# Patient Record
Sex: FEMALE | Race: WHITE
[De-identification: names, ages, dates, MRNs, and addresses within clinical notes are randomized per-mention and may not be internally consistent; named-entity substitution may affect disease eponyms.]

---

## 2017-09-16 NOTE — ERD
ER Documentation


Chief Complaint


Date/Time


DATE: 17 


TIME: 20:56


Chief Complaint


shortness x 1 week, hx asthma





HPI


Patient is a 46-year-old female with a history of asthma, breast cancer with 

mets to lungs and liver (last treatment over 1.5 years old), recurrent pleural 

effusions who presents to the emergency department for concerns of shortness of 

breath 1 week.  Patient states that her shortness of breath occurs 

episodically.  Patient states that she feel SOB when walking long distances. 

Patient denies any SOB at rest.  Patient reports difficulty with taking a deep 

breath.  Patient also reports intermittent episodes of palpitations.  She 

states she states that her episodes last less than 1 minute.  Patient denies 

any chest pain.  Patient denies any fever, chills, left upper extremity pain, 

nausea, vomiting, diaphoresis, loss of consciousness.  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 

has a desk job.  Patient denies any heavy lifting or recent trauma.  Patient 

denies any bilateral leg swelling, calf pain,recent surgeries, prolonged travel

, hormone use, history of DVT or PE.





ROS


All systems reviewed and are negative except as per history of present illness.





Medications


Home Meds


Active Scripts


Ferrous Sulfate* (Ferrous Sulfate*) 325 Mg Tabec, 325 MG PO BID, #60 TAB


   Prov:MAGDALENA PRICE PA-C         17


Diphenhydramine Hcl* (Benadryl*) 25 Mg Cap, 25 MG PO Q6, #30 CAP


   Prov:GILBERTO TIPTON NP         16


Tramadol HCl (Tramadol HCl) 50 Mg Tablet, 50 MG PO Q6 Y for PAIN, #20 TAB


   Prov:GILBERTO TIPTON NP         16


Hydrocodone Bit/Acetaminophen (Anexsia 5-325 Mg Tablet) 1 Tab Tablet, 1 TAB PO 

Q6H Y for MODERATE PAIN LEVEL 4-6, #20 TAB


   Prov:SAMEER ROE NP         16


Venlafaxine Hcl* (Venlafaxine Hcl ER*) 37.5 Mg Capsr, 37.5 MG PO DAILY for 30 

Days, CAP


   Prov:ROESAMEER NP         16


Apixaban* (Eliquis*) 5 Mg Tablet, 5 MG PO BID for 90 Days, TAB


   Prov:SAMEER ROE NP         16


Tamoxifen Citrate* (Nolvadex*) 10 Mg Tab, 20 MG PO DAILY for 30 Days, TAB


   Prov:SAMEER ROE NP         16


Reported Medications


Tamoxifen Citrate* (Tamoxifen Citrate*) 20 Mg Tab, 20 MG PO DAILY, TAB


   16





Allergies


Allergies:  


Coded Allergies:  


     Penicillins (Verified  Allergy, Mild, 16)


     hydrocodone bit (Verified  Adverse Reaction, Mild, HEADACHE, NAUSEA AND 

VOMITING, 16)





PMhx/Soc


History of Surgery:  Yes (RT MASTECTOMY 2013, BTL)


Anesthesia Reaction:  No


Hx Neurological Disorder:  No


Hx Respiratory Disorders:  Yes (PLEURAL EFFUSION REQUIRING CHEST TUBE )


Hx Cardiac Disorders:  No


Hx Psychiatric Problems:  No


Hx Miscellaneous Medical Probl:  Yes (breast ca)


Hx Alcohol Use:  No


Hx Substance Use:  No


Hx Tobacco Use:  No


Smoking Status:  Never smoker





Physical Exam


Vitals





Vital Signs








  Date Time  Temp Pulse Resp B/P Pulse Ox O2 Delivery O2 Flow Rate FiO2


 


17 01:38 98.3 89 20 137/68 97   


 


17 20:05 98.7 89 20 143/73 97   








Physical Exam


GENERAL: Well-developed, well-nourished female. Appears in no acute distress. 

No abdominal retractions, no nasal flaring.


HEAD: Normocephalic, atraumatic. 


EYES: Pupils are equally reactive bilaterally. EOMs grossly intact. No 

conjunctival erythema. 


ENT: Moist mucous membranes. No uvula deviation. No kissing tonsils. 


NECK: Supple. No meningismus. Normal range of motion of the neck.


LUNG: Clear to auscultation bilaterally. No rhonchi, wheezing, rales or coarse 

breath sounds. 


HEART: Regular rate and rhythm. No murmurs, rubs or gallops.


BACK: No midline tenderness. 


EXTREMITIES: Equal pulses bilaterally. No peripheral clubbing, cyanosis or 

edema. No unilateral leg swelling.


NEUROLOGIC: Alert and oriented. Moving all four extremities without any 

difficulty. Normal speech. Steady gait. 


SKIN: Normal color. Warm and dry. No rashes or lesions.


Result Diagram:  


17





Results 24 hrs





 Laboratory Tests








Test


  17


21:30


 


White Blood Count 11.310^3/ul 


 


Red Blood Count 4.4610^6/ul 


 


Hemoglobin 9.6g/dl 


 


Hematocrit 31.3% 


 


Mean Corpuscular Volume 70.2fl 


 


Mean Corpuscular Hemoglobin 21.5pg 


 


Mean Corpuscular Hemoglobin


Concent 30.7g/dl 


 


 


Red Cell Distribution Width 17.9% 


 


Platelet Count 97045^3/UL 


 


Mean Platelet Volume 9.1fl 


 


Neutrophils % 61.1% 


 


Lymphocytes % 28.8% 


 


Monocytes % 7.9% 


 


Eosinophils % 1.3% 


 


Basophils % 0.5% 


 


Nucleated Red Blood Cells % 0.0/100WBC 


 


Neutrophils # 6.910^3/ul 


 


Lymphocytes # 3.310^3/ul 


 


Monocytes # 0.910^3/ul 


 


Eosinophils # 0.210^3/ul 


 


Basophils # 0.110^3/ul 


 


Nucleated Red Blood Cells # 0.010^3/ul 


 


Sodium Level 139mmol/L 


 


Potassium Level 3.8mmol/L 


 


Chloride Level 108mmol/L 


 


Carbon Dioxide Level 21mmol/L 


 


Anion Gap 14 


 


Blood Urea Nitrogen 17mg/dl 


 


Creatinine 0.71mg/dl 


 


Glucose Level 108mg/dl 


 


Calcium Level 9.0mg/dl 


 


Troponin I < 0.012ng/ml 


 


B-Type Natriuretic Peptide 69PG/ML 











Procedures/MDM


ED COURSE:


The patient was stable throughout ED course. I kept the patient and/or family 

informed of laboratory and diagnostic imaging results throughout the ED course.

  





EKG:


Read by Dr. Wilson, attending physician.


EKG shows normal sinus rhythm at a rate of 72 bpm.


No arrhythmias, acute ST elevations or T wave changes were noted.


 


DIAGNOSTIC IMAGING:


Read by radiologist.








Patient: TULIO MCGOWAN   : 1970   Age: 46  Sex: F                   

     


 MR #:    [id number]   [id number]    DOS: 17


 Ordering MD: MAGDALENA PRICE PA-C   Location:  [facility]   Room/Bed:                 

                           


 








PROCEDURE:   XR Chest. 


 


CLINICAL INDICATION:   Shortness of breath.   


 


TECHNIQUE:   Portable AP semi erect view of the chest was obtained. 


 


COMPARISON:   2016 


 


FINDINGS:


 


The cardiomediastinal silhouette is within upper normal limits.  The right 

lower lobe consolidation appears slightly worse likely compressive atelectasis 

from an enlarging right pleural effusion.  The left lung is clear. There is no 

left pleural effusion or pulmonary vascular congestion.  The osseous structures 

are intact with no evidence for acute abnormality.  


 


RPTAT:HJJR


 


IMPRESSION:


 


Slight interval enlargement of chronic right pleural effusion and associated 

compressive atelectasis of the right lower lobe as compared to 2016. 

Chronic empyema is difficult to exclude. Consider follow-up evaluation.


_____________________________________________ 


Physician Blanka           Date    Time 


Electronically viewed and signed by Physician Blanka on 2017 22:36 


 


D:  2017 22:36  T:  2017 22:36


JR/





CC: MAGDALENA PRICE PA-C











 Patient: TULIO MCGOWAN   : 1970   Age: 46  Sex: F                  

      


 MR #:    [id number]   Acct #:   [id number]    DOS: 17 2312


 Ordering MD: MAGDALENA PRICE PA-C   Location:  [facility]   Room/Bed:                 

                           


 








PROCEDURE:   CT chest without contrast 


 


CLINICAL INDICATION:   Shortness of breath


 


TECHNIQUE:   CT scan of the chest with contrast was performed without 

intravenous contrast.  Coronal and sagittal images were reformatted.  One or 

more of the following dose reduction techniques were used: Automated exposure 

control, adjustment of the mA and/or kV according to patient size, use of 

iterative reconstruction technique.


The CTDIvol = 16.20 mGy and DLP = 618.05 mGycm.


 


COMPARISON:   Chest x-ray 2017 and 2016 


 


FINDINGS:


 


Lungs, airway and pleura:  The trachea and bronchi are patent as well as normal 

in caliber.  In addition to the compressive atelectasis of the right lower and 

middle lobe seen on the chest x-ray, there are multiple nodules scattered 

throughout both lungs ranging in size from 4 mm up to 1 cm. These findings may 

reflect atypical infectious etiologies but metastatic disease is difficult to 

exclude.  The loculated right pleural effusion extends into the major fissure 

and has a slightly hyperdense periphery in the inferior right thorax, findings 

unable to exclude a chronic empyema. The right pleural fluid may also reflect 

carcinomatosis. The right pleural fluid occupies an estimated 40% of the right 

thorax. There is no evidence of a left pleural effusion.


 


Mediastinum, berhane and cardiovascular:  The heart is normal in size.  There is 

no evidence for pericardial effusion.  The thoracic aorta is normal in caliber.

  Paratracheal adenopathy is approximately 1.8 cm in short axis. There is 

subcarinal adenopathy of approximately 1.3 cm in short axis.  The esophagus is 

normal in caliber.


 


Osseous structures and musculoskeletal findings:  Sclerosis of the sternum is 

concerning for blastic metastatic disease. There is some spondylosis of the 

thoracic spine without additional evidence of osseous metastasis.  Changes of 

right mastectomy are present.  Right axillary adenopathy has a short axis of 

1.7 cm and cannot exclude metastatic disease.


 


Visualized upper abdomen:  Hypodense masses within the liver are present, a 

total of 5 lesions are visualized ranging in size from 7 mm up to 6.6 cm in the 

right hepatic lobe, findings compatible with hepatic metastases. Adenopathy of 

the pao hepatis region is present.  The adrenal glands are normal bilaterally.


 


RPTAT:HJJR


 


IMPRESSION:


 


1.  Loculated right pleural effusion is more concerning for carcinomatosis 

rather than a chronic empyema


2. Multiple scattered pulmonary nodules bilaterally in addition to compressive 

atelectasis of the right lower lobe are concerning for metastatic disease. 

Mediastinal adenopathy is also present and concerning for neoplastic 

involvement.


3.  Changes of right mastectomy are noted with right axillary lymphadenopathy, 

hepatic metastases and a sclerotic sternum focus, the combination of findings 

believed to reflect stage IV metastatic breast cancer. Consideration might be 

given for follow-up evaluation with PET.


_____________________________________________ 


Physician Blanka           Date    Time 


Electronically viewed and signed by Physician Blanka on 2017 23:54 


 


D:  2017 23:54  T:  2017 23:54


JR/





CC: MAGDALENA PRICE PA-C








MEDICAL DECISION MAKING:


This is a 46-year-old female with PMHx of breast cancer with mets to lung and 

liver who presents emergency department for concerns of shortness of breath and 

palpitations for the last week.  Patient describes her symptoms to be episodic 

in nature.  Patient denied any leg swelling, recent surgeries, travel, 

exogenous estrogen use. Vital signs were reviewed. Patient was afebrile. 

Patient was not hypoxic. 





CBC showed signs of a slight anemia.  Patient does not have any indications for 

an emergent blood transfusion at this time. BMP showed no evidence of 

electrolyte abnormalities, severe acidosis, alkalosis, renal failure. PERC 

score was 0.  Troponin was negative.  BNP was negative. EKG was obtained and 

showed normal sinus rhythm.  Reviewed by ED attending.  CXR showed slight 

interval enlargement of chronic right pleural effusion and associated 

compressive atelectasis of the right lower lobe as compared to 2016. 

Chronic empyema is difficult to exclude. Consider follow-up evaluation. 

Initially, I spoke to Dr. Wilson, supervising physician who advised me to order 

CT chest.





CT chest was obtained given the patient had a history of cancer.   CT chest 

showed  1.  Loculated right pleural effusion is more concerning for 

carcinomatosis rather than a chronic empyema 2. Multiple scattered pulmonary 

nodules bilaterally in addition to compressive atelectasis of the right lower 

lobe are concerning for metastatic disease. Mediastinal adenopathy is also 

present and concerning for neoplastic involvement. 3.  Changes of right 

mastectomy are noted with right axillary lymphadenopathy, hepatic metastases 

and a sclerotic sternum focus, the combination of findings believed to reflect 

stage IV metastatic breast cancer. Consideration might be given for follow-up 

evaluation with PET.





Discussed findings with Dr. Howard, my supervising physician at the time.  Dr. Howard also examined and spoke to the patient.  We had a long discussion with 

patient in regards to her CT findings. Patient was offered admission, however 

she declined.  Patient states she is barely getting her life back on track and 

wished to go home at this time.  Patient was advised that she may return at 

anytime for any new or worsening symptoms.  Patient was encouraged to follow-up 

with her oncologist  for further management of her pleural effusion and 

CT chest findings.  Patient understands and is agreeable with this plan.  

Patient's O2 sat remained above 95% throughout ED course with no signs of 

respiratory distress/failure.





At this time, patient's presentation is most consistent with pleural effusion 

and possible carcinomatosis. She also noted to have a slight anemia.  Patient 

was advised to take iron supplements. Low suspicion for ACS, pericarditis, 

pericardial tamponade, PE, pneumothorax, pneumonia, bronchitis. 





PRESCRIPTION:


Ferrous sulfate





DISCHARGE:


At this time, patient is stable for discharge and outpatient management. 

Patient was given a copy of all imaging studies and blood work obtained today. 

I have instructed the patient to follow-up with his/her primary care physician/ 

oncologist (Dr. Dawn) in 1-2 days. I have discussed with the patient the 

possibility of needing to see a specialist for further workup and imaging 

studies if symptoms persist. I have instructed the patient to promptly return 

to the ER for any new or worsening symptoms including increased pain, fever, 

nausea, vomiting, weakness or LOC. The patient and/or family expressed 

understanding of and agreement with this plan. All questions were answered. 

Home care instructions were provided. 





Disclaimer: Inadvertent spelling and grammatical errors are likely due to EHR/

dictation software use and do not reflect on the overall quality of patient 

care. Also, please note that the electronic time recorded on this note does not 

necessarily reflect the actual time of the patient encounter





Departure


Diagnosis:  


 Primary Impression:  


 Pleural effusion, right


 Additional Impressions:  


 Shortness of breath


 Carcinomatosis


Condition:  Stable


Patient Instructions:  Pleural Effusion, What Is Breast Cancer?


Referrals:  


JAVED GONZALEZ CARLOS M. MD ARORA,ANITRA HONEYCUTT MD, MD,SHIREEN CELESTIN,TORI FERRER,CAITLIN MANDUJANO MD





Additional Instructions:  








Call your primary care doctor TOMORROW for an appointment during the next 1-2 

days.See the doctor sooner or return here if your condition worsens before your 

appointment time.





Call Dr. Dawn, your oncologist was further management of your pleural 

effusion. Unable to rule out metastasis at this time.











MAGDALENA PRICE PA-C Sep 16, 2017 21:02

## 2017-09-16 NOTE — RADRPT
PROCEDURE:   CT chest without contrast 

 

CLINICAL INDICATION:   Shortness of breath

 

TECHNIQUE:   CT scan of the chest with contrast was performed without intravenous contrast.  Coronal
 and sagittal images were reformatted.  One or more of the following dose reduction techniques were 
used: Automated exposure control, adjustment of the mA and/or kV according to patient size, use of i
terative reconstruction technique.

The CTDIvol = 16.20 mGy and DLP = 618.05 mGycm.

 

COMPARISON:   Chest x-ray 09/16/2017 and 05/11/2016 

 

FINDINGS:

 

Lungs, airway and pleura:  The trachea and bronchi are patent as well as normal in caliber.  In wilfred
tion to the compressive atelectasis of the right lower and middle lobe seen on the chest x-ray, ther
e are multiple nodules scattered throughout both lungs ranging in size from 4 mm up to 1 cm. These f
indings may reflect atypical infectious etiologies but metastatic disease is difficult to exclude.  
The loculated right pleural effusion extends into the major fissure and has a slightly hyperdense pe
riphery in the inferior right thorax, findings unable to exclude a chronic empyema. The right pleura
l fluid may also reflect carcinomatosis. The right pleural fluid occupies an estimated 40% of the ri
ght thorax. There is no evidence of a left pleural effusion.

 

Mediastinum, berhane and cardiovascular:  The heart is normal in size.  There is no evidence for perica
rdial effusion.  The thoracic aorta is normal in caliber.  Paratracheal adenopathy is approximately 
1.8 cm in short axis. There is subcarinal adenopathy of approximately 1.3 cm in short axis.  The eso
phagus is normal in caliber.

 

Osseous structures and musculoskeletal findings:  Sclerosis of the sternum is concerning for blastic
 metastatic disease. There is some spondylosis of the thoracic spine without additional evidence of 
osseous metastasis.  Changes of right mastectomy are present.  Right axillary adenopathy has a short
 axis of 1.7 cm and cannot exclude metastatic disease.

 

Visualized upper abdomen:  Hypodense masses within the liver are present, a total of 5 lesions are v
isualized ranging in size from 7 mm up to 6.6 cm in the right hepatic lobe, findings compatible with
 hepatic metastases. Adenopathy of the pao hepatis region is present.  The adrenal glands are norm
al bilaterally.

 

RPTAT:HJJR

 

IMPRESSION:

 

1.  Loculated right pleural effusion is more concerning for carcinomatosis rather than a chronic emp
yema

2. Multiple scattered pulmonary nodules bilaterally in addition to compressive atelectasis of the ri
ght lower lobe are concerning for metastatic disease. Mediastinal adenopathy is also present and con
cerning for neoplastic involvement.

3.  Changes of right mastectomy are noted with right axillary lymphadenopathy, hepatic metastases an
d a sclerotic sternum focus, the combination of findings believed to reflect stage IV metastatic santos
ast cancer. Consideration might be given for follow-up evaluation with PET.

_____________________________________________ 

Physician Blanka           Date    Time 

Electronically viewed and signed by Physician Blanka on 09/16/2017 23:54 

 

D:  09/16/2017 23:54  T:  09/16/2017 23:54

/

## 2017-09-16 NOTE — RADRPT
PROCEDURE:   XR Chest. 

 

CLINICAL INDICATION:   Shortness of breath.   

 

TECHNIQUE:   Portable AP semi erect view of the chest was obtained. 

 

COMPARISON:   05/11/2016 

 

FINDINGS:

 

The cardiomediastinal silhouette is within upper normal limits.  The right lower lobe consolidation 
appears slightly worse likely compressive atelectasis from an enlarging right pleural effusion.  The
 left lung is clear. There is no left pleural effusion or pulmonary vascular congestion.  The osseou
s structures are intact with no evidence for acute abnormality.  

 

RPTAT:HJJR

 

IMPRESSION:

 

Slight interval enlargement of chronic right pleural effusion and associated compressive atelectasis
 of the right lower lobe as compared to 05/11/2016. Chronic empyema is difficult to exclude. Conside
r follow-up evaluation.

_____________________________________________ 

Physician Blanka           Date    Time 

Electronically viewed and signed by Physician Blanka on 09/16/2017 22:36 

 

D:  09/16/2017 22:36  T:  09/16/2017 22:36

/

## 2017-09-26 NOTE — RADRPT
PROCEDURE:   XR Chest. 

 

CLINICAL INDICATION:   Chest pain.

 

TECHNIQUE:   Single frontal view. 

 

COMPARISON:   09/16/2017. 

 

FINDINGS:

There is a large right pleural effusion and only minimal aeration of the right lung apex. The left l
jorge alberto is clear and there is no left pleural effusion. 

The heart size is normal.  There is calcification in the aorta consistent with atherosclerosis. 

There is no pneumothorax.   

 

IMPRESSION:

1.  Large right pleural effusion, larger than seen previously.

2.  Only minimal aeration of the right lung apex.

3.  Otherwise unremarkable study.

 

RPTAT: QQ

_____________________________________________ 

.Charly Espinal MD, MD           Date    Time 

Electronically viewed and signed by .Charly Espinal MD, MD on 09/26/2017 16:25 

 

D:  09/26/2017 16:25  T:  09/26/2017 16:25

.R/

## 2017-09-26 NOTE — ERA
ER Documentation


Chief Complaint


Date/Time


DATE: 9/26/17 


TIME: 16:41


Chief Complaint


sob when lying flat during mri, lung biopsy x 1 week, + cancer





HPI


This is a 46-year-old female with a history of right breast cancer who had her 

right pleural effusion drained 5 days ago.  Patient says she felt good 1-2 days 

after but it started getting short of breath.  She says she cannot walk very 

far and has orthopnea.  Patient was a code green at radiology department in the 

MRI scanner.  The patient apparently was getting an MRI of her brain when she 

was given IV contrast she became very dizzy.  The MRI was completed and when 

she was taken out of the scanner she was complaining of severe dizziness and 

did not feel well.  There is no rash no swelling of the face lips tongues or 

throat.  She had no chest pain.  She states that once she got to the ER here 

she was having profuse vomiting over and over that is nonbloody.  She says she 

felt better after she stopped vomiting.  She denies any recent fever abdominal 

pain diarrhea illness or productive cough





ROS


All systems reviewed and are negative except as per history of present illness.





Medications


Home Meds


Discontinued Reported Medications


Tamoxifen Citrate* (Tamoxifen Citrate*) 20 Mg Tab, 20 MG PO DAILY, TAB


   7/14/16


Discontinued Scripts


Ferrous Sulfate* (Ferrous Sulfate*) 325 Mg Tabec, 325 MG PO BID, #60 TAB


   Prov:MAGDALENA PRICE PA-C         9/17/17


Diphenhydramine Hcl* (Benadryl*) 25 Mg Cap, 25 MG PO Q6, #30 CAP


   Prov:GILBERTO TIPTON NP         11/19/16


Tramadol HCl (Tramadol HCl) 50 Mg Tablet, 50 MG PO Q6 Y for PAIN, #20 TAB


   Prov:GILBERTO TIPTON NP         11/19/16


Hydrocodone Bit/Acetaminophen (Anexsia 5-325 Mg Tablet) 1 Tab Tablet, 1 TAB PO 

Q6H Y for MODERATE PAIN LEVEL 4-6, #20 TAB


   Prov:SAMEER ROE NP         5/12/16


Venlafaxine Hcl* (Venlafaxine Hcl ER*) 37.5 Mg Capsr, 37.5 MG PO DAILY for 30 

Days, CAP


   Prov:SAMEER ROE NP         5/11/16


Apixaban* (Eliquis*) 5 Mg Tablet, 5 MG PO BID for 90 Days, TAB


   Prov:ROE,SAMEER V. NP         5/11/16


Tamoxifen Citrate* (Nolvadex*) 10 Mg Tab, 20 MG PO DAILY for 30 Days, TAB


   Prov:ROE,SAMEER V. NP         5/11/16





Allergies


Allergies:  


Coded Allergies:  


     Penicillins (Verified  Allergy, Mild, 9/26/17)


     hydrocodone bit (Verified  Adverse Reaction, Mild, HEADACHE, NAUSEA AND 

VOMITING, 9/26/17)





PMhx/Soc


History of Surgery:  Yes (RT MASTECTOMY 2013, BTL)


Anesthesia Reaction:  No


Hx Neurological Disorder:  No


Hx Respiratory Disorders:  Yes (PLEURAL EFFUSION REQUIRING CHEST TUBE 2016)


Hx Cardiac Disorders:  No


Hx Psychiatric Problems:  No


Hx Miscellaneous Medical Probl:  Yes (breast ca)


Hx Alcohol Use:  No


Hx Substance Use:  No


Hx Tobacco Use:  No


Smoking Status:  Never smoker





Physical Exam


Vitals





Vital Signs








  Date Time  Temp Pulse Resp B/P Pulse Ox O2 Delivery O2 Flow Rate FiO2


 


9/26/17 18:07  81 16 133/70 97 Room Air  


 


9/26/17 16:00      Nasal Cannula 2.0 


 


9/26/17 16:00      Nasal Cannula 2 


 


9/26/17 16:00 98.8 91 24 160/85 97   








Physical Exam


Const:  []


Head:   Atraumatic 


Eyes:    Normal Conjunctiva


ENT:    Normal External Ears, Nose and Mouth.


Neck:               Full range of motion..~ No meningismus.


Resp:    Clear to auscultation bilaterally


Cardio:    Regular rate and rhythm, no murmurs


Abd:    Soft, non tender, non distended. Normal bowel sounds


Skin:    No petechiae or rashes


Back:    No midline or flank tenderness


Ext:    No cyanosis, or edema


Neur:    Awake and alert


Psych:    Normal Mood and Affect


Result Diagram:  


9/26/17 1600                                                                   

             9/26/17 1600





Results 24 hrs





 Laboratory Tests








Test


  9/26/17


16:00


 


White Blood Count 13.410^3/ul 


 


Red Blood Count 4.8310^6/ul 


 


Hemoglobin 10.3g/dl 


 


Hematocrit 33.5% 


 


Mean Corpuscular Volume 69.4fl 


 


Mean Corpuscular Hemoglobin 21.3pg 


 


Mean Corpuscular Hemoglobin


Concent 30.7g/dl 


 


 


Red Cell Distribution Width 18.0% 


 


Platelet Count 28791^3/UL 


 


Mean Platelet Volume 9.4fl 


 


Neutrophils % 78.6% 


 


Lymphocytes % 14.8% 


 


Monocytes % 5.6% 


 


Eosinophils % 0.1% 


 


Basophils % 0.4% 


 


Nucleated Red Blood Cells % 0.0/100WBC 


 


Neutrophils # 10.510^3/ul 


 


Lymphocytes # 2.010^3/ul 


 


Monocytes # 0.810^3/ul 


 


Eosinophils # 0.010^3/ul 


 


Basophils # 0.110^3/ul 


 


Nucleated Red Blood Cells # 0.010^3/ul 


 


Prothrombin Time 14.1Sec 


 


Prothrombin Time Ratio 1.1 


 


INR International Normalized


Ratio 1.09 


 


 


Activated Partial


Thromboplast Time 32.8Sec 


 


 


Sodium Level 137mmol/L 


 


Potassium Level 4.0mmol/L 


 


Chloride Level 105mmol/L 


 


Carbon Dioxide Level 22mmol/L 


 


Anion Gap 14 


 


Blood Urea Nitrogen 10mg/dl 


 


Creatinine 0.57mg/dl 


 


Glucose Level 123mg/dl 


 


Calcium Level 9.2mg/dl 


 


Total Bilirubin 0.2mg/dl 


 


Direct Bilirubin 0.00mg/dl 


 


Indirect Bilirubin 0.2mg/dl 


 


Aspartate Amino Transf


(AST/SGOT) 59IU/L 


 


 


Alanine Aminotransferase


(ALT/SGPT) 29IU/L 


 


 


Alkaline Phosphatase 107IU/L 


 


Troponin I < 0.012ng/ml 


 


B-Type Natriuretic Peptide 77PG/ML 


 


Total Protein 8.1g/dl 


 


Albumin 4.0g/dl 


 


Globulin 4.10g/dl 


 


Albumin/Globulin Ratio 0.97 








 Current Medications








 Medications


  (Trade)  Dose


 Ordered  Sig/Aiden


 Route


 PRN Reason  Start Time


 Stop Time Status Last Admin


Dose Admin


 


 Ondansetron HCl


  (Zofran Inj)  4 mg  ONCE  STAT


 IV


   9/26/17 16:07


 9/26/17 16:10 DC 9/26/17 16:26


 











Procedures/MDM





PROCEDURE:   XR Chest. 


 


CLINICAL INDICATION:   Chest pain.


 


TECHNIQUE:   Single frontal view. 


 


COMPARISON:   09/16/2017. 


 


FINDINGS:


There is a large right pleural effusion and only minimal aeration of the right 

lung apex. The left lung is clear and there is no left pleural effusion. 


The heart size is normal.  There is calcification in the aorta consistent with 

atherosclerosis. 


There is no pneumothorax.   


 


IMPRESSION:


1.  Large right pleural effusion, larger than seen previously.


2.  Only minimal aeration of the right lung apex.


3.  Otherwise unremarkable study.


 


RPTAT: QQ


_____________________________________________ 


.Charly Espinal MD, MD           Date    Time 


Electronically viewed and signed by .Charly Espinal MD, MD on 09/26/2017 16:25 


 


D:  09/26/2017 16:25  T:  09/26/2017 16:25


.R/





CC: SINA BROWN DO











The patient again has a very large right pleural effusion.  His unusual that 

the fluid has accumulated so fast again just 4-5 days after being drained.





Her dizziness may have been caused from a contrast induced reaction.  Her 

vomiting may have been from that or from the vertigo she was feeling.





We will admit her to the hospital for symptomatic pleural effusion she will 

need to have it drained again.





Departure


Diagnosis:  


 Primary Impression:  


 Pleural effusion


Condition:  Stable











SINA BROWN DO Sep 26, 2017 16:43

## 2017-09-26 NOTE — RADRPT
PROCEDURE:   CT Brain without contrast. 

 

CLINICAL INDICATION: Sudden left-sided weakness

 

TECHNIQUE:   A CT of the brain was performed on a GE LightSpeed 64-slice CT scanner utilizing axial 
imaging from the skull base through the vertex without IV contrast.  Multiplanar reformatted images 
were made.  Images were reviewed on a PACS workstation.  The CTDIvol is 42.81 mGy and the DLP is 20.
23 mGycm.  

 

One of the following 3 does reduction techniques were used during this CT examination:

1)  Automated exposure control

2)  Adjustment of the mA +/- kV according to patient size or

3)  Use of iterative reconstruction technique

 

COMPARISON:   None 

 

FINDINGS:

 

The visualized scalp and again demonstrates a preventive lesion in the right frontal calvarium with 
the trans spatial lesion extending from the right frontal scalp involving the calvarium and the righ
t frontal extra-axial and frontal lobe. This lesion is concerning for metastasis. Perilesional edema
 is noted in the right frontal lobe with effacement of the right lateral ventricle. Again noted is 6
 mm right to left midline herniation. Multiple hyperdense nodules are noted in the left posterior pa
rietal lobe at the gray-white matter junction ranging in size from 4 to 9 mm. Hyperdense metastasis 
are again likely per Brain MRI with contrast is again recommended to further evaluate.

 

No extra-axial fluid collections are present. No evidence for extra-axial hemorrhage is noted. No ev
idence for hydrocephalus is noted.

 

IMPRESSION:

 

1.  No significant interval change in the large right frontal trans patient will mass involving the 
scalp, the calvarium, the right frontal extra-axial space in the right frontal lobe with extensive p
erilesional edema. This is concerning for a metastasis and recommend brain MRI with contrast.

2.  Multiple left parietal convexity hyperdense lesions ranging in size from fourth to 9 mm also com
patible with metastasis.

3.  6 mm right to left midline shift

4.  No evidence for hydrocephalus at this time.

 

 

 

 

RPTAT: HDC

_____________________________________________ 

.Taylor Rice MD, MD           Date    Time 

Electronically viewed and signed by .Taylor Rice MD, MD on 09/26/2017 22:14 

 

D:  09/26/2017 22:14  T:  09/26/2017 22:14

.C/

## 2017-09-27 NOTE — CONS
DATE OF ADMISSION:  09/26/2017

 

DATE OF CONSULTATION:  09/27/2017

 

REASON FOR CONSULTATION:  Right pleural effusion.

 

Thank you, Dr. Quevedo for this consultation.

 

HISTORY OF PRESENT ILLNESS:  This is a pleasant, 46-year-old lady

with a history of metastatic breast cancer now found to have METS

to the brain, in addition to history of METS to the lungs.  She

has had recurrent pleural effusions.  The last thoracentesis was

performed less than 1 week ago.  The patient states in the past

she has had a PleurX catheter approximately a year ago and this

was removed when she had minimal drainage.  Currently has mild

dyspnea but no hemoptysis or hematemesis.  No nausea or vomiting.

 

PAST MEDICAL HISTORY:

1.   Metastatic breast cancer.

2.   Mild obesity.

 

MEDICATION:  Per chart.

 

ALLERGIES:  PENICILLIN AND HYDROCODONE.

 

SOCIAL HISTORY:  She is a nonsmoker.  No alcohol.  No history of

drug use.

 

FAMILY HISTORY:  Noncontributory.

 

REVIEW OF SYSTEMS:  A 12-point review of systems negative other

than that mentioned above.

 

PHYSICAL EXAMINATION:

GENERAL:  Well-nourished, well-developed lady, comfortable at

rest.  No acute distress.

VITAL SIGNS:  Currently afebrile.  Pulse is 68, blood pressure

125/72, O2 sat 96 percent on 4 L nasal cannula.

NECK:  Supple.  No JVD.  No lymphadenopathy.

CARDIAC:  S1, S2.  No added sounds or murmurs.

CHEST:  Diminished air entry bilaterally.

ABDOMEN:  Soft, nontender.  No guarding or rebound.

EXTREMITIES:  No cyanosis, clubbing or edema.

NEUROLOGICALLY:  No focal deficits.

 

LABORATORY:  White count 10.5, hemoglobin 9.4, and platelets of

235.  INR 1.09.

 

Chest x-ray shows a large to moderate right pleural effusion.

 

IMPRESSION:

1.   Recurrent right pleural effusion, likely secondary to

  underlying metastatic breast cancer.

2.   New central nervous system (CNS) lesions with cerebral

edema.

 

PLAN:

1.   The patient to have a PleurX catheter placed for recurrent

  pleural effusions.

2.   Continue steroids for cerebral edema.

3.   Neurosurgical recommendations.

4.   Consider palliative care consultation as overall prognosis

is guarded.

 

 

 

Dictated By:  Davion Bazan MD

 

/rené/carlos

Job#:  [id number]/Document#:  [id number]

D:  09/27/2017 12:09

T:  09/27/2017 12:36

## 2017-09-27 NOTE — CONS
Date/Time of Note


Date/Time of Note


DATE: 9/27/17 


TIME: 13:46





Assessment/Plan


Assessment/Plan


Chief Complaint/Hosp Course


#Her2+/ER+/AZ+ metastatic breast ca


-pt is non compliant and when she receives therapy, she is quite responsive to 

therapy


-therefore at this time, although she has terminal disease, there are still 

many more treatment options which she could benefit from


-given her Brain mets, I would consider a combination of Xeloda and Lapatinib 

which can penetrate the blood brain barrier


-once she stabilizes can restart Tamoxifen as well





#Brain Mets


-these are quite large and bulky


-I have spoken to neurosurgery about potentially resecting these tumors. 


-will refer the patient to rad onc as well as she will need this regardless, 

post operatively


-if surgery is not an option we can also consider radiosurgery





#Pleural Effusion


-pt to have a thoracentesis


-will check cytology and re-evaluate her prognostic markers


Problems:  





Consultation Date/Type/Reason


Admit Date/Time


Sep 26, 2017 at 19:54


Date of Consultation:  Sep 27, 2017


Type of Consultation:  Oncology


Reason for Consultation


metastatic Her2+ Breast ca now with brain mets


Referring Provider:  SHIREEN ASHBY





Hx of Present Illness


46-year-old female first seen our office in Dec 2014. Her history is as follows:


-05/28/2013 had a right mastectomy on  for a 3 cm poorly differentiated 

infiltrating ductal carcinoma with high-grade ductal carcinoma in situ 

measuring 3 cm with clear margins, but 7/8 nodes were positive for cancer.  Her 

ER and AZ were strongly positive.  Ki-67 was high at 82%.  Her HER-2/iman was 

positive at 3+ by IHC,, PT was initially treated by Dr. Claudy Alfonso  who 

presumably gave the patient adjuvant Taxotere/ Cytoxan and Herceptin which was 

completed in 4/2014 with breaks in the therapy due to patient non compliance. 

PT was also given Tamoxifen which she only took for 2 months.





-12/2014 pt presented to our clinic and was restarted on Tamoxifen The patient 

received untilchemotherapy until 04/2014, but I do not have the flow sheet.  

The patient was started on tamoxifen earlier this year, but she stopped after 

two months because of side effects. 


-3/2016 Re-presented to our clinic, off tamoxifen for almost 2 years. PET CT 

done revealed enlarged lymph nodes in R axilla and intrathroacic lymph nodes. 

Also noted were pulmonary nodules, right pleural effusion, liver mets and bone 

mets. She changed insurance.  It should be noted that her BRCA1 and BRCA2 were 

done, which were negative.


5/2016 - PT started TAxotere/ Cytoxan / Herceptin. She received 6 cycles until 

11/15/16. Shew as also placed back on Tamoxifen


8/2016 PET CT showed significant response to treatment with resolved pleural 

effusion, and resolved lymphadenopathy as well as decrease in liver mets and 

resolved bone mets.


1/2017: pt was on single agent Tamoxifen as she wanted to take a break from 

infusional therapy. 


2/2017: PET CT was done which demonstrated continued decrease in 

lymphadenopathy and continued decrease in the hepatic mets. osseous mets were 

stable.


3/2017 was the last time we saw her in clinic.


7/2017 : pt was noted to have recurrent pleural effusions and had a pleurex 

catheter placed and removed. The cytology was malignant. She was to follow up 

with me as an outpatient to re-initiate therapy


9/2017 - CT Chest was done which demonstrated loculated pleural effusion 

concerning for carcinomatosis. Also seen are multiple pulmonary nodules and 

lymph adenopathy


9/22/17 PET CT :  new metabolic uptake in anterior temporal lobe concerning for 

brain mets. extensive new liver mets, progressive osseous mets and progressive 

barbie mets in the bilateral lower neck, right axilla, chest and abdomen. also 

seen is a new right pleural effusion. also seen are progressive pulmonary mets. 


9/26/17 Brain MRI done revealed mass in right frontal calvarium extending to 

overlying scalp soft tissue and underling meninges measuring 5cm. also seen is 

a mass in kia right sphenotemporal buttress extending to dura measuring 1.9 cm. 

also seen in a left parietal lobe lesion measuring 9mm, there is associated 

midline shift and no herniation





Pt was admitted for neurosurgical evaluation.


Constitutional:  other (dizziness)


Eyes:  no complaints


ENT:  no complaints


Respiratory:  no complaints, shortness of breath


Cardiovascular:  no complaints


Gastrointestinal:  constipation, decreased appetite


Musculoskeletal:  bone/joint pain





Past Medical History


metastatic breast cancer





Past Surgical History


R Breast MRM in 2013





Family History


Significant Family History:  no pertinent family hx





Social History


Alcohol Use:  none


Smoking Status:  Never smoker


Drug Use:  none





Exam/Review of Systems


Vital Signs


Vitals





 Vital Signs








  Date Time  Temp Pulse Resp B/P Pulse Ox O2 Delivery O2 Flow Rate FiO2


 


9/27/17 12:00  68      


 


9/27/17 11:00   20 125/72 97 Nasal Cannula 4.0 


 


9/27/17 08:00 98.1       














 Intake and Output   


 


 9/26/17 9/26/17 9/27/17





 15:00 23:00 07:00


 


Intake Total   200 ml


 


Output Total   1050 ml


 


Balance   -850 ml











Exam


Constitutional:  alert, oriented


Psych:  no complaints


Head:  normocephalic


Eyes:  nl conjunctiva


ENMT:  nl external ears & nose


Neck:  non-tender, supple


Respiratory:  crackles/rales, diminished breath sounds, labored breathing


Cardiovascular:  nl pulses, regular rate and rhythm


Gastrointestinal:  soft


Musculoskeletal:  nl extremities to inspection


Skin:  other (s/p R MRM)





Results


Result Diagram:  


9/26/17 2305                                                                   

             9/26/17 2305





Results 24 hrs





Laboratory Tests








Test


  9/26/17


16:00 9/26/17


23:05


 


White Blood Count 13.4  H 10.5  #


 


Red Blood Count 4.83   4.41  


 


Hemoglobin 10.3  L 9.4  L


 


Hematocrit 33.5  L 30.5  L


 


Mean Corpuscular Volume 69.4  L 69.2  L


 


Mean Corpuscular Hemoglobin 21.3  L 21.3  L


 


Mean Corpuscular Hemoglobin


Concent 30.7  L


  30.8  L


 


 


Red Cell Distribution Width 18.0  H 17.7  H


 


Platelet Count 295   235  #


 


Mean Platelet Volume 9.4   9.3  


 


Neutrophils % 78.6  H 75.4  


 


Lymphocytes % 14.8  L 16.3  


 


Monocytes % 5.6   7.5  


 


Eosinophils % 0.1   0.1  


 


Basophils % 0.4   0.3  


 


Nucleated Red Blood Cells % 0.0   0.0  


 


Neutrophils # 10.5  H 7.9  H


 


Lymphocytes # 2.0   1.7  


 


Monocytes # 0.8   0.8  


 


Eosinophils # 0.0   0.0  


 


Basophils # 0.1   0.0  


 


Nucleated Red Blood Cells # 0.0   0.0  


 


Prothrombin Time 14.1   


 


Prothrombin Time Ratio 1.1   


 


INR International Normalized


Ratio 1.09  


  


 


 


Activated Partial


Thromboplast Time 32.8  


  


 


 


Sodium Level 137   135  


 


Potassium Level 4.0   3.9  


 


Chloride Level 105   103  


 


Carbon Dioxide Level 22   26  


 


Anion Gap 14   10  


 


Blood Urea Nitrogen 10   10  


 


Creatinine 0.57   0.62  


 


Glucose Level 123   124  


 


Calcium Level 9.2   9.1  


 


Total Bilirubin 0.2   0.3  


 


Direct Bilirubin 0.00   0.00  


 


Indirect Bilirubin 0.2   0.3  


 


Aspartate Amino Transf


(AST/SGOT) 59  H


  56  H


 


 


Alanine Aminotransferase


(ALT/SGPT) 29  


  29  


 


 


Alkaline Phosphatase 107   96  


 


Troponin I < 0.012   


 


B-Type Natriuretic Peptide 77   


 


Total Protein 8.1   7.3  


 


Albumin 4.0   3.3  


 


Globulin 4.10  H 4.00  H


 


Albumin/Globulin Ratio 0.97   0.82  











Medications


Medications





 Current Medications


Ondansetron HCl (Zofran Inj) 4 mg Q6H  PRN IV NAUSEA AND/OR VOMITING;  Start 9/ 26/17 at 22:30


Acetaminophen (Tylenol Liquid) 650 mg Q6H  PRN PO PAIN LEVEL 1-3 OR FEVER;  

Start 9/26/17 at 22:30


Morphine Sulfate (morphine) 2 mg Q4H  PRN IV PAIN LEVEL 7-10;  Start 9/26/17 at 

22:30


Famotidine (Pepcid) 20 mg Q12 PO  Last administered on 9/27/17at 09:34; Admin 

Dose 20 MG;  Start 9/27/17 at 09:00


Dexamethasone (Decadron) 4 mg Q6 IV  Last administered on 9/27/17at 11:43; 

Admin Dose 4 MG;  Start 9/27/17 at 00:00











KRISTEL ORANTES M.D. Sep 27, 2017 14:09

## 2017-09-27 NOTE — HP
Date/Time of Note


Date/Time of Note


DATE: 9/27/17 


TIME: 07:35





Assessment/Plan


VTE Prophylaxis


VTE Prophylaxis Intervention:  SCD's





Lines/Catheters


IV Catheter Type (from [facility]):  Peripheral IV


Urinary Cath still in place:  No





Assessment/Plan


Assessment/Plan





ASSESSMENT


46-year-old female with history of breast cancer with metastasis to the lung, 

liver and recurrent pleural effusion now with brain metastasis





PLAN


Continue ICU monitoring


Frequent neuro checks


Decadron


Follow-up MRI of the brain results


Thoracentesis


Neurosurgery evaluation


Will notify her oncologist about admission





HPI/ROS


Admit Date/Time


Admit Date/Time


Sep 26, 2017 at 19:54





Hx of Present Illness





This is a 46-year-old female with a history of breast cancer with metastasis to 

lung and liver status post right-sided mastectomy with recurrent rule out 

effusion who initially presented for a MRI of the brain.  During imaging, 

patient became severely dizzy as a result patient was sent to ER.  MRI was 

completed.  She told me that even when she was at home, she was planning on 

going to the ER after MRI is done because she has been having diffuse headache 

for the past day or 2 and because of continued shortness of breath.  She said a 

few days ago she had thoracentesis at the outside hospital but she continued to 

have shortness of breath.  She has had multiple thoracentesis for recurrent 

right-sided pleural effusion.


Patient was initially admitted to telemetry unit but acidosis she got there she 

was noted to have difficulty speaking and a per patient she was also shaking.  

Patient is admitted to ICU for close monitoring given finding of brain imaging.

  See below.





CT of the head showed Large right trans spatial mass involving the right 

frontal scalp, calvarium, extra axial space and right frontal lobe with 

extensive perilesional edema, Multiple left parietal convexity hyperdense 

lesions at the gray-white matter junction compatible with additional metastasis.


and 6 mm right to left midline herniation and effacement of the right lateral 

ventricle.





PMH/Family/Social


Social History


Smoking Status:  Never smoker





Exam/Review of Systems


Vital Signs


Vitals





 Vital Signs








  Date Time  Temp Pulse Resp B/P Pulse Ox O2 Delivery O2 Flow Rate FiO2


 


9/27/17 06:21       2.0 


 


9/27/17 06:00  70 24  93   


 


9/27/17 05:00    130/71  Nasal Cannula  


 


9/27/17 04:00 98.3       














 Intake and Output   


 


 9/26/17 9/26/17 9/27/17





 15:00 23:00 07:00


 


Intake Total   200 ml


 


Output Total   1050 ml


 


Balance   -850 ml











Exam


Constitutional:  alert, oriented, well developed


Head:  atraumatic, normocephalic


Eyes:  EOMI, PERRL


Respiratory:  diminished breath sounds


Cardiovascular:  nl pulses, regular rate and rhythm


Extremities:  normal pulses





Labs


Result Diagram:  


9/26/17 2305 9/26/17 2305








Medications


Medications





 Current Medications


Ondansetron HCl (Zofran Inj) 4 mg Q6H  PRN IV NAUSEA AND/OR VOMITING;  Start 9/ 26/17 at 22:30


Acetaminophen (Tylenol Liquid) 650 mg Q6H  PRN PO PAIN LEVEL 1-3 OR FEVER;  

Start 9/26/17 at 22:30


Morphine Sulfate (morphine) 2 mg Q4H  PRN IV PAIN LEVEL 7-10;  Start 9/26/17 at 

22:30


Famotidine (Pepcid) 20 mg Q12 PO ;  Start 9/27/17 at 09:00


Dexamethasone (Decadron) 4 mg Q6 IV  Last administered on 9/27/17at 06:43; 

Admin Dose 4 MG;  Start 9/27/17 at 00:00











JOSE EDUARDO COLE MD Sep 27, 2017 07:45

## 2017-09-27 NOTE — CONS
Date/Time of Note


Date/Time of Note


DATE: 9/27/17 


TIME: 20:31





Assessment/Plan


Assessment/Plan


Additional Assessment/Plan


Large right hemispheric breast metastasis, with midline shift and edema. This 

is too large to consider treating with primary XRT in my opinion. She will need 

craniectomy of involved calvarium and reconstruction of the skull with a 

titanium mesh cranioplasty at the same time as resection of the tumor itself. 

If there is significant pial invasion she may have left sided weakness. She is 

right handed so I do not expect a significant speech or cognitive adverse 

effect. She will need XRT to the resection cavity. I also spoke with her about 

the possibility that the skin overlying the lesion may be compromised. She 

expressed her understanding of the risks and benefit of surgery and agrees to 

proceed. She is to have her right pleural effusion treated tomorrow; craniotomy 

may be arranged as soon as Friday if cleared by primary service. Thank you.





Consultation Date/Type/Reason


Admit Date/Time


Sep 26, 2017 at 19:54


Date of Consultation:  Sep 27, 2017


Type of Consultation:  NEUROSURGERY


Reason for Consultation


large right calvarial metastasis with intradural extension





Hx of Present Illness


The patient is a 46 year old femalew ith a history of HEr2+ breast CA now 

presenting with large right calvarial metastatic tumor with dural extension, 

possible brain invasion, and right frontal vasogenic edema and midline shift of 

5mm. Patient has had headache and N/V. She has been followed by Dr. Loco. 

There is another area on left stephanie-atrial white matter that has hemosiderin 

staining and a blush of hemorrhage, that may represent either a second met or 

possible a cavernoma. Neurosurgical input regarding management of brain 

metastases has been requested.


Constitutional:  other (dizziness)


Eyes:  no complaints


ENT:  no complaints


Respiratory:  no complaints, shortness of breath


Cardiovascular:  no complaints


Gastrointestinal:  constipation, decreased appetite


Musculoskeletal:  bone/joint pain


Psychological:  no complaints





Social History


Alcohol Use:  none


Smoking Status:  Never smoker


Drug Use:  none





Exam/Review of Systems


Vital Signs


Vitals





 Vital Signs








  Date Time  Temp Pulse Resp B/P Pulse Ox O2 Delivery O2 Flow Rate FiO2


 


9/27/17 20:10     96  2.0 


 


9/27/17 20:00 98.2 59 23 174/104  Nasal Cannula  














 Intake and Output   


 


 9/26/17 9/26/17 9/27/17





 15:00 23:00 07:00


 


Intake Total   200 ml


 


Output Total   1050 ml


 


Balance   -850 ml











Exam


On neurologic examination the patient is doing very well, is awake and alert 

and following commands briskly. Her speech is fluent and appropriate. She seems 

cheerful. Her cranial nerve exam is benign with intact extra-ocular movements, 

pupils equal and reactive, TML, face =. She moves all extremities with full 

power and has no pronator drift.





Results


Result Diagram:  


9/26/17 2305                                                                   

             9/26/17 2305





Results 24 hrs





Laboratory Tests








Test


  9/26/17


23:05


 


White Blood Count 10.5  #


 


Red Blood Count 4.41  


 


Hemoglobin 9.4  L


 


Hematocrit 30.5  L


 


Mean Corpuscular Volume 69.2  L


 


Mean Corpuscular Hemoglobin 21.3  L


 


Mean Corpuscular Hemoglobin


Concent 30.8  L


 


 


Red Cell Distribution Width 17.7  H


 


Platelet Count 235  #


 


Mean Platelet Volume 9.3  


 


Neutrophils % 75.4  


 


Lymphocytes % 16.3  


 


Monocytes % 7.5  


 


Eosinophils % 0.1  


 


Basophils % 0.3  


 


Nucleated Red Blood Cells % 0.0  


 


Neutrophils # 7.9  H


 


Lymphocytes # 1.7  


 


Monocytes # 0.8  


 


Eosinophils # 0.0  


 


Basophils # 0.0  


 


Nucleated Red Blood Cells # 0.0  


 


Sodium Level 135  


 


Potassium Level 3.9  


 


Chloride Level 103  


 


Carbon Dioxide Level 26  


 


Anion Gap 10  


 


Blood Urea Nitrogen 10  


 


Creatinine 0.62  


 


Glucose Level 124  


 


Calcium Level 9.1  


 


Total Bilirubin 0.3  


 


Direct Bilirubin 0.00  


 


Indirect Bilirubin 0.3  


 


Aspartate Amino Transf


(AST/SGOT) 56  H


 


 


Alanine Aminotransferase


(ALT/SGPT) 29  


 


 


Alkaline Phosphatase 96  


 


Total Protein 7.3  


 


Albumin 3.3  


 


Globulin 4.00  H


 


Albumin/Globulin Ratio 0.82  











Medications


Medications





 Current Medications


Ondansetron HCl (Zofran Inj) 4 mg Q6H  PRN IV NAUSEA AND/OR VOMITING;  Start 9/ 26/17 at 22:30


Acetaminophen (Tylenol Liquid) 650 mg Q6H  PRN PO PAIN LEVEL 1-3 OR FEVER;  

Start 9/26/17 at 22:30


Morphine Sulfate (morphine) 2 mg Q4H  PRN IV PAIN LEVEL 7-10;  Start 9/26/17 at 

22:30


Famotidine (Pepcid) 20 mg Q12 PO  Last administered on 9/27/17at 09:34; Admin 

Dose 20 MG;  Start 9/27/17 at 09:00


Dexamethasone (Decadron) 4 mg Q6 IV  Last administered on 9/27/17at 17:53; 

Admin Dose 4 MG;  Start 9/27/17 at 00:00











FARNAZ SOLORIO MD Sep 27, 2017 20:40

## 2017-09-27 NOTE — PN
Date/Time of Note


Date/Time of Note


DATE: 9/27/17 


TIME: 15:32





Assessment/Plan


VTE Prophylaxis


VTE Prophylaxis Intervention:  SCD's





Lines/Catheters


IV Catheter Type (from [facility]):  Saline Lock


Urinary Cath still in place:  No





Assessment/Plan


Chief Complaint/Hosp Course


Patient is a 46-year-old female with a past medical history of HER-2 and 

estrogen receptor positive metastatic breast cancer to the lung, liver and 

brain who presents after feeling dizzy during routine outpatient MRI





Assessment


Dizziness


Metastatic breast cancer, metastases to the lung, liver, brain


6 mm right-to-left midline herniation and effacement of the right lateral 

ventricle


Large right-sided pleural effusion


Status post right-sided mastectomy


Headache


Shortness of breath, stable





Plan


-Attempting to obtain MRI results from [facility] and uploaded into the system, 

neurosurgery has been consulted, recommends steroids for cerebral edema, 

questionable resection, recommendations pending


-Oncology has also seen the patient, multiple options still available for 

patients, physical resection versus radiosurgery.


-Symptomatic care for now


-Decadron for cerebral edema


-Pulmonology has been consulted for large right pleural effusion, thoracentesis 

on hold as patient had recent thoracentesis 5 days ago and quick recurrence, 

recommend a Pleurx catheter instead, pending placement of catheter


-Monitor in ICU


Problems:  





Subjective


24 Hr Interval Summary


Free Text/Dictation


no SOB or headache at this time





Exam/Review of Systems


Vital Signs


Vitals





 Vital Signs








  Date Time  Temp Pulse Resp B/P Pulse Ox O2 Delivery O2 Flow Rate FiO2


 


9/27/17 12:00  68      


 


9/27/17 11:00   20 125/72 97 Nasal Cannula 4.0 


 


9/27/17 08:00 98.1       














 Intake and Output   


 


 9/26/17 9/26/17 9/27/17





 15:00 23:00 07:00


 


Intake Total   200 ml


 


Output Total   1050 ml


 


Balance   -850 ml











Exam


Physical exam





General: Patient is laying in bed and answers questions appropriately


Mentation: Patient is alert and oriented 4,


Head: Normocephalic atraumatic


Eyes: EOMI, pupils reactive to light


Neck: Supple, nontender, midline


Respiratory: dimished breath sounds on the R.


Cardiovascular: regular rate, no obvious murmurs


Gastrointestinal: non-tender to palpation, bowel sounds heard.


Neurological: Moves all extremities spontaneously, very mild strength deficit 

on L UE


Skin: No new skin lesions





Results


Result Diagram:  


9/26/17 2305                                                                   

             9/26/17 2305





Results 24 hrs





Laboratory Tests








Test


  9/26/17


16:00 9/26/17


23:05


 


White Blood Count 13.4  H 10.5  #


 


Red Blood Count 4.83   4.41  


 


Hemoglobin 10.3  L 9.4  L


 


Hematocrit 33.5  L 30.5  L


 


Mean Corpuscular Volume 69.4  L 69.2  L


 


Mean Corpuscular Hemoglobin 21.3  L 21.3  L


 


Mean Corpuscular Hemoglobin


Concent 30.7  L


  30.8  L


 


 


Red Cell Distribution Width 18.0  H 17.7  H


 


Platelet Count 295   235  #


 


Mean Platelet Volume 9.4   9.3  


 


Neutrophils % 78.6  H 75.4  


 


Lymphocytes % 14.8  L 16.3  


 


Monocytes % 5.6   7.5  


 


Eosinophils % 0.1   0.1  


 


Basophils % 0.4   0.3  


 


Nucleated Red Blood Cells % 0.0   0.0  


 


Neutrophils # 10.5  H 7.9  H


 


Lymphocytes # 2.0   1.7  


 


Monocytes # 0.8   0.8  


 


Eosinophils # 0.0   0.0  


 


Basophils # 0.1   0.0  


 


Nucleated Red Blood Cells # 0.0   0.0  


 


Prothrombin Time 14.1   


 


Prothrombin Time Ratio 1.1   


 


INR International Normalized


Ratio 1.09  


  


 


 


Activated Partial


Thromboplast Time 32.8  


  


 


 


Sodium Level 137   135  


 


Potassium Level 4.0   3.9  


 


Chloride Level 105   103  


 


Carbon Dioxide Level 22   26  


 


Anion Gap 14   10  


 


Blood Urea Nitrogen 10   10  


 


Creatinine 0.57   0.62  


 


Glucose Level 123   124  


 


Calcium Level 9.2   9.1  


 


Total Bilirubin 0.2   0.3  


 


Direct Bilirubin 0.00   0.00  


 


Indirect Bilirubin 0.2   0.3  


 


Aspartate Amino Transf


(AST/SGOT) 59  H


  56  H


 


 


Alanine Aminotransferase


(ALT/SGPT) 29  


  29  


 


 


Alkaline Phosphatase 107   96  


 


Troponin I < 0.012   


 


B-Type Natriuretic Peptide 77   


 


Total Protein 8.1   7.3  


 


Albumin 4.0   3.3  


 


Globulin 4.10  H 4.00  H


 


Albumin/Globulin Ratio 0.97   0.82  











Medications


Medications





 Current Medications


Ondansetron HCl (Zofran Inj) 4 mg Q6H  PRN IV NAUSEA AND/OR VOMITING;  Start 9/ 26/17 at 22:30


Acetaminophen (Tylenol Liquid) 650 mg Q6H  PRN PO PAIN LEVEL 1-3 OR FEVER;  

Start 9/26/17 at 22:30


Morphine Sulfate (morphine) 2 mg Q4H  PRN IV PAIN LEVEL 7-10;  Start 9/26/17 at 

22:30


Famotidine (Pepcid) 20 mg Q12 PO  Last administered on 9/27/17at 09:34; Admin 

Dose 20 MG;  Start 9/27/17 at 09:00


Dexamethasone (Decadron) 4 mg Q6 IV  Last administered on 9/27/17at 11:43; 

Admin Dose 4 MG;  Start 9/27/17 at 00:00











SHIREEN ASHBY Sep 27, 2017 15:34

## 2017-09-28 NOTE — RADRPT
PROCEDURE:   XR Chest. 

 

CLINICAL INDICATION:   Shortness of breath.

 

TECHNIQUE:   Single frontal view. 

 

COMPARISON:   09/26/2017. 

 

FINDINGS:

There is complete opacification of the right hemithorax, worse than seen previously. If to the media
stinum to the left. The left lung is clear and there is no left pleural effusion. 

The heart size is normal.

There is a large right pleural effusion. There is no left pleural effusion. 

There is no pneumothorax.   

 

IMPRESSION:

1.  Large right pleural effusion, larger than seen previously with complete opacification of the rig
ht hemithorax. 

2.  Left lung is clear and there is no left pleural effusion.

 

RPTAT: QQ

_____________________________________________ 

.Charly Espinal MD, MD           Date    Time 

Electronically viewed and signed by .Charly Espinal MD, MD on 09/28/2017 08:48 

 

D:  09/28/2017 08:48  T:  09/28/2017 08:48

.R/

## 2017-09-28 NOTE — RADRPT
PROCEDURE: MR Brain with and without intravenous contrast

 

CLINICAL INDICATION:  Preoperative planning. History breast cancer.

 

COMPARISON: CT from 09/26/2017. MRI brain from 9/26/2017 performed under the name "Radha Pacheco"
 with accession [id number] and MRN [id number].

 

TECHNIQUE: Multiplanar multi-sequence images of the brain were obtained before and after the unevent
ful administration of 10 mL Magnevist intravenous contrast.  Images acquired on a 3.0 Karine magnet.

 

FINDINGS:

 

Bones: Multiple masses, consistent with metastases.

 

1. A mass centered within the right frontal calvarium extends to the overlying scalp soft tissues as
 well as the underlying meninges and extra-axial space. It measures approximately 4.7 x 4.5 x 4.0 cm
. The mass has a large dural tail extending to the frontal and temporal lobe.

 

2. The mass centered in the right sphenotemporal buttress extends to the dura overlying the anterior
 right temporal lobe, measuring approximately 1.2 x 1.5 x 1.9 cm.

 

Diffusely low bone marrow signal which may reflect metastases or sequela of chemotherapy.

 

Parenchyma: Multiple parenchymal metastases:

 

1. An enhancing metastasis containing blood products in the left parietal occipital junction and smia
sures about 14 x 11 x 10 mm (AP x TV x SI) , consistent with metastasis.

 

2. Enhancing metastasis with internal blood products within the medial left parietal lobe adjacent t
o measuring about 7 x 5 mm (series 11, image 125).

 

3. Rounded metastasis in the left cerebellar hemisphere measuring 3 mm (series 10, image 123 and ser
ies 14, image 17). This metastasis is better visualized on this examination compared to the MRI from
 09/26/2017, although in retrospect, it was present previously (series 11, image 8).

 

4. 1 mm focal enhancement within the left posterior temporal lobe. This is seen only on a 1 sequence
 and may represent artifact or a tiny metastasis (series 10, image 111). This metastasis is not seen
 on the examination a few days ago. Attention is recommended to this area on a follow-up imaging.

 

There is aliasing from the choroid plexus in the left lateral ventricle which creates the appearance
 of additional metastases (series 9, image 75). However, this is an artifact.

 

The right frontal and opercular sulci are effaced. Moderate amount of vasogenic edema about the extr
a-axial mass.

 

Ventricles: Right lateral ventricle is partially effaced.

 

Extra-axial spaces: 5 mm of midline shift to the left. No uncal herniation or cisternal effacement..

 

Orbits: Normal.

 

Major intracranial flow voids: Preserved.

 

Paranasal sinuses: Clear.

 

Mastoids and middle ears: Clear.

 

Extracranial soft tissues 12 mm fat containing lesion in the midline, suboccipital scalp, consistent
 with a lipoma or pilonidal inclusion cyst.

 

IMPRESSION:

 

Multiple intracranial metastases are unchanged since 09/26/2017. 

 

1. Large metastasis centered in the right frontal calvarium with overlying scalp component, underlyi
ng epidural component, and dural tail. This metastasis contains internal blood products This metasta
sis exerts mass effect resulting in adjacent vasogenic edema, partial effacement of the right latera
l ventricle, and midline shift to the left by 5 mm.

 

2. Two small hemorrhagic metastasis at the junction of the left parietal and occipital lobes.

 

3. Smaller metastasis with dural tail centered in the right sphenotemporal buttress.

 

4. 3 mm rounded metastasis within the left cerebellar hemisphere which is better characterized on th
is examination compared to 09/26/2017.

 

RPTAT: PP

_____________________________________________ 

Physician Jose Luis           Date    Time 

Electronically viewed and signed by Physician Jose Luis on 09/28/2017 08:37 

 

D:  09/28/2017 08:37  T:  09/28/2017 08:37

/

## 2017-09-28 NOTE — RADRPT
PROCEDURE:   ULTRASOUND AND FLUOROSCOPICALLY-GUIDED PERCUTANEOUS PLACEMENT OF TUNNELED RIGHT PLEURAL
 DRAINAGE CATHETER.  

 

CLINICAL INDICATION:   Large right pleural effusion and shortness of breath.

 

TECHNIQUE:   

Prior to the procedure, informed consent was obtained. Risks including bleeding, infection, and pneu
mothorax were explained to the patient. The patient understood and was willing to proceed. A procedu
ral pause was performed. The patient's name, date of birth, and procedure to be performed were verif
ied. The central line was inserted with all elements of maximal sterile barrier technique. All of th
e following were used: head covering, facial mask, sterile gown, sterile gloves, a large sterile she
et, hand hygiene, and  2% chlorhexidine for cutaneous antisepsis.  The right lateral chest wall was 
prepped and draped in the usual sterile fashion.

 

Limited sonography of the right side of chest was then performed. Noted is a large right pleural eff
usion.  Ultrasound images were recorded and stored in the patient's medical record.

 

Following the local injection of Xylocaine, the right pleural space was punctured under sonographic 
guidance posterior laterally at the 8/9 rib interspace with an 18-gauge needle through which a 0.035
 inch floppy tip guidewire was advanced into the right pleural space.  Following this, the catheter 
was tunneled subcutaneously in the right chest wall. The cuff of the catheter was positioned in the 
subcutaneous tissues within the tunnel approximately 2 cm from the exit site of the catheter. Serial
 dilatation was then performed to 16-Burmese and a 16-Burmese peel-away sheath was advanced over the g
uidewire. The 15.5 Burmese Pleurex pleural catheter was advanced through the peel-away sheath into th
e right pleural space. Pleural fluid was aspirated, to verify position. The peel-away sheath was rem
juliette. The subcutaneous tissues were closed with running 3-0 Vicryl. The skin wound was then closed u
sing 4-0 Vicryl and a running subcuticular technique. The catheter was secured to the skin with 2-0 

silk suture. The site was dressed. The patient tolerated the procedure well. 2 liters pleural fluid 
was aspirated out of the catheter without problem.

 

COMPARISON:   Chest x-ray done earlier the same day. 

 

FINDINGS:

The final images demonstrate the pleural catheter within the right pleural space.  Total fluoroscopy
 time is 0.1 minutes.  4 images of the chest were obtained with image intensifier.

 

IMPRESSION:

1. Satisfactory percutaneous insertion of tunneled right pleural drainage catheter with ultrasound a
nd fluoroscopic guidance.

 

RPTAT: QQ

_____________________________________________ 

.Charly Espinal MD, MD           Date    Time 

Electronically viewed and signed by .Charly Espinal MD, MD on 09/28/2017 13:31 

 

D:  09/28/2017 13:31  T:  09/28/2017 13:31

.R/

## 2017-09-28 NOTE — PN
Date/Time of Note


Date/Time of Note


DATE: 9/28/17 


TIME: 10:25





Assessment/Plan


VTE Prophylaxis


VTE Prophylaxis Intervention:  SCD's





Lines/Catheters


IV Catheter Type (from [facility] [place]):  Saline Lock


Urinary Cath still in place:  No





Assessment/Plan


Chief Complaint/Hosp Course


Patient is a 46-year-old female with a past medical history of HER-2 and 

estrogen receptor positive metastatic breast cancer to the lung, liver and 

brain who presents after feeling dizzy during routine outpatient MRI





Assessment


Dizziness


Metastatic breast cancer, metastases to the lung, liver, brain


6 mm right-to-left midline herniation and effacement of the right lateral 

ventricle


Large right-sided pleural effusion


Status post right-sided mastectomy


Headache


Shortness of breath, stable





Plan


-Neurosurgery planning craniotomy tomorrow or monday, pending clearance.


-pleurex catheter today, if vitals and symptoms WNL after catheter place and 

respiratory status improved, patient is moderate risk. 


-Oncology has also seen the patient, multiple options still available for 

patients, physical resection versus radiosurgery.


-Symptomatic care for now


-Decadron for cerebral edema


-Monitor in ICU


Problems:  





Subjective


24 Hr Interval Summary


Free Text/Dictation


no headache, feels well





Exam/Review of Systems


Vital Signs


Vitals





 Vital Signs








  Date Time  Temp Pulse Resp B/P Pulse Ox O2 Delivery O2 Flow Rate FiO2


 


9/28/17 09:00  64 22 137/89 96 Nasal Cannula 4.0 


 


9/28/17 08:00 97.6       














 Intake and Output   


 


 9/27/17 9/27/17 9/28/17





 15:00 23:00 07:00


 


Intake Total 560 ml 120 ml 


 


Output Total 350 ml 400 ml 


 


Balance 210 ml -280 ml 











Exam


Physical exam





General: Patient is laying in bed and answers questions appropriately


Mentation: Patient is alert and oriented 4,


Head: Normocephalic atraumatic


Eyes: EOMI, pupils reactive to light


Neck: Supple, nontender, midline


Respiratory: dimished breath sounds on the R.


Cardiovascular: regular rate, no obvious murmurs


Gastrointestinal: non-tender to palpation, bowel sounds heard.


Neurological: Moves all extremities spontaneously, very mild strength deficit 

on L UE


Skin: No new skin lesions





Results


Result Diagram:  


9/28/17 0442                                                                   

             9/28/17 0442





Results 24 hrs





Laboratory Tests








Test


  9/28/17


04:42


 


White Blood Count 14.3  #H


 


Red Blood Count 4.46  


 


Hemoglobin 9.6  L


 


Hematocrit 31.0  L


 


Mean Corpuscular Volume 69.5  L


 


Mean Corpuscular Hemoglobin 21.5  L


 


Mean Corpuscular Hemoglobin


Concent 31.0  L


 


 


Red Cell Distribution Width 18.1  H


 


Platelet Count 283  #


 


Mean Platelet Volume 9.9  


 


Neutrophils % 83.8  H


 


Lymphocytes % 11.4  L


 


Monocytes % 3.9  


 


Eosinophils % 0.0  


 


Basophils % 0.1  


 


Nucleated Red Blood Cells % 0.0  


 


Neutrophils # 12.0  H


 


Lymphocytes # 1.6  


 


Monocytes # 0.6  


 


Eosinophils # 0.0  


 


Basophils # 0.0  


 


Nucleated Red Blood Cells # 0.0  


 


Sodium Level 136  


 


Potassium Level 4.4  


 


Chloride Level 105  


 


Carbon Dioxide Level 25  


 


Anion Gap 10  


 


Blood Urea Nitrogen 22  #H


 


Creatinine 0.69  


 


Glucose Level 152  


 


Calcium Level 9.3  


 


Phosphorus Level 4.0  


 


Magnesium Level 1.9  











Medications


Medications





 Current Medications


Ondansetron HCl (Zofran Inj) 4 mg Q6H  PRN IV NAUSEA AND/OR VOMITING;  Start 9/ 26/17 at 22:30


Acetaminophen (Tylenol Liquid) 650 mg Q6H  PRN PO PAIN LEVEL 1-3 OR FEVER;  

Start 9/26/17 at 22:30


Morphine Sulfate (morphine) 2 mg Q4H  PRN IV PAIN LEVEL 7-10;  Start 9/26/17 at 

22:30


Famotidine (Pepcid) 20 mg Q12 PO  Last administered on 9/28/17at 08:13; Admin 

Dose 20 MG;  Start 9/27/17 at 09:00


Dexamethasone (Decadron) 4 mg Q6 IV  Last administered on 9/28/17at 06:00; 

Admin Dose 4 MG;  Start 9/27/17 at 00:00











SHIREEN ASHBY Sep 28, 2017 10:29

## 2017-09-28 NOTE — CONS
Date/Time of Note


Date/Time of Note


DATE: 9/28/17 


TIME: 10:08





Consult Date/Type/Reason


Admit Date/Time


Sep 26, 2017 at 19:54


Initial Consult Date


9/27/17


Type of Consultation:  Pulm


Ordering Provider:  SHIREEN ASHBY





Subjective


Comfortable this morning, no overnight events.





Objective





 Vital Signs








  Date Time  Temp Pulse Resp B/P Pulse Ox O2 Delivery O2 Flow Rate FiO2


 


9/28/17 09:00  64 22 137/89 96 Nasal Cannula 4.0 


 


9/28/17 08:00 97.6       














 Intake and Output   


 


 9/27/17 9/27/17 9/28/17





 15:00 23:00 07:00


 


Intake Total 560 ml 120 ml 


 


Output Total 350 ml 400 ml 


 


Balance 210 ml -280 ml 








Exam


PHYSICAL EXAMINATION:


GENERAL:  Well-nourished, well-developed lady, comfortable at rest.  No acute 

distress.


VITAL SIGNS: 


NECK:  Supple.  No JVD.  No lymphadenopathy.


CARDIAC:  S1, S2.  No added sounds or murmurs.


CHEST:  Diminished air entry bilaterally.


ABDOMEN:  Soft, nontender.  No guarding or rebound.


EXTREMITIES:  No cyanosis, clubbing or edema.


NEUROLOGICALLY:  No focal deficits.





Results/Medications


Result Diagram:  


9/28/17 0442                                                                   

             9/28/17 0442





Results 24 hrs





Laboratory Tests








Test


  9/28/17


04:42


 


White Blood Count 14.3  #H


 


Red Blood Count 4.46  


 


Hemoglobin 9.6  L


 


Hematocrit 31.0  L


 


Mean Corpuscular Volume 69.5  L


 


Mean Corpuscular Hemoglobin 21.5  L


 


Mean Corpuscular Hemoglobin


Concent 31.0  L


 


 


Red Cell Distribution Width 18.1  H


 


Platelet Count 283  #


 


Mean Platelet Volume 9.9  


 


Neutrophils % 83.8  H


 


Lymphocytes % 11.4  L


 


Monocytes % 3.9  


 


Eosinophils % 0.0  


 


Basophils % 0.1  


 


Nucleated Red Blood Cells % 0.0  


 


Neutrophils # 12.0  H


 


Lymphocytes # 1.6  


 


Monocytes # 0.6  


 


Eosinophils # 0.0  


 


Basophils # 0.0  


 


Nucleated Red Blood Cells # 0.0  


 


Sodium Level 136  


 


Potassium Level 4.4  


 


Chloride Level 105  


 


Carbon Dioxide Level 25  


 


Anion Gap 10  


 


Blood Urea Nitrogen 22  #H


 


Creatinine 0.69  


 


Glucose Level 152  


 


Calcium Level 9.3  


 


Phosphorus Level 4.0  


 


Magnesium Level 1.9  








Medications





 Current Medications


Ondansetron HCl (Zofran Inj) 4 mg Q6H  PRN IV NAUSEA AND/OR VOMITING;  Start 9/ 26/17 at 22:30


Acetaminophen (Tylenol Liquid) 650 mg Q6H  PRN PO PAIN LEVEL 1-3 OR FEVER;  

Start 9/26/17 at 22:30


Morphine Sulfate (morphine) 2 mg Q4H  PRN IV PAIN LEVEL 7-10;  Start 9/26/17 at 

22:30


Famotidine (Pepcid) 20 mg Q12 PO  Last administered on 9/28/17at 08:13; Admin 

Dose 20 MG;  Start 9/27/17 at 09:00


Dexamethasone (Decadron) 4 mg Q6 IV  Last administered on 9/28/17at 06:00; 

Admin Dose 4 MG;  Start 9/27/17 at 00:00





Assessment/Plan


Chief Complaint/Hosp Course





IMPRESSION:


1.   Recurrent right pleural effusion, likely secondary to underlying 

metastatic breast cancer. Right lung opacification secondary to pleural effusion

, pending pleurex catheter. 


2.   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 Pending 

resection. 


 


PLAN:


1.   The patient to have a PleurX catheter placed for recurrent pleural 

effusions.


2.   Continue steroids for cerebral edema.


3.   Neurosurgical recommendations. Possible surgery tomorrow. 


4.   Consider palliative care consultation as overall prognosis is guarded.


Problems:  











DELFIN LAKHANI MD, [facility] Sep 28, 2017 10:11

## 2017-09-28 NOTE — CONS
Date/Time of Note


Date/Time of Note


DATE: 9/28/17 


TIME: 22:28





Assessment/Plan


Assessment/Plan


Chief Complaint/Hosp Course


#Her2+/ER+/MA+ metastatic breast ca


-pt is non compliant and when she receives therapy, she is quite responsive to 

therapy


-therefore at this time, although she has terminal disease, there are still 

many more treatment options which she could benefit from


-given her Brain mets, I would consider a combination of Xeloda and Lapatinib 

which can penetrate the blood brain barrier


-once she stabilizes can restart Tamoxifen as well





#Brain Mets


-appreciate neurosurgery recs


-pt scheduled for surgical resection of these tumors


-will refer the patient to rad onc as well as she will need this regardless, 

post operatively


-continue current dose of Decadron at 6mv IV q 6





#Pleural Effusion


-s/p pleurex catheter placement


-will check cytology and re-evaluate her prognostic markers


Problems:  





Consultation Date/Type/Reason


Admit Date/Time


Sep 26, 2017 at 19:54


Initial Consult Date


9/27/17


Type of Consultation:  Oncology


Reason for Consultation


metastatic her 2 positive breast cancer to brain


Referring Provider:  SHIREEN ASHBY





24 HR Interval Summary


Free Text/Dictation


pleurex catheter placed yesterday





Exam/Review of Systems


Vital Signs


Vitals





 Vital Signs








  Date Time  Temp Pulse Resp B/P Pulse Ox O2 Delivery O2 Flow Rate FiO2


 


9/28/17 21:00  62 18 125/92 97 Room Air  


 


9/28/17 20:54        21


 


9/28/17 19:00 98.5       


 


9/28/17 18:00       4.0 














 Intake and Output   


 


 9/27/17 9/27/17 9/28/17





 15:00 23:00 07:00


 


Intake Total 560 ml 120 ml 


 


Output Total 350 ml 400 ml 


 


Balance 210 ml -280 ml 











Exam


Constitutional:  alert, oriented


Psych:  no complaints


Head:  normocephalic


Eyes:  nl conjunctiva


ENMT:  nl external ears & nose


Neck:  supple


Respiratory:  diminished breath sounds


Cardiovascular:  regular rate and rhythm


Gastrointestinal:  soft





Results


Result Diagram:  


9/28/17 0442                                                                   

             9/28/17 0442





Results 24 hrs





Laboratory Tests








Test


  9/28/17


04:42 9/28/17


12:50


 


White Blood Count 14.3  #H 


 


Red Blood Count 4.46   


 


Hemoglobin 9.6  L 


 


Hematocrit 31.0  L 


 


Mean Corpuscular Volume 69.5  L 


 


Mean Corpuscular Hemoglobin 21.5  L 


 


Mean Corpuscular Hemoglobin


Concent 31.0  L


  


 


 


Red Cell Distribution Width 18.1  H 


 


Platelet Count 283  # 


 


Mean Platelet Volume 9.9   


 


Neutrophils % 83.8  H 


 


Lymphocytes % 11.4  L 


 


Monocytes % 3.9   


 


Eosinophils % 0.0   


 


Basophils % 0.1   


 


Nucleated Red Blood Cells % 0.0   


 


Neutrophils # 12.0  H 


 


Lymphocytes # 1.6   


 


Monocytes # 0.6   


 


Eosinophils # 0.0   


 


Basophils # 0.0   


 


Nucleated Red Blood Cells # 0.0   


 


Sodium Level 136   


 


Potassium Level 4.4   


 


Chloride Level 105   


 


Carbon Dioxide Level 25   


 


Anion Gap 10   


 


Blood Urea Nitrogen 22  #H 


 


Creatinine 0.69   


 


Glucose Level 152   


 


Calcium Level 9.3   


 


Phosphorus Level 4.0   


 


Magnesium Level 1.9   


 


Body Fluid Type  PLEURAL FLUID  


 


Body Fluid Volume  20.0  


 


Body Fluid Color  ORANGE  


 


Body Fluid Appearance  CLOUDY  


 


Body Fluid WBC  1032  


 


Body Fluid RBC (Auto)  12  


 


Body Fluid Polynuclear WBCs


(%) 


  3.3  


 


 


Body Fluid Mononuclear Cells


% Auto 


  96.7  


 


 


Body Fluid Glucose  136  


 


Body Fluid Total Protein  5.3  


 


Body Fluid Lactate


Dehydrogenase 


    


 











Medications


Medications





 Current Medications


Ondansetron HCl (Zofran Inj) 4 mg Q6H  PRN IV NAUSEA AND/OR VOMITING;  Start 9/ 26/17 at 22:30


Acetaminophen (Tylenol Liquid) 650 mg Q6H  PRN PO PAIN LEVEL 1-3 OR FEVER Last 

administered on 9/28/17at 17:24; Admin Dose 650 MG;  Start 9/26/17 at 22:30


Morphine Sulfate (morphine) 2 mg Q4H  PRN IV PAIN LEVEL 7-10;  Start 9/26/17 at 

22:30


Famotidine (Pepcid) 20 mg Q12 PO  Last administered on 9/28/17at 21:08; Admin 

Dose 20 MG;  Start 9/27/17 at 09:00


Dexamethasone (Decadron) 4 mg Q6 IV  Last administered on 9/28/17at 17:55; 

Admin Dose 4 MG;  Start 9/27/17 at 00:00











KRISTEL ORANTES M.D. Sep 28, 2017 22:31

## 2017-09-28 NOTE — RADRPT
PROCEDURE:   Ultrasound guidance for placement of needle in right pleural space.  

 

CLINICAL INDICATION:   Large malignant right pleural effusion.

 

TECHNIQUE:   

Prior to the procedure, informed consent was obtained. Risks including bleeding, infection, and pneu
mothorax were explained to the patient. The patient understood and was willing to proceed. A procedu
ral pause was performed. The patient's name, date of birth, and procedure to be performed were verif
ied. The central line was inserted with all elements of maximal sterile barrier technique. All of th
e following were used: head covering, facial mask, sterile gown, sterile gloves, a large sterile she
et, hand hygiene, and  2% chlorhexidine for cutaneous antisepsis.  The right neck and anterior/super
ior chest wall was prepped and draped in usual sterile fashion.

 

Limited sonography of the right pleural space was then performed. Noted is a large right pleural eff
usion. Ultrasound images were recorded and stored in the patient's medical record.

 

Following the local injection of Xylocaine, the right the pleural space vein was punctured under son
ographic guidance with 18-gauge needle through which a 0.035 inch floppy tip guidewire was advanced 
into the right pleural space.  The patient tolerated the procedure well.  The remainder of the proce
dure was performed and dictated under separate cover.

 

COMPARISON:   None. 

 

FINDINGS:

The ultrasound images demonstrate a large right pleural effusion.  The subsequent images demonstrate
 the needle entering the right pleural space.

 

IMPRESSION:

1. Ultrasound guidance for a needle placement in right the pleural space.

 

RPTAT: QQ

_____________________________________________ 

.Charly Espinal MD, MD           Date    Time 

Electronically viewed and signed by .Charly Espinal MD, MD on 09/28/2017 14:12 

 

D:  09/28/2017 14:12  T:  09/28/2017 14:12

.R/

## 2017-09-29 NOTE — RADRPT
PROCEDURE:   XR Chest. 

 

CLINICAL INDICATION:   Shortness of breath.

 

TECHNIQUE:   Single frontal view. 

 

COMPARISON:   09/28/2017. 

 

FINDINGS:

There is a new right chest tube in satisfactory position. Previously noted large right pleural effus
ion is no longer present. There is only minimal right pleural fluid inferiorly and laterally. There 
is patchy consolidation in the right mid and lower lung zones. The left lung is clear and there is n
o left pleural effusion. 

The heart is enlarged.  

There is no pneumothorax.   

 

IMPRESSION:

1.  Marked improvement in the appearance of the right lung and right pleural fluid. 

2.  Cardiomegaly.

3.  No left pleural effusion.

 

RPTAT: QQ

_____________________________________________ 

.Charly Espinal MD, MD           Date    Time 

Electronically viewed and signed by .Charly Espinal MD, MD on 09/29/2017 08:38 

 

D:  09/29/2017 08:38  T:  09/29/2017 08:38

.R/

## 2017-09-29 NOTE — SIPON
Date/Time of Note


Date/Time of Note


DATE: 9/29/17 


TIME: 20:25





Operative Report


Preoperative Diagnosis


BREAST METASTASIS


Postoperative Diagnosis


RUMA


Operation/Procedure Performed


right frontal craniectomy, resection of tumor, microscope, image guidance, 

titanium mesh cranioplasty


Surgeon


see signature line


First assist


none


Anesthesia:  general


Estimated blood loss:  50 - 100 ml's


Transfusion Required


   none


Specimen


bone and brain tumor


Grafts/Implants


none


Complications


none











FARNAZ SOLORIO MD Sep 29, 2017 20:27

## 2017-09-29 NOTE — HPN
Date/Time of Note


Date/Time of Note


DATE: 9/29/17 


TIME: 17:14





Interval H&P Admission Note


Pt. seen H&P reviewed:  No system changes











FARNAZ SOLORIO MD Sep 29, 2017 17:14

## 2017-09-29 NOTE — PN
Date/Time of Note


Date/Time of Note


DATE: 9/29/17 


TIME: 09:54





Assessment/Plan


VTE Prophylaxis


VTE Prophylaxis Intervention:  SCD's





Lines/Catheters


IV Catheter Type (from [facility]):  Saline Lock


Urinary Cath still in place:  No





Assessment/Plan


Chief Complaint/Hosp Course


Patient is a 46-year-old female with a past medical history of HER-2 and 

estrogen receptor positive metastatic breast cancer to the lung, liver and 

brain who presents after feeling dizzy during routine outpatient MRI





Assessment


Dizziness


Metastatic breast cancer, metastases to the lung, liver, brain


6 mm right-to-left midline herniation and effacement of the right lateral 

ventricle


Large right-sided pleural effusion


Status post right-sided mastectomy


Headache


Shortness of breath, stable





Plan


-Neurosurgery to perform craniotomy today.


-pleurex catheter inserted, draining well.


-Oncology has also seen the patient, multiple options still available for 

patients, physical resection versus radiosurgery.


-Symptomatic care for now


-Decadron for cerebral edema


-Monitor in ICU


-will dispo accordingly depending on how surgery goes.


Problems:  





Subjective


24 Hr Interval Summary


Free Text/Dictation


patient is breathing much easier s/p pleurex cath. pending surgery today.





Exam/Review of Systems


Vital Signs


Vitals





 Vital Signs








  Date Time  Temp Pulse Resp B/P Pulse Ox O2 Delivery O2 Flow Rate FiO2


 


9/29/17 09:00  53 17 112/66 100 Room Air  


 


9/29/17 08:00 98.3       


 


9/28/17 20:54        21


 


9/28/17 18:00       4.0 














 Intake and Output   


 


 9/28/17 9/28/17 9/29/17





 15:00 23:00 07:00


 


Intake Total 200 ml 100 ml 50 ml


 


Output Total 4000 ml  


 


Balance -3800 ml 100 ml 50 ml











Exam


Physical exam





General: Patient is laying in bed and answers questions appropriately


Mentation: Patient is alert and oriented 4,


Head: Normocephalic atraumatic


Eyes: EOMI, pupils reactive to light


Neck: Supple, nontender, midline


Respiratory: dimished breath sounds on the R.


Cardiovascular: regular rate, no obvious murmurs


Gastrointestinal: non-tender to palpation, bowel sounds heard.


Neurological: Moves all extremities spontaneously, very mild strength deficit 

on L UE


Skin: No new skin lesions, R chest pleurex catheter





Results


Result Diagram:  


9/29/17 0428 9/29/17 0428





Results 24 hrs





Laboratory Tests








Test


  9/28/17


12:50 9/29/17


04:28


 


Body Fluid Type PLEURAL FLUID   


 


Body Fluid Volume 20.0   


 


Body Fluid Color ORANGE   


 


Body Fluid Appearance CLOUDY   


 


Body Fluid WBC 1032   


 


Body Fluid RBC (Auto) 12   


 


Body Fluid Polynuclear WBCs


(%) 3.3  


  


 


 


Body Fluid Mononuclear Cells


% Auto 96.7  


  


 


 


Body Fluid Glucose 136   


 


Body Fluid Total Protein 5.3   


 


Body Fluid Lactate


Dehydrogenase   


  


 


 


White Blood Count  19.5  #H


 


Red Blood Count  4.87  


 


Hemoglobin  10.2  L


 


Hematocrit  33.8  L


 


Mean Corpuscular Volume  69.4  L


 


Mean Corpuscular Hemoglobin  20.9  L


 


Mean Corpuscular Hemoglobin


Concent 


  30.2  L


 


 


Red Cell Distribution Width  18.6  H


 


Platelet Count  334  


 


Mean Platelet Volume  9.5  


 


Neutrophils %  84.7  H


 


Lymphocytes %  10.7  L


 


Monocytes %  3.5  


 


Eosinophils %  0.0  


 


Basophils %  0.1  


 


Nucleated Red Blood Cells %  0.0  


 


Neutrophils #  16.5  H


 


Lymphocytes #  2.1  


 


Monocytes #  0.7  


 


Eosinophils #  0.0  


 


Basophils #  0.0  


 


Nucleated Red Blood Cells #  0.0  


 


Sodium Level  136  


 


Potassium Level  4.6  


 


Chloride Level  104  


 


Carbon Dioxide Level  26  


 


Anion Gap  11  


 


Blood Urea Nitrogen  27  H


 


Creatinine  0.66  


 


Glucose Level  142  


 


Calcium Level  9.3  


 


Phosphorus Level  3.8  


 


Magnesium Level  2.1  











Medications


Medications





 Current Medications


Ondansetron HCl (Zofran Inj) 4 mg Q6H  PRN IV NAUSEA AND/OR VOMITING;  Start 9/ 26/17 at 22:30


Acetaminophen (Tylenol Liquid) 650 mg Q6H  PRN PO PAIN LEVEL 1-3 OR FEVER Last 

administered on 9/29/17at 00:32; Admin Dose 650 MG;  Start 9/26/17 at 22:30


Morphine Sulfate (morphine) 2 mg Q4H  PRN IV PAIN LEVEL 7-10;  Start 9/26/17 at 

22:30


Famotidine (Pepcid) 20 mg Q12 PO  Last administered on 9/28/17at 21:08; Admin 

Dose 20 MG;  Start 9/27/17 at 09:00


Dexamethasone (Decadron) 4 mg Q6 IV  Last administered on 9/29/17at 05:40; 

Admin Dose 4 MG;  Start 9/27/17 at 00:00











SHIREEN ASHBY Sep 29, 2017 09:56

## 2017-09-29 NOTE — CONS
Date/Time of Note


Date/Time of Note


DATE: 9/29/17 


TIME: 10:21





Consult Date/Type/Reason


Admit Date/Time


Sep 26, 2017 at 19:54


Initial Consult Date


9/27/17


Type of Consultation:  Pulmonary


Ordering Provider:  SHIREEN ASHBY





Subjective


Stable this morning after placement of Pleurx catheter.  Significant 

improvement in right lung pleural effusion.  Denies shortness of breath chest 

pain or palpitations.  Currently n.p.o. pending neurosurgery.





Objective





 Vital Signs








  Date Time  Temp Pulse Resp B/P Pulse Ox O2 Delivery O2 Flow Rate FiO2


 


9/29/17 09:00  53 17 112/66 100 Room Air  


 


9/29/17 08:00 98.3       


 


9/28/17 20:54        21


 


9/28/17 18:00       4.0 














 Intake and Output   


 


 9/28/17 9/28/17 9/29/17





 15:00 23:00 07:00


 


Intake Total 200 ml 100 ml 50 ml


 


Output Total 4000 ml  


 


Balance -3800 ml 100 ml 50 ml








Exam


PHYSICAL EXAMINATION:


GENERAL:  Well-nourished, well-developed lady, comfortable at rest.  No acute 

distress.


VITAL SIGNS: 


NECK:  Supple.  No JVD.  No lymphadenopathy.


CARDIAC:  S1, S2.  No added sounds or murmurs.


CHEST:  Diminished air entry bilaterally.


ABDOMEN:  Soft, nontender.  No guarding or rebound.


EXTREMITIES:  No cyanosis, clubbing or edema.


NEUROLOGICALLY:  No focal deficits.





Results/Medications


Result Diagram:  


9/29/17 0428                                                                   

             9/29/17 0428





Results 24 hrs





Laboratory Tests








Test


  9/28/17


12:50 9/29/17


04:28


 


Body Fluid Type PLEURAL FLUID   


 


Body Fluid Volume 20.0   


 


Body Fluid Color ORANGE   


 


Body Fluid Appearance CLOUDY   


 


Body Fluid WBC 1032   


 


Body Fluid RBC (Auto) 12   


 


Body Fluid Polynuclear WBCs


(%) 3.3  


  


 


 


Body Fluid Mononuclear Cells


% Auto 96.7  


  


 


 


Body Fluid Glucose 136   


 


Body Fluid Total Protein 5.3   


 


Body Fluid Lactate


Dehydrogenase   


  


 


 


White Blood Count  19.5  #H


 


Red Blood Count  4.87  


 


Hemoglobin  10.2  L


 


Hematocrit  33.8  L


 


Mean Corpuscular Volume  69.4  L


 


Mean Corpuscular Hemoglobin  20.9  L


 


Mean Corpuscular Hemoglobin


Concent 


  30.2  L


 


 


Red Cell Distribution Width  18.6  H


 


Platelet Count  334  


 


Mean Platelet Volume  9.5  


 


Neutrophils %  84.7  H


 


Lymphocytes %  10.7  L


 


Monocytes %  3.5  


 


Eosinophils %  0.0  


 


Basophils %  0.1  


 


Nucleated Red Blood Cells %  0.0  


 


Neutrophils #  16.5  H


 


Lymphocytes #  2.1  


 


Monocytes #  0.7  


 


Eosinophils #  0.0  


 


Basophils #  0.0  


 


Nucleated Red Blood Cells #  0.0  


 


Sodium Level  136  


 


Potassium Level  4.6  


 


Chloride Level  104  


 


Carbon Dioxide Level  26  


 


Anion Gap  11  


 


Blood Urea Nitrogen  27  H


 


Creatinine  0.66  


 


Glucose Level  142  


 


Calcium Level  9.3  


 


Phosphorus Level  3.8  


 


Magnesium Level  2.1  








Medications





 Current Medications


Ondansetron HCl (Zofran Inj) 4 mg Q6H  PRN IV NAUSEA AND/OR VOMITING;  Start 9/ 26/17 at 22:30


Acetaminophen (Tylenol Liquid) 650 mg Q6H  PRN PO PAIN LEVEL 1-3 OR FEVER Last 

administered on 9/29/17at 00:32; Admin Dose 650 MG;  Start 9/26/17 at 22:30


Morphine Sulfate (morphine) 2 mg Q4H  PRN IV PAIN LEVEL 7-10;  Start 9/26/17 at 

22:30


Famotidine (Pepcid) 20 mg Q12 PO  Last administered on 9/28/17at 21:08; Admin 

Dose 20 MG;  Start 9/27/17 at 09:00


Dexamethasone (Decadron) 4 mg Q6 IV  Last administered on 9/29/17at 05:40; 

Admin Dose 4 MG;  Start 9/27/17 at 00:00





Assessment/Plan


Chief Complaint/Hosp Course





IMPRESSION:


1.   Recurrent right pleural effusion, likely secondary to underlying 

metastatic breast cancer. Right lung opacification secondary to pleural effusion

, status post Pleurx catheter placement.  Continue drainage daily.


2.   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 Pending 

resection scheduled for today.


 


PLAN:


1.   Continue Pleurx catheter drainage daily.


2.   Continue steroids for cerebral edema.


3.   Neurosurgical recommendations.  Neurosurgery today.





Critical care time 40 minutes.


Problems:  











DELFIN LAKHANI MD, [facility] Sep 29, 2017 10:23

## 2017-09-29 NOTE — CONS
Date/Time of Note


Date/Time of Note


DATE: 9/29/17 


TIME: 21:38





Assessment/Plan


Assessment/Plan


Chief Complaint/Hosp Course


45 yo with metastatic breast cancer, who was off therapy for at least 5 month, 

who presents with recurrent pleural effusion and large brain metastasis. 





#Her2+/ER+/CA+ metastatic breast ca


-pt is non compliant but  when she receives therapy, she is quite responsive to 

therapy


-therefore at this time, although she has terminal disease, there are still 

many more treatment options which she could benefit from


-given her Brain mets, I would consider a combination of Xeloda and Lapatinib 

which can penetrate the blood brain barrier


-once she stabilizes can restart Tamoxifen as well





#Brain Mets


-appreciate neurosurgery recs


- now s/p  right frontal craniectomy, resection of tumor, microscope, image 

guidance, titanium mesh cranioplast


-will refer the patient to rad onc once she stabilizes for post op radiation 


-decrease to Decadron at 6mv IV q 8





#Pleural Effusion


-s/p pleurex catheter placement


-will check cytology and re-evaluate her prognostic markers





A total 40 min was spent face to face in speaking with the patient of which > 50

% was spent in counseling and coordination fo care and details question and 

answer session


Problems:  





Consultation Date/Type/Reason


Admit Date/Time


Sep 26, 2017 at 19:54


Initial Consult Date


9/27/17


Type of Consultation:  Oncology


Reason for Consultation


metastatic breast cancer


Referring Provider:  SHIREEN ASHBY





24 HR Interval Summary


Free Text/Dictation


pt seen in AM prior to surgery


pt's shortness of breath was improved. awaiting surgery


Constitutional:  improved





Exam/Review of Systems


Vital Signs


Vitals





 Vital Signs








  Date Time  Temp Pulse Resp B/P Pulse Ox O2 Delivery O2 Flow Rate FiO2


 


9/29/17 21:21 98.4       


 


9/29/17 21:18  104 14  99   50


 


9/29/17 16:00    139/93  Room Air  


 


9/28/17 18:00       4.0 














 Intake and Output   


 


 9/28/17 9/28/17 9/29/17





 15:00 23:00 07:00


 


Intake Total 200 ml 100 ml 50 ml


 


Output Total 4000 ml  


 


Balance -3800 ml 100 ml 50 ml











Exam


Constitutional:  alert, oriented


Head:  normocephalic


Eyes:  nl conjunctiva


ENMT:  nl external ears & nose


Neck:  non-tender, supple


Respiratory:  diminished breath sounds, other (pleurex drain in place)


Cardiovascular:  regular rate and rhythm


Gastrointestinal:  soft


Musculoskeletal:  nl extremities to inspection





Results


Result Diagram:  


9/29/17 0428 9/29/17 0428





Results 24 hrs





Laboratory Tests








Test


  9/29/17


04:28


 


White Blood Count 19.5  #H


 


Red Blood Count 4.87  


 


Hemoglobin 10.2  L


 


Hematocrit 33.8  L


 


Mean Corpuscular Volume 69.4  L


 


Mean Corpuscular Hemoglobin 20.9  L


 


Mean Corpuscular Hemoglobin


Concent 30.2  L


 


 


Red Cell Distribution Width 18.6  H


 


Platelet Count 334  


 


Mean Platelet Volume 9.5  


 


Neutrophils % 84.7  H


 


Lymphocytes % 10.7  L


 


Monocytes % 3.5  


 


Eosinophils % 0.0  


 


Basophils % 0.1  


 


Nucleated Red Blood Cells % 0.0  


 


Neutrophils # 16.5  H


 


Lymphocytes # 2.1  


 


Monocytes # 0.7  


 


Eosinophils # 0.0  


 


Basophils # 0.0  


 


Nucleated Red Blood Cells # 0.0  


 


Sodium Level 136  


 


Potassium Level 4.6  


 


Chloride Level 104  


 


Carbon Dioxide Level 26  


 


Anion Gap 11  


 


Blood Urea Nitrogen 27  H


 


Creatinine 0.66  


 


Glucose Level 142  


 


Calcium Level 9.3  


 


Phosphorus Level 3.8  


 


Magnesium Level 2.1  











Medications


Medications





 Current Medications


Ondansetron HCl (Zofran Inj) 4 mg Q6H  PRN IV NAUSEA AND/OR VOMITING;  Start 9/ 26/17 at 22:30


Acetaminophen (Tylenol Liquid) 650 mg Q6H  PRN PO PAIN LEVEL 1-3 OR FEVER Last 

administered on 9/29/17at 00:32; Admin Dose 650 MG;  Start 9/26/17 at 22:30


Morphine Sulfate (morphine) 2 mg Q4H  PRN IV PAIN LEVEL 7-10;  Start 9/26/17 at 

22:30


Famotidine (Pepcid) 20 mg Q12 PO  Last administered on 9/28/17at 21:08; Admin 

Dose 20 MG;  Start 9/27/17 at 09:00


Dexamethasone 4 mg 4 mg Q6 IV  Last administered on 9/29/17at 05:40; Admin Dose 

4 MG;  Start 9/27/17 at 00:00


Levetiracetam (Keppra 500 Mg/ 100ml (Pmx)) 100 ml @  400 mls/hr ONCE IVPB ;  

Start 9/29/17 at 19:30;  Stop 9/29/17 at 23:00


Mannitol 12.5 gm 12.5 gm ONCE IV ;  Start 9/29/17 at 19:30;  Stop 9/29/17 at 23:

33


Fentanyl 100 ml @  2.5 mls/hr TITRATE  PRN IV sedation;  Start 9/29/17 at 21:30


Propofol (Diprivan) 100 ml @  3.6 mls/hr Q12H IV ;  Start 9/29/17 at 21:30











KRISTEL ORANTES M.D. Sep 29, 2017 21:48

## 2017-09-30 NOTE — RADRPT
PROCEDURE:   XR Chest. 

 

CLINICAL INDICATION:   Shortness of breath.

 

TECHNIQUE:   Single frontal view. 

 

COMPARISON:   09/29/2017. 

 

FINDINGS:

The endotracheal tube and right internal jugular vein catheter has been inserted in satisfactory pos
ition. The right chest tube remains in satisfactory position. There is a small right pleural effusio
n, slightly larger than seen previously.

The heart is enlarged. 

There is no left pleural effusion. 

There is no pneumothorax.   

 

IMPRESSION:

1.  Satisfactory placement of endotracheal tube and right internal jugular vein catheter.

2.  Slightly larger right pleural effusion.

3.  No other change from 09/29/2017.

 

RPTAT: QQ

_____________________________________________ 

.Charly Espinal MD, MD           Date    Time 

Electronically viewed and signed by .Charly Espinal MD, MD on 09/30/2017 08:48 

 

D:  09/30/2017 08:48  T:  09/30/2017 08:48

.R/

## 2017-09-30 NOTE — RADRPT
PROCEDURE:   MRI Brain with and without contrast. 

 

CLINICAL INDICATION:   46-year-old female with history of metastatic disease, postop evaluation. 

 

TECHNIQUE:   An MRI of the brain was performed with and without contrast utilizing the following seq
uences:  Sagittal T1 weighted, sagittal FLAIR, axial T1, axial FLAIR, axial T2 weighted, axial diffu
hari weighted (EPI technique [id number]), axial ADC mapping, and post contrast axial and coronal T1 weig
hted, and axial FLAIR. 10 cc of Magnevist was given intravenously without complication.  The images 
were reviewed on a high-resolution PACS workstation. 

 

COMPARISON:   No prior studies are available for comparison. 

 

FINDINGS:

Diffusion weighted sequences demonstrate no evidence of acute lacunar or lobar infarction.  There ha
s been interval right frontal - parietal - temporal craniectomy, with cranioplasty. There is a mild 
amount of residual vasogenic edema in the underlying right frontal and parietal lobes. Of. The there
 is redemonstration of small enhancing hemorrhagic metastases involving the paramedian left occipita
l lobe, measuring 11 x 13 mm and 7 x 5 mm respectively. There is stable mild amount of vasogenic ronald
ma in the adjacent white matter. There is stable appearance of 2.0 x 1.1 cm enhancing extra-axial ma
ss lesion involving the right sphenoid temporal buttress. There is a subtle area of enhancement invo
lving the left superior cerebellar hemisphere measuring 4 mm (axial series image 68). The known left
 temporal lobe enhancing mass lesion is not as well seen on the examination. There is no intracrania
l hemorrhage, extra-axial fluid collection, mass lesion, midline shift or hydrocephalous.  There is 

mild prominence of the cerebral sulci, lateral and third ventricles. There are mild to moderate stephanie
ventricular and subcortical white matter lesions, likely related to chronic microangiopathic changes
.  Normal flow voids are visible the proximal intracranial arteries and dural sinuses, indicating pa
tency.  The midline structures are intact. 

 

The paranasal sinuses, mastoid air cells and middle ear cavities are normally aerated.  The orbits, 
calvarium and extracranial soft tissues are normal in appearance.

 

IMPRESSION:

 

1.  Interval right frontal - parietal - temporal craniectomy with removal of the large extra-axial m
etastatic lesion. 

2.  There is stable appearance of small hemorrhagic metastases involving the paramedian left occipit
al lobe measuring 13 x 11 mm and 7 x 5 mm respectively. 

3.  Stable 4 mm enhancement involving the left superior cerebellar hemisphere, suggestive of small m
etastasis.

 

RPTAT: HGAS

_____________________________________________ 

.Chuy Hallman MD, MD           Date    Time 

Electronically viewed and signed by .Chuy Hallman MD, MD on 09/30/2017 16:14 

 

D:  09/30/2017 16:14  T:  09/30/2017 16:14

.S/

## 2017-09-30 NOTE — CONS
Date/Time of Note


Date/Time of Note


DATE: 9/30/17 


TIME: 13:10





Consult Date/Type/Reason


Admit Date/Time


Sep 26, 2017 at 19:54


Initial Consult Date


9/27/17


Type of Consultation:  Pulm/CCM


Ordering Provider:  SHIREEN ASHBY


s/p craniectomy. On vent. Sedated.





Objective





 Vital Signs








  Date Time  Temp Pulse Resp B/P Pulse Ox O2 Delivery O2 Flow Rate FiO2


 


9/30/17 11:30  75 13 132/63 98   


 


9/30/17 11:00      Mechanical Ventilator  


 


9/30/17 08:00 97.9       


 


9/30/17 08:00        30


 


9/28/17 18:00       4.0 














 Intake and Output   


 


 9/29/17 9/29/17 9/30/17





 15:00 23:00 07:00


 


Intake Total  2941 ml 1093 ml


 


Output Total  1155 ml 1435 ml


 


Balance  1786 ml -342 ml








Exam


NECK:  Supple.  No JVD.  No lymphadenopathy; + ET tube 


CARDIAC:  S1, S2.  No added sounds or murmurs.


CHEST:  Diminished air entry bilaterally.


ABDOMEN:  Soft, nontender.  No guarding or rebound.


EXTREMITIES:  No cyanosis, clubbing or edema.





Results/Medications


Result Diagram:  


9/30/17 0400                                                                   

             9/30/17 0555





Results 24 hrs





Laboratory Tests








Test


  9/30/17


00:16 9/30/17


04:00 9/30/17


05:55


 


Blood Gas Specimen Source


  Blood arterial


  


  


 


 


Arterial Blood Date Drawn


  9/30/2017


12:33:53 AM 


  


 


 


Arterial Blood pH (Temp


corrected) 7.316  L


  


  


 


 


Arterial Blood pCO2 (Temp


correct) 44.0  


  


  


 


 


Arterial Blood pO2 (Temp


corrected) 170.1  H


  


  


 


 


Arterial Blood HCO3 22.0    


 


Arterial Blood Base Excess -4.0  L  


 


Arterial Blood Oxygen


Saturation 98.6  H


  


  


 


 


Gerald Test N/A    


 


Arterial Blood Gas Puncture


Site A-Line  


  


  


 


 


Arterial Blood


Carboxyhemoglobin 0.2  


  


  


 


 


Arterial Blood Methemoglobin 0.3    


 


Blood Gas A-a O2 Differential 136.9  H  


 


Oxyhemoglobin Percent 98.1    


 


Total Hemoglobin 9.8  L  


 


Blood Gas Temperature 37.0    


 


Blood Gas Respiration Rate 14.0    


 


Blood Gas Actual Respiration


Rate 14  


  


  


 


 


Blood Gas Modality VENT - AC    


 


FiO2 50.0    


 


Blood Gas Tidal Volume 500.0    


 


Blood Gas Low PEEP Setting 5.0    


 


Blood Gas Notified Whom CRISTY HAYDEN RCP    


 


Blood Gas Notified Time


  9/30/2017


12:46:02 AM 


  


 


 


White Blood Count  18.1  H 


 


Red Blood Count  3.99  L 


 


Hemoglobin  8.7  L 


 


Hematocrit  28.6  L 


 


Mean Corpuscular Volume  71.7  L 


 


Mean Corpuscular Hemoglobin  21.8  L 


 


Mean Corpuscular Hemoglobin


Concent 


  30.4  L


  


 


 


Red Cell Distribution Width  18.4  H 


 


Platelet Count  335   


 


Mean Platelet Volume  10.3   


 


Neutrophils %  80.8  H 


 


Lymphocytes %  11.5  L 


 


Monocytes %  6.6   


 


Eosinophils %  0.0   


 


Basophils %  0.1   


 


Nucleated Red Blood Cells %  0.0   


 


Neutrophils #  14.6  H 


 


Lymphocytes #  2.1   


 


Monocytes #  1.2  H 


 


Eosinophils #  0.0   


 


Basophils #  0.0   


 


Nucleated Red Blood Cells #  0.0   


 


Sodium Level   139  


 


Potassium Level   5.0  


 


Chloride Level   110  


 


Carbon Dioxide Level   22  


 


Anion Gap   12  


 


Blood Urea Nitrogen   28  H


 


Creatinine   0.85  


 


Glucose Level   173  


 


Calcium Level   7.6  L


 


Phosphorus Level   5.2  H


 


Magnesium Level   2.1  








Medications





 Current Medications


Ondansetron HCl (Zofran Inj) 4 mg Q6H  PRN IV NAUSEA AND/OR VOMITING;  Start 9/ 26/17 at 22:30


Acetaminophen (Tylenol Liquid) 650 mg Q6H  PRN PO PAIN LEVEL 1-3 OR FEVER Last 

administered on 9/29/17at 00:32; Admin Dose 650 MG;  Start 9/26/17 at 22:30


Morphine Sulfate (morphine) 2 mg Q4H  PRN IV PAIN LEVEL 7-10;  Start 9/26/17 at 

22:30


Dexamethasone 4 mg 4 mg Q6 IV  Last administered on 9/30/17at 12:41; Admin Dose 

4 MG;  Start 9/27/17 at 00:00


Fentanyl 100 ml @  2.5 mls/hr TITRATE  PRN IV sedation Last administered on 9/30 /17at 04:36; Admin Dose 10 MLS/HR;  Start 9/29/17 at 21:30


Propofol 100 ml @  3.6 mls/hr Q12H IV  Last administered on 9/30/17at 10:21; 

Admin Dose 25.2 MLS/HR;  Start 9/29/17 at 21:30


Sodium Chloride 1,000 ml @  125 mls/hr Q8H IV  Last administered on 9/30/17at 05

:18; Admin Dose 125 MLS/HR;  Start 9/29/17 at 22:00


Midazolam HCl (Versed) 50 ml @ 1 mls/hr TITRATE IV ;  Start 9/29/17 at 22:00


Famotidine (Pepcid Iv) 20 mg DAILY IV  Last administered on 9/30/17at 10:25; 

Admin Dose 20 MG;  Start 9/30/17 at 10:00





Assessment/Plan


Additional Assessment/Plan


IMP:


1.   Vent Dependence s/p craniectomy 


2.   Recurrent right pleural effusion, likely secondary to underlying 

metastatic breast cancer. Right lung opacification secondary to pleural effusion

, status post Pleurx catheter placement.  Continue drainage daily.


3.   New central nervous system (CNS) lesions with cerebral edema--s/p 

craniectomy 


 


RECS:


1.   Continue Pleurx catheter drainage daily.


2.   Continue steroids for cerebral edema.


3.   Wean to CPAP/PS--> wean as tolerated. 





Critical care time 40 minutes











JULY COPELAND MD Sep 30, 2017 13:17

## 2017-09-30 NOTE — PN
Date/Time of Note


Date/Time of Note


DATE: 9/30/17 


TIME: 10:26





Assessment/Plan


VTE Prophylaxis


VTE Prophylaxis Intervention:  SCD's





Lines/Catheters


IV Catheter Type (from Nrs):  Central Line


Central line still needed:  Yes


Urinary Cath still in place:  No





Assessment/Plan


Chief Complaint/Hosp Course


Patient is a 46-year-old female with a past medical history of HER-2 and 

estrogen receptor positive metastatic breast cancer to the lung, liver and 

brain who presents after feeling dizzy during routine outpatient MRI





Assessment


Dizziness


Metastatic breast cancer, metastases to the lung, liver, brain


6 mm right-to-left midline herniation and effacement of the right lateral 

ventricle


Large right-sided pleural effusion


Status post right-sided mastectomy


Headache


Shortness of breath, stable





Plan


-Neurosurgery performed craniectomy with mesh placement on 9/29/17. patient 

currently still intubated.


-pleurex catheter inserted, draining well. drain as needed.


-Oncology has also seen the patient, multiple options still available for 

patient.


-Symptomatic care for now


-Decadron for cerebral edema


-Monitor in ICU


-will dispo accordingly depending on how patient is after extubation





More than 40 minutes was spent on this encounter


Problems:  





Exam/Review of Systems


Vital Signs


Vitals





 Vital Signs








  Date Time  Temp Pulse Resp B/P Pulse Ox O2 Delivery O2 Flow Rate FiO2


 


9/30/17 08:45  76 14 110/52 97   


 


9/30/17 08:00 97.9     Mechanical Ventilator  


 


9/30/17 08:00        30


 


9/28/17 18:00       4.0 














 Intake and Output   


 


 9/29/17 9/29/17 9/30/17





 15:00 23:00 07:00


 


Intake Total  2941 ml 1093 ml


 


Output Total  1155 ml 1435 ml


 


Balance  1786 ml -342 ml











Exam


Physical exam





General: Patient is laying in bed intubated, sedated


Mentation: Patient intubated, sedated,


Head: R side craniectomy with mesh.


Neck: Supple, nontender, midline


Respiratory: slightly diminished breath sounds on the R.


Cardiovascular: regular rate, no obvious murmurs


Gastrointestinal: non-tender to palpation, bowel sounds heard.


Neurological: unable to assess 2/2 sedation


Skin: No new skin lesions, R chest pleurex catheter





Results


Result Diagram:  


9/30/17 0400                                                                   

             9/30/17 0555





Results 24 hrs





Laboratory Tests








Test


  9/30/17


00:16 9/30/17


04:00 9/30/17


05:55


 


Blood Gas Specimen Source


  Blood arterial


  


  


 


 


Arterial Blood Date Drawn


  9/30/2017


12:33:53 AM 


  


 


 


Arterial Blood pH (Temp


corrected) 7.316  L


  


  


 


 


Arterial Blood pCO2 (Temp


correct) 44.0  


  


  


 


 


Arterial Blood pO2 (Temp


corrected) 170.1  H


  


  


 


 


Arterial Blood HCO3 22.0    


 


Arterial Blood Base Excess -4.0  L  


 


Arterial Blood Oxygen


Saturation 98.6  H


  


  


 


 


Gerald Test N/A    


 


Arterial Blood Gas Puncture


Site A-Line  


  


  


 


 


Arterial Blood


Carboxyhemoglobin 0.2  


  


  


 


 


Arterial Blood Methemoglobin 0.3    


 


Blood Gas A-a O2 Differential 136.9  H  


 


Oxyhemoglobin Percent 98.1    


 


Total Hemoglobin 9.8  L  


 


Blood Gas Temperature 37.0    


 


Blood Gas Respiration Rate 14.0    


 


Blood Gas Actual Respiration


Rate 14  


  


  


 


 


Blood Gas Modality VENT - AC    


 


FiO2 50.0    


 


Blood Gas Tidal Volume 500.0    


 


Blood Gas Low PEEP Setting 5.0    


 


Blood Gas Notified Whom CRISTY HAYDEN RCP    


 


Blood Gas Notified Time


  9/30/2017


12:46:02 AM 


  


 


 


White Blood Count  18.1  H 


 


Red Blood Count  3.99  L 


 


Hemoglobin  8.7  L 


 


Hematocrit  28.6  L 


 


Mean Corpuscular Volume  71.7  L 


 


Mean Corpuscular Hemoglobin  21.8  L 


 


Mean Corpuscular Hemoglobin


Concent 


  30.4  L


  


 


 


Red Cell Distribution Width  18.4  H 


 


Platelet Count  335   


 


Mean Platelet Volume  10.3   


 


Neutrophils %  80.8  H 


 


Lymphocytes %  11.5  L 


 


Monocytes %  6.6   


 


Eosinophils %  0.0   


 


Basophils %  0.1   


 


Nucleated Red Blood Cells %  0.0   


 


Neutrophils #  14.6  H 


 


Lymphocytes #  2.1   


 


Monocytes #  1.2  H 


 


Eosinophils #  0.0   


 


Basophils #  0.0   


 


Nucleated Red Blood Cells #  0.0   


 


Sodium Level   139  


 


Potassium Level   5.0  


 


Chloride Level   110  


 


Carbon Dioxide Level   22  


 


Anion Gap   12  


 


Blood Urea Nitrogen   28  H


 


Creatinine   0.85  


 


Glucose Level   173  


 


Calcium Level   7.6  L


 


Phosphorus Level   5.2  H


 


Magnesium Level   2.1  











Medications


Medications





 Current Medications


Ondansetron HCl (Zofran Inj) 4 mg Q6H  PRN IV NAUSEA AND/OR VOMITING;  Start 9/ 26/17 at 22:30


Acetaminophen (Tylenol Liquid) 650 mg Q6H  PRN PO PAIN LEVEL 1-3 OR FEVER Last 

administered on 9/29/17at 00:32; Admin Dose 650 MG;  Start 9/26/17 at 22:30


Morphine Sulfate (morphine) 2 mg Q4H  PRN IV PAIN LEVEL 7-10;  Start 9/26/17 at 

22:30


Famotidine (Pepcid) 20 mg Q12 PO  Last administered on 9/28/17at 21:08; Admin 

Dose 20 MG;  Start 9/27/17 at 09:00


Dexamethasone 4 mg 4 mg Q6 IV  Last administered on 9/30/17at 05:18; Admin Dose 

4 MG;  Start 9/27/17 at 00:00


Fentanyl 100 ml @  2.5 mls/hr TITRATE  PRN IV sedation Last administered on 9/30 /17at 04:36; Admin Dose 10 MLS/HR;  Start 9/29/17 at 21:30


Propofol 100 ml @  3.6 mls/hr Q12H IV  Last administered on 9/30/17at 10:21; 

Admin Dose 25.2 MLS/HR;  Start 9/29/17 at 21:30


Sodium Chloride 1,000 ml @  125 mls/hr Q8H IV  Last administered on 9/30/17at 05

:18; Admin Dose 125 MLS/HR;  Start 9/29/17 at 22:00


Midazolam HCl (Versed) 50 ml @ 1 mls/hr TITRATE IV ;  Start 9/29/17 at 22:00


Famotidine (Pepcid Iv) 20 mg DAILY IV ;  Start 9/30/17 at 10:00











SHIREEN ASHBY Sep 30, 2017 10:30

## 2017-10-01 NOTE — QN
Documentation


Comment


The patient is doing well s/p extubation. She follows commands x4 briskly. 

Speech is fluent and appropriate. MRI looks good with no evidence of residual 

tumor. 





A/P: dc drain. dc Guerrero. OK from NS pov to transfer to floor.











FARNAZ SOLORIO MD Oct 1, 2017 14:39

## 2017-10-01 NOTE — PN
Date/Time of Note


Date/Time of Note


DATE: 10/1/17 


TIME: 12:14





Assessment/Plan


VTE Prophylaxis


VTE Prophylaxis Intervention:  SCD's





Lines/Catheters


IV Catheter Type (from Nrs):  A Line


Urinary Cath still in place:  Yes


Reason Cath still needed:  terminal illness/intractable pain





Assessment/Plan


Chief Complaint/Hosp Course


Patient is a 46-year-old female with a past medical history of HER-2 and 

estrogen receptor positive metastatic breast cancer to the lung, liver and 

brain who presents after feeling dizzy during routine outpatient MRI





Assessment


Dizziness


Metastatic breast cancer, metastases to the lung, liver, brain


6 mm right-to-left midline herniation and effacement of the right lateral 

ventricle


Large right-sided pleural effusion


Status post right-sided mastectomy


Headache


Shortness of breath, stable





Plan


-Neurosurgery performed craniectomy with mesh placement on 9/29/17. patient 

extubated today. Doing well. However MRI states multiple other areas of mets. 

prognosis is poor.


-hgb low, neurosurgery defers transfusion to primary, will transfuse as needed. 


-pleurex catheter inserted, draining well. drain as needed.


-Oncology has also seen the patient, multiple options still available for 

patient.


-Symptomatic care for now


-Decadron for cerebral edema


-Monitor in ICU


-will dispo accordingly depending on how patient does with PT/OT


-case management consult for palliative care.





More than 40 minutes was spent on this encounter


Problems:  





Subjective


24 Hr Interval Summary


Free Text/Dictation


Patient is doing well, recently extubated





Exam/Review of Systems


Vital Signs


Vitals





 Vital Signs








  Date Time  Temp Pulse Resp B/P Pulse Ox O2 Delivery O2 Flow Rate FiO2


 


10/1/17 10:05     100  4.0 


 


10/1/17 09:25  65 15     30


 


10/1/17 09:00    141/70  CPAP  





      Mechanical Ventilator  


 


10/1/17 08:00 98.1       














 Intake and Output   


 


 9/30/17 9/30/17 10/1/17





 15:00 23:00 07:00


 


Intake Total 1242.2 ml 1253.02 ml 1077.98 ml


 


Output Total 1815 ml 600 ml 585 ml


 


Balance -572.8 ml 653.02 ml 492.98 ml











Exam


Physical exam





General: Patient is laying in bed recently extubted


Mentation: sleepy, but is alert and follows command.


Head: R side craniectomy with mesh. drain.


Neck: Supple, nontender, midline


Respiratory: slightly diminished breath sounds on the R.


Cardiovascular: regular rate, no obvious murmurs


Gastrointestinal: non-tender to palpation, bowel sounds heard.


Neurological: unable to assess 2/2 recent extubation and sedation


Skin: No new skin lesions, R chest pleurex catheter





Results


Result Diagram:  


10/1/17 0810                                                                   

             10/1/17 0500





Results 24 hrs





Laboratory Tests








Test


  10/1/17


05:00 10/1/17


08:10 10/1/17


09:00


 


White Blood Count 11.2  #H 12.3  H 


 


Red Blood Count 3.29  L 3.30  L 


 


Hemoglobin 6.9  #*L 7.3  L 


 


Hematocrit 23.7  L 23.4  L 


 


Mean Corpuscular Volume 72.0  L 70.9  L 


 


Mean Corpuscular Hemoglobin 21.0  L 22.1  L 


 


Mean Corpuscular Hemoglobin


Concent 29.1  L


  31.2  L


  


 


 


Red Cell Distribution Width 18.3  H 18.0  H 


 


Platelet Count 198  # 196   


 


Mean Platelet Volume 9.5   8.8   


 


Neutrophils % 75.3   75.0   


 


Lymphocytes % 14.5  L 14.8  L 


 


Monocytes % 8.5   8.1   


 


Eosinophils % 0.0   0.0   


 


Basophils % 0.1   0.0   


 


Nucleated Red Blood Cells % 0.0   0.0   


 


Neutrophils # 8.5  H 9.2  H 


 


Lymphocytes # 1.6   1.8   


 


Monocytes # 1.0  H 1.0  H 


 


Eosinophils # 0.0   0.0   


 


Basophils # 0.0   0.0   


 


Nucleated Red Blood Cells # 0.0   0.0   


 


Sodium Level 140    


 


Potassium Level 4.2    


 


Chloride Level 112  H  


 


Carbon Dioxide Level 24    


 


Anion Gap 8    


 


Blood Urea Nitrogen 28  H  


 


Creatinine 0.64    


 


Glucose Level 138    


 


Calcium Level 7.6  L  


 


Phosphorus Level 3.5    


 


Magnesium Level 2.3    


 


Blood Gas Specimen Source


  


  


  Blood arterial


 


 


Arterial Blood Date Drawn


  


  


  10/1/2017


9:25:02 AM


 


Arterial Blood pH (Temp


corrected) 


  


  7.418  


 


 


Arterial Blood pCO2 (Temp


correct) 


  


  33.4  L


 


 


Arterial Blood pO2 (Temp


corrected) 


  


  104.2  H


 


 


Arterial Blood HCO3   21.1  L


 


Arterial Blood Base Excess   -3.0  


 


Arterial Blood Oxygen


Saturation 


  


  97.3  


 


 


Gerald Test   N/A  


 


Arterial Blood Gas Puncture


Site 


  


  A-Line  


 


 


Arterial Blood


Carboxyhemoglobin 


  


  0.3  


 


 


Arterial Blood Methemoglobin   0.5  


 


Blood Gas A-a O2 Differential   70.5  H


 


Oxyhemoglobin Percent   96.5  


 


Total Hemoglobin   8.0  L


 


Blood Gas Temperature   37.0  


 


Blood Gas Actual Respiration


Rate 


  


  15  


 


 


Blood Gas Modality   VENT - CPAP  


 


FiO2   30.0  


 


Blood Gas Tidal Volume   626.0  


 


Blood Gas Low PEEP Setting   5.0  


 


Blood Gas Pressure Support   8  


 


Blood Gas Notified Whom   CW  


 


Blood Gas Notified Time


  


  


  10/1/2017


9:39:51 AM











Medications


Medications





 Current Medications


Ondansetron HCl (Zofran Inj) 4 mg Q6H  PRN IV NAUSEA AND/OR VOMITING;  Start 9/ 26/17 at 22:30


Acetaminophen (Tylenol Liquid) 650 mg Q6H  PRN PO PAIN LEVEL 1-3 OR FEVER Last 

administered on 9/29/17at 00:32; Admin Dose 650 MG;  Start 9/26/17 at 22:30


Morphine Sulfate (morphine) 2 mg Q4H  PRN IV PAIN LEVEL 7-10;  Start 9/26/17 at 

22:30


Dexamethasone 4 mg 4 mg Q6 IV  Last administered on 10/1/17at 05:53; Admin Dose 

4 MG;  Start 9/27/17 at 00:00


Fentanyl 100 ml @  2.5 mls/hr TITRATE  PRN IV sedation Last administered on 10/1

/17at 07:03; Admin Dose 5 MLS/HR;  Start 9/29/17 at 21:30


Propofol 100 ml @  3.6 mls/hr Q12H IV  Last administered on 10/1/17at 05:35; 

Admin Dose 18 MLS/HR;  Start 9/29/17 at 21:30


Sodium Chloride 1,000 ml @  125 mls/hr Q8H IV  Last administered on 10/1/17at 09

:01; Admin Dose 125 MLS/HR;  Start 9/29/17 at 22:00


Midazolam HCl (Versed) 50 ml @ 1 mls/hr TITRATE IV ;  Start 9/29/17 at 22:00


Famotidine (Pepcid Iv) 20 mg DAILY IV  Last administered on 10/1/17at 09:54; 

Admin Dose 20 MG;  Start 9/30/17 at 10:00











SHIREEN ASHBY Oct 1, 2017 12:17

## 2017-10-01 NOTE — CONS
Date/Time of Note


Date/Time of Note


DATE: 10/1/17 


TIME: 13:30





Consult Date/Type/Reason


Admit Date/Time


Sep 26, 2017 at 19:54


Initial Consult Date


9/27/17


Type of Consultation:  Pulm/CCM


Ordering Provider:  SHIREEN ASHBY





Subjective


No events. On CPAP/PS





Objective





 Vital Signs








  Date Time  Temp Pulse Resp B/P Pulse Ox O2 Delivery O2 Flow Rate FiO2


 


10/1/17 12:00 98.3 59 14 121/73 100 Nasal Cannula 4.0 


 


10/1/17 09:25        30














 Intake and Output   


 


 9/30/17 9/30/17 10/1/17





 14:59 22:59 06:59


 


Intake Total 1136.6 ml 1251.96 ml 1230.64 ml


 


Output Total 1765 ml 665 ml 570 ml


 


Balance -628.4 ml 586.96 ml 660.64 ml








Exam


HEENT: Neck supple; no JVD; no LAD, + ET tube 


CVS: RRR, S1 and S2


CHEST: Decreased BS right base


ABD: Soft, NT, + BS


EXT: No c/c/e





Results/Medications


Result Diagram:  


10/1/17 0810                                                                   

             10/1/17 0500





Results 24 hrs





Laboratory Tests








Test


  10/1/17


05:00 10/1/17


08:10 10/1/17


09:00


 


White Blood Count 11.2  #H 12.3  H 


 


Red Blood Count 3.29  L 3.30  L 


 


Hemoglobin 6.9  #*L 7.3  L 


 


Hematocrit 23.7  L 23.4  L 


 


Mean Corpuscular Volume 72.0  L 70.9  L 


 


Mean Corpuscular Hemoglobin 21.0  L 22.1  L 


 


Mean Corpuscular Hemoglobin


Concent 29.1  L


  31.2  L


  


 


 


Red Cell Distribution Width 18.3  H 18.0  H 


 


Platelet Count 198  # 196   


 


Mean Platelet Volume 9.5   8.8   


 


Neutrophils % 75.3   75.0   


 


Lymphocytes % 14.5  L 14.8  L 


 


Monocytes % 8.5   8.1   


 


Eosinophils % 0.0   0.0   


 


Basophils % 0.1   0.0   


 


Nucleated Red Blood Cells % 0.0   0.0   


 


Neutrophils # 8.5  H 9.2  H 


 


Lymphocytes # 1.6   1.8   


 


Monocytes # 1.0  H 1.0  H 


 


Eosinophils # 0.0   0.0   


 


Basophils # 0.0   0.0   


 


Nucleated Red Blood Cells # 0.0   0.0   


 


Sodium Level 140    


 


Potassium Level 4.2    


 


Chloride Level 112  H  


 


Carbon Dioxide Level 24    


 


Anion Gap 8    


 


Blood Urea Nitrogen 28  H  


 


Creatinine 0.64    


 


Glucose Level 138    


 


Calcium Level 7.6  L  


 


Phosphorus Level 3.5    


 


Magnesium Level 2.3    


 


Blood Gas Specimen Source


  


  


  Blood arterial


 


 


Arterial Blood Date Drawn


  


  


  10/1/2017


9:25:02 AM


 


Arterial Blood pH (Temp


corrected) 


  


  7.418  


 


 


Arterial Blood pCO2 (Temp


correct) 


  


  33.4  L


 


 


Arterial Blood pO2 (Temp


corrected) 


  


  104.2  H


 


 


Arterial Blood HCO3   21.1  L


 


Arterial Blood Base Excess   -3.0  


 


Arterial Blood Oxygen


Saturation 


  


  97.3  


 


 


Gerald Test   N/A  


 


Arterial Blood Gas Puncture


Site 


  


  A-Line  


 


 


Arterial Blood


Carboxyhemoglobin 


  


  0.3  


 


 


Arterial Blood Methemoglobin   0.5  


 


Blood Gas A-a O2 Differential   70.5  H


 


Oxyhemoglobin Percent   96.5  


 


Total Hemoglobin   8.0  L


 


Blood Gas Temperature   37.0  


 


Blood Gas Actual Respiration


Rate 


  


  15  


 


 


Blood Gas Modality   VENT - CPAP  


 


FiO2   30.0  


 


Blood Gas Tidal Volume   626.0  


 


Blood Gas Low PEEP Setting   5.0  


 


Blood Gas Pressure Support   8  


 


Blood Gas Notified Whom   CW  


 


Blood Gas Notified Time


  


  


  10/1/2017


9:39:51 AM








Medications





 Current Medications


Ondansetron HCl (Zofran Inj) 4 mg Q6H  PRN IV NAUSEA AND/OR VOMITING;  Start 9/ 26/17 at 22:30


Acetaminophen (Tylenol Liquid) 650 mg Q6H  PRN PO PAIN LEVEL 1-3 OR FEVER Last 

administered on 9/29/17at 00:32; Admin Dose 650 MG;  Start 9/26/17 at 22:30


Morphine Sulfate (morphine) 2 mg Q4H  PRN IV PAIN LEVEL 7-10;  Start 9/26/17 at 

22:30


Dexamethasone 4 mg 4 mg Q6 IV  Last administered on 10/1/17at 12:42; Admin Dose 

4 MG;  Start 9/27/17 at 00:00


Fentanyl 100 ml @  2.5 mls/hr TITRATE  PRN IV sedation Last administered on 10/1

/17at 07:03; Admin Dose 5 MLS/HR;  Start 9/29/17 at 21:30


Propofol 100 ml @  3.6 mls/hr Q12H IV  Last administered on 10/1/17at 05:35; 

Admin Dose 18 MLS/HR;  Start 9/29/17 at 21:30


Sodium Chloride 1,000 ml @  125 mls/hr Q8H IV  Last administered on 10/1/17at 09

:01; Admin Dose 125 MLS/HR;  Start 9/29/17 at 22:00


Midazolam HCl (Versed) 50 ml @ 1 mls/hr TITRATE IV ;  Start 9/29/17 at 22:00


Famotidine (Pepcid Iv) 20 mg DAILY IV  Last administered on 10/1/17at 09:54; 

Admin Dose 20 MG;  Start 9/30/17 at 10:00





Assessment/Plan


Additional Assessment/Plan


IMP:


1.   Vent Dependence s/p craniectomy 


2.   Recurrent right pleural effusion, likely secondary to underlying 

metastatic breast cancer. Right lung opacification secondary to pleural effusion

, status post Pleurx catheter placement.  Continue drainage daily.


3.   New central nervous system (CNS) lesions with cerebral edema/hemorrhage--s/

p craniectomy 


 


RECS:


1.   Continue Pleurx catheter drainage daily


2.   Continue steroids for cerebral edema


3.   Extubate


4.   Advance diet 





Critical care time 40 minutes











JULY COPELAND MD Oct 1, 2017 13:32

## 2017-10-02 NOTE — PN
Date/Time of Note


Date/Time of Note


DATE: 10/2/17 


TIME: 10:39





Assessment/Plan


VTE Prophylaxis


VTE Prophylaxis Intervention:  SCD's





Lines/Catheters


IV Catheter Type (from [facility]):  Central Line


Central line still needed:  Yes


Urinary Cath still in place:  No





Assessment/Plan


Assessment/Plan


1.  Breast cancer with metastases to the lung, liver, brain


- s/p craniectomy with mesh placement on 9/29/17


- Neurosurgery on board and recommendations appreciated


- Oncology on board and consultation appreciated. She has been responsive to 

therapy in the past and still has many more treatment options which she could 

benefit from.  Given her Brain mets, Dr. Dee would consider a combination of 

Xeloda and Lapatinib which can penetrate the blood brain barrier.  Once she 

stabilizes can restart Tamoxifen as well


-once cleared from a neurosurgical standpoint will start chemotherapy





2.  6 mm right-to-left midline herniation and effacement of the right lateral 

ventricle


- mental status remains stable


- s/p craniectomy


- Decadron for cerebral edema





3. Large right-sided pleural effusion


- Pleurex cath in place, draining well





4.  Anemia of chronic disease


- s/p 1 unit PRBC today. will continue monitoring and transfuse as needed





5. Shortness of breath


- remains stable s/p extubation





Subjective


24 Hr Interval Summary


Free Text/Dictation


Patient resting comfortably with no new complaints.  c/o slight headache but 

denies any nausea, vomiting, chest pain, SOB, or abdominal issues.  She 

tolerated diet this am.





Exam/Review of Systems


Vital Signs


Vitals





 Vital Signs








  Date Time  Temp Pulse Resp B/P Pulse Ox O2 Delivery O2 Flow Rate FiO2


 


10/2/17 08:30 99.4 55 16 136/74 97   


 


10/2/17 06:00      Room Air  


 


10/1/17 15:00       2.0 


 


10/1/17 09:25        30














 Intake and Output   


 


 10/1/17 10/1/17 10/2/17





 15:00 23:00 07:00


 


Intake Total 1506.6 ml 1365 ml 


 


Output Total 1325 ml 760 ml 550 ml


 


Balance 181.6 ml 605 ml -550 ml











Exam


General: NAD, responding appropriately. 


Mentation: sleepy, but is alert and follows command.


Head: R side craniectomy with mesh. staples in place- no discharge or drainage


Neck: Supple, nontender, midline


Respiratory: diminished diffusely. no wheezes or rhonchi appreciated


Cardiovascular: regular rate, no obvious murmurs


Gastrointestinal: non-tender to palpation, bowel sounds heard.


Skin: No new skin lesions, R chest pleurex catheter





Results


Result Diagram:  


10/2/17 0430                                                                   

             10/2/17 0430





Results 24 hrs





Laboratory Tests








Test


  10/2/17


04:30


 


White Blood Count 13.5  H


 


Red Blood Count 3.56  L


 


Hemoglobin 7.5  L


 


Hematocrit 25.2  L


 


Mean Corpuscular Volume 70.8  L


 


Mean Corpuscular Hemoglobin 21.1  L


 


Mean Corpuscular Hemoglobin


Concent 29.8  L


 


 


Red Cell Distribution Width 17.7  H


 


Platelet Count 219  


 


Mean Platelet Volume 9.9  


 


Neutrophils % 74.5  


 


Lymphocytes % 16.4  


 


Monocytes % 6.5  


 


Eosinophils % 0.0  


 


Basophils % 0.1  


 


Nucleated Red Blood Cells % 0.0  


 


Neutrophils # 10.1  H


 


Lymphocytes # 2.2  


 


Monocytes # 0.9  


 


Eosinophils # 0.0  


 


Basophils # 0.0  


 


Nucleated Red Blood Cells # 0.0  


 


Sodium Level 133  L


 


Potassium Level 4.1  


 


Chloride Level 104  


 


Carbon Dioxide Level 28  


 


Anion Gap 5  L


 


Blood Urea Nitrogen 25  H


 


Creatinine 0.55  


 


Glucose Level 127  


 


Calcium Level 8.1  L


 


Phosphorus Level 3.4  


 


Magnesium Level 2.0  











Medications


Medications





 Current Medications


Ondansetron HCl (Zofran Inj) 4 mg Q6H  PRN IV NAUSEA AND/OR VOMITING Last 

administered on 10/2/17at 01:27; Admin Dose 4 MG;  Start 9/26/17 at 22:30


Acetaminophen (Tylenol Liquid) 650 mg Q6H  PRN PO PAIN LEVEL 1-3 OR FEVER Last 

administered on 9/29/17at 00:32; Admin Dose 650 MG;  Start 9/26/17 at 22:30


Morphine Sulfate (morphine) 2 mg Q4H  PRN IV PAIN LEVEL 7-10;  Start 9/26/17 at 

22:30


Dexamethasone (Decadron) 4 mg Q6 IV  Last administered on 10/2/17at 05:56; 

Admin Dose 4 MG;  Start 9/27/17 at 00:00


Famotidine (Pepcid Iv) 20 mg DAILY IV  Last administered on 10/2/17at 09:05; 

Admin Dose 20 MG;  Start 9/30/17 at 10:00











RIVERA RAWLS MD Oct 2, 2017 10:40

## 2017-10-02 NOTE — CONS
Date/Time of Note


Date/Time of Note


DATE: 10/2/17 


TIME: 11:29





Assessment/Plan


Assessment/Plan


Chief Complaint/Hosp Course


47 yo with metastatic breast cancer, who was off therapy for at least 5 month, 

who presents with recurrent pleural effusion and large brain metastasis. 





#Her2+/ER+/NV+ metastatic breast ca


-pt is non compliant but  when she receives therapy, she is quite responsive to 

therapy


-therefore at this time, although she has terminal disease, there are still 

many more treatment options which she could benefit from


-given her Brain mets, I would consider a combination of Xeloda and Lapatinib 

which can penetrate the blood brain barrier


-once she stabilizes can restart Tamoxifen as well


-once cleared from a neurosurgical standpoint will start chemotherapy





#Brain Mets


-appreciate neurosurgery recs


- now s/p  right frontal craniectomy, resection of tumor, microscope, image 

guidance, titanium mesh cranioplast


-will refer the patient to rad onc once she stabilizes for post op radiation 


-continue Decadron at4mg IV q 6 





#Pleural Effusion


-s/p pleurex catheter placement


-will check cytology and re-evaluate her prognostic markers





A total 40 min was spent face to face in speaking with the patient of which > 50

% was spent in counseling and coordination fo care and details question and 

answer session


Problems:  





Consultation Date/Type/Reason


Admit Date/Time


Sep 26, 2017 at 19:54


Initial Consult Date


9/27/17


Type of Consultation:  oncology


Reason for Consultation


metastatic breast cancer to brain


Referring Provider:  SHIREEN ASHBY





24 HR Interval Summary


Free Text/Dictation


pt is s/p extubation. transferred to floor. on Decadron 4 mg IV q 6





Exam/Review of Systems


Vital Signs


Vitals





 Vital Signs








  Date Time  Temp Pulse Resp B/P Pulse Ox O2 Delivery O2 Flow Rate FiO2


 


10/2/17 08:30 99.4 55 16 136/74 97   


 


10/2/17 06:00      Room Air  


 


10/1/17 15:00       2.0 


 


10/1/17 09:25        30














 Intake and Output   


 


 10/1/17 10/1/17 10/2/17





 15:00 23:00 07:00


 


Intake Total 1506.6 ml 1365 ml 


 


Output Total 1325 ml 760 ml 550 ml


 


Balance 181.6 ml 605 ml -550 ml











Exam


Constitutional:  alert, oriented


Psych:  no complaints


Head:  normocephalic


Eyes:  nl conjunctiva


ENMT:  nl external ears & nose


Neck:  non-tender, supple


Respiratory:  clear to auscultation, normal air movement


Cardiovascular:  regular rate and rhythm


Gastrointestinal:  soft


Musculoskeletal:  nl extremities to inspection





Results


Result Diagram:  


10/2/17 0430                                                                   

             10/2/17 0430





Results 24 hrs





Laboratory Tests








Test


  10/2/17


04:30


 


White Blood Count 13.5  H


 


Red Blood Count 3.56  L


 


Hemoglobin 7.5  L


 


Hematocrit 25.2  L


 


Mean Corpuscular Volume 70.8  L


 


Mean Corpuscular Hemoglobin 21.1  L


 


Mean Corpuscular Hemoglobin


Concent 29.8  L


 


 


Red Cell Distribution Width 17.7  H


 


Platelet Count 219  


 


Mean Platelet Volume 9.9  


 


Neutrophils % 74.5  


 


Lymphocytes % 16.4  


 


Monocytes % 6.5  


 


Eosinophils % 0.0  


 


Basophils % 0.1  


 


Nucleated Red Blood Cells % 0.0  


 


Neutrophils # 10.1  H


 


Lymphocytes # 2.2  


 


Monocytes # 0.9  


 


Eosinophils # 0.0  


 


Basophils # 0.0  


 


Nucleated Red Blood Cells # 0.0  


 


Sodium Level 133  L


 


Potassium Level 4.1  


 


Chloride Level 104  


 


Carbon Dioxide Level 28  


 


Anion Gap 5  L


 


Blood Urea Nitrogen 25  H


 


Creatinine 0.55  


 


Glucose Level 127  


 


Calcium Level 8.1  L


 


Phosphorus Level 3.4  


 


Magnesium Level 2.0  











Medications


Medications





 Current Medications


Ondansetron HCl (Zofran Inj) 4 mg Q6H  PRN IV NAUSEA AND/OR VOMITING Last 

administered on 10/2/17at 01:27; Admin Dose 4 MG;  Start 9/26/17 at 22:30


Acetaminophen (Tylenol Liquid) 650 mg Q6H  PRN PO PAIN LEVEL 1-3 OR FEVER Last 

administered on 9/29/17at 00:32; Admin Dose 650 MG;  Start 9/26/17 at 22:30


Morphine Sulfate (morphine) 2 mg Q4H  PRN IV PAIN LEVEL 7-10;  Start 9/26/17 at 

22:30


Dexamethasone (Decadron) 4 mg Q6 IV  Last administered on 10/2/17at 05:56; 

Admin Dose 4 MG;  Start 9/27/17 at 00:00


Famotidine (Pepcid Iv) 20 mg DAILY IV  Last administered on 10/2/17at 09:05; 

Admin Dose 20 MG;  Start 9/30/17 at 10:00











KRISTEL ORANTES M.D. Oct 2, 2017 11:32

## 2017-10-03 NOTE — CONS
Date/Time of Note


Date/Time of Note


DATE: 10/3/17 


TIME: 11:37





Consult Date/Type/Reason


Admit Date/Time


Sep 26, 2017 at 19:54


Initial Consult Date


9/27/17


Type of Consultation:  Pulmonary


Ordering Provider:  SHIREEN ASHBY





Subjective


Patient comfortable this morning.  No new events





Objective





 Vital Signs








  Date Time  Temp Pulse Resp B/P Pulse Ox O2 Delivery O2 Flow Rate FiO2


 


10/3/17 07:00 98.1 56 18 127/68 97   


 


10/3/17 06:00      Room Air  


 


10/3/17 00:08       2.0 


 


10/3/17 00:03        21














 Intake and Output   


 


 10/2/17 10/2/17 10/3/17





 15:00 23:00 07:00


 


Intake Total  720 ml 420 ml


 


Output Total   900 ml


 


Balance  720 ml -480 ml








Exam


PHYSICAL EXAMINATION:


GENERAL:  Well-nourished, well-developed lady, comfortable at rest.  No acute 

distress.


VITAL SIGNS: 


NECK:  Supple.  No JVD.  No lymphadenopathy.


CARDIAC:  S1, S2.  No added sounds or murmurs.


CHEST:  Diminished air entry bilaterally.


ABDOMEN:  Soft, nontender.  No guarding or rebound.


EXTREMITIES:  No cyanosis, clubbing or edema.


NEUROLOGICALLY:  No focal deficits.





Results/Medications


Result Diagram:  


10/3/17 0437                                                                   

             10/3/17 0438





Results 24 hrs





Laboratory Tests








Test


  10/3/17


04:37 10/3/17


04:38 10/3/17


06:47 10/3/17


08:13


 


White Blood Count 21.5  #H   


 


Red Blood Count 4.47  #   


 


Hemoglobin 9.9  #L   


 


Hematocrit 31.8  #L   


 


Mean Corpuscular Volume 71.1  L   


 


Mean Corpuscular Hemoglobin 22.1  L   


 


Mean Corpuscular Hemoglobin


Concent 31.1  L


  


  


  


 


 


Red Cell Distribution Width 18.6  H   


 


Platelet Count 292  #   


 


Mean Platelet Volume 9.0     


 


Neutrophils % 72.4     


 


Lymphocytes % 17.1     


 


Monocytes % 6.3     


 


Eosinophils % 0.0     


 


Basophils % 0.2     


 


Nucleated Red Blood Cells % 0.1  H   


 


Neutrophils # 15.6  H   


 


Lymphocytes # 3.7  H   


 


Monocytes # 1.4  H   


 


Eosinophils # 0.0     


 


Basophils # 0.0     


 


Nucleated Red Blood Cells # 0.0     


 


Sodium Level  132  L  


 


Potassium Level  4.2    


 


Chloride Level  100    


 


Carbon Dioxide Level  27    


 


Anion Gap  9    


 


Blood Urea Nitrogen  24  H  


 


Creatinine  0.62    


 


Glucose Level  135    


 


Calcium Level  8.4    


 


Phosphorus Level  3.6    


 


Magnesium Level  1.9    


 


Albumin  3.0  L  


 


C-Reactive Protein   < 0.5   


 


Lab Scanned Report


  


  


  


  BLOOD


TRANSFUSION








Medications





 Current Medications


Ondansetron HCl (Zofran Inj) 4 mg Q6H  PRN IV NAUSEA AND/OR VOMITING Last 

administered on 10/2/17at 01:27; Admin Dose 4 MG;  Start 9/26/17 at 22:30


Acetaminophen (Tylenol Liquid) 650 mg Q6H  PRN PO PAIN LEVEL 1-3 OR FEVER Last 

administered on 9/29/17at 00:32; Admin Dose 650 MG;  Start 9/26/17 at 22:30


Morphine Sulfate (morphine) 2 mg Q4H  PRN IV PAIN LEVEL 7-10;  Start 9/26/17 at 

22:30


Dexamethasone (Decadron) 4 mg Q6 IV  Last administered on 10/3/17at 05:54; 

Admin Dose 4 MG;  Start 9/27/17 at 00:00


Famotidine (Pepcid Iv) 20 mg DAILY IV  Last administered on 10/3/17at 08:25; 

Admin Dose 20 MG;  Start 9/30/17 at 10:00





Assessment/Plan


Chief Complaint/Hosp Course





IMPRESSION:


1.   Recurrent right pleural effusion, likely secondary to underlying 

metastatic breast cancer. Right lung opacification secondary to pleural effusion

, status post Pleurx catheter placement.  Continue drainage daily.


2.   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  Status post 

resection of tumor with craniotomy.


 


PLAN:


1.   Continue Pleurx catheter drainage daily.


2.   Continue steroids for cerebral edema.


3.   Hematology oncology recommendations.


Problems:  











DELFIN LAKHANI MD, [facility] Oct 3, 2017 11:38

## 2017-10-03 NOTE — PN
Date/Time of Note


Date/Time of Note


DATE: 10/3/17 


TIME: 15:15





Assessment/Plan


VTE Prophylaxis


VTE Prophylaxis Intervention:  SCD's





Lines/Catheters


IV Catheter Type (from [facility]):  Central Line


Central line still needed:  Yes


Urinary Cath still in place:  No





Assessment/Plan


Assessment/Plan


1.  Breast cancer with metastases to the lung, liver, brain


- s/p craniectomy with mesh placement on 9/29/17


- Neurosurgery on board and recommendations appreciated.  Awaiting clearance 

prior to initiating chemotx


- Oncology on board and consultation appreciated. She has been responsive to 

therapy in the past and still has many more treatment options which she could 

benefit from.  Given her Brain mets, Dr. Dee would consider a combination of 

Xeloda and Lapatinib which can penetrate the blood brain barrier.  Once she 

stabilizes can restart Tamoxifen as well





2.  6 mm right-to-left midline herniation and effacement of the right lateral 

ventricle


- mental status remains stable


- s/p craniectomy


- Decadron for cerebral edema





3. Large right-sided pleural effusion


- Pleurex cath in place, draining well





4.  Anemia of chronic disease


- stable





5. Shortness of breath


- remains stable





Subjective


24 Hr Interval Summary


Free Text/Dictation


Patient lethargic this am but responding appropriately to questions and denies 

any new complaints.  No acute overnight events.





Exam/Review of Systems


Vital Signs


Vitals





 Vital Signs








  Date Time  Temp Pulse Resp B/P Pulse Ox O2 Delivery O2 Flow Rate FiO2


 


10/3/17 14:00 97.7 74 18 118/69 97   


 


10/3/17 06:00      Room Air  


 


10/3/17 00:08       2.0 


 


10/3/17 00:03        21














 Intake and Output   


 


 10/2/17 10/2/17 10/3/17





 15:00 23:00 07:00


 


Intake Total  720 ml 420 ml


 


Output Total   900 ml


 


Balance  720 ml -480 ml











Exam


General: NAD, lethargic but responding appropriately. 


Head: R side staples in place- no discharge or drainage


Neck: Supple, nontender, midline


Respiratory: diminished diffusely. no wheezes or rhonchi appreciated


Cardiovascular: regular rate, no obvious murmurs


Gastrointestinal: non-tender to palpation, bowel sounds heard.


Skin: No new skin lesions, R chest pleurex catheter





Results


Result Diagram:  


10/3/17 0437                                                                   

             10/3/17 0438





Results 24 hrs





Laboratory Tests








Test


  10/3/17


04:37 10/3/17


04:38 10/3/17


06:47 10/3/17


08:13


 


White Blood Count 21.5  #H   


 


Red Blood Count 4.47  #   


 


Hemoglobin 9.9  #L   


 


Hematocrit 31.8  #L   


 


Mean Corpuscular Volume 71.1  L   


 


Mean Corpuscular Hemoglobin 22.1  L   


 


Mean Corpuscular Hemoglobin


Concent 31.1  L


  


  


  


 


 


Red Cell Distribution Width 18.6  H   


 


Platelet Count 292  #   


 


Mean Platelet Volume 9.0     


 


Neutrophils % 72.4     


 


Lymphocytes % 17.1     


 


Monocytes % 6.3     


 


Eosinophils % 0.0     


 


Basophils % 0.2     


 


Nucleated Red Blood Cells % 0.1  H   


 


Neutrophils # 15.6  H   


 


Lymphocytes # 3.7  H   


 


Monocytes # 1.4  H   


 


Eosinophils # 0.0     


 


Basophils # 0.0     


 


Nucleated Red Blood Cells # 0.0     


 


Sodium Level  132  L  


 


Potassium Level  4.2    


 


Chloride Level  100    


 


Carbon Dioxide Level  27    


 


Anion Gap  9    


 


Blood Urea Nitrogen  24  H  


 


Creatinine  0.62    


 


Glucose Level  135    


 


Calcium Level  8.4    


 


Phosphorus Level  3.6    


 


Magnesium Level  1.9    


 


Albumin  3.0  L  


 


C-Reactive Protein   < 0.5   


 


Lab Scanned Report


  


  


  


  BLOOD


TRANSFUSION














Test


  10/3/17


11:45 


  


  


 


 


Urine Color YELLOW     


 


Urine Clarity


  SLIGHTLY


CLOUDY  A 


  


  


 


 


Urine pH 5.0     


 


Urine Specific Gravity 1.016     


 


Urine Ketones NEGATIVE     


 


Urine Nitrite POSITIVE  A   


 


Urine Bilirubin NEGATIVE     


 


Urine Urobilinogen NEGATIVE     


 


Urine Leukocyte Esterase NEGATIVE     


 


Urine Microscopic RBC 1     


 


Urine Microscopic WBC 3     


 


Urine Bacteria MODERATE     


 


Urine Mucus FEW  A   


 


Urine Hemoglobin NEGATIVE     


 


Urine Glucose NEGATIVE     


 


Urine Total Protein NEGATIVE     











Medications


Medications





 Current Medications


Ondansetron HCl (Zofran Inj) 4 mg Q6H  PRN IV NAUSEA AND/OR VOMITING Last 

administered on 10/2/17at 01:27; Admin Dose 4 MG;  Start 9/26/17 at 22:30


Acetaminophen (Tylenol Liquid) 650 mg Q6H  PRN PO PAIN LEVEL 1-3 OR FEVER Last 

administered on 9/29/17at 00:32; Admin Dose 650 MG;  Start 9/26/17 at 22:30


Morphine Sulfate (morphine) 2 mg Q4H  PRN IV PAIN LEVEL 7-10;  Start 9/26/17 at 

22:30


Dexamethasone (Decadron) 4 mg Q6 IV  Last administered on 10/3/17at 13:30; 

Admin Dose 4 MG;  Start 9/27/17 at 00:00


Famotidine (Pepcid Iv) 20 mg DAILY IV  Last administered on 10/3/17at 08:25; 

Admin Dose 20 MG;  Start 9/30/17 at 10:00











RIVERA RAWLS MD Oct 3, 2017 15:17

## 2017-10-03 NOTE — RADRPT
PROCEDURE:   XR Chest. 

 

CLINICAL INDICATION:     Elevated white blood cell count

 

TECHNIQUE:   A single AP view of the chest was obtained.

 

COMPARISON:   Chest x-ray dated 09/30/2017 

 

FINDINGS:

 

The endotracheal tube has been removed. There is a right internal jugular central venous catheter wi
th tip near the cavoatrial junction.

 

There is a small right pleural effusion with right basilar consolidation. The cardiomediastinal silh
ouette is within normal limits for size.  The osseous structures are unremarkable.   

 

IMPRESSION:

 

 

1.  Small right pleural effusion with right basilar atelectasis versus pneumonia, improved when comp
ared to the prior examination.

2. Tubes and lines, as described above.   

 

 

RPTAT: HH

_____________________________________________ 

.Linda Alberts MD, MD           Date    Time 

Electronically viewed and signed by .Linda Alberts MD, MD on 10/03/2017 07:58 

 

D:  10/03/2017 07:58  T:  10/03/2017 07:58

.G/

## 2017-10-03 NOTE — CONS
Date/Time of Note


Date/Time of Note


DATE: 10/3/17 


TIME: 22:13





Assessment/Plan


Assessment/Plan


Chief Complaint/Hosp Course


45 yo with metastatic breast cancer, who was off therapy for at least 5 month, 

who presents with recurrent pleural effusion and large brain metastasis. 





#Her2+/ER+/DC+ metastatic breast ca


-pt is non compliant but  when she receives therapy, she is quite responsive to 

therapy


-therefore at this time, although she has terminal disease, there are still 

many more treatment options which she could benefit from


-given her Brain mets, I would consider a combination of Xeloda and Lapatinib 

which can penetrate the blood brain barrier


-once she stabilizes can restart Tamoxifen as well


-once patient has completed her post op radiation will initiate chemotherapy





#Brain Mets


-appreciate neurosurgery recs


- now s/p  right frontal craniectomy, resection of tumor, microscope, image 

guidance, titanium mesh cranioplast


-will refer the patient to rad onc once she stabilizes for post op radiation . 

rad onc to see patient in house


-continue Decadron at4mg IV q 6 





#Pleural Effusion


-s/p pleurex catheter placement


-will check cytology and re-evaluate her prognostic markers





A total 40 min was spent face to face in speaking with the patient of which > 50

% was spent in counseling and coordination fo care and details question and 

answer session


Problems:  





Consultation Date/Type/Reason


Admit Date/Time


Sep 26, 2017 at 19:54


Initial Consult Date


9/27/17


Type of Consultation:  Oncology


Reason for Consultation


metastatic breast cancer


Referring Provider:  SHIREEN ASHBY





24 HR Interval Summary


Free Text/Dictation


pt continues to recover from surgery





Exam/Review of Systems


Vital Signs


Vitals





 Vital Signs








  Date Time  Temp Pulse Resp B/P Pulse Ox O2 Delivery O2 Flow Rate FiO2


 


10/3/17 19:56 98.0 61 16 120/68 98   


 


10/3/17 06:00      Room Air  


 


10/3/17 00:08       2.0 


 


10/3/17 00:03        21














 Intake and Output   


 


 10/2/17 10/2/17 10/3/17





 15:00 23:00 07:00


 


Intake Total  720 ml 420 ml


 


Output Total   900 ml


 


Balance  720 ml -480 ml











Exam


Constitutional:  alert, oriented


Head:  other (s/p craniotomy . surgical scar healing well)


ENMT:  nl external ears & nose


Neck:  supple


Respiratory:  clear to auscultation, normal air movement


Cardiovascular:  regular rate and rhythm


Gastrointestinal:  soft


Musculoskeletal:  nl extremities to inspection





Results


Result Diagram:  


10/3/17 0437                                                                   

             10/3/17 0438





Results 24 hrs





Laboratory Tests








Test


  10/3/17


04:37 10/3/17


04:38 10/3/17


06:47 10/3/17


08:13


 


White Blood Count 21.5  #H   


 


Red Blood Count 4.47  #   


 


Hemoglobin 9.9  #L   


 


Hematocrit 31.8  #L   


 


Mean Corpuscular Volume 71.1  L   


 


Mean Corpuscular Hemoglobin 22.1  L   


 


Mean Corpuscular Hemoglobin


Concent 31.1  L


  


  


  


 


 


Red Cell Distribution Width 18.6  H   


 


Platelet Count 292  #   


 


Mean Platelet Volume 9.0     


 


Neutrophils % 72.4     


 


Lymphocytes % 17.1     


 


Monocytes % 6.3     


 


Eosinophils % 0.0     


 


Basophils % 0.2     


 


Nucleated Red Blood Cells % 0.1  H   


 


Neutrophils # 15.6  H   


 


Lymphocytes # 3.7  H   


 


Monocytes # 1.4  H   


 


Eosinophils # 0.0     


 


Basophils # 0.0     


 


Nucleated Red Blood Cells # 0.0     


 


Sodium Level  132  L  


 


Potassium Level  4.2    


 


Chloride Level  100    


 


Carbon Dioxide Level  27    


 


Anion Gap  9    


 


Blood Urea Nitrogen  24  H  


 


Creatinine  0.62    


 


Glucose Level  135    


 


Calcium Level  8.4    


 


Phosphorus Level  3.6    


 


Magnesium Level  1.9    


 


Albumin  3.0  L  


 


C-Reactive Protein   < 0.5   


 


Lab Scanned Report


  


  


  


  BLOOD


TRANSFUSION














Test


  10/3/17


11:45 


  


  


 


 


Urine Color YELLOW     


 


Urine Clarity


  SLIGHTLY


CLOUDY  A 


  


  


 


 


Urine pH 5.0     


 


Urine Specific Gravity 1.016     


 


Urine Ketones NEGATIVE     


 


Urine Nitrite POSITIVE  A   


 


Urine Bilirubin NEGATIVE     


 


Urine Urobilinogen NEGATIVE     


 


Urine Leukocyte Esterase NEGATIVE     


 


Urine Microscopic RBC 1     


 


Urine Microscopic WBC 3     


 


Urine Bacteria MODERATE     


 


Urine Mucus FEW  A   


 


Urine Hemoglobin NEGATIVE     


 


Urine Glucose NEGATIVE     


 


Urine Total Protein NEGATIVE     











Medications


Medications





 Current Medications


Ondansetron HCl (Zofran Inj) 4 mg Q6H  PRN IV NAUSEA AND/OR VOMITING Last 

administered on 10/2/17at 01:27; Admin Dose 4 MG;  Start 9/26/17 at 22:30


Acetaminophen (Tylenol Liquid) 650 mg Q6H  PRN PO PAIN LEVEL 1-3 OR FEVER Last 

administered on 9/29/17at 00:32; Admin Dose 650 MG;  Start 9/26/17 at 22:30


Morphine Sulfate (morphine) 2 mg Q4H  PRN IV PAIN LEVEL 7-10;  Start 9/26/17 at 

22:30


Dexamethasone (Decadron) 4 mg Q6 IV  Last administered on 10/3/17at 17:39; 

Admin Dose 4 MG;  Start 9/27/17 at 00:00


Famotidine (Pepcid Iv) 20 mg DAILY IV  Last administered on 10/3/17at 08:25; 

Admin Dose 20 MG;  Start 9/30/17 at 10:00











KRISTEL ORANTES M.D. Oct 3, 2017 22:15

## 2017-10-04 NOTE — PN
Date/Time of Note


Date/Time of Note


DATE: 10/4/17 


TIME: 17:32





Assessment/Plan


VTE Prophylaxis


VTE Prophylaxis Intervention:  SCD's





Lines/Catheters


IV Catheter Type (from [facility]):  [place]


Urinary Cath still in place:  No





Assessment/Plan


Assessment/Plan


1.  Breast cancer with metastases to the lung, liver, brain


- s/p craniectomy with mesh placement on 9/29/17


- Neurosurgery on board and recommendations appreciated.  


- Oncology on board and consultation appreciated. She has been responsive to 

therapy in the past and still has many more treatment options which she could 

benefit from.  Given her Brain mets, Dr. Dee would consider a combination of 

Xeloda and Lapatinib which can penetrate the blood brain barrier.  Once she 

stabilizes can restart Tamoxifen as well





2.  6 mm right-to-left midline herniation and effacement of the right lateral 

ventricle


- mental status remains stable


- s/p craniectomy


- Decadron for cerebral edema





3. Large right-sided pleural effusion


- Pleurex cath in place, draining well





4.  Anemia of chronic disease


- stable





5. Shortness of breath


- remains stable





6. Disposition


- Patient eventually would like to return home. Awaiting clearance from 

specialists prior to d/c





Subjective


24 Hr Interval Summary


Free Text/Dictation


Patient doing well and feeling more like herself this am.  Tolerating diet and 

denies any headaches, dizziness, blurred vision, nausea, vomiting, confusion, 

or abdominal issues.  No acute overnight events.





Exam/Review of Systems


Vital Signs


Vitals





 Vital Signs








  Date Time  Temp Pulse Resp B/P Pulse Ox O2 Delivery O2 Flow Rate FiO2


 


10/4/17 12:48 98.5 66 18 111/67 97   


 


10/3/17 06:00      Room Air  


 


10/3/17 00:08       2.0 


 


10/3/17 00:03        21














 Intake and Output   


 


 10/3/17 10/3/17 10/4/17





 15:00 23:00 07:00


 


Intake Total  1060 ml 600 ml


 


Output Total  1150 ml 


 


Balance  -90 ml 600 ml











Exam


General: NAD, awake and alert. In good spirits


Head: R side staples in place- no discharge or drainage


Neck: Supple, mild tenderness where central line removed. No erythema, drainage

, or discharge appreciated


Respiratory: diminished diffusely. no wheezes or rhonchi appreciated


Cardiovascular: regular rate, no obvious murmurs


Gastrointestinal: non-tender to palpation, bowel sounds heard.


Skin: No new skin lesions, R chest pleurex catheter





Results


Result Diagram:  


10/4/17 0437                                                                   

             10/4/17 0437





Results 24 hrs





Laboratory Tests








Test


  10/4/17


04:37 10/4/17


07:29 10/4/17


07:51 10/4/17


10:54


 


White Blood Count 22.6  H   


 


Red Blood Count 4.19  L   


 


Hemoglobin 9.5  L   


 


Hematocrit 30.1  L   


 


Mean Corpuscular Volume 71.8  L   


 


Mean Corpuscular Hemoglobin 22.7  L   


 


Mean Corpuscular Hemoglobin


Concent 31.6  L


  


  


  


 


 


Red Cell Distribution Width 19.0  H   


 


Platelet Count 265     


 


Mean Platelet Volume 9.2     


 


Neutrophils % 75.1     


 


Lymphocytes % 13.3  L   


 


Monocytes % 7.1     


 


Eosinophils % 0.0     


 


Basophils % 0.2     


 


Nucleated Red Blood Cells % 0.0     


 


Neutrophils # 17.0  H   


 


Lymphocytes # 3.0  H   


 


Monocytes # 1.6  H   


 


Eosinophils # 0.0     


 


Basophils # 0.0     


 


Nucleated Red Blood Cells # 0.0     


 


Sodium Level 130  L   


 


Potassium Level 4.2     


 


Chloride Level 100     


 


Carbon Dioxide Level 26     


 


Anion Gap 8     


 


Blood Urea Nitrogen 26  H   


 


Creatinine 0.56     


 


Glucose Level 135     


 


Calcium Level 7.9  L   


 


Phosphorus Level 3.7     


 


Magnesium Level 1.9     


 


Albumin 2.7  L   


 


Lab Scanned Report  REFERENCE LAB   REFERENCE LAB   


 


Bedside Glucose    190  











Medications


Medications





 Current Medications


Ondansetron HCl (Zofran Inj) 4 mg Q6H  PRN IV NAUSEA AND/OR VOMITING Last 

administered on 10/2/17at 01:27; Admin Dose 4 MG;  Start 9/26/17 at 22:30


Acetaminophen (Tylenol Liquid) 650 mg Q6H  PRN PO PAIN LEVEL 1-3 OR FEVER Last 

administered on 9/29/17at 00:32; Admin Dose 650 MG;  Start 9/26/17 at 22:30


Morphine Sulfate (morphine) 2 mg Q4H  PRN IV PAIN LEVEL 7-10;  Start 9/26/17 at 

22:30


Dexamethasone (Decadron) 4 mg Q6 IV  Last administered on 10/4/17at 17:24; 

Admin Dose 4 MG;  Start 9/27/17 at 00:00


Famotidine (Pepcid) 20 mg DAILY PO ;  Start 10/5/17 at 09:00











RIVERA RAWLS MD Oct 4, 2017 17:37

## 2017-10-05 NOTE — CONS
Date/Time of Note


Date/Time of Note


DATE: 10/5/17 


TIME: 16:21





Assessment/Plan


Assessment/Plan


Chief Complaint/Hosp Course


47 yo with metastatic breast cancer, who was off therapy for at least 5 month, 

who presents with recurrent pleural effusion and large brain metastasis. 





#Her2+/ER+/PA+ metastatic breast ca


-pt is non compliant but  when she receives therapy, she is quite responsive to 

therapy


-therefore at this time, although she has terminal disease, there are still 

many more treatment options which she could benefit from


-given her Brain mets, I would consider a combination of Xeloda and Lapatinib 

which can penetrate the blood brain barrier


-at this time will restart Tamoxifen


-once patient has completed her post op radiation will initiate chemotherapy





#Brain Mets


-appreciate neurosurgery recs


- now s/p  right frontal craniectomy, resection of tumor, microscope, image 

guidance, titanium mesh cranioplast


-appreciate rad onc recs: they have recommended whole brain irradiation over 3 

weeks, 2 doses of 3750 cGy. will consider Namenda with radiation.  will try to 

get Lapatinib in house to start asap. will plan to start Xeloda after 

completion of radiation


-continue Decadron at4mg IV q 6 





#Pleural Effusion


-s/p pleurex catheter placement


-will check cytology and re-evaluate her prognostic markers





A total 40 min was spent face to face in speaking with the patient of which > 50

% was spent in counseling and coordination fo care and details question and 

answer session


Problems:  





Consultation Date/Type/Reason


Admit Date/Time


Sep 26, 2017 at 19:54


Initial Consult Date


9/27/17


Type of Consultation:  Oncology


Reason for Consultation


metastatic Her 2 positive breast cancer


Referring Provider:  SHIREEN ASHBY





24 HR Interval Summary


Free Text/Dictation


pt is recovering well from her craniotomy. seen by radiation yesterday





Exam/Review of Systems


Vital Signs


Vitals





 Vital Signs








  Date Time  Temp Pulse Resp B/P Pulse Ox O2 Delivery O2 Flow Rate FiO2


 


10/5/17 14:00 98.7  18 110/64 98   


 


10/5/17 07:00  79      


 


10/3/17 06:00      Room Air  


 


10/3/17 00:08       2.0 


 


10/3/17 00:03        21














 Intake and Output   


 


 10/4/17 10/4/17 10/5/17





 15:00 23:00 07:00


 


Intake Total  840 ml 1200 ml


 


Output Total 300 ml  1100 ml


 


Balance -300 ml 840 ml 100 ml











Exam


Constitutional:  alert


Psych:  no complaints


Head:  normocephalic


Eyes:  nl conjunctiva


ENMT:  nl external ears & nose


Neck:  non-tender, supple


Respiratory:  clear to auscultation, normal air movement


Cardiovascular:  regular rate and rhythm


Gastrointestinal:  soft


Musculoskeletal:  nl extremities to inspection, nl gait and stance





Results


Result Diagram:  


10/5/17 0503                                                                   

             10/5/17 0503





Results 24 hrs





Laboratory Tests








Test


  10/5/17


05:03


 


White Blood Count 26.2  H


 


Red Blood Count 4.31  


 


Hemoglobin 9.8  L


 


Hematocrit 31.4  L


 


Mean Corpuscular Volume 72.9  L


 


Mean Corpuscular Hemoglobin 22.7  L


 


Mean Corpuscular Hemoglobin


Concent 31.2  L


 


 


Red Cell Distribution Width 19.7  H


 


Platelet Count 269  


 


Mean Platelet Volume 9.0  


 


Neutrophils % 72.1  


 


Lymphocytes % 13.2  L


 


Monocytes % 8.1  


 


Eosinophils % 0.0  


 


Basophils % 0.2  


 


Nucleated Red Blood Cells % 0.1  H


 


Neutrophils # 18.9  H


 


Lymphocytes # 3.5  H


 


Monocytes # 2.1  H


 


Eosinophils # 0.0  


 


Basophils # 0.1  


 


Nucleated Red Blood Cells # 0.0  


 


Sodium Level 130  L


 


Potassium Level 4.0  


 


Chloride Level 100  


 


Carbon Dioxide Level 26  


 


Anion Gap 8  


 


Blood Urea Nitrogen 23  H


 


Creatinine 0.62  


 


Glucose Level 126  


 


Calcium Level 8.4  


 


Phosphorus Level 3.7  


 


Magnesium Level 1.9  


 


Albumin 2.7  L











Medications


Medications





 Current Medications


Ondansetron HCl (Zofran Inj) 4 mg Q6H  PRN IV NAUSEA AND/OR VOMITING Last 

administered on 10/2/17at 01:27; Admin Dose 4 MG;  Start 9/26/17 at 22:30


Acetaminophen (Tylenol Liquid) 650 mg Q6H  PRN PO PAIN LEVEL 1-3 OR FEVER Last 

administered on 9/29/17at 00:32; Admin Dose 650 MG;  Start 9/26/17 at 22:30


Morphine Sulfate (morphine) 2 mg Q4H  PRN IV PAIN LEVEL 7-10;  Start 9/26/17 at 

22:30


Dexamethasone (Decadron) 4 mg Q6 IV  Last administered on 10/5/17at 13:10; 

Admin Dose 4 MG;  Start 9/27/17 at 00:00


Famotidine (Pepcid) 20 mg DAILY PO  Last administered on 10/5/17at 08:38; Admin 

Dose 20 MG;  Start 10/5/17 at 09:00


Tramadol HCl (Ultram) 50 mg Q6H  PRN PO PAIN;  Start 10/4/17 at 23:00


Docusate Sodium (Colace) 100 mg BID  PRN PO CONSTIPATION;  Start 10/4/17 at 23:

00


Bisacodyl (Dulcolax) 5 mg DAILY  PRN PO CONSTIPATION;  Start 10/4/17 at 23:00











KRISTEL ORANTES M.D. Oct 5, 2017 16:27

## 2017-10-05 NOTE — PN
Date/Time of Note


Date/Time of Note


DATE: 10/5/17 


TIME: 14:59





Assessment/Plan


VTE Prophylaxis


VTE Prophylaxis Intervention:  ambulation





Lines/Catheters


IV Catheter Type (from [facility]):  Saline Lock


Urinary Cath still in place:  No





Assessment/Plan


Assessment/Plan


1.  Breast cancer with metastases to the lung, liver, brain


- s/p craniectomy with mesh placement on 9/29/17


- Neurosurgery on board and recommendations appreciated.


- Oncology on board and consultation appreciated. She has been responsive to 

therapy in the past and still has many more treatment options which she could 

benefit from.  Given her Brain mets, Dr. Dee would consider a combination of 

Xeloda and Lapatinib which can penetrate the blood brain barrier.  Once she 

stabilizes can restart Tamoxifen as well





2.  6 mm right-to-left midline herniation and effacement of the right lateral 

ventricle


- mental status remains stable


- s/p craniectomy


- Decadron for cerebral edema





3. Large right-sided pleural effusion


- Pleurex cath in place, draining well





4.  Anemia of chronic disease


- stable and continues to trend up





5. Shortness of breath


- resolved





6. Disposition


- Continue monitoring and awaiting clearance from specialists.  Patient was 

told will here until Monday at least





Subjective


24 Hr Interval Summary


Free Text/Dictation


Patient doing well and ambulating with little assistance.  Denies any headaches

, fevers, chills, dizziness, vision changes, chest pain, or shortness of 

breath.  No acute overnight events and no new complaints.





Exam/Review of Systems


Vital Signs


Vitals





 Vital Signs








  Date Time  Temp Pulse Resp B/P Pulse Ox O2 Delivery O2 Flow Rate FiO2


 


10/5/17 14:00 98.7  18 110/64 98   


 


10/5/17 07:00  79      


 


10/3/17 06:00      Room Air  


 


10/3/17 00:08       2.0 


 


10/3/17 00:03        21














 Intake and Output   


 


 10/4/17 10/4/17 10/5/17





 15:00 23:00 07:00


 


Intake Total  840 ml 1200 ml


 


Output Total 300 ml  1100 ml


 


Balance -300 ml 840 ml 100 ml











Exam


General: NAD, awake and alert. pleasant 


Head: R side staples in place- no discharge or drainage


Neck: Supple


Respiratory: diminished diffusely. no wheezes or rhonchi appreciated


Cardiovascular: regular rate, no obvious murmurs


Gastrointestinal: non-tender to palpation, bowel sounds heard.


Skin: No new skin lesions, R chest pleurex catheter





Results


Result Diagram:  


10/5/17 0503                                                                   

             10/5/17 0503





Results 24 hrs





Laboratory Tests








Test


  10/5/17


05:03


 


White Blood Count 26.2  H


 


Red Blood Count 4.31  


 


Hemoglobin 9.8  L


 


Hematocrit 31.4  L


 


Mean Corpuscular Volume 72.9  L


 


Mean Corpuscular Hemoglobin 22.7  L


 


Mean Corpuscular Hemoglobin


Concent 31.2  L


 


 


Red Cell Distribution Width 19.7  H


 


Platelet Count 269  


 


Mean Platelet Volume 9.0  


 


Neutrophils % 72.1  


 


Lymphocytes % 13.2  L


 


Monocytes % 8.1  


 


Eosinophils % 0.0  


 


Basophils % 0.2  


 


Nucleated Red Blood Cells % 0.1  H


 


Neutrophils # 18.9  H


 


Lymphocytes # 3.5  H


 


Monocytes # 2.1  H


 


Eosinophils # 0.0  


 


Basophils # 0.1  


 


Nucleated Red Blood Cells # 0.0  


 


Sodium Level 130  L


 


Potassium Level 4.0  


 


Chloride Level 100  


 


Carbon Dioxide Level 26  


 


Anion Gap 8  


 


Blood Urea Nitrogen 23  H


 


Creatinine 0.62  


 


Glucose Level 126  


 


Calcium Level 8.4  


 


Phosphorus Level 3.7  


 


Magnesium Level 1.9  


 


Albumin 2.7  L











Medications


Medications





 Current Medications


Ondansetron HCl (Zofran Inj) 4 mg Q6H  PRN IV NAUSEA AND/OR VOMITING Last 

administered on 10/2/17at 01:27; Admin Dose 4 MG;  Start 9/26/17 at 22:30


Acetaminophen (Tylenol Liquid) 650 mg Q6H  PRN PO PAIN LEVEL 1-3 OR FEVER Last 

administered on 9/29/17at 00:32; Admin Dose 650 MG;  Start 9/26/17 at 22:30


Morphine Sulfate (morphine) 2 mg Q4H  PRN IV PAIN LEVEL 7-10;  Start 9/26/17 at 

22:30


Dexamethasone (Decadron) 4 mg Q6 IV  Last administered on 10/5/17at 13:10; 

Admin Dose 4 MG;  Start 9/27/17 at 00:00


Famotidine (Pepcid) 20 mg DAILY PO  Last administered on 10/5/17at 08:38; Admin 

Dose 20 MG;  Start 10/5/17 at 09:00


Tramadol HCl (Ultram) 50 mg Q6H  PRN PO PAIN;  Start 10/4/17 at 23:00


Docusate Sodium (Colace) 100 mg BID  PRN PO CONSTIPATION;  Start 10/4/17 at 23:

00


Bisacodyl (Dulcolax) 5 mg DAILY  PRN PO CONSTIPATION;  Start 10/4/17 at 23:00











RIVERA RAWLS MD Oct 5, 2017 15:02

## 2017-10-05 NOTE — CONS
DATE OF ADMISSION: 09/26/2017

DATE OF CONSULTATION:  10/05/2017

 

 

 

DIAGNOSIS:  Metastatic breast cancer with calvarial and brain metastases.  

 

REFERRING PHYSICIAN:  Ani Loco MD.

 

CONSULTING PHYSICIAN:  FARNAZ BARTHOLOMEW MD.

 

HISTORY OF PRESENT ILLNESS:  The patient is a 46-year-old female whose oncologic history dates back 
to 2013 when on 05/28/2017 she underwent a right modified radical mastectomy for reported 3 cm poorl
y differentiated invasive ductal carcinoma with 7 of 8 positive lymph nodes.  The tumor was triple p
ositive with a high Ki-67.  She was managed by Dr. Alfonso with adjuvant Taxotere and Cytoxan edy
g with Herceptin completed in April 2014.  Her treatment was interrupted reportedly due to patient n
oncompliance.  She was thereafter initiated on tamoxifen for which she only took 2 months.  It does 
not appear that she received adjuvant radiation treatment.  In 12/2014, the patient presented for re
assessment with Dr. Loco and resumed tamoxifen but stopped the medication again due to apparent tox
icity.  The patient was lost to followup until 03/2016 when she represented and was subsequently fou
nd to have evidence of recurrent/metastatic disease.  A CT PET scan apparently revealed right axilla
ry lymphadenopathy, intrathoracic lymphadenopathy as well as metastatic disease to the lung, liver a
nd bone.  There was a right pleural effusion as well.  Genetic testing was performed and the patient
 was found to be BRCA negative.  In May 2016, she was reinitiated on systemic therapy with Taxotere,
 Cytoxan and Herceptin and received 6 cycles through 11/2016.  Tamoxifen was then reinitiated.  By 0
6/2016, CT PET scan demonstrated significant response with resolution of the effusion, lymphadenopat
hy and bony metastases and a decrease in the  size of liver metastases.  By 02/2017, a CT PET scan s
howed continued response and disease stability.  She did not return for followup after 03/2017 but w
as subsequently reassessed in July upon developing shortness of breath and being diagnosed with recu
rrent pleural effusions.  A PleurX catheter was placed and subsequently removed.  Cytology was posit
jeff for malignant cells.  PET imaging on 09/22/2017 described new metabolic uptake in the anterior t
emporal lobe, concerning for brain metastases, extensive new liver disease, progressive osseous dise
ase and lymphadenopathy in the low neck, right axilla, chest and abdomen.  On 09/26/2017 a brain MRI
 revealed a right frontal calvarial mass extending to the overlying scalp soft tissue into the under
lying meninges measuring up to 5 cm.  There was a second subcentimeter mass in the right sphenoid te
mporal buttress extending to the dura measuring 2 cm.  Also noted was a left parotid lobe lesion sima
suring 9 mm.  There was midline shift and no herniation.  At the time, the patient denied any headac
hes, nausea or vomiting or seizure activity or other neurologic issues.  A CT scan also performed on
 09/26/2017 confirmed the presence of this large right trans-spatial mass involving the right fronta
l scalp and extending through the calvarium into the extraaxial and intraaxial spaces with extensive
 perilesional edema.  Other masses which were hyperdense were noted in the left parietal convexity m
easuring 4 and 9 mm each.  There was effacement of the right lateral ventricle and 6 mm of right to 
left midline shift and subfalcine herniation but no hydrocephalus or acute hemorrhage.  Upon hospita
lization at [facility], decision was to replace a PleurX catheter and initiate carlene
roids for cerebral edema.  She was assessed by Dr. Bartholomew who recommended craniectomy and reconstru
ction of the skull with mesh cranioplasty with concurrent resection of the mass.  Such surgery was p
erformed on 09/29/2017 and the patient is recovering.  Post-surgical MRI on 09/30/2017 described int
erval right frontoparietal temporal craniectomy with removal of large extraaxial metastatic lesion. 
 There was only a mild residual amount of vasogenic edema.  There was redemonstration of small enhan
cing hemorrhagic metastases involving the paramedian left occipital lobe measuring 11 x 13 mm and 7 
x 5 mm respectively.  There was a stable 20 x 11 mm extraaxial mass involving the right sphenoid tem
poral buttress and a subtle area of enhancement involving the left superior cerebellar hemisphere me
asuring 4 mm.  The known left temporal lobe enhancing mass was not well visualized on this examinati
on.  

 

PAST MEDICAL HISTORY:  As above.

 

PAST SURGICAL HISTORY:  Right, mastectomy.  Right craniectomy with mesh placement.

 

CURRENT MEDICATIONS:  Zofran, morphine p.r.n., Pepcid, Decadron 4 mg q.6h.  

 

ALLERGIES TO MEDICATIONS:  None.

 

SOCIAL HISTORY:  Denies, tobacco use.  No significant alcohol use.

 

FAMILY HISTORY:  No family history of breast cancer.

 

REVIEW OF SYSTEMS:  

GENERAL:   Denies fevers chills or sweats.

ENT:  Denies otalgia, dysphagia, hoarseness,.

NEUROLOGIC:  As above.

ENDOCRINE:  No history of diabetes or thyroid disease.

SKIN:  No history of lupus, scleroderma or shingles,.

MUSCULOSKELETAL:  She has a generalized mild joint discomfort.  

RESPIRATORY:  She has been having shortness of breath with exertion but no cough or hemoptysis.

CARDIOVASCULAR:  No chest, pain, palpitations, heart attacks or strokes.

SKIN:  No history of lupus scleroderma or shingles.

 

PHYSICAL EXAMINATION,:

GENERAL:  Well-developed female in no distress.

HEENT:  Normal.  The craniectomy scar is healing well without discharge or wound separation or eryth
ema.  Sclerae are anicteric.  Extraocular motions are intact.  No facial asymmetry or droop.  Tongue
 moves well without deviation.  No palpable cervical or supraclavicular adenopathy.  Palpable right 
axillary adenopathy.

LUNGS:  Decreased breath sound at the bases without wheezes or rhonchi.

ABDOMEN:  Soft, nontender, without, rebound or guarding.

EXTREMITIES:  No cyanosis or edema.

NEUROLOGIC:  Grossly nonfocal.  No cranial nerve abnormalities.  The patient can move all extremitie
s, distally and proximally against gravity.  No focal motor or sensory deficits,.

 

ASSESSMENT AND PLAN:  The patient is a 46-year-old female with known metastatic carcinoma of the santos
ast with involvement of brain lung, pleural fluid, liver and, bone.  She has been noncompliant with 
care on numerous occasions.  She has just undergone resection of a large calvarial met with soft tis
marianela and intraaxial and extraaxial extension.  Plan:  Dr. Loco is considering initiating systemic th
erapy with lapatinib and Xeloda.  We reviewed the case and given the potential interaction with radi
otherapy, I would recommend that the Xeloda be initiated post-completion of treatment.  I am comfort
able with concurrent lapatinib and radiation treatment.

 

Given the extent of her disease as well as her noncompliance, I would recommend whole brain irradiat
ion over 3 weeks, 2 doses of 3750 cGy.  Perhaps consideration could be given to initiating Namenda t
o minimize neurocognitive change.  We did review the nature of the risks and benefits of whole brain
 radiation.  Side effects were discussed including but not limited to fatigue, skin reaction, hair l
oss, neurocognitive change.  Questions were answered.  She is interested in proceeding with pedro tidwell.  I will review with Dr. Bartholomew as when he is comfortable with me starting this process.

Thank you for allowing me to participate with this patient's assessment and care.

 

 

Dictated By: MATTHEW GUTIERREZ/LAKISHA

DD:    10/05/2017 11:17:19

DT:    10/05/2017 15:54:51

Conf#: [id number]

DID#:  [id number]

## 2017-10-06 NOTE — CONS
Date/Time of Note


Date/Time of Note


DATE: 10/6/17 


TIME: 21:14





Assessment/Plan


Assessment/Plan


Chief Complaint/Hosp Course


47 yo with metastatic breast cancer, who was off therapy for at least 5 month, 

who presents with recurrent pleural effusion and large brain metastasis. 





#Her2+/ER+/[place]+ metastatic breast ca


-pt is non compliant but  when she receives therapy, she is quite responsive to 

therapy


-therefore at this time, although she has terminal disease, there are still 

many more treatment options which she could benefit from


-given her Brain mets, I would consider a combination of Xeloda and Lapatinib 

which can penetrate the blood brain barrier


-at this time will restart Tamoxifen


-once patient has completed her post op radiation will initiate chemotherapy





#Brain Mets


-appreciate neurosurgery recs


- now s/p  right frontal craniectomy, resection of tumor, microscope, image 

guidance, titanium mesh cranioplast


-appreciate rad onc recs: they have recommended whole brain irradiation over 3 

weeks, 2 doses of 3750 cGy. will consider Namenda with radiation.  will try to 

get Lapatinib in house to start asap. will plan to start Xeloda after 

completion of radiation


-continue Decadron at4mg IV q8





#Pleural Effusion


-s/p pleurex catheter placement


-will check cytology and re-evaluate her prognostic markers





A total 40 min was spent face to face in speaking with the patient of which > 50

% was spent in counseling and coordination fo care and details question and 

answer session


Problems:  





Consultation Date/Type/Reason


Admit Date/Time


Sep 26, 2017 at 19:54


Initial Consult Date


9/27/17


Type of Consultation:  Oncology


Reason for Consultation


metastatic breast cancer


Referring Provider:  SHIREEN ASHBY





24 HR Interval Summary


Free Text/Dictation


recovering well from surgery





Exam/Review of Systems


Vital Signs


Vitals





 Vital Signs








  Date Time  Temp Pulse Resp B/P Pulse Ox O2 Delivery O2 Flow Rate FiO2


 


10/6/17 16:00  56 20 113/65 95 Room Air  


 


10/6/17 08:00 97.6       


 


10/3/17 00:08       2.0 


 


10/3/17 00:03        21














 Intake and Output   


 


 10/5/17 10/5/17 10/6/17





 15:00 23:00 07:00


 


Intake Total  1500 ml 480 ml


 


Output Total  900 ml 


 


Balance  600 ml 480 ml











Exam


Constitutional:  alert, oriented


Psych:  no complaints


Head:  normocephalic, other (s/p craniotomy)


Eyes:  nl conjunctiva


ENMT:  nl external ears & nose


Neck:  non-tender, supple


Respiratory:  clear to auscultation


Cardiovascular:  regular rate and rhythm


Gastrointestinal:  soft


Musculoskeletal:  nl extremities to inspection





Results


Result Diagram:  


10/6/17 0442                                                                   

             10/6/17 0445





Results 24 hrs





Laboratory Tests








Test


  10/6/17


04:42 10/6/17


04:45


 


White Blood Count 23.7  H 


 


Red Blood Count 4.07  L 


 


Hemoglobin 9.2  L 


 


Hematocrit 29.3  L 


 


Mean Corpuscular Volume 72.0  L 


 


Mean Corpuscular Hemoglobin 22.6  L 


 


Mean Corpuscular Hemoglobin


Concent 31.4  L


  


 


 


Red Cell Distribution Width 21.0  H 


 


Platelet Count 261   


 


Mean Platelet Volume 9.6   


 


Neutrophils % 73.0   


 


Lymphocytes % 13.4  L 


 


Monocytes % 7.2   


 


Eosinophils % 0.0   


 


Basophils % 0.1   


 


Nucleated Red Blood Cells % 0.0   


 


Neutrophils # 17.3  H 


 


Lymphocytes # 3.2  H 


 


Monocytes # 1.7  H 


 


Eosinophils # 0.0   


 


Basophils # 0.0   


 


Nucleated Red Blood Cells # 0.0   


 


Sodium Level  132  L


 


Potassium Level  4.5  


 


Chloride Level  102  


 


Carbon Dioxide Level  26  


 


Anion Gap  9  


 


Blood Urea Nitrogen  24  H


 


Creatinine  0.59  


 


Glucose Level  125  


 


Calcium Level  8.2  L


 


Phosphorus Level  4.0  


 


Magnesium Level  2.0  


 


Albumin  2.8  L











Medications


Medications





 Current Medications


Ondansetron HCl (Zofran Inj) 4 mg Q6H  PRN IV NAUSEA AND/OR VOMITING Last 

administered on 10/2/17at 01:27; Admin Dose 4 MG;  Start 9/26/17 at 22:30


Acetaminophen (Tylenol Liquid) 650 mg Q6H  PRN PO PAIN LEVEL 1-3 OR FEVER Last 

administered on 9/29/17at 00:32; Admin Dose 650 MG;  Start 9/26/17 at 22:30


Morphine Sulfate (morphine) 2 mg Q4H  PRN IV PAIN LEVEL 7-10;  Start 9/26/17 at 

22:30


Dexamethasone (Decadron) 4 mg Q6 IV  Last administered on 10/6/17at 17:43; 

Admin Dose 4 MG;  Start 9/27/17 at 00:00


Famotidine (Pepcid) 20 mg DAILY PO  Last administered on 10/6/17at 08:50; Admin 

Dose 20 MG;  Start 10/5/17 at 09:00


Tramadol HCl (Ultram) 50 mg Q6H  PRN PO PAIN;  Start 10/4/17 at 23:00


Docusate Sodium (Colace) 100 mg BID  PRN PO CONSTIPATION;  Start 10/4/17 at 23:

00


Bisacodyl (Dulcolax) 5 mg DAILY  PRN PO CONSTIPATION;  Start 10/4/17 at 23:00


Tamoxifen Citrate (Nolvadex) 20 mg DAILY PO  Last administered on 10/6/17at 08:

53; Admin Dose 20 MG;  Start 10/6/17 at 09:00











KRISTEL ORANTES M.D. Oct 6, 2017 21:20

## 2017-10-06 NOTE — PN
Date/Time of Note


Date/Time of Note


DATE: 10/6/17 


TIME: 17:57





Assessment/Plan


VTE Prophylaxis


VTE Prophylaxis Intervention:  SCD's





Lines/Catheters


IV Catheter Type (from [facility]):  Saline Lock


Urinary Cath still in place:  No





Assessment/Plan


Assessment/Plan


1.  Breast cancer with metastases to the lung, liver, brain


- s/p craniectomy with mesh placement on 9/29/17. Doing well and continues to 

mentate well


- Neurosurgery on board and recommendations appreciated.


- Oncology on board and consultation appreciated.  Dr. Loco is considering 

initiating systemic therapy with lapatinib with Xeloda post-completion of 

treatment. 


- Rad Oncology on board as well and consultation appreciated.  Plans for whole 

brain irradiation over 3 weeks, 2 doses of 3750 cGy.  





2.  6 mm right-to-left midline herniation and effacement of the right lateral 

ventricle


- mental status remains stable


- s/p craniectomy


- Decadron for cerebral edema





3. Large right-sided pleural effusion


- Pleurex cath in place, draining well





4.  Anemia of chronic disease


- stable and continues to trend up





5. Shortness of breath


- resolved





6. Disposition


- Continue monitoring and awaiting clearance from specialists.  Will arrange 

for HH and PT upon discharge





Subjective


24 Hr Interval Summary


Free Text/Dictation


Patient doing well and no new complaints.  Spoke with Rad Onc and Dr. Dee 

and agreeable to plans.  Requesting HH and PT upon discharge.  No acute 

overnight events.





Exam/Review of Systems


Vital Signs


Vitals





 Vital Signs








  Date Time  Temp Pulse Resp B/P Pulse Ox O2 Delivery O2 Flow Rate FiO2


 


10/6/17 16:00  56 20 113/65 95 Room Air  


 


10/6/17 08:00 97.6       


 


10/3/17 00:08       2.0 


 


10/3/17 00:03        21














 Intake and Output   


 


 10/5/17 10/5/17 10/6/17





 15:00 23:00 07:00


 


Intake Total  1500 ml 480 ml


 


Output Total  900 ml 


 


Balance  600 ml 480 ml











Exam


General: NAD, awake and alert. pleasant 


Head: R side staples in place- no discharge or drainage


Neck: Supple


Respiratory: diminished diffusely. no wheezes or rhonchi appreciated


Cardiovascular: regular rate, no obvious murmurs


Gastrointestinal: non-tender to palpation, bowel sounds heard.


Skin: No new skin lesions, R chest pleurex catheter





Results


Result Diagram:  


10/6/17 0442                                                                   

             10/6/17 0445





Results 24 hrs





Laboratory Tests








Test


  10/6/17


04:42 10/6/17


04:45


 


White Blood Count 23.7  H 


 


Red Blood Count 4.07  L 


 


Hemoglobin 9.2  L 


 


Hematocrit 29.3  L 


 


Mean Corpuscular Volume 72.0  L 


 


Mean Corpuscular Hemoglobin 22.6  L 


 


Mean Corpuscular Hemoglobin


Concent 31.4  L


  


 


 


Red Cell Distribution Width 21.0  H 


 


Platelet Count 261   


 


Mean Platelet Volume 9.6   


 


Neutrophils % 73.0   


 


Lymphocytes % 13.4  L 


 


Monocytes % 7.2   


 


Eosinophils % 0.0   


 


Basophils % 0.1   


 


Nucleated Red Blood Cells % 0.0   


 


Neutrophils # 17.3  H 


 


Lymphocytes # 3.2  H 


 


Monocytes # 1.7  H 


 


Eosinophils # 0.0   


 


Basophils # 0.0   


 


Nucleated Red Blood Cells # 0.0   


 


Sodium Level  132  L


 


Potassium Level  4.5  


 


Chloride Level  102  


 


Carbon Dioxide Level  26  


 


Anion Gap  9  


 


Blood Urea Nitrogen  24  H


 


Creatinine  0.59  


 


Glucose Level  125  


 


Calcium Level  8.2  L


 


Phosphorus Level  4.0  


 


Magnesium Level  2.0  


 


Albumin  2.8  L











Medications


Medications





 Current Medications


Ondansetron HCl (Zofran Inj) 4 mg Q6H  PRN IV NAUSEA AND/OR VOMITING Last 

administered on 10/2/17at 01:27; Admin Dose 4 MG;  Start 9/26/17 at 22:30


Acetaminophen (Tylenol Liquid) 650 mg Q6H  PRN PO PAIN LEVEL 1-3 OR FEVER Last 

administered on 9/29/17at 00:32; Admin Dose 650 MG;  Start 9/26/17 at 22:30


Morphine Sulfate (morphine) 2 mg Q4H  PRN IV PAIN LEVEL 7-10;  Start 9/26/17 at 

22:30


Dexamethasone (Decadron) 4 mg Q6 IV  Last administered on 10/6/17at 17:43; 

Admin Dose 4 MG;  Start 9/27/17 at 00:00


Famotidine (Pepcid) 20 mg DAILY PO  Last administered on 10/6/17at 08:50; Admin 

Dose 20 MG;  Start 10/5/17 at 09:00


Tramadol HCl (Ultram) 50 mg Q6H  PRN PO PAIN;  Start 10/4/17 at 23:00


Docusate Sodium (Colace) 100 mg BID  PRN PO CONSTIPATION;  Start 10/4/17 at 23:

00


Bisacodyl (Dulcolax) 5 mg DAILY  PRN PO CONSTIPATION;  Start 10/4/17 at 23:00


Tamoxifen Citrate (Nolvadex) 20 mg DAILY PO  Last administered on 10/6/17at 08:

53; Admin Dose 20 MG;  Start 10/6/17 at 09:00











RIVERA RAWLS MD Oct 6, 2017 18:02

## 2017-10-07 NOTE — PN
Date/Time of Note


Date/Time of Note


DATE: 10/7/17 


TIME: 10:26





Assessment/Plan


VTE Prophylaxis


VTE Prophylaxis Intervention:  contraindicated, SCD's





Lines/Catheters


IV Catheter Type (from [facility]):  Saline Lock


Urinary Cath still in place:  No





Assessment/Plan


Assessment/Plan


1.  Breast cancer with metastases to the lung, liver, brain


- s/p craniectomy with mesh placement on 9/29/17. Doing well and continues to 

mentate well


- Neurosurgery on board and recommendations appreciated.


- Oncology on board and consultation appreciated.  Dr. Loco is considering 

initiating systemic therapy with lapatinib with Xeloda post-completion of 

treatment. Started on Tamoxifen yesterday and feeling sleepy but no other side 

effects noted


- Rad Oncology on board as well and consultation appreciated.  Plans for whole 

brain irradiation over 3 weeks, 2 doses of 3750 cGy.  





2.  Hyperglycemia


- Due to decadron


- Will change to carb consistent diet


- ISS and accuchecks





3.  6 mm right-to-left midline herniation and effacement of the right lateral 

ventricle


- mental status remains stable


- s/p craniectomy


- Decadron for cerebral edema





4. Large right-sided pleural effusion


- Pleurex cath in place, draining well





5.  Anemia of chronic disease


- stable and continues to trend up





6. Shortness of breath


- resolved





7. Disposition


- Continue monitoring and awaiting clearance from specialists.  Will arrange 

for HH and PT upon discharge





Subjective


24 Hr Interval Summary


Free Text/Dictation


Patient doing well and states feeling sleepy after starting on Tamoxifen but 

denies any other complaints.  No acute overnight events.





Exam/Review of Systems


Vital Signs


Vitals





 Vital Signs








  Date Time  Temp Pulse Resp B/P Pulse Ox O2 Delivery O2 Flow Rate FiO2


 


10/7/17 08:17 98.8 54 20 123/72 99   


 


10/6/17 16:00      Room Air  














 Intake and Output   


 


 10/6/17 10/6/17 10/7/17





 15:00 23:00 07:00


 


Intake Total  1300 ml 950 ml


 


Output Total  450 ml 850 ml


 


Balance  850 ml 100 ml











Exam


General: NAD, awake and alert. pleasant 


Head: R side staples in place- no discharge or drainage


Neck: Supple


Respiratory: diminished diffusely. no wheezes or rhonchi appreciated


Cardiovascular: regular rate, no obvious murmurs


Gastrointestinal: non-tender to palpation, bowel sounds heard.


Skin: No new skin lesions, R chest pleurex catheter





Results


Result Diagram:  


10/7/17 0440                                                                   

             10/7/17 0439





Results 24 hrs





Laboratory Tests








Test


  10/7/17


04:39 10/7/17


04:40


 


Sodium Level 132  L 


 


Potassium Level 4.3   


 


Chloride Level 101   


 


Carbon Dioxide Level 24   


 


Anion Gap 11   


 


Blood Urea Nitrogen 23  H 


 


Creatinine 0.57   


 


Glucose Level 130   


 


Calcium Level 8.2  L 


 


Phosphorus Level 3.4   


 


Magnesium Level 2.0   


 


Albumin 2.8  L 


 


White Blood Count  26.0  H


 


Red Blood Count  4.12  L


 


Hemoglobin  9.4  L


 


Hematocrit  29.9  L


 


Mean Corpuscular Volume  72.6  L


 


Mean Corpuscular Hemoglobin  22.8  L


 


Mean Corpuscular Hemoglobin


Concent 


  31.4  L


 


 


Red Cell Distribution Width  21.0  H


 


Platelet Count  267  


 


Mean Platelet Volume  9.5  


 


Neutrophils %  73.3  


 


Lymphocytes %  13.4  L


 


Monocytes %  7.2  


 


Eosinophils %  0.0  


 


Basophils %  0.2  


 


Nucleated Red Blood Cells %  0.0  


 


Neutrophils #  19.0  H


 


Lymphocytes #  3.5  H


 


Monocytes #  1.9  H


 


Eosinophils #  0.0  


 


Basophils #  0.1  


 


Nucleated Red Blood Cells #  0.0  











Medications


Medications





 Current Medications


Ondansetron HCl (Zofran Inj) 4 mg Q6H  PRN IV NAUSEA AND/OR VOMITING Last 

administered on 10/2/17at 01:27; Admin Dose 4 MG;  Start 9/26/17 at 22:30


Acetaminophen (Tylenol Liquid) 650 mg Q6H  PRN PO PAIN LEVEL 1-3 OR FEVER Last 

administered on 9/29/17at 00:32; Admin Dose 650 MG;  Start 9/26/17 at 22:30


Morphine Sulfate (morphine) 2 mg Q4H  PRN IV PAIN LEVEL 7-10;  Start 9/26/17 at 

22:30


Famotidine (Pepcid) 20 mg DAILY PO  Last administered on 10/7/17at 09:00; Admin 

Dose 20 MG;  Start 10/5/17 at 09:00


Tramadol HCl (Ultram) 50 mg Q6H  PRN PO PAIN;  Start 10/4/17 at 23:00


Docusate Sodium (Colace) 100 mg BID  PRN PO CONSTIPATION;  Start 10/4/17 at 23:

00


Bisacodyl (Dulcolax) 5 mg DAILY  PRN PO CONSTIPATION;  Start 10/4/17 at 23:00


Tamoxifen Citrate (Nolvadex) 20 mg DAILY PO  Last administered on 10/7/17at 09:

06; Admin Dose 20 MG;  Start 10/6/17 at 09:00


Dexamethasone (Decadron) 4 mg Q8 IV  Last administered on 10/7/17at 05:42; 

Admin Dose 4 MG;  Start 10/6/17 at 22:00











RIVERA RAWLS MD Oct 7, 2017 10:26

## 2017-10-08 NOTE — PN
Date/Time of Note


Date/Time of Note


DATE: 10/8/17 


TIME: 08:07





Assessment/Plan


VTE Prophylaxis


VTE Prophylaxis Intervention:  contraindicated, SCD's





Lines/Catheters


IV Catheter Type (from [facility]):  Saline Lock


Urinary Cath still in place:  No





Assessment/Plan


Assessment/Plan


1.  Breast cancer with metastases to the lung, liver, brain


- s/p craniectomy with mesh placement on 9/29/17. Doing well and no issues with 

confusion


- Neurosurgery on board and recommendations appreciated.


- Oncology on board and consultation appreciated.  Dr. Dee is considering 

initiating systemic therapy with lapatinib with Xeloda post-completion of 

treatment. Started on Tamoxifen yesterday and feeling sleepy but no other side 

effects noted


- Rad Oncology on board as well and consultation appreciated.  Plans for whole 

brain irradiation over 3 weeks, 2 doses of 3750 cGy.  





2.  Hyperglycemia


- Due to decadron


- Will change to carb consistent diet


- ISS and accuchecks





3.  6 mm right-to-left midline herniation and effacement of the right lateral 

ventricle


- mental status remains stable


- s/p craniectomy


- Decadron for cerebral edema





4. Large right-sided pleural effusion


- Pleurex cath in place, draining well





5.  Anemia of chronic disease


- stable and continues to trend up





6. Shortness of breath


- resolved





7. Disposition


- Continue monitoring and awaiting clearance from specialists.  Will arrange 

for HH and PT upon discharge





Subjective


24 Hr Interval Summary


Free Text/Dictation


Patient doing well and states still feeling fatigued but no new complaints and 

no acute overnight events.





Exam/Review of Systems


Vital Signs


Vitals





 Vital Signs








  Date Time  Temp Pulse Resp B/P Pulse Ox O2 Delivery O2 Flow Rate FiO2


 


10/8/17 07:41 98.0 67 20 117/73 99   


 


10/7/17 15:19      Room Air  














 Intake and Output   


 


 10/7/17 10/7/17 10/8/17





 15:00 23:00 07:00


 


Intake Total  1300 ml 950 ml


 


Output Total  500 ml 900 ml


 


Balance  800 ml 50 ml











Exam


General: NAD, awake and alert. pleasant 


Head: R side staples in place- no discharge or drainage


Neck: Supple


Respiratory: diminished diffusely. no wheezes or rhonchi appreciated


Cardiovascular: regular rate, no obvious murmurs


Gastrointestinal: non-tender to palpation, bowel sounds heard.


Skin: No new skin lesions, R chest pleurex catheter





Results


Result Diagram:  


10/8/17 0423                                                                   

             10/8/17 0423





Results 24 hrs





Laboratory Tests








Test


  10/7/17


12:32 10/7/17


17:40 10/7/17


21:08 10/8/17


01:42


 


Bedside Glucose 119   139   213   150  














Test


  10/8/17


04:23 


  


  


 


 


White Blood Count 27.0  H   


 


Red Blood Count 4.15  L   


 


Hemoglobin 9.3  L   


 


Hematocrit 30.0  L   


 


Mean Corpuscular Volume 72.3  L   


 


Mean Corpuscular Hemoglobin 22.4  L   


 


Mean Corpuscular Hemoglobin


Concent 31.0  L


  


  


  


 


 


Red Cell Distribution Width 21.9  H   


 


Platelet Count 229     


 


Mean Platelet Volume 9.3     


 


Neutrophils % 74.7     


 


Lymphocytes % 12.7  L   


 


Monocytes % 7.9     


 


Eosinophils % 0.0     


 


Basophils % 0.1     


 


Nucleated Red Blood Cells % 0.0     


 


Neutrophils # 20.2  H   


 


Lymphocytes # 3.4  H   


 


Monocytes # 2.1  H   


 


Eosinophils # 0.0     


 


Basophils # 0.0     


 


Nucleated Red Blood Cells # 0.0     


 


Sodium Level 131  L   


 


Potassium Level 4.5     


 


Chloride Level 102     


 


Carbon Dioxide Level 25     


 


Anion Gap 9     


 


Blood Urea Nitrogen 26  H   


 


Creatinine 0.58     


 


Glucose Level 128     


 


Calcium Level 8.0  L   


 


Phosphorus Level 3.7     


 


Magnesium Level 1.9     


 


Albumin 2.8  L   











Medications


Medications





 Current Medications


Ondansetron HCl (Zofran Inj) 4 mg Q6H  PRN IV NAUSEA AND/OR VOMITING Last 

administered on 10/2/17at 01:27; Admin Dose 4 MG;  Start 9/26/17 at 22:30


Acetaminophen (Tylenol Liquid) 650 mg Q6H  PRN PO PAIN LEVEL 1-3 OR FEVER Last 

administered on 9/29/17at 00:32; Admin Dose 650 MG;  Start 9/26/17 at 22:30


Morphine Sulfate (morphine) 2 mg Q4H  PRN IV PAIN LEVEL 7-10;  Start 9/26/17 at 

22:30


Famotidine (Pepcid) 20 mg DAILY PO  Last administered on 10/7/17at 09:00; Admin 

Dose 20 MG;  Start 10/5/17 at 09:00


Tramadol HCl (Ultram) 50 mg Q6H  PRN PO PAIN;  Start 10/4/17 at 23:00


Docusate Sodium (Colace) 100 mg BID  PRN PO CONSTIPATION;  Start 10/4/17 at 23:

00


Bisacodyl (Dulcolax) 5 mg DAILY  PRN PO CONSTIPATION;  Start 10/4/17 at 23:00


Tamoxifen Citrate (Nolvadex) 20 mg DAILY PO  Last administered on 10/7/17at 09:

06; Admin Dose 20 MG;  Start 10/6/17 at 09:00


Dexamethasone (Decadron) 4 mg Q8 IV  Last administered on 10/8/17at 05:31; 

Admin Dose 4 MG;  Start 10/6/17 at 22:00


Miscellaneous Information 1 ea NOTE XX ;  Start 10/7/17 at 10:30


Glucose (Glutose) 15 gm Q15M  PRN PO DECREASED GLUCOSE;  Start 10/7/17 at 10:30


Glucose (Glutose) 22.5 gm Q15M  PRN PO DECREASED GLUCOSE;  Start 10/7/17 at 10:

30


Dextrose (D50w Syringe) 25 ml Q15M  PRN IV DECREASED GLUCOSE;  Start 10/7/17 at 

10:30


Dextrose (D50w Syringe) 50 ml Q15M  PRN IV DECREASED GLUCOSE;  Start 10/7/17 at 

10:30


Glucagon (Glucagen) 1 mg Q15M  PRN IM DECREASED GLUCOSE;  Start 10/7/17 at 10:30


Glucose (Glutose) 15 gm Q15M  PRN BUCCAL DECREASED GLUCOSE;  Start 10/7/17 at 10

:30











RIVERA RAWLS MD Oct 8, 2017 08:07

## 2017-10-09 NOTE — PN
Date/Time of Note


Date/Time of Note


DATE: 10/9/17 


TIME: 11:47





Assessment/Plan


VTE Prophylaxis


VTE Prophylaxis Intervention:  ambulation





Lines/Catheters


IV Catheter Type (from [facility]):  Saline Lock


Urinary Cath still in place:  No





Assessment/Plan


Chief Complaint/Hosp Course


1.  Breast cancer with metastases to the lung, liver, brain


- s/p craniectomy with mesh placement on 9/29/17. Doing well and no issues with 

confusion


- Neurosurgery on board and recommendations appreciated.


- Oncology on board and consultation appreciated.  Dr. Dee is considering 

initiating systemic therapy with lapatinib with Xeloda post-completion of 

treatment. Started on Tamoxifen


- Rad Oncology on board as well and consultation appreciated.  Plans for whole 

brain irradiation over 3 weeks, 2 doses of 3750 cGy.  





2.  Hyperglycemia


- Due to decadron


- Will change to carb consistent diet


- ISS and accuchecks





3.  6 mm right-to-left midline herniation and effacement of the right lateral 

ventricle


- mental status remains stable


- s/p craniectomy


- Decadron for cerebral edema





4. Large right-sided pleural effusion


- Pleurex cath in place, draining well





5.  Anemia of chronic disease


- stable and continues to trend up





6. Shortness of breath


- resolved





7. Disposition


- planning for in-house radiation per neurosurgery, pending timing.


Problems:  





Subjective


24 Hr Interval Summary


Free Text/Dictation


no acute complaints





Exam/Review of Systems


Vital Signs


Vitals





 Vital Signs








  Date Time  Temp Pulse Resp B/P Pulse Ox O2 Delivery O2 Flow Rate FiO2


 


10/9/17 08:17 98.2 67 18 106/62 99   


 


10/7/17 15:19      Room Air  














 Intake and Output   


 


 10/8/17 10/8/17 10/9/17





 15:00 23:00 07:00


 


Intake Total  900 ml 950 ml


 


Output Total  1700 ml 900 ml


 


Balance  -800 ml 50 ml











Exam


General: NAD, awake and alert. pleasant 


Head: R side staples in place- no discharge or drainage


Neck: Supple


Respiratory: diminished diffusely. no wheezes or rhonchi appreciated


Cardiovascular: regular rate, no obvious murmurs


Gastrointestinal: non-tender to palpation, bowel sounds heard.


Skin: No new skin lesions, R chest pleurex catheter





Results


Result Diagram:  


10/9/17 0418                                                                   

             10/9/17 0418





Results 24 hrs





Laboratory Tests








Test


  10/8/17


12:23 10/8/17


17:58 10/8/17


21:25 10/9/17


04:18


 


Bedside Glucose 341  H 124   123   


 


White Blood Count    26.1  H


 


Red Blood Count    4.07  L


 


Hemoglobin    9.3  L


 


Hematocrit    29.4  L


 


Mean Corpuscular Volume    72.2  L


 


Mean Corpuscular Hemoglobin    22.9  L


 


Mean Corpuscular Hemoglobin


Concent 


  


  


  31.6  L


 


 


Red Cell Distribution Width    21.4  H


 


Platelet Count    229  


 


Mean Platelet Volume    9.3  


 


Neutrophils %    77.2  H


 


Lymphocytes %    11.5  L


 


Monocytes %    6.9  


 


Eosinophils %    0.0  


 


Basophils %    0.2  


 


Nucleated Red Blood Cells %    0.0  


 


Neutrophils #    20.1  H


 


Lymphocytes #    3.0  H


 


Monocytes #    1.8  H


 


Eosinophils #    0.0  


 


Basophils #    0.1  


 


Nucleated Red Blood Cells #    0.0  


 


Sodium Level    130  L


 


Potassium Level    4.4  


 


Chloride Level    101  


 


Carbon Dioxide Level    24  


 


Anion Gap    9  


 


Blood Urea Nitrogen    26  H


 


Creatinine    0.57  


 


Glucose Level    113  


 


Calcium Level    8.3  L


 


Phosphorus Level    4.1  


 


Magnesium Level    1.8  


 


Albumin    2.6  L














Test


  10/9/17


08:39 


  


  


 


 


Bedside Glucose 116     











Medications


Medications





 Current Medications


Ondansetron HCl (Zofran Inj) 4 mg Q6H  PRN IV NAUSEA AND/OR VOMITING Last 

administered on 10/2/17at 01:27; Admin Dose 4 MG;  Start 9/26/17 at 22:30


Acetaminophen (Tylenol Liquid) 650 mg Q6H  PRN PO PAIN LEVEL 1-3 OR FEVER Last 

administered on 9/29/17at 00:32; Admin Dose 650 MG;  Start 9/26/17 at 22:30


Morphine Sulfate (morphine) 2 mg Q4H  PRN IV PAIN LEVEL 7-10;  Start 9/26/17 at 

22:30


Famotidine (Pepcid) 20 mg DAILY PO  Last administered on 10/9/17at 10:23; Admin 

Dose 20 MG;  Start 10/5/17 at 09:00


Tramadol HCl (Ultram) 50 mg Q6H  PRN PO PAIN;  Start 10/4/17 at 23:00


Docusate Sodium (Colace) 100 mg BID  PRN PO CONSTIPATION;  Start 10/4/17 at 23:

00


Bisacodyl (Dulcolax) 5 mg DAILY  PRN PO CONSTIPATION;  Start 10/4/17 at 23:00


Tamoxifen Citrate (Nolvadex) 20 mg DAILY PO  Last administered on 10/9/17at 10:

27; Admin Dose 20 MG;  Start 10/6/17 at 09:00


Dexamethasone (Decadron) 4 mg Q8 IV  Last administered on 10/9/17at 05:42; 

Admin Dose 4 MG;  Start 10/6/17 at 22:00


Miscellaneous Information 1 ea NOTE XX ;  Start 10/7/17 at 10:30


Glucose (Glutose) 15 gm Q15M  PRN PO DECREASED GLUCOSE;  Start 10/7/17 at 10:30


Glucose (Glutose) 22.5 gm Q15M  PRN PO DECREASED GLUCOSE;  Start 10/7/17 at 10:

30


Dextrose (D50w Syringe) 25 ml Q15M  PRN IV DECREASED GLUCOSE;  Start 10/7/17 at 

10:30


Dextrose (D50w Syringe) 50 ml Q15M  PRN IV DECREASED GLUCOSE;  Start 10/7/17 at 

10:30


Glucagon (Glucagen) 1 mg Q15M  PRN IM DECREASED GLUCOSE;  Start 10/7/17 at 10:30


Glucose (Glutose) 15 gm Q15M  PRN BUCCAL DECREASED GLUCOSE;  Start 10/7/17 at 10

:30











SHIREEN ASHBY Oct 9, 2017 11:49

## 2017-10-09 NOTE — OPR
DATE OF OPERATION:  09/29/2017

 

 

PREOPERATIVE DIAGNOSIS:  Breast metastasis to right calvarium.

 

POSTOPERATIVE DIAGNOSIS: Breast metastasis to right calvarium.

 

OPERATION PERFORMED:

1.  Right frontal craniectomy greater than 5 cm2.

2.  Resection of brain metastasis.

3.  Operative microscope.

4.  Image guidance/neural navigation.

5.  Titanium mesh cranioplasty approximately 10 x 8 cm.

 

SURGEON:  Jerel Bartholomew.

 

ASSISTANT: None.

 

ANESTHESIA:  General endotracheal anesthesia.

 

ESTIMATED BLOOD LOSS:  100 mL.

 

INDICATIONS:  The patient is a 46-year-old female with a known history of breast cancer who presents
 with a large mass protruding underneath the scalp.  CT and MRI demonstrate intracranial extension w
ith significant cerebral edema and midline shift.  Risks, benefits, and alternatives to craniectomy 
for resection of the involved bone and extracranial and intracranial mass were explained to the denice
ent in detail. Informed consent was obtained, the patient was brought to the operating room.

 

PROCEDURE IN DETAIL:  The patient was positioned supine on the operating table after induction of an
esthesia.  Head was then placed in 3-pin fixation. Her topographic anatomy was then registered to [employer] image guidance system using a tracer algorithm. Once this was completed, it was confirmed
 for accuracy using anatomical landmarks.  The location of the tumor including both the extra clinic
al portion which could be directly palpated but also the intracranial portion was marked on the surf
ace of the scalp with a felt tip marker. An appropriate curvilinear incision that was a question mar
k style flap was marked on the surface of the scalp.  The hair was clipped and the patient was prepp
ed and draped in usual sterile fashion.  The scalp was infiltrated with anesthetic and a 10 blade wa
s used to incise the skin and reflect the scalp flap forward.  The galea was secured to be intact ov
er the extracranial portion of the tumor.  Bur holes were placed along the inferior aspect of the ex
posure at the squamosal portion of the temporal bone and a craniotome was used to circumscribe the t
umor. Because the tumor extended both intracranially, extradurally and extracranially through the chica
ne, the bone flap was not elevated at this time, but rather a craniotome was used to bisect the bone
 flap and in situ and this allowed for a plan to be developed between the tumor and dura and remove 
the bone flap in several pieces.  The operative microscope was then brought in.  The dura was opened
 again, the dura was circumferentially opened, that is to circumscribe the tumor which extended from
 the epidural space to the intradural space intracranially. The complete tumor was isolated, surroun
ded and exposed with the margin of dura, about 1 cm around. ____ were then placed on the surface of 
the brain and the operative microscope was brought in.  The tumor was removed and the operative micr
oscope with ____ carefully being advanced to protect the brain from inadvertent injury.  As the tumo
r was elevated using standard microsurgical techniques. En bloc removal of the involved tumor was pe
rformed.  The resulting craniectomy defect was covered with Duragen and wound was copiously irrigate
d wit antibiotic irrigation, meticulous hemostasis having been obtained.  Next, the cranioplasty was
 performed with a PercSys pre-formatted, gently curved occipital craniectomy Titanium mesh craniopla
sty, which was gently trimmed and bent to fit the resulting defect from the frontal craniectomy.  Th
is was secured in place with several mini screws and a NAHUM drain was left in the subgaleal space. The
 wound was irrigated again with copious amounts of antibiotic irrigation, and the wound was closed i
n layers with 2-0 interrupted Vicryl being used to close the galea and staples on the skin.  The pat
ient tolerated the procedure well.  There were no complications.  All needle and sponge counts were 
reported as correct x2.

 

 

Dictated By: JEREL HOUSTON/LAKISHA

DD:    10/09/2017 19:51:44

DT:    10/09/2017 22:35:08

Conf#: [id number]

DID#:  [id number]

## 2017-10-09 NOTE — CONS
Date/Time of Note


Date/Time of Note


DATE: 10/9/17 


TIME: 22:16





Assessment/Plan


Assessment/Plan


Chief Complaint/Hosp Course


45 yo with metastatic breast cancer, who was off therapy for at least 5 month, 

who presents with recurrent pleural effusion and large brain metastasis. 





#Her2+/ER+/WI+ metastatic breast ca


-pt is non compliant but  when she receives therapy, she is quite responsive to 

therapy


-therefore at this time, although she has terminal disease, there are still 

many more treatment options which she could benefit from


-given her Brain mets, I would consider a combination of Xeloda and Lapatinib 

which can penetrate the blood brain barrier


-at this time will restart Tamoxifen


-once patient has completed her post op radiation will initiate chemotherapy





#Brain Mets


-appreciate neurosurgery recs


- now s/p  right frontal craniectomy, resection of tumor, microscope, image 

guidance, titanium mesh cranioplast. will follow up pathology


-appreciate rad onc recs: they have recommended whole brain irradiation over 3 

weeks, 2 doses of 3750 cGy. will consider Namenda with radiation. will plan to 

start Xeloda after completion of radiation


-patient to get simulated prior to discharge


-continue Decadron at4mg IV q8





#Pleural Effusion


-s/p pleurex catheter placement


-will check cytology and re-evaluate her prognostic markers





A total 40 min was spent face to face in speaking with the patient of which > 50

% was spent in counseling and coordination fo care and details question and 

answer session


Problems:  





Consultation Date/Type/Reason


Admit Date/Time


Sep 26, 2017 at 19:54


Initial Consult Date


9/27/17


Type of Consultation:  Oncology


Reason for Consultation


metastatic breast cancer to brain


Referring Provider:  SHIREEN ASHBY





24 HR Interval Summary


Free Text/Dictation


no acute overnight events. patient continues to improve.





Exam/Review of Systems


Vital Signs


Vitals





 Vital Signs








  Date Time  Temp Pulse Resp B/P Pulse Ox O2 Delivery O2 Flow Rate FiO2


 


10/9/17 19:21 97.9 71 18 117/65 98   


 


10/9/17 14:03      Room Air  














 Intake and Output   


 


 10/8/17 10/8/17 10/9/17





 15:00 23:00 07:00


 


Intake Total  900 ml 950 ml


 


Output Total  1700 ml 900 ml


 


Balance  -800 ml 50 ml











Exam


Constitutional:  alert, oriented


Psych:  no complaints


Head:  normocephalic


Eyes:  nl conjunctiva


ENMT:  nl external ears & nose


Neck:  non-tender, supple


Respiratory:  clear to auscultation, normal air movement


Cardiovascular:  regular rate and rhythm


Gastrointestinal:  soft


Musculoskeletal:  nl extremities to inspection





Results


Result Diagram:  


10/9/17 0418                                                                   

             10/9/17 0418





Results 24 hrs





Laboratory Tests








Test


  10/9/17


04:18 10/9/17


08:39 10/9/17


12:28 10/9/17


17:57


 


White Blood Count 26.1  H   


 


Red Blood Count 4.07  L   


 


Hemoglobin 9.3  L   


 


Hematocrit 29.4  L   


 


Mean Corpuscular Volume 72.2  L   


 


Mean Corpuscular Hemoglobin 22.9  L   


 


Mean Corpuscular Hemoglobin


Concent 31.6  L


  


  


  


 


 


Red Cell Distribution Width 21.4  H   


 


Platelet Count 229     


 


Mean Platelet Volume 9.3     


 


Neutrophils % 77.2  H   


 


Lymphocytes % 11.5  L   


 


Monocytes % 6.9     


 


Eosinophils % 0.0     


 


Basophils % 0.2     


 


Nucleated Red Blood Cells % 0.0     


 


Neutrophils # 20.1  H   


 


Lymphocytes # 3.0  H   


 


Monocytes # 1.8  H   


 


Eosinophils # 0.0     


 


Basophils # 0.1     


 


Nucleated Red Blood Cells # 0.0     


 


Sodium Level 130  L   


 


Potassium Level 4.4     


 


Chloride Level 101     


 


Carbon Dioxide Level 24     


 


Anion Gap 9     


 


Blood Urea Nitrogen 26  H   


 


Creatinine 0.57     


 


Glucose Level 113     


 


Calcium Level 8.3  L   


 


Phosphorus Level 4.1     


 


Magnesium Level 1.8     


 


Albumin 2.6  L   


 


Bedside Glucose  116   134   126  














Test


  10/9/17


21:30 


  


  


 


 


Bedside Glucose 213     











Medications


Medications





 Current Medications


Ondansetron HCl (Zofran Inj) 4 mg Q6H  PRN IV NAUSEA AND/OR VOMITING Last 

administered on 10/2/17at 01:27; Admin Dose 4 MG;  Start 9/26/17 at 22:30


Acetaminophen (Tylenol Liquid) 650 mg Q6H  PRN PO PAIN LEVEL 1-3 OR FEVER Last 

administered on 9/29/17at 00:32; Admin Dose 650 MG;  Start 9/26/17 at 22:30


Morphine Sulfate (morphine) 2 mg Q4H  PRN IV PAIN LEVEL 7-10;  Start 9/26/17 at 

22:30


Famotidine (Pepcid) 20 mg DAILY PO  Last administered on 10/9/17at 10:23; Admin 

Dose 20 MG;  Start 10/5/17 at 09:00


Tramadol HCl (Ultram) 50 mg Q6H  PRN PO PAIN;  Start 10/4/17 at 23:00


Docusate Sodium (Colace) 100 mg BID  PRN PO CONSTIPATION;  Start 10/4/17 at 23:

00


Bisacodyl (Dulcolax) 5 mg DAILY  PRN PO CONSTIPATION;  Start 10/4/17 at 23:00


Tamoxifen Citrate (Nolvadex) 20 mg DAILY PO  Last administered on 10/9/17at 10:

27; Admin Dose 20 MG;  Start 10/6/17 at 09:00


Dexamethasone (Decadron) 4 mg Q8 IV  Last administered on 10/9/17at 21:32; 

Admin Dose 4 MG;  Start 10/6/17 at 22:00


Miscellaneous Information 1 ea NOTE XX ;  Start 10/7/17 at 10:30


Glucose (Glutose) 15 gm Q15M  PRN PO DECREASED GLUCOSE;  Start 10/7/17 at 10:30


Glucose (Glutose) 22.5 gm Q15M  PRN PO DECREASED GLUCOSE;  Start 10/7/17 at 10:

30


Dextrose (D50w Syringe) 25 ml Q15M  PRN IV DECREASED GLUCOSE;  Start 10/7/17 at 

10:30


Dextrose (D50w Syringe) 50 ml Q15M  PRN IV DECREASED GLUCOSE;  Start 10/7/17 at 

10:30


Glucagon (Glucagen) 1 mg Q15M  PRN IM DECREASED GLUCOSE;  Start 10/7/17 at 10:30


Glucose (Glutose) 15 gm Q15M  PRN BUCCAL DECREASED GLUCOSE;  Start 10/7/17 at 10

:30











KRISTEL ORANTES M.D. Oct 9, 2017 22:19

## 2017-10-09 NOTE — CONS
Date/Time of Note


Date/Time of Note


DATE: 10/9/17 


TIME: 14:57





Assessment/Plan


Assessment/Plan


Additional Assessment/Plan


Assessment and recommendations;





1.  Patient admitted with recurrent right pleural effusion due to metastatic 

breast cancer.  Status post Pleurx catheter placement. 





2.  Brain tumor status post resection.





Continue current supportive care.  Further recommendations per oncology.





Consultation Date/Type/Reason


Admit Date/Time


Sep 26, 2017 at 19:54


Initial Consult Date


9/27/17


Type of Consultation:  Pulmonary


Referring Provider:  SHIREEN ASHBY





24 HR Interval Summary


Free Text/Dictation


Patient's condition is stable.  Remains completely awake and alert.





General exam; middle-aged woman, currently in no distress.





Exam/Review of Systems


Vital Signs


Vitals





 Vital Signs








  Date Time  Temp Pulse Resp B/P Pulse Ox O2 Delivery O2 Flow Rate FiO2


 


10/9/17 14:03  73 18 105/51 96 Room Air  


 


10/9/17 08:17 98.2       














 Intake and Output   


 


 10/8/17 10/8/17 10/9/17





 15:00 23:00 07:00


 


Intake Total  900 ml 950 ml


 


Output Total  1700 ml 900 ml


 


Balance  -800 ml 50 ml











Exam


HEENT exam; supple neck, no JVD.  No lymphadenopathy.  Midline trachea.  No 

thyromegaly.  Patient has fair dentition.  Pupils are midsize and reactive to 

light.  There is a well-healed parietal craniotomy scar present with staples 

applied.





Chest exam; diminished breath on lung bases bilaterally.  Right Pleurx catheter 

in place.  S1-S2 audible, no murmurs.  Regular rhythm.  





Abdomen exam; soft, nontender.  No organomegaly.  Bowel sounds audible.





Extremity exam; no peripheral edema.





CNS exam; no focal deficit.





Results


Result Diagram:  


10/9/17 0418                                                                   

             10/9/17 0418





Results 24 hrs





Laboratory Tests








Test


  10/8/17


17:58 10/8/17


21:25 10/9/17


04:18 10/9/17


08:39


 


Bedside Glucose 124   123    116  


 


White Blood Count   26.1  H 


 


Red Blood Count   4.07  L 


 


Hemoglobin   9.3  L 


 


Hematocrit   29.4  L 


 


Mean Corpuscular Volume   72.2  L 


 


Mean Corpuscular Hemoglobin   22.9  L 


 


Mean Corpuscular Hemoglobin


Concent 


  


  31.6  L


  


 


 


Red Cell Distribution Width   21.4  H 


 


Platelet Count   229   


 


Mean Platelet Volume   9.3   


 


Neutrophils %   77.2  H 


 


Lymphocytes %   11.5  L 


 


Monocytes %   6.9   


 


Eosinophils %   0.0   


 


Basophils %   0.2   


 


Nucleated Red Blood Cells %   0.0   


 


Neutrophils #   20.1  H 


 


Lymphocytes #   3.0  H 


 


Monocytes #   1.8  H 


 


Eosinophils #   0.0   


 


Basophils #   0.1   


 


Nucleated Red Blood Cells #   0.0   


 


Sodium Level   130  L 


 


Potassium Level   4.4   


 


Chloride Level   101   


 


Carbon Dioxide Level   24   


 


Anion Gap   9   


 


Blood Urea Nitrogen   26  H 


 


Creatinine   0.57   


 


Glucose Level   113   


 


Calcium Level   8.3  L 


 


Phosphorus Level   4.1   


 


Magnesium Level   1.8   


 


Albumin   2.6  L 














Test


  10/9/17


12:28 


  


  


 


 


Bedside Glucose 134     











Medications


Medications





 Current Medications


Ondansetron HCl (Zofran Inj) 4 mg Q6H  PRN IV NAUSEA AND/OR VOMITING Last 

administered on 10/2/17at 01:27; Admin Dose 4 MG;  Start 9/26/17 at 22:30


Acetaminophen (Tylenol Liquid) 650 mg Q6H  PRN PO PAIN LEVEL 1-3 OR FEVER Last 

administered on 9/29/17at 00:32; Admin Dose 650 MG;  Start 9/26/17 at 22:30


Morphine Sulfate (morphine) 2 mg Q4H  PRN IV PAIN LEVEL 7-10;  Start 9/26/17 at 

22:30


Famotidine (Pepcid) 20 mg DAILY PO  Last administered on 10/9/17at 10:23; Admin 

Dose 20 MG;  Start 10/5/17 at 09:00


Tramadol HCl (Ultram) 50 mg Q6H  PRN PO PAIN;  Start 10/4/17 at 23:00


Docusate Sodium (Colace) 100 mg BID  PRN PO CONSTIPATION;  Start 10/4/17 at 23:

00


Bisacodyl (Dulcolax) 5 mg DAILY  PRN PO CONSTIPATION;  Start 10/4/17 at 23:00


Tamoxifen Citrate (Nolvadex) 20 mg DAILY PO  Last administered on 10/9/17at 10:

27; Admin Dose 20 MG;  Start 10/6/17 at 09:00


Dexamethasone (Decadron) 4 mg Q8 IV  Last administered on 10/9/17at 13:14; 

Admin Dose 4 MG;  Start 10/6/17 at 22:00


Miscellaneous Information 1 ea NOTE XX ;  Start 10/7/17 at 10:30


Glucose (Glutose) 15 gm Q15M  PRN PO DECREASED GLUCOSE;  Start 10/7/17 at 10:30


Glucose (Glutose) 22.5 gm Q15M  PRN PO DECREASED GLUCOSE;  Start 10/7/17 at 10:

30


Dextrose (D50w Syringe) 25 ml Q15M  PRN IV DECREASED GLUCOSE;  Start 10/7/17 at 

10:30


Dextrose (D50w Syringe) 50 ml Q15M  PRN IV DECREASED GLUCOSE;  Start 10/7/17 at 

10:30


Glucagon (Glucagen) 1 mg Q15M  PRN IM DECREASED GLUCOSE;  Start 10/7/17 at 10:30


Glucose (Glutose) 15 gm Q15M  PRN BUCCAL DECREASED GLUCOSE;  Start 10/7/17 at 10

:30











GOKUL BRIGGS Oct 9, 2017 14:59

## 2017-10-10 NOTE — PN
Date/Time of Note


Date/Time of Note


DATE: 10/10/17 


TIME: 15:01





Assessment/Plan


VTE Prophylaxis


VTE Prophylaxis Intervention:  SCD's





Lines/Catheters


IV Catheter Type (from [facility] [[email or web address]  [place]


Urinary Cath still in place:  No





Assessment/Plan


Chief Complaint/Hosp Course


1.  Breast cancer with metastases to the lung, liver, brain


- s/p craniectomy with mesh placement on 9/29/17. Doing well and no issues with 

confusion


- Neurosurgery on board and recommendations appreciated.


- Oncology on board and consultation appreciated.  Dr. Dee is considering 

initiating systemic therapy with lapatinib with Xeloda post-completion of 

treatment. Started on Tamoxifen


- Rad Oncology on board as well and consultation appreciated.  Plans for whole 

brain irradiation over 3 weeks, 2 doses of 3750 cGy.  





2.  Hyperglycemia


- Due to decadron


- Will change to carb consistent diet


- ISS and accuchecks





3.  6 mm right-to-left midline herniation and effacement of the right lateral 

ventricle


- mental status remains stable


- s/p craniectomy


- Decadron for cerebral edema





4. Large right-sided pleural effusion


- Pleurex cath in place, draining well





5.  Anemia of chronic disease


- stable and continues to trend up





6. Shortness of breath


- resolved





7. Disposition


- Will plan to DC for outpatient follow up after confirming appts with heme/onc

, rad onc, neurosurgery


Problems:  





Subjective


24 Hr Interval Summary


Free Text/Dictation


feels well, no nausea/vomiting





Exam/Review of Systems


Vital Signs


Vitals





 Vital Signs








  Date Time  Temp Pulse Resp B/P Pulse Ox O2 Delivery O2 Flow Rate FiO2


 


10/10/17 14:10 98.0 60 17 106/61 99 Room Air  














 Intake and Output   


 


 10/9/17 10/9/17 10/10/17





 15:00 23:00 07:00


 


Intake Total  700 ml 500 ml


 


Output Total  700 ml 


 


Balance  0 ml 500 ml











Exam


General: NAD, awake and alert. pleasant 


Head: R side staples in place- no discharge or drainage


Neck: Supple, midline


Respiratory: diminished diffusely. no wheezes or rhonchi appreciated


Cardiovascular: regular rate, no obvious murmurs


Gastrointestinal: non-tender to palpation, bowel sounds heard.


Skin: No new skin lesions, R chest pleurex catheter





Results


Result Diagram:  


10/10/17 0436                                                                  

              10/10/17 0436





Results 24 hrs





Laboratory Tests








Test


  10/9/17


17:57 10/9/17


21:30 10/10/17


04:36 10/10/17


08:37


 


Bedside Glucose 126   213    133  


 


White Blood Count   24.1  H 


 


Red Blood Count   4.16  L 


 


Hemoglobin   9.3  L 


 


Hematocrit   30.1  L 


 


Mean Corpuscular Volume   72.4  L 


 


Mean Corpuscular Hemoglobin   22.4  L 


 


Mean Corpuscular Hemoglobin


Concent 


  


  30.9  L


  


 


 


Red Cell Distribution Width   21.8  H 


 


Platelet Count   232   


 


Mean Platelet Volume   9.5   


 


Neutrophils %   78.3  H 


 


Lymphocytes %   11.1  L 


 


Monocytes %   6.5   


 


Eosinophils %   0.0   


 


Basophils %   0.2   


 


Nucleated Red Blood Cells %   0.0   


 


Neutrophils #   18.9  H 


 


Lymphocytes #   2.7   


 


Monocytes #   1.6  H 


 


Eosinophils #   0.0   


 


Basophils #   0.1   


 


Nucleated Red Blood Cells #   0.0   


 


Sodium Level   131  L 


 


Potassium Level   4.3   


 


Chloride Level   102   


 


Carbon Dioxide Level   23   


 


Anion Gap   10   


 


Blood Urea Nitrogen   31  H 


 


Creatinine   0.67   


 


Glucose Level   127   


 


Calcium Level   8.0  L 


 


Phosphorus Level   4.1   


 


Magnesium Level   1.9   


 


Albumin   2.7  L 














Test


  10/10/17


12:46 


  


  


 


 


Bedside Glucose 122     











Medications


Medications





 Current Medications


Ondansetron HCl (Zofran Inj) 4 mg Q6H  PRN IV NAUSEA AND/OR VOMITING Last 

administered on 10/2/17at 01:27; Admin Dose 4 MG;  Start 9/26/17 at 22:30


Acetaminophen (Tylenol Liquid) 650 mg Q6H  PRN PO PAIN LEVEL 1-3 OR FEVER Last 

administered on 9/29/17at 00:32; Admin Dose 650 MG;  Start 9/26/17 at 22:30


Morphine Sulfate (morphine) 2 mg Q4H  PRN IV PAIN LEVEL 7-10;  Start 9/26/17 at 

22:30


Famotidine (Pepcid) 20 mg DAILY PO  Last administered on 10/10/17at 08:37; 

Admin Dose 20 MG;  Start 10/5/17 at 09:00


Tramadol HCl (Ultram) 50 mg Q6H  PRN PO PAIN;  Start 10/4/17 at 23:00


Docusate Sodium (Colace) 100 mg BID  PRN PO CONSTIPATION;  Start 10/4/17 at 23:

00


Bisacodyl (Dulcolax) 5 mg DAILY  PRN PO CONSTIPATION;  Start 10/4/17 at 23:00


Tamoxifen Citrate (Nolvadex) 20 mg DAILY PO  Last administered on 10/10/17at 08:

40; Admin Dose 20 MG;  Start 10/6/17 at 09:00


Dexamethasone (Decadron) 4 mg Q8 IV  Last administered on 10/10/17at 14:18; 

Admin Dose 4 MG;  Start 10/6/17 at 22:00


Miscellaneous Information 1 ea NOTE XX ;  Start 10/7/17 at 10:30


Glucose (Glutose) 15 gm Q15M  PRN PO DECREASED GLUCOSE;  Start 10/7/17 at 10:30


Glucose (Glutose) 22.5 gm Q15M  PRN PO DECREASED GLUCOSE;  Start 10/7/17 at 10:

30


Dextrose (D50w Syringe) 25 ml Q15M  PRN IV DECREASED GLUCOSE;  Start 10/7/17 at 

10:30


Dextrose (D50w Syringe) 50 ml Q15M  PRN IV DECREASED GLUCOSE;  Start 10/7/17 at 

10:30


Glucagon (Glucagen) 1 mg Q15M  PRN IM DECREASED GLUCOSE;  Start 10/7/17 at 10:30


Glucose (Glutose) 15 gm Q15M  PRN BUCCAL DECREASED GLUCOSE;  Start 10/7/17 at 10

:30











SHIREEN ASHBY Oct 10, 2017 15:05

## 2017-10-10 NOTE — CONS
Date/Time of Note


Date/Time of Note


DATE: 10/10/17 


TIME: 14:02





Assessment/Plan


Assessment/Plan


Additional Assessment/Plan


Assessment and recommendations;





1.  Patient admitted with recurrent pleural effusion status post right Pleurx 

catheter placement.


2.  History of metastatic breast cancer.


3.  Right parietal craniotomy for brain tumor resection.





Continue current supportive care.  Consider discharge.





Consultation Date/Type/Reason


Admit Date/Time


Sep 26, 2017 at 19:54


Initial Consult Date


9/27/17


Type of Consultation:  Pulmonary


Referring Provider:  SHIREEN ASHBY





24 HR Interval Summary


Free Text/Dictation


Patient's  condition is stable.  Remains completely awake alert.  Denies any 

shortness of breath, chest pain, coughing or sputum production.  Denies any 

headache.





General exam; middle-aged woman, currently no distress.  Awake and alert.





Exam/Review of Systems


Vital Signs


Vitals





 Vital Signs








  Date Time  Temp Pulse Resp B/P Pulse Ox O2 Delivery O2 Flow Rate FiO2


 


10/10/17 08:34 97.9 56 18 116/67 96   


 


10/9/17 14:03      Room Air  














 Intake and Output   


 


 10/9/17 10/9/17 10/10/17





 15:00 23:00 07:00


 


Intake Total  700 ml 500 ml


 


Output Total  700 ml 


 


Balance  0 ml 500 ml











Exam


HEENT exam; supple neck, no JVD.  No lymphadenopathy.  Midline trachea.  No 

thyromegaly.  Pharynx is clear.  There is a healing right parietal scar.  

Pupils are equal and reactive to light.





Chest exam; diminished but clear breath sound.  Pleurx catheter in right 

lateral chest wall.  S1-S2 audible, no murmurs.  Regular rhythm.  





Abdomen exam; soft, nontender.  No organomegaly.  Bowel sounds audible.





Extremity exam; no peripheral edema.  No clubbing.





CNS exam; no focal deficit.





Results


Result Diagram:  


10/10/17 0436                                                                  

              10/10/17 0436





Results 24 hrs





Laboratory Tests








Test


  10/9/17


17:57 10/9/17


21:30 10/10/17


04:36 10/10/17


08:37


 


Bedside Glucose 126   213    133  


 


White Blood Count   24.1  H 


 


Red Blood Count   4.16  L 


 


Hemoglobin   9.3  L 


 


Hematocrit   30.1  L 


 


Mean Corpuscular Volume   72.4  L 


 


Mean Corpuscular Hemoglobin   22.4  L 


 


Mean Corpuscular Hemoglobin


Concent 


  


  30.9  L


  


 


 


Red Cell Distribution Width   21.8  H 


 


Platelet Count   232   


 


Mean Platelet Volume   9.5   


 


Neutrophils %   78.3  H 


 


Lymphocytes %   11.1  L 


 


Monocytes %   6.5   


 


Eosinophils %   0.0   


 


Basophils %   0.2   


 


Nucleated Red Blood Cells %   0.0   


 


Neutrophils #   18.9  H 


 


Lymphocytes #   2.7   


 


Monocytes #   1.6  H 


 


Eosinophils #   0.0   


 


Basophils #   0.1   


 


Nucleated Red Blood Cells #   0.0   


 


Sodium Level   131  L 


 


Potassium Level   4.3   


 


Chloride Level   102   


 


Carbon Dioxide Level   23   


 


Anion Gap   10   


 


Blood Urea Nitrogen   31  H 


 


Creatinine   0.67   


 


Glucose Level   127   


 


Calcium Level   8.0  L 


 


Phosphorus Level   4.1   


 


Magnesium Level   1.9   


 


Albumin   2.7  L 














Test


  10/10/17


12:46 


  


  


 


 


Bedside Glucose 122     











Medications


Medications





 Current Medications


Ondansetron HCl (Zofran Inj) 4 mg Q6H  PRN IV NAUSEA AND/OR VOMITING Last 

administered on 10/2/17at 01:27; Admin Dose 4 MG;  Start 9/26/17 at 22:30


Acetaminophen (Tylenol Liquid) 650 mg Q6H  PRN PO PAIN LEVEL 1-3 OR FEVER Last 

administered on 9/29/17at 00:32; Admin Dose 650 MG;  Start 9/26/17 at 22:30


Morphine Sulfate (morphine) 2 mg Q4H  PRN IV PAIN LEVEL 7-10;  Start 9/26/17 at 

22:30


Famotidine (Pepcid) 20 mg DAILY PO  Last administered on 10/10/17at 08:37; 

Admin Dose 20 MG;  Start 10/5/17 at 09:00


Tramadol HCl (Ultram) 50 mg Q6H  PRN PO PAIN;  Start 10/4/17 at 23:00


Docusate Sodium (Colace) 100 mg BID  PRN PO CONSTIPATION;  Start 10/4/17 at 23:

00


Bisacodyl (Dulcolax) 5 mg DAILY  PRN PO CONSTIPATION;  Start 10/4/17 at 23:00


Tamoxifen Citrate (Nolvadex) 20 mg DAILY PO  Last administered on 10/10/17at 08:

40; Admin Dose 20 MG;  Start 10/6/17 at 09:00


Dexamethasone (Decadron) 4 mg Q8 IV  Last administered on 10/10/17at 06:18; 

Admin Dose 4 MG;  Start 10/6/17 at 22:00


Miscellaneous Information 1 ea NOTE XX ;  Start 10/7/17 at 10:30


Glucose (Glutose) 15 gm Q15M  PRN PO DECREASED GLUCOSE;  Start 10/7/17 at 10:30


Glucose (Glutose) 22.5 gm Q15M  PRN PO DECREASED GLUCOSE;  Start 10/7/17 at 10:

30


Dextrose (D50w Syringe) 25 ml Q15M  PRN IV DECREASED GLUCOSE;  Start 10/7/17 at 

10:30


Dextrose (D50w Syringe) 50 ml Q15M  PRN IV DECREASED GLUCOSE;  Start 10/7/17 at 

10:30


Glucagon (Glucagen) 1 mg Q15M  PRN IM DECREASED GLUCOSE;  Start 10/7/17 at 10:30


Glucose (Glutose) 15 gm Q15M  PRN BUCCAL DECREASED GLUCOSE;  Start 10/7/17 at 10

:30











GOKUL BRIGGS Oct 10, 2017 14:04

## 2017-10-11 NOTE — CONS
Date/Time of Note


Date/Time of Note


DATE: 10/11/17 


TIME: 12:02





Assessment/Plan


Assessment/Plan


Additional Assessment/Plan


Assessment and recommendations;





1.  Patient admitted with shortness of breath due to pleural effusions status 

post Pleurx catheter placement on the right side.  Patient requiring daily 

drainage.


2.  Metastatic breast cancer.  


3.  History of parietal brain tumor resection.  





Continue current treatment.  Consider discharge.  Prognosis is poor.





Consultation Date/Type/Reason


Admit Date/Time


Sep 26, 2017 at 19:54


Initial Consult Date


9/27/17


Type of Consultation:  Pulmonary


Referring Provider:  SHIREEN ASHBY





24 HR Interval Summary


Free Text/Dictation


Patient's condition is stable.  Denies any shortness of breath, headache, 

seizures.





General exam; middle-aged woman, awake and alert.  Currently in no distress.





Exam/Review of Systems


Vital Signs


Vitals





 Vital Signs








  Date Time  Temp Pulse Resp B/P Pulse Ox O2 Delivery O2 Flow Rate FiO2


 


10/11/17 08:31 98.5 53 18 114/69 97   


 


10/10/17 14:10      Room Air  














 Intake and Output   


 


 10/10/17 10/10/17 10/11/17





 15:00 23:00 07:00


 


Intake Total  780 ml 750 ml


 


Balance  780 ml 750 ml











Exam


HEENT exam; supple neck, no JVD.  No lymphadenopathy.  Midline trachea.  No 

thyromegaly.  There is a well-healing right parietal scar.  He was are midsize 

and reactive to light.  Patient has fair dentition.





Chest exam; minimally decreased breath sounds right lower lobe.  Pleurx 

catheter in right lateral chest wall.  Left lung is clear to auscultation.  S1-

S2 audible, no murmurs.  Regular rhythm.





Abdomen exam; soft, protuberant.  Nontender.  Bowel sounds audible.





Extremity exam; no edema.  No clubbing.





CNS exam; no focal deficit.





Results


Result Diagram:  


10/11/17 0432                                                                  

              10/11/17 0432





Results 24 hrs





Laboratory Tests








Test


  10/10/17


12:46 10/10/17


17:40 10/10/17


20:59 10/11/17


02:55


 


Bedside Glucose 122   128   192   157  














Test


  10/11/17


04:32 10/11/17


09:01 


  


 


 


White Blood Count 22.7  H   


 


Red Blood Count 4.37     


 


Hemoglobin 9.9  L   


 


Hematocrit 31.9  L   


 


Mean Corpuscular Volume 73.0  L   


 


Mean Corpuscular Hemoglobin 22.7  L   


 


Mean Corpuscular Hemoglobin


Concent 31.0  L


  


  


  


 


 


Red Cell Distribution Width 21.6  H   


 


Platelet Count 239     


 


Mean Platelet Volume 9.1     


 


Neutrophils % 78.7  H   


 


Lymphocytes % 11.6  L   


 


Monocytes % 6.2     


 


Eosinophils % 0.0     


 


Basophils % 0.2     


 


Nucleated Red Blood Cells % 0.0     


 


Neutrophils # 17.9  H   


 


Lymphocytes # 2.6     


 


Monocytes # 1.4  H   


 


Eosinophils # 0.0     


 


Basophils # 0.0     


 


Nucleated Red Blood Cells # 0.0     


 


Sodium Level 133  L   


 


Potassium Level 4.5     


 


Chloride Level 102     


 


Carbon Dioxide Level 23     


 


Anion Gap 13     


 


Blood Urea Nitrogen 33  H   


 


Creatinine 0.68     


 


Glucose Level 119     


 


Calcium Level 8.3  L   


 


Phosphorus Level 4.2     


 


Magnesium Level 2.0     


 


Albumin 2.8  L   


 


Bedside Glucose  163    











Medications


Medications





 Current Medications


Ondansetron HCl (Zofran Inj) 4 mg Q6H  PRN IV NAUSEA AND/OR VOMITING Last 

administered on 10/2/17at 01:27; Admin Dose 4 MG;  Start 9/26/17 at 22:30


Acetaminophen (Tylenol Liquid) 650 mg Q6H  PRN PO PAIN LEVEL 1-3 OR FEVER Last 

administered on 9/29/17at 00:32; Admin Dose 650 MG;  Start 9/26/17 at 22:30


Morphine Sulfate (morphine) 2 mg Q4H  PRN IV PAIN LEVEL 7-10;  Start 9/26/17 at 

22:30


Famotidine (Pepcid) 20 mg DAILY PO  Last administered on 10/11/17at 09:02; 

Admin Dose 20 MG;  Start 10/5/17 at 09:00


Tramadol HCl (Ultram) 50 mg Q6H  PRN PO PAIN;  Start 10/4/17 at 23:00


Docusate Sodium (Colace) 100 mg BID  PRN PO CONSTIPATION;  Start 10/4/17 at 23:

00


Bisacodyl (Dulcolax) 5 mg DAILY  PRN PO CONSTIPATION;  Start 10/4/17 at 23:00


Tamoxifen Citrate (Nolvadex) 20 mg DAILY PO  Last administered on 10/11/17at 09:

03; Admin Dose 20 MG;  Start 10/6/17 at 09:00


Miscellaneous Information 1 ea NOTE XX ;  Start 10/7/17 at 10:30


Glucose (Glutose) 15 gm Q15M  PRN PO DECREASED GLUCOSE;  Start 10/7/17 at 10:30


Glucose (Glutose) 22.5 gm Q15M  PRN PO DECREASED GLUCOSE;  Start 10/7/17 at 10:

30


Dextrose (D50w Syringe) 25 ml Q15M  PRN IV DECREASED GLUCOSE;  Start 10/7/17 at 

10:30


Dextrose (D50w Syringe) 50 ml Q15M  PRN IV DECREASED GLUCOSE;  Start 10/7/17 at 

10:30


Glucagon (Glucagen) 1 mg Q15M  PRN IM DECREASED GLUCOSE;  Start 10/7/17 at 10:30


Glucose (Glutose) 15 gm Q15M  PRN BUCCAL DECREASED GLUCOSE;  Start 10/7/17 at 10

:30


Dexamethasone (Decadron) 4 mg BID PO ;  Start 10/11/17 at 21:00











GOKUL BRIGGS Oct 11, 2017 12:04

## 2017-10-11 NOTE — PDOCDIS
Discharge Instructions


CONDITION


Patient Condition:  Stable





HOME CARE INSTRUCTIONS:


Diet Instructions:  RegularSpecial Diet:  CARB CONTROLLED





ACTIVITY:








Activity Restrictions:   Slowly Increase Activity





 Rest between Activity





 Avoid heavy lifting





 Do not Drive





 Do not operate Machinery





 Do not operate Power Tool





 Avoid Heavy Housework





Bathing Restrictions:  Shower





FOLLOW UP/APPOINTMENTS


Follow-up Plan


1. Follow up with Dr. Haroon Garcia (rad-oncology) asap, Dr. Bartholomew within 2 

weeks, and Dr. Celeste Loco within 3 weeks. 2. DO NOT MISS APPOINTMENTS 3. Take 

decadron until radiation therapy is over.











SHIREEN ASHBY Oct 11, 2017 13:18

## 2017-10-11 NOTE — DS
Date/Time of Note


Date/Time of Note


DATE: 10/11/17 


TIME: 13:14





Discharge Summary


Admission/Discharge Info


Admit Date/Time


Sep 26, 2017 at 19:54


Discharge Date/Time





Patient Condition:  Stable


Hx of Present Illness





This is a 46-year-old female with a history of breast cancer with metastasis to 

lung and liver status post right-sided mastectomy with recurrent rule out 

effusion who initially presented for a MRI of the brain.  During imaging, 

patient became severely dizzy as a result patient was sent to ER.  MRI was 

completed.  She told me that even when she was at home, she was planning on 

going to the ER after MRI is done because she has been having diffuse headache 

for the past day or 2 and because of continued shortness of breath.  She said a 

few days ago she had thoracentesis at the outside hospital but she continued to 

have shortness of breath.  She has had multiple thoracentesis for recurrent 

right-sided pleural effusion.


Patient was initially admitted to telemetry unit but acidosis she got there she 

was noted to have difficulty speaking and a per patient she was also shaking.  

Patient is admitted to ICU for close monitoring given finding of brain imaging.

  See below.





CT of the head showed Large right trans spatial mass involving the right 

frontal scalp, calvarium, extra axial space and right frontal lobe with 

extensive perilesional edema, Multiple left parietal convexity hyperdense 

lesions at the gray-white matter junction compatible with additional metastasis.


and 6 mm right to left midline herniation and effacement of the right lateral 

ventricle.


Hospital Course


Patient is a 47-year-old  female with a past medical history of 

medically noncompliant breast cancer with metastases to the lung liver and 

brain who presented to [facility] for dizziness during 

routine MRI ordered by oncologist, found to have worsening brain metastases.  

Patient was seen by neurosurgery who performed a right-sided craniectomy with 

mesh placement on September 29, 2017.  During this time patient was also seen 

by her oncologist as well as a radiation oncologist and it was determined that 

patient should began receiving immediate radiation oncology as well as 

tamoxifen and additional chemotherapies per oncology.  Patient had an 

uneventful post surgical.  And was seen by physical therapy who recommended 

acute rehab.  During this time patient also suffered from a large right-sided 

pleural effusion and a Pleurx catheter was placed, very likely secondary to a 

malignant effusion.  Patient has been noncompliant in the past, which may be 

the reason why her metastases have occurred, which is the reason why 

neurosurgery and hematology oncology will like this patient in some sort of 

inpatient service for as long as possible.  Case management will check with the 

rehab unit to ensure follow-up with neurosurgeon Dr. Bartholomew for staple removal 

and craniectomy follow up within 2 weeks, follow up with Dr. Haroon Garcia for 

radiation therapy immediately, and Dr. Ani Loco, heme/onc for follow up for 

starting additional medical chemotherapy within 3 weeks.  Patient will need 

additional follow-up in the outpatient setting and has been thoroughly 

explained to the patient the importance of follow-up.  Patient will need to 

continue her home medications and tamoxifen and including Decadron twice a day 

until radiation therapy is over.  Patient also understands these instructions 

and is happy to be leaving the hospital.





Discharge diagnoses


Metastatic breast cancer, lungs, liver, brain


Brain mass, status post resection, craniectomy on September 29, 2017, residual 

brain masses do exist


Hyperglycemia, likely secondary to Decadron, insulin sliding scale


Right pleural effusion, likely malignant, with Pleurx catheter


Anemia of chronic disease


Home Meds


No Active Prescriptions or Reported Meds


Primary Care Provider


Not On Staff Doctor


Time spent on discharge:   > 30 minutes


Pending Labs





Laboratory Tests








Test


  10/10/17


17:40 10/10/17


20:59 10/11/17


02:55 10/11/17


04:32


 


Bedside Glucose


  128mg/dL


() 192mg/dL


() 157mg/dL


() 


 


 


White Blood Count


  


  


  


  22.710^3/ul


(4.8-10.8)


 


Red Blood Count


  


  


  


  4.3710^6/ul


(4.20-5.40)


 


Hemoglobin


  


  


  


  9.9g/dl


(12.0-16.0)


 


Hematocrit


  


  


  


  31.9%


(37.0-47.0)


 


Mean Corpuscular Volume


  


  


  


  73.0fl


(82.0-101.0)


 


Mean Corpuscular Hemoglobin


  


  


  


  22.7pg


(29.0-33.0)


 


Mean Corpuscular Hemoglobin


Concent 


  


  


  31.0g/dl


(32.0-37.0)


 


Red Cell Distribution Width


  


  


  


  21.6%


(11.5-14.5)


 


Platelet Count


  


  


  


  19168^3/UL


(140-415)


 


Mean Platelet Volume


  


  


  


  9.1fl


(7.4-10.4)


 


Neutrophils %


  


  


  


  78.7%


(39.0-77.0)


 


Lymphocytes %


  


  


  


  11.6%


(15.0-51.0)


 


Monocytes %


  


  


  


  6.2%


(0.0-11.0)


 


Eosinophils %    0.0% (0.0-7.0) 


 


Basophils %    0.2% (0.0-2.0) 


 


Nucleated Red Blood Cells %


  


  


  


  0.0/100WBC


(0.0-0.0)


 


Neutrophils #


  


  


  


  17.910^3/ul


(1.6-7.5)


 


Lymphocytes #


  


  


  


  2.610^3/ul


(0.8-2.9)


 


Monocytes #


  


  


  


  1.410^3/ul


(0.3-0.9)


 


Eosinophils #


  


  


  


  0.010^3/ul


(0.0-0.5)


 


Basophils #


  


  


  


  0.010^3/ul


(0.0-0.1)


 


Nucleated Red Blood Cells #


  


  


  


  0.010^3/ul


(0.0-0.0)


 


Sodium Level


  


  


  


  133mmol/L


(135-144)


 


Potassium Level


  


  


  


  4.5mmol/L


(3.5-5.1)


 


Chloride Level


  


  


  


  102mmol/L


()


 


Carbon Dioxide Level


  


  


  


  23mmol/L


(21-31)


 


Anion Gap    13 (8-16) 


 


Blood Urea Nitrogen    33mg/dl (7-20) 


 


Creatinine


  


  


  


  0.68mg/dl


(0.44-1.00)


 


Glucose Level


  


  


  


  119mg/dl


()


 


Calcium Level


  


  


  


  8.3mg/dl


(8.4-10.2)


 


Phosphorus Level


  


  


  


  4.2mg/dl


(2.5-4.9)


 


Magnesium Level


  


  


  


  2.0mg/dl


(1.7-2.5)


 


Albumin


  


  


  


  2.8g/dl


(3.3-4.9)














Test


  10/11/17


09:01 10/11/17


12:37 


  


 


 


Bedside Glucose


  163mg/dL


() 121mg/dL


() 


  


 

















SHIREEN ASHBY Oct 11, 2017 13:14

## 2017-10-24 ENCOUNTER — HOSPITAL ENCOUNTER (INPATIENT)
Dept: HOSPITAL 10 - E/R | Age: 47
LOS: 22 days | Discharge: HOME HEALTH | DRG: 907 | End: 2017-11-15
Attending: HOSPITALIST | Admitting: HOSPITALIST
Payer: MEDICARE

## 2017-10-24 VITALS
HEIGHT: 66 IN | WEIGHT: 246.04 LBS | BODY MASS INDEX: 39.54 KG/M2 | HEIGHT: 66 IN | WEIGHT: 246.04 LBS | BODY MASS INDEX: 39.54 KG/M2

## 2017-10-24 VITALS — SYSTOLIC BLOOD PRESSURE: 117 MMHG | DIASTOLIC BLOOD PRESSURE: 61 MMHG | RESPIRATION RATE: 18 BRPM

## 2017-10-24 VITALS — TEMPERATURE: 97.5 F

## 2017-10-24 DIAGNOSIS — C79.31: ICD-10-CM

## 2017-10-24 DIAGNOSIS — E66.9: ICD-10-CM

## 2017-10-24 DIAGNOSIS — Z92.3: ICD-10-CM

## 2017-10-24 DIAGNOSIS — J90: ICD-10-CM
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
## 2017-10-24 DIAGNOSIS — C79.89: ICD-10-CM

## 2017-10-24 DIAGNOSIS — R65.10: ICD-10-CM

## 2017-10-24 DIAGNOSIS — T85.79XA: ICD-10-CM

## 2017-10-24 DIAGNOSIS — C78.7: ICD-10-CM

## 2017-10-24 DIAGNOSIS — J86.9: ICD-10-CM

## 2017-10-24 DIAGNOSIS — T85.618A: Primary | ICD-10-CM

## 2017-10-24 DIAGNOSIS — C50.911: ICD-10-CM

## 2017-10-24 DIAGNOSIS — D64.9: ICD-10-CM

## 2017-10-24 DIAGNOSIS — Z98.890: ICD-10-CM

## 2017-10-24 DIAGNOSIS — B95.62: ICD-10-CM

## 2017-10-24 DIAGNOSIS — T85.628A: ICD-10-CM

## 2017-10-24 DIAGNOSIS — L03.313: ICD-10-CM

## 2017-10-24 DIAGNOSIS — C78.01: ICD-10-CM

## 2017-10-24 LAB
ABNORMAL IP MESSAGE: 1
ANION GAP SERPL CALC-SCNC: 9 MMOL/L (ref 8–16)
BASOPHILS # BLD AUTO: 0 10^3/UL (ref 0–0.1)
BASOPHILS NFR BLD: 0.1 % (ref 0–2)
BUN SERPL-MCNC: 28 MG/DL (ref 7–20)
CALCIUM SERPL-MCNC: 8.6 MG/DL (ref 8.4–10.2)
CHLORIDE SERPL-SCNC: 105 MMOL/L (ref 97–110)
CO2 SERPL-SCNC: 26 MMOL/L (ref 21–31)
CREAT SERPL-MCNC: 0.46 MG/DL (ref 0.44–1)
EOSINOPHIL # BLD: 0 10^3/UL (ref 0–0.5)
EOSINOPHIL NFR BLD: 0 % (ref 0–7)
ERYTHROCYTE [DISTWIDTH] IN BLOOD BY AUTOMATED COUNT: 22.5 % (ref 11.5–14.5)
GLUCOSE SERPL-MCNC: 209 MG/DL (ref 70–220)
HCT VFR BLD CALC: 27.2 % (ref 37–47)
HGB BLD-MCNC: 8.2 G/DL (ref 12–16)
LYMPHOCYTES # BLD AUTO: 1.5 10^3/UL (ref 0.8–2.9)
LYMPHOCYTES NFR BLD AUTO: 9.4 % (ref 15–51)
MCH RBC QN AUTO: 22.3 PG (ref 29–33)
MCHC RBC AUTO-ENTMCNC: 30.1 G/DL (ref 32–37)
MCV RBC AUTO: 73.9 FL (ref 82–101)
MONOCYTES # BLD: 1.2 10^3/UL (ref 0.3–0.9)
MONOCYTES NFR BLD: 7.6 % (ref 0–11)
NEUTROPHILS # BLD: 13 10^3/UL (ref 1.6–7.5)
NEUTROPHILS NFR BLD AUTO: 79.9 % (ref 39–77)
NRBC # BLD MANUAL: 0 10^3/UL (ref 0–0)
NRBC BLD AUTO-RTO: 0 /100WBC (ref 0–0)
PLATELET # BLD: 190 10^3/UL (ref 140–415)
PMV BLD AUTO: 9 FL (ref 7.4–10.4)
POSITIVE DIFF: (no result)
POTASSIUM SERPL-SCNC: 4.2 MMOL/L (ref 3.5–5.1)
RBC # BLD AUTO: 3.68 10^6/UL (ref 4.2–5.4)
SODIUM SERPL-SCNC: 136 MMOL/L (ref 135–144)
WBC # BLD AUTO: 16.3 10^3/UL (ref 4.8–10.8)

## 2017-10-24 PROCEDURE — 84484 ASSAY OF TROPONIN QUANT: CPT

## 2017-10-24 PROCEDURE — 89051 BODY FLUID CELL COUNT: CPT

## 2017-10-24 PROCEDURE — 83036 HEMOGLOBIN GLYCOSYLATED A1C: CPT

## 2017-10-24 PROCEDURE — 85049 AUTOMATED PLATELET COUNT: CPT

## 2017-10-24 PROCEDURE — 84443 ASSAY THYROID STIM HORMONE: CPT

## 2017-10-24 PROCEDURE — 76942 ECHO GUIDE FOR BIOPSY: CPT

## 2017-10-24 PROCEDURE — 71010: CPT

## 2017-10-24 PROCEDURE — 77014: CPT

## 2017-10-24 PROCEDURE — 86850 RBC ANTIBODY SCREEN: CPT

## 2017-10-24 PROCEDURE — 87040 BLOOD CULTURE FOR BACTERIA: CPT

## 2017-10-24 PROCEDURE — 83735 ASSAY OF MAGNESIUM: CPT

## 2017-10-24 PROCEDURE — 82962 GLUCOSE BLOOD TEST: CPT

## 2017-10-24 PROCEDURE — 87102 FUNGUS ISOLATION CULTURE: CPT

## 2017-10-24 PROCEDURE — 96366 THER/PROPH/DIAG IV INF ADDON: CPT

## 2017-10-24 PROCEDURE — 96365 THER/PROPH/DIAG IV INF INIT: CPT

## 2017-10-24 PROCEDURE — 76937 US GUIDE VASCULAR ACCESS: CPT

## 2017-10-24 PROCEDURE — 86920 COMPATIBILITY TEST SPIN: CPT

## 2017-10-24 PROCEDURE — 80069 RENAL FUNCTION PANEL: CPT

## 2017-10-24 PROCEDURE — 36430 TRANSFUSION BLD/BLD COMPNT: CPT

## 2017-10-24 PROCEDURE — 87081 CULTURE SCREEN ONLY: CPT

## 2017-10-24 PROCEDURE — 84157 ASSAY OF PROTEIN OTHER: CPT

## 2017-10-24 PROCEDURE — 86901 BLOOD TYPING SEROLOGIC RH(D): CPT

## 2017-10-24 PROCEDURE — 36569 INSJ PICC 5 YR+ W/O IMAGING: CPT

## 2017-10-24 PROCEDURE — 85730 THROMBOPLASTIN TIME PARTIAL: CPT

## 2017-10-24 PROCEDURE — 77412 RADIATION TX DELIVERY LVL 3: CPT

## 2017-10-24 PROCEDURE — 85670 THROMBIN TIME PLASMA: CPT

## 2017-10-24 PROCEDURE — 81001 URINALYSIS AUTO W/SCOPE: CPT

## 2017-10-24 PROCEDURE — 96368 THER/DIAG CONCURRENT INF: CPT

## 2017-10-24 PROCEDURE — 88307 TISSUE EXAM BY PATHOLOGIST: CPT

## 2017-10-24 PROCEDURE — 87070 CULTURE OTHR SPECIMN AEROBIC: CPT

## 2017-10-24 PROCEDURE — C1769 GUIDE WIRE: HCPCS

## 2017-10-24 PROCEDURE — 86900 BLOOD TYPING SEROLOGIC ABO: CPT

## 2017-10-24 PROCEDURE — 85025 COMPLETE CBC W/AUTO DIFF WBC: CPT

## 2017-10-24 PROCEDURE — 85610 PROTHROMBIN TIME: CPT

## 2017-10-24 PROCEDURE — 87075 CULTR BACTERIA EXCEPT BLOOD: CPT

## 2017-10-24 PROCEDURE — 80053 COMPREHEN METABOLIC PANEL: CPT

## 2017-10-24 PROCEDURE — 83615 LACTATE (LD) (LDH) ENZYME: CPT

## 2017-10-24 PROCEDURE — 87116 MYCOBACTERIA CULTURE: CPT

## 2017-10-24 PROCEDURE — 83605 ASSAY OF LACTIC ACID: CPT

## 2017-10-24 PROCEDURE — 80202 ASSAY OF VANCOMYCIN: CPT

## 2017-10-24 PROCEDURE — 71250 CT THORAX DX C-: CPT

## 2017-10-24 PROCEDURE — 82945 GLUCOSE OTHER FLUID: CPT

## 2017-10-24 PROCEDURE — 82042 OTHER SOURCE ALBUMIN QUAN EA: CPT

## 2017-10-24 PROCEDURE — 80048 BASIC METABOLIC PNL TOTAL CA: CPT

## 2017-10-24 PROCEDURE — 86078 PHYS BLOOD BANK SERV REACTJ: CPT

## 2017-10-24 PROCEDURE — 97162 PT EVAL MOD COMPLEX 30 MIN: CPT

## 2017-10-24 PROCEDURE — 84100 ASSAY OF PHOSPHORUS: CPT

## 2017-10-24 PROCEDURE — P9016 RBC LEUKOCYTES REDUCED: HCPCS

## 2017-10-24 RX ADMIN — DOCUSATE SODIUM SCH MG: 100 CAPSULE, LIQUID FILLED ORAL at 22:37

## 2017-10-24 RX ADMIN — FAMOTIDINE SCH MG: 20 TABLET ORAL at 22:37

## 2017-10-24 RX ADMIN — FOLIC ACID SCH MLS/HR: 5 INJECTION, SOLUTION INTRAMUSCULAR; INTRAVENOUS; SUBCUTANEOUS at 21:00

## 2017-10-24 NOTE — ERD
ER Documentation


Chief Complaint


Chief Complaint


RIGHT SIDE TUBE SITE HAS YELLOWISH DRAINAGE SINCE TODAY.





HPI


Patient is a 47-year-old female with diabetes who presents with draining from a 

pleural catheter.  The patient says that she had drainage from the right sided 

pleural catheter with "wet" thick yellow fluid.  The patient has no blood or 

pain.  She denies chest pain.  She was brought in by ambulance from a nursing 

facility.  She is currently receiving chemotherapy and there was plans to start 

radiation tomorrow.  She has had no treatment as of yet.





ROS


All systems reviewed and are negative except as per history of present illness.





Medications


Home Meds


Active Scripts


Insulin Aspart* (Novolog Insulin Pen*) 100 Unit/Ml Soln, 0 UNIT SC WITH MEALS 

BEDTIME for 30 Days, #30 BOX


   Prov:SHIREEN ASHBY         10/11/17


Dexamethasone* (Decadron*) 4 Mg Tab, 4 MG PO BID for 60 Days, #60 TAB


   Prov:SHIREEN ASHBY         10/11/17


Famotidine* (Famotidine*) 20 Mg Tablet, 20 MG PO DAILY for 30 Days, #30 TAB


   Prov:SHIREEN ASHBY         10/11/17


Tamoxifen Citrate* (Nolvadex*) 10 Mg Tab, 20 MG PO DAILY for 30 Days, #30 TAB


   Prov:SHIREEN ASHBY         10/11/17





Allergies


Allergies:  


Coded Allergies:  


     Penicillins (Verified  Allergy, Mild, 9/26/17)


     hydrocodone bit (Verified  Adverse Reaction, Mild, HEADACHE, NAUSEA AND 

VOMITING, ANAPHYLACTIC SYMPTOMS, 9/29/17)





PMhx/Soc


History of Surgery:  Yes (CHEST TUBE PLACEMENT,  RIGHT BREST MASTECTROMY)


Anesthesia Reaction:  No


Hx Neurological Disorder:  Yes (dx bRAIN MASS WITH HERNIATION)


Hx Respiratory Disorders:  Yes (LUNG CA WITH METS, RIGHT PLERUAL EFFUSION)


Hx Cardiac Disorders:  No


Hx Psychiatric Problems:  No


Hx Miscellaneous Medical Probl:  Yes (see H&P)


Hx Alcohol Use:  No


Hx Substance Use:  No


Hx Tobacco Use:  No


Smoking Status:  Never smoker





FmHx


Family History:  No diabetes





Physical Exam


Vitals





Vital Signs








  Date Time  Temp Pulse Resp B/P Pulse Ox O2 Delivery O2 Flow Rate FiO2


 


10/24/17 17:25 98.8 64 20 118/76 98   








Physical Exam


Const: Moderate distress


Head:   Atraumatic 


Eyes:    Normal Conjunctiva


ENT:    Normal External Ears, Nose and Mouth.


Neck:               Full range of motion..~ No meningismus.


Resp:   Decreased breath sounds on the right side


Cardio:    Regular rate and rhythm, no murmurs


Abd:    Soft, non tender, non distended. Normal bowel sounds


Skin:   Pus from a right-sided pleural catheter


Back:    No midline or flank tenderness


Ext:    No cyanosis, or edema


Neur:    Awake and alert


Psych:    Normal Mood and Affect


Result Diagram:  


10/24/17 1810                                                                  

              10/24/17 1810





Results 24 hrs





 Laboratory Tests








Test


  10/24/17


18:10


 


White Blood Count 16.310^3/ul 


 


Red Blood Count 3.6810^6/ul 


 


Hemoglobin 8.2g/dl 


 


Hematocrit 27.2% 


 


Mean Corpuscular Volume 73.9fl 


 


Mean Corpuscular Hemoglobin 22.3pg 


 


Mean Corpuscular Hemoglobin


Concent 30.1g/dl 


 


 


Red Cell Distribution Width 22.5% 


 


Platelet Count 10147^3/UL 


 


Mean Platelet Volume 9.0fl 


 


Neutrophils % 79.9% 


 


Lymphocytes % 9.4% 


 


Monocytes % 7.6% 


 


Eosinophils % 0.0% 


 


Basophils % 0.1% 


 


Nucleated Red Blood Cells % 0.0/100WBC 


 


Neutrophils # 13.010^3/ul 


 


Lymphocytes # 1.510^3/ul 


 


Monocytes # 1.210^3/ul 


 


Eosinophils # 0.010^3/ul 


 


Basophils # 0.010^3/ul 


 


Nucleated Red Blood Cells # 0.010^3/ul 


 


Sodium Level 136mmol/L 


 


Potassium Level 4.2mmol/L 


 


Chloride Level 105mmol/L 


 


Carbon Dioxide Level 26mmol/L 


 


Anion Gap 9 


 


Blood Urea Nitrogen 28mg/dl 


 


Creatinine 0.46mg/dl 


 


Glucose Level 209mg/dl 


 


Lactic Acid Level 2.7mmol/L 


 


Calcium Level 8.6mg/dl 








 Current Medications








 Medications


  (Trade)  Dose


 Ordered  Sig/Aiden


 Route


 PRN Reason  Start Time


 Stop Time Status Last Admin


Dose Admin


 


 Vancomycin HCl  250 ml @ 


 125 mls/hr  ONCE  STAT


 IVPB


   10/24/17 18:03


 10/24/17 20:02   


 


 


 Aztreonam


  (Azactam 1gm/NS


  (Pmx))  50 ml @ 


 100 mls/hr  ONCE  ONCE


 IVPB


   10/24/17 18:30


 10/24/17 18:59 DC 10/24/17 18:51


 


 


 Ondansetron HCl


  (Zofran Inj)  4 mg  BRIDGE ORDER PRN


 IV


 NAUSEA AND/OR VOMITING  10/24/17 18:30


 10/25/17 18:29   


 


 


 Acetaminophen


  (Tylenol Tab)  650 mg  ER BRIDGE PRN


 PO


 MILD PAIN/FEVER  10/24/17 18:30


 10/25/17 18:29   


 


 


 Sodium Chloride


  (NS)  3,460 ml  BOLUS OVER 2 HOURS STAT


 IV*


   10/24/17 19:24


 10/24/17 19:25   


 











Procedures/MDM


Chest X-ray 1V Interpreted by me:


Soft Tissue:                                               No acute 

abnormalities


Bones:                                                    No acute abnormalities


Mediastinum/Cardiac Silhouette/Lungs:     Right-sided pleural effusion with 

catheter in place





Admit MDM:


Patient's infectious symptoms have not stabilized and the patient is at risk of 

rapid decompensation.  The patient will be admitted for careful hydration, 

antibiotic therapy, and infectious source control.





Severe Sepsis criteria: 


Infectious source:   Infected pleural effusion


End organ damage indicated by: 


Lactate greater than 2





Sepsis Management:


Time of recognition of sepsis: 1925





Within 3 hours of recognition:


Blood cultures x 2 before broad-spectrum antibiotics:   Yes


30 ml/kg NS bolus    Completed


Initial lactate      2.7


Repeat lactate     pending





Time of recognition of septic shock: No septic shock





Septic Shock Assessment:


Any lactic acid > 4.0   No


Persistent hypotension (SBP < 90 or 40 mmHg drop, MAP < 65) despite 30 mL/kg IV 

fluid bolus No





Volume Re-assessment for Septic Shock (post 30 ml/kg bolus):


No septic shock at this time





Persistent Hypotension Treatment:


Comfort care   No


Central line   Not Required


Vasopressor started   Not required


I considered further perfusion assessment with CVP measurement, SCVO2, bedside 

ultrasound volume assessment, passive leg raise, trial of further fluid bolus.


And proceeded with 30 ml/kg fluid bolus of NSS, broad spectrum antibiotics, and 

admission.





Accepting Care Team


Current data and ongoing care discussed.


Admitting Physician:   Dr. Truong


Consultant(s):   None


Outstanding Data:   Culture results





Critical Care:


Critical care time   35 minutes excluding all billable procedures


Emergent fluid management while maintaining close respiratory support.  

Provision of immediate and broad-spectrum antibiotic therapy.  Simultaneous 

assessment for possible sources in order to direct targeted therapy.  

Consideration for invasive and chemical support to prevent cardiopulmonary 

collapse.





Departure


Diagnosis:  


 Primary Impression:  


 Severe sepsis


 Additional Impressions:  


 Pleural effusion associated with pulmonary infection


 Anemia


 Anemia type:  unspecified type  Qualified Code:  D64.9 - Anemia, unspecified 

type


 Leukocytosis


 Leukocytosis type:  unspecified  Qualified Code:  D72.829 - Leukocytosis, 

unspecified type


Condition:  Fair











OSTICK,DELFINO MD Oct 24, 2017 19:29

## 2017-10-24 NOTE — HP
Date/Time of Note


Date/Time of Note


DATE: 10/24/17 


TIME: 22:13





Assessment/Plan


VTE Prophylaxis


VTE Prophylaxis Intervention:  SCD's





Assessment/Plan


Chief Complaint/Hosp Course





A/P


1.  Pleural effusion; stable, consult Pul. pleurex catheter therapy


2.  Chest Wall Cellulitis? ID Consult.


3.  Metastatic/ Recurrent Breast Ca- lung/liver/brain met's


4.  POD ~30 Crani


5.  FTT; snf


Problems:  





HPI/ROS


Admit Date/Time


Admit Date/Time


Oct 24, 2017 at 18:29





Hx of Present Illness


39-year-old female with metastatic breast cancer who was residing at 

[facility] rehab for pleural effusion Pleurx catheter therapy.  

There may have been some issues lately and were unable to drain her fluid.  She 

is noticed that the fluid has been leaking and was sent to the ER for eval.  

Denies any fever chills loss of appetite.  She does notice some drainage around 

the chest tube site.





ER: Stable vital signs





ROS


Neurological system: No loss of speech or vision.  Not much headache.


Cardiovascular: No chest pain positive dyspnea no edema


Lungs: Positive dyspnea no edema but no fever


Abdomen: No pain nausea vomiting.  Feels some sort of fullness


Genitourinary: No abdominal pain dysuria fever


Musculoskeletal: Mild gait dysfunction needs assistance with ambulating.  No 

rash no itching


Psychiatry: No anxiety agitation depression


Endocrine: No previous diabetes dyslipidemia or thyroid dysfunction


Hematologic; no hemoptysis hematuria hematochezia


Constitutional: No fevers no chills.





PMH/Family/Social


Social History


Smoking Status:  Never smoker





Exam/Review of Systems


Vital Signs


Vitals





 Vital Signs








  Date Time  Temp Pulse Resp B/P Pulse Ox O2 Delivery O2 Flow Rate FiO2


 


10/24/17 21:46 98.3 56 18 117/61 97   


 


10/24/17 19:40      Room Air  











Exam


Exam


No pallor adenopathy icterus.  Postop craniectomy site C/D/I


Regular no murmur rub gallop


Dimin bs; on right no tachypnea. Rt chest tube in place probably; dressing 

soaked


Bs + nt nd; no r/r/g


Mild edema negative Homans





Labs


Result Diagram:  


10/24/17 1810                                                                  

              10/24/17 1810








Medications


Medications





 Current Medications


Dexamethasone (Decadron) 4 mg BID PO ;  Start 10/24/17 at 21:00


Docusate Sodium (Colace) 200 mg QHS PO ;  Start 10/24/17 at 21:00


Diagnostic Test (Pha) (Accu-Chek) 1 ea 02 XX ;  Start 10/25/17 at 02:00


Ondansetron HCl (Zofran Inj) 4 mg Q6H  PRN IV NAUSEA AND/OR VOMITING;  Start 10/

24/17 at 21:00


Acetaminophen (Tylenol Tab) 650 mg Q6H  PRN PO PAIN LEVEL 1-3 OR FEVER;  Start 

10/24/17 at 21:00


Oxycodone/ Acetaminophen (Percocet (5/ 325)) 1 tab Q6H  PRN PO MODERATE PAIN 

LEVEL 4-6;  Start 10/24/17 at 21:00


Morphine Sulfate (morphine) 2 mg Q4H  PRN IV SEVERE PAIN LEVEL 7-10;  Start 10/

24/17 at 21:00


Docusate Sodium (Colace) 100 mg Q12H  PRN PO CONSTIPATION;  Start 10/24/17 at 21

:00


Magnesium Hydroxide (Milk Of Mag) 30 ml DAILY  PRN PO CONSTIPATION;  Start 10/24

/17 at 21:00


Famotidine (Pepcid) 20 mg Q12 PO ;  Start 10/24/17 at 21:00


Albuterol 2.5 mg 2.5 mg Q2  PRN HHN SHORTNESS OF BREATH;  Start 10/24/17 at 21:

00


Sodium Chloride (NS) 1,000 ml @  75 mls/hr [id number] IV ;  Start 10/24/17 at 21:00


Miscellaneous Information 1 ea NOTE XX ;  Start 10/24/17 at 21:30


Glucose (Glutose) 15 gm Q15M  PRN PO DECREASED GLUCOSE;  Start 10/24/17 at 21:30


Glucose (Glutose) 22.5 gm Q15M  PRN PO DECREASED GLUCOSE;  Start 10/24/17 at 21:

30


Dextrose (D50w Syringe) 25 ml Q15M  PRN IV DECREASED GLUCOSE;  Start 10/24/17 

at 21:30


Dextrose (D50w Syringe) 50 ml Q15M  PRN IV DECREASED GLUCOSE;  Start 10/24/17 

at 21:30


Glucagon (Glucagen) 1 mg Q15M  PRN IM DECREASED GLUCOSE;  Start 10/24/17 at 21:

30


Glucose (Glutose) 15 gm Q15M  PRN BUCCAL DECREASED GLUCOSE;  Start 10/24/17 at 

21:30











MILAD ROONEY MD Oct 24, 2017 22:25

## 2017-10-24 NOTE — RADRPT
PROCEDURE:   XR Chest. 

 

CLINICAL INDICATION:   Shortness of breath.

 

TECHNIQUE:   Single frontal view. 

 

COMPARISON:   10/03/2017. 

 

FINDINGS:

Previously noted right internal jugular vein catheter has been removed. There is a new large right p
leural effusion. A right chest tube is noted inferiorly. There is atelectasis throughout the right m
id and lower lung zones. The left lung is clear and there is no left pleural effusion. 

The heart size is normal.

There is no pleural effusion. 

There is no pneumothorax.   

 

IMPRESSION:

1.  Large right pleural effusion.

2.  Right IJ catheter removed.

3.  Right chest tube.

4.  Atelectasis throughout the right mid and lower lung zones.

 

RPTAT: QQ

_____________________________________________ 

.Charly Espinal MD, MD           Date    Time 

Electronically viewed and signed by .Charly Espinal MD, MD on 10/24/2017 18:05 

 

D:  10/24/2017 18:05  T:  10/24/2017 18:05

.R/

## 2017-10-25 VITALS — DIASTOLIC BLOOD PRESSURE: 62 MMHG | SYSTOLIC BLOOD PRESSURE: 113 MMHG | RESPIRATION RATE: 20 BRPM

## 2017-10-25 VITALS — RESPIRATION RATE: 16 BRPM | DIASTOLIC BLOOD PRESSURE: 73 MMHG | SYSTOLIC BLOOD PRESSURE: 122 MMHG

## 2017-10-25 VITALS — RESPIRATION RATE: 16 BRPM | SYSTOLIC BLOOD PRESSURE: 128 MMHG | DIASTOLIC BLOOD PRESSURE: 72 MMHG

## 2017-10-25 VITALS — DIASTOLIC BLOOD PRESSURE: 69 MMHG | RESPIRATION RATE: 20 BRPM | SYSTOLIC BLOOD PRESSURE: 115 MMHG

## 2017-10-25 VITALS — HEART RATE: 62 BPM | SYSTOLIC BLOOD PRESSURE: 115 MMHG | RESPIRATION RATE: 16 BRPM | DIASTOLIC BLOOD PRESSURE: 69 MMHG

## 2017-10-25 VITALS — DIASTOLIC BLOOD PRESSURE: 70 MMHG | RESPIRATION RATE: 18 BRPM | HEART RATE: 60 BPM | SYSTOLIC BLOOD PRESSURE: 117 MMHG

## 2017-10-25 LAB
ALBUMIN SERPL-MCNC: 2.4 G/DL (ref 3.3–4.9)
ALBUMIN/GLOB SERPL: 0.92 {RATIO}
ALP SERPL-CCNC: 93 IU/L (ref 42–121)
ALT SERPL-CCNC: 33 IU/L (ref 13–69)
ANION GAP SERPL CALC-SCNC: 11 MMOL/L (ref 8–16)
APPEARANCE FLD: (no result)
AST SERPL-CCNC: 23 IU/L (ref 15–46)
BASOPHILS # BLD AUTO: 0 10^3/UL (ref 0–0.1)
BASOPHILS NFR BLD: 0.1 % (ref 0–2)
BILIRUB DIRECT SERPL-MCNC: 0 MG/DL (ref 0–0.2)
BILIRUB SERPL-MCNC: 0.2 MG/DL (ref 0.2–1.3)
BODY FLD TYPE: (no result)
BUN SERPL-MCNC: 23 MG/DL (ref 7–20)
CALCIUM SERPL-MCNC: 7.7 MG/DL (ref 8.4–10.2)
CHLORIDE SERPL-SCNC: 106 MMOL/L (ref 97–110)
CO2 SERPL-SCNC: 24 MMOL/L (ref 21–31)
CREAT SERPL-MCNC: 0.4 MG/DL (ref 0.44–1)
EOSINOPHIL # BLD: 0 10^3/UL (ref 0–0.5)
EOSINOPHIL NFR BLD: 0 % (ref 0–7)
ERYTHROCYTE [DISTWIDTH] IN BLOOD BY AUTOMATED COUNT: 22 % (ref 11.5–14.5)
FLD COLOR: (no result)
FLD MN%: 41.1 %
FLD PMN%: 58.9 %
FLD VOLUME: 290 ML
FLD WBC: 180 /CMM
FLUID GLUCOSE: 89 MG/DL
FLUID LD: 1140 U/L
FLUID TOTAL PROTEIN: 2.7 G/DL
GLOBULIN SER-MCNC: 2.6 G/DL (ref 1.3–3.2)
GLUCOSE SERPL-MCNC: 136 MG/DL (ref 70–220)
HCT VFR BLD CALC: 26.9 % (ref 37–47)
HGB BLD-MCNC: 8.1 G/DL (ref 12–16)
LYMPHOCYTES # BLD AUTO: 1.4 10^3/UL (ref 0.8–2.9)
LYMPHOCYTES NFR BLD AUTO: 9.9 % (ref 15–51)
MAGNESIUM SERPL-MCNC: 1.8 MG/DL (ref 1.7–2.5)
MCH RBC QN AUTO: 22.3 PG (ref 29–33)
MCHC RBC AUTO-ENTMCNC: 30.1 G/DL (ref 32–37)
MCV RBC AUTO: 74.1 FL (ref 82–101)
MONOCYTES # BLD: 0.8 10^3/UL (ref 0.3–0.9)
MONOCYTES NFR BLD: 5.6 % (ref 0–11)
NEUTROPHILS # BLD: 11.2 10^3/UL (ref 1.6–7.5)
NEUTROPHILS NFR BLD AUTO: 82.1 % (ref 39–77)
NRBC # BLD MANUAL: 0 10^3/UL (ref 0–0)
NRBC BLD AUTO-RTO: 0 /100WBC (ref 0–0)
PHOSPHATE SERPL-MCNC: 3.8 MG/DL (ref 2.5–4.9)
PLATELET # BLD: 172 10^3/UL (ref 140–415)
PMV BLD AUTO: 8.7 FL (ref 7.4–10.4)
POTASSIUM SERPL-SCNC: 4.3 MMOL/L (ref 3.5–5.1)
PROT SERPL-MCNC: 5 G/DL (ref 6.1–8.1)
RBC # BLD AUTO: 3.63 10^6/UL (ref 4.2–5.4)
SODIUM SERPL-SCNC: 137 MMOL/L (ref 135–144)
TSH SERPL-ACNC: 0.56 MIU/L (ref 0.47–4.68)
WBC # BLD AUTO: 13.7 10^3/UL (ref 4.8–10.8)

## 2017-10-25 PROCEDURE — 0W9G3ZX DRAINAGE OF PERITONEAL CAVITY, PERCUTANEOUS APPROACH, DIAGNOSTIC: ICD-10-PCS | Performed by: INTERNAL MEDICINE

## 2017-10-25 RX ADMIN — TAMOXIFEN CITRATE SCH MG: 10 TABLET, FILM COATED ORAL at 15:22

## 2017-10-25 RX ADMIN — FAMOTIDINE SCH MG: 20 TABLET ORAL at 20:44

## 2017-10-25 RX ADMIN — FOLIC ACID SCH MLS/HR: 5 INJECTION, SOLUTION INTRAMUSCULAR; INTRAVENOUS; SUBCUTANEOUS at 10:20

## 2017-10-25 RX ADMIN — NYSTATIN SCH APPLIC: 100000 POWDER TOPICAL at 14:43

## 2017-10-25 RX ADMIN — NYSTATIN SCH APPLIC: 100000 POWDER TOPICAL at 20:53

## 2017-10-25 RX ADMIN — FAMOTIDINE SCH MG: 20 TABLET ORAL at 09:29

## 2017-10-25 RX ADMIN — DOCUSATE SODIUM SCH MG: 100 CAPSULE, LIQUID FILLED ORAL at 20:44

## 2017-10-25 RX ADMIN — DOXYCYCLINE SCH MLS/HR: 100 INJECTION, POWDER, LYOPHILIZED, FOR SOLUTION INTRAVENOUS at 20:43

## 2017-10-25 NOTE — RADRPT
PROCEDURE:   Chest radiograph

 

CLINICAL INDICATION:  Evaluation following right-sided thoracentesis..

 

COMPARISON: Radiograph from 10/24/2017.

 

TECHNIQUE:  Single frontal chest radiograph.

 

FINDINGS:

 

Minimally decreased size of moderate right pleural effusion.

Fluid still layers within the minor fissure.

No pneumothorax. 

The left lung is clear.

Cardiomegaly.  

No suspicious bone lesion.

 

IMPRESSION:

 

Minimally decreased size of right pleural effusion. No pneumothorax. 

 

RPTAT: EE

_____________________________________________ 

Physician Jose Luis           Date    Time 

Electronically viewed and signed by Bridget German Physician on 10/25/2017 16:57 

 

D:  10/25/2017 16:57  T:  10/25/2017 16:57

LG/

## 2017-10-25 NOTE — PN
Date/Time of Note


Date/Time of Note


DATE: 10/25/17 


TIME: 16:47





Assessment/Plan


VTE Prophylaxis


VTE Prophylaxis Intervention:  ambulation





Lines/Catheters


IV Catheter Type (from Nrs):  Peripheral IV





Assessment/Plan


Chief Complaint/Hosp Course


Patient is a 39-year-old female with a past medical history of metastatic 

breast cancer with metastases to the liver and brain who presents from 

[facility] for malfunctioning Pleurx catheter.





Assessment and plan


Dysfunctional Pleurx catheter


Cellulitis, area around catheter


Metastatic breast cancer, lung, liver, brain


Diabetes mellitus


Obesity


Status post craniectomy for brain metastases





-Continue Decadron and tamoxifen


-We will consult case management to reschedule radiation therapy, was planned 

for today


-Ultrasound-guided thoracentesis performed 


-ID consulted for possible cellulitis, empiric antibiotics


-Pulmonology consulted for malfunctioning Pleurx catheter


-Continue home meds as tolerated


-PT OT


Problems:  





Subjective


24 Hr Interval Summary


Free Text/Dictation


no acute complaints except leaking catheter





Exam/Review of Systems


Vital Signs


Vitals





 Vital Signs








  Date Time  Temp Pulse Resp B/P Pulse Ox O2 Delivery O2 Flow Rate FiO2


 


10/25/17 14:56 98.7 58 16 122/73 99   


 


10/25/17 08:00      Nasal Cannula 2.0 














 Intake and Output   


 


 10/24/17 10/24/17 10/25/17





 15:00 23:00 07:00


 


Intake Total  300 ml 0 ml


 


Output Total   500 ml


 


Balance  300 ml -500 ml











Exam


Physical exam





General: Patient is laying in bed and answers questions appropriately


Mentation: Patient is alert and oriented 4,


Head: R side craniectomy site healing well. no more staples


Eyes: EOMI, pupils reactive to light


Neck: Supple, nontender, midline


Respiratory: decreased lung sounds on the R


Cardiovascular: regular rate, no obvious murmurs


Gastrointestinal: non-tender to palpation, bowel sounds heard.


Neurological: Moves all extremities spontaneously


Skin: erythematous skin around pleurx cath site, leaking catheter





Results


Result Diagram:  


10/25/17 0534                                                                  

              10/25/17 0534





Results 24 hrs





Laboratory Tests








Test


  10/24/17


18:10 10/24/17


20:28 10/24/17


22:30 10/24/17


22:40


 


White Blood Count 16.3  #H   


 


Red Blood Count 3.68  L   


 


Hemoglobin 8.2  L   


 


Hematocrit 27.2  L   


 


Mean Corpuscular Volume 73.9  L   


 


Mean Corpuscular Hemoglobin 22.3  L   


 


Mean Corpuscular Hemoglobin


Concent 30.1  L


  


  


  


 


 


Red Cell Distribution Width 22.5  H   


 


Platelet Count 190  #   


 


Mean Platelet Volume 9.0     


 


Neutrophils % 79.9  H   


 


Lymphocytes % 9.4  L   


 


Monocytes % 7.6     


 


Eosinophils % 0.0     


 


Basophils % 0.1     


 


Nucleated Red Blood Cells % 0.0     


 


Neutrophils # 13.0  H   


 


Lymphocytes # 1.5     


 


Monocytes # 1.2  H   


 


Eosinophils # 0.0     


 


Basophils # 0.0     


 


Nucleated Red Blood Cells # 0.0     


 


Sodium Level 136     


 


Potassium Level 4.2     


 


Chloride Level 105     


 


Carbon Dioxide Level 26     


 


Anion Gap 9     


 


Blood Urea Nitrogen 28  H   


 


Creatinine 0.46     


 


Glucose Level 209     


 


Lactic Acid Level 2.7  *H 1.9   1.9   


 


Calcium Level 8.6     


 


Bedside Glucose    167  














Test


  10/25/17


05:34 10/25/17


12:36 


  


 


 


White Blood Count 13.7  H   


 


Red Blood Count 3.63  L   


 


Hemoglobin 8.1  L   


 


Hematocrit 26.9  L   


 


Mean Corpuscular Volume 74.1  L   


 


Mean Corpuscular Hemoglobin 22.3  L   


 


Mean Corpuscular Hemoglobin


Concent 30.1  L


  


  


  


 


 


Red Cell Distribution Width 22.0  H   


 


Platelet Count 172     


 


Mean Platelet Volume 8.7     


 


Neutrophils % 82.1  H   


 


Lymphocytes % 9.9  L   


 


Monocytes % 5.6     


 


Eosinophils % 0.0     


 


Basophils % 0.1     


 


Nucleated Red Blood Cells % 0.0     


 


Neutrophils # 11.2  H   


 


Lymphocytes # 1.4     


 


Monocytes # 0.8     


 


Eosinophils # 0.0     


 


Basophils # 0.0     


 


Nucleated Red Blood Cells # 0.0     


 


Sodium Level 137     


 


Potassium Level 4.3     


 


Chloride Level 106     


 


Carbon Dioxide Level 24     


 


Anion Gap 11     


 


Blood Urea Nitrogen 23  H   


 


Creatinine 0.40  L   


 


Glucose Level 136  #   


 


Hemoglobin A1c 6.2  H   


 


Calcium Level 7.7  L   


 


Phosphorus Level 3.8     


 


Magnesium Level 1.8     


 


Total Bilirubin 0.2     


 


Direct Bilirubin 0.00     


 


Indirect Bilirubin 0.2     


 


Aspartate Amino Transf


(AST/SGOT) 23  


  


  


  


 


 


Alanine Aminotransferase


(ALT/SGPT) 33  


  


  


  


 


 


Alkaline Phosphatase 93     


 


Troponin I 0.018     


 


Total Protein 5.0  L   


 


Albumin 2.4  L   


 


Globulin 2.60     


 


Albumin/Globulin Ratio 0.92     


 


Thyroid Stimulating Hormone


(TSH) 0.557  


  


  


  


 


 


Bedside Glucose  123    











Medications


Medications





 Current Medications


Dexamethasone (Decadron) 4 mg BID PO  Last administered on 10/25/17at 09:29; 

Admin Dose 4 MG;  Start 10/24/17 at 21:00


Docusate Sodium (Colace) 200 mg QHS PO  Last administered on 10/24/17at 22:37; 

Admin Dose 200 MG;  Start 10/24/17 at 21:00


Diagnostic Test (Pha) (Accu-Chek) 1 ea 02 XX ;  Start 10/25/17 at 02:00


Ondansetron HCl (Zofran Inj) 4 mg Q6H  PRN IV NAUSEA AND/OR VOMITING;  Start 10/

24/17 at 21:00


Acetaminophen (Tylenol Tab) 650 mg Q6H  PRN PO PAIN LEVEL 1-3 OR FEVER;  Start 

10/24/17 at 21:00


Oxycodone/ Acetaminophen (Percocet (5/ 325)) 1 tab Q6H  PRN PO MODERATE PAIN 

LEVEL 4-6;  Start 10/24/17 at 21:00


Morphine Sulfate (morphine) 2 mg Q4H  PRN IV SEVERE PAIN LEVEL 7-10;  Start 10/

24/17 at 21:00


Docusate Sodium (Colace) 100 mg Q12H  PRN PO CONSTIPATION;  Start 10/24/17 at 21

:00


Magnesium Hydroxide (Milk Of Mag) 30 ml DAILY  PRN PO CONSTIPATION;  Start 10/24

/17 at 21:00


Famotidine (Pepcid) 20 mg Q12 PO  Last administered on 10/25/17at 09:29; Admin 

Dose 20 MG;  Start 10/24/17 at 21:00


Albuterol 2.5 mg 2.5 mg Q2  PRN HHN SHORTNESS OF BREATH;  Start 10/24/17 at 21:

00


Sodium Chloride (NS) 1,000 ml @  75 mls/hr [id number] IV  Last administered on 10/

25/17at 10:20; Admin Dose 75 MLS/HR;  Start 10/24/17 at 21:00


Miscellaneous Information 1 ea NOTE XX ;  Start 10/24/17 at 21:30


Glucose (Glutose) 15 gm Q15M  PRN PO DECREASED GLUCOSE;  Start 10/24/17 at 21:30


Glucose (Glutose) 22.5 gm Q15M  PRN PO DECREASED GLUCOSE;  Start 10/24/17 at 21:

30


Dextrose (D50w Syringe) 25 ml Q15M  PRN IV DECREASED GLUCOSE;  Start 10/24/17 

at 21:30


Dextrose (D50w Syringe) 50 ml Q15M  PRN IV DECREASED GLUCOSE;  Start 10/24/17 

at 21:30


Glucagon (Glucagen) 1 mg Q15M  PRN IM DECREASED GLUCOSE;  Start 10/24/17 at 21:

30


Glucose (Glutose) 15 gm Q15M  PRN BUCCAL DECREASED GLUCOSE;  Start 10/24/17 at 

21:30


Nystatin (Nystatin Powder) 1 applic BID TOP  Last administered on 10/25/17at 14:

43; Admin Dose 1 APPLIC;  Start 10/25/17 at 09:00


Tamoxifen Citrate (Nolvadex) 20 mg DAILY PO  Last administered on 10/25/17at 15:

22; Admin Dose 20 MG;  Start 10/25/17 at 13:00


Diagnostic Test (Pha) (Accu-Chek) 1 ea 02 XX ;  Start 10/26/17 at 02:00


Diagnostic Test (Pha) (Accu-Chek) 1 ea 02 XX ;  Start 10/26/17 at 02:00











SHIREEN ASHBY Oct 25, 2017 16:49

## 2017-10-25 NOTE — RADRPT
PROCEDURE:   US guided right thoracentesis. 

 

CLINICAL INDICATION:   Shortness of breath. Right pleural effusion.

 

TECHNIQUE:   

Prior to the procedure, informed consent was obtained.  The risks, benefits, and alternatives were e
xplained to the patient or the patient's family, including but not limited to bleeding, infection, p
ain, visceral or vascular damage, shock, pneumothorax, chest tube placement, air embolism, and death
.  The patient or the patient's family understood the risks and the alternatives and wished to proce
ed with the study.  Informed written consent was obtained.

 

A procedural pause was performed.  The patient's name, date of birth, and procedure to be performed 
were verified.

Ultrasound of the right hemithorax was performed in the axial and sagittal planes. A right pleural e
ffusion is noted. Utilizing ultrasound guidance, optimal location for entry to the pleural cavity wa
s ascertained.  The overlying skin was prepped and draped in the usual sterile fashion.  Approximate
ly 10 ml of 1% Xylocaine was injected locally for pain control.  Using ultrasound guidance, a 5-Fren
ch Yueh catheter was introduced into the right pleural space without difficulty. Fluid was aspirated
.

 

COMPARISON:   None. 

 

FINDINGS:

Initial ultrasound demonstrates fluid in the right pleural space.  Approximately 0.250 liters of ser
ous fluid was aspirated and sent to the laboratory.

 

IMPRESSION:

1.  Satisfactory ultrasound-guided right thoracentesis. 

 

RPTAT: QQ

_____________________________________________ 

.Charly Espinal MD, MD           Date    Time 

Electronically viewed and signed by .Charly Espinal MD, MD on 10/25/2017 16:38 

 

D:  10/25/2017 16:38  T:  10/25/2017 16:38

.R/

## 2017-10-25 NOTE — CONS
Date/Time of Note


Date/Time of Note


DATE: 10/25/17 


TIME: 11:25





Assessment/Plan


Assessment/Plan


Additional Assessment/Plan


Chest x-ray was reviewed from yesterday which is showing significant right 

pleural effusion.  





Assessment and recommendations;





1.  Patient with history of metastatic breast cancer admitted for right-sided 

pleural effusion and shortness of breath.  Pleurx catheter not been able to 

drain any further pleural fluid.


2.  Recent right parietal craniotomy for tumor resection.


3.  Anemia.





Schedule ultrasound-guided thoracentesis on the right side.  Continue current 

supportive care.





Consultation Date/Type/Reason


Admit Date/Time


Oct 24, 2017 at 18:29


Date of Consultation:  Oct 25, 2017


Type of Consultation:  Pulmonary





Hx of Present Illness


Patient's condition is stable.  Complains of mild shortness of breath.  Denies 

any chest pain, coughing, wheezing.





General exam; middle-aged woman, awake and alert.  Currently in no distress.





Social History


Smoking Status:  Never smoker





Exam/Review of Systems


Vital Signs


Vitals





 Vital Signs








  Date Time  Temp Pulse Resp B/P Pulse Ox O2 Delivery O2 Flow Rate FiO2


 


10/25/17 08:04 98.7 61 16 128/72 95   


 


10/25/17 01:51      Room Air  


 


10/24/17 22:00       2.0 














 Intake and Output   


 


 10/24/17 10/24/17 10/25/17





 15:00 23:00 07:00


 


Intake Total  300 ml 0 ml


 


Output Total   500 ml


 


Balance  300 ml -500 ml











Exam


HEENT exam; supple neck, no JVD.  No lymphadenopathy.  Midline trachea.  No 

thyromegaly.  Right parietal surgical scar is healing well.  Pharynx is clear.





Chest exam; right Pleurx catheter in place.  Diminished breath sounds right 

lower lobe.  Left lung is clear to auscultation.  S1-S2 audible, no murmurs.  

Regular rhythm.





Abdomen exam; soft, nontender.  No organomegaly.  Bowel sounds audible.  





Extremity exam; no peripheral edema.  Pulses 1+ bilaterally.





CNS exam; no focal deficit.





Results


Result Diagram:  


10/25/17 0534                                                                  

              10/25/17 0534





Results 24 hrs





Laboratory Tests








Test


  10/24/17


18:10 10/24/17


20:28 10/24/17


22:30 10/24/17


22:40


 


White Blood Count 16.3  #H   


 


Red Blood Count 3.68  L   


 


Hemoglobin 8.2  L   


 


Hematocrit 27.2  L   


 


Mean Corpuscular Volume 73.9  L   


 


Mean Corpuscular Hemoglobin 22.3  L   


 


Mean Corpuscular Hemoglobin


Concent 30.1  L


  


  


  


 


 


Red Cell Distribution Width 22.5  H   


 


Platelet Count 190  #   


 


Mean Platelet Volume 9.0     


 


Neutrophils % 79.9  H   


 


Lymphocytes % 9.4  L   


 


Monocytes % 7.6     


 


Eosinophils % 0.0     


 


Basophils % 0.1     


 


Nucleated Red Blood Cells % 0.0     


 


Neutrophils # 13.0  H   


 


Lymphocytes # 1.5     


 


Monocytes # 1.2  H   


 


Eosinophils # 0.0     


 


Basophils # 0.0     


 


Nucleated Red Blood Cells # 0.0     


 


Sodium Level 136     


 


Potassium Level 4.2     


 


Chloride Level 105     


 


Carbon Dioxide Level 26     


 


Anion Gap 9     


 


Blood Urea Nitrogen 28  H   


 


Creatinine 0.46     


 


Glucose Level 209     


 


Lactic Acid Level 2.7  *H 1.9   1.9   


 


Calcium Level 8.6     


 


Bedside Glucose    167  














Test


  10/25/17


05:34 


  


  


 


 


White Blood Count 13.7  H   


 


Red Blood Count 3.63  L   


 


Hemoglobin 8.1  L   


 


Hematocrit 26.9  L   


 


Mean Corpuscular Volume 74.1  L   


 


Mean Corpuscular Hemoglobin 22.3  L   


 


Mean Corpuscular Hemoglobin


Concent 30.1  L


  


  


  


 


 


Red Cell Distribution Width 22.0  H   


 


Platelet Count 172     


 


Mean Platelet Volume 8.7     


 


Neutrophils % 82.1  H   


 


Lymphocytes % 9.9  L   


 


Monocytes % 5.6     


 


Eosinophils % 0.0     


 


Basophils % 0.1     


 


Nucleated Red Blood Cells % 0.0     


 


Neutrophils # 11.2  H   


 


Lymphocytes # 1.4     


 


Monocytes # 0.8     


 


Eosinophils # 0.0     


 


Basophils # 0.0     


 


Nucleated Red Blood Cells # 0.0     


 


Sodium Level 137     


 


Potassium Level 4.3     


 


Chloride Level 106     


 


Carbon Dioxide Level 24     


 


Anion Gap 11     


 


Blood Urea Nitrogen 23  H   


 


Creatinine 0.40  L   


 


Glucose Level 136  #   


 


Hemoglobin A1c 6.2  H   


 


Calcium Level 7.7  L   


 


Phosphorus Level 3.8     


 


Magnesium Level 1.8     


 


Total Bilirubin 0.2     


 


Direct Bilirubin 0.00     


 


Indirect Bilirubin 0.2     


 


Aspartate Amino Transf


(AST/SGOT) 23  


  


  


  


 


 


Alanine Aminotransferase


(ALT/SGPT) 33  


  


  


  


 


 


Alkaline Phosphatase 93     


 


Troponin I 0.018     


 


Total Protein 5.0  L   


 


Albumin 2.4  L   


 


Globulin 2.60     


 


Albumin/Globulin Ratio 0.92     


 


Thyroid Stimulating Hormone


(TSH) 0.557  


  


  


  


 











Medications


Medications





 Current Medications


Dexamethasone (Decadron) 4 mg BID PO  Last administered on 10/25/17at 09:29; 

Admin Dose 4 MG;  Start 10/24/17 at 21:00


Docusate Sodium (Colace) 200 mg QHS PO  Last administered on 10/24/17at 22:37; 

Admin Dose 200 MG;  Start 10/24/17 at 21:00


Diagnostic Test (Pha) (Accu-Chek) 1 ea 02 XX ;  Start 10/25/17 at 02:00


Ondansetron HCl (Zofran Inj) 4 mg Q6H  PRN IV NAUSEA AND/OR VOMITING;  Start 10/

24/17 at 21:00


Acetaminophen (Tylenol Tab) 650 mg Q6H  PRN PO PAIN LEVEL 1-3 OR FEVER;  Start 

10/24/17 at 21:00


Oxycodone/ Acetaminophen (Percocet (5/ 325)) 1 tab Q6H  PRN PO MODERATE PAIN 

LEVEL 4-6;  Start 10/24/17 at 21:00


Morphine Sulfate (morphine) 2 mg Q4H  PRN IV SEVERE PAIN LEVEL 7-10;  Start 10/

24/17 at 21:00


Docusate Sodium (Colace) 100 mg Q12H  PRN PO CONSTIPATION;  Start 10/24/17 at 21

:00


Magnesium Hydroxide (Milk Of Mag) 30 ml DAILY  PRN PO CONSTIPATION;  Start 10/24

/17 at 21:00


Famotidine (Pepcid) 20 mg Q12 PO  Last administered on 10/25/17at 09:29; Admin 

Dose 20 MG;  Start 10/24/17 at 21:00


Albuterol 2.5 mg 2.5 mg Q2  PRN HHN SHORTNESS OF BREATH;  Start 10/24/17 at 21:

00


Sodium Chloride (NS) 1,000 ml @  75 mls/hr [id number] IV ;  Start 10/24/17 at 21:00


Miscellaneous Information 1 ea NOTE XX ;  Start 10/24/17 at 21:30


Glucose (Glutose) 15 gm Q15M  PRN PO DECREASED GLUCOSE;  Start 10/24/17 at 21:30


Glucose (Glutose) 22.5 gm Q15M  PRN PO DECREASED GLUCOSE;  Start 10/24/17 at 21:

30


Dextrose (D50w Syringe) 25 ml Q15M  PRN IV DECREASED GLUCOSE;  Start 10/24/17 

at 21:30


Dextrose (D50w Syringe) 50 ml Q15M  PRN IV DECREASED GLUCOSE;  Start 10/24/17 

at 21:30


Glucagon (Glucagen) 1 mg Q15M  PRN IM DECREASED GLUCOSE;  Start 10/24/17 at 21:

30


Glucose (Glutose) 15 gm Q15M  PRN BUCCAL DECREASED GLUCOSE;  Start 10/24/17 at 

21:30


Nystatin (Nystatin Powder) 1 applic BID TOP ;  Start 10/25/17 at 09:00











GOKUL BRIGGS Oct 25, 2017 11:27

## 2017-10-26 VITALS — SYSTOLIC BLOOD PRESSURE: 139 MMHG | RESPIRATION RATE: 16 BRPM | DIASTOLIC BLOOD PRESSURE: 80 MMHG

## 2017-10-26 VITALS — RESPIRATION RATE: 16 BRPM | DIASTOLIC BLOOD PRESSURE: 83 MMHG | SYSTOLIC BLOOD PRESSURE: 152 MMHG

## 2017-10-26 VITALS — RESPIRATION RATE: 16 BRPM | SYSTOLIC BLOOD PRESSURE: 139 MMHG | DIASTOLIC BLOOD PRESSURE: 63 MMHG

## 2017-10-26 VITALS — SYSTOLIC BLOOD PRESSURE: 119 MMHG | DIASTOLIC BLOOD PRESSURE: 71 MMHG | RESPIRATION RATE: 20 BRPM

## 2017-10-26 VITALS — RESPIRATION RATE: 20 BRPM | SYSTOLIC BLOOD PRESSURE: 111 MMHG | DIASTOLIC BLOOD PRESSURE: 59 MMHG

## 2017-10-26 VITALS — DIASTOLIC BLOOD PRESSURE: 80 MMHG | SYSTOLIC BLOOD PRESSURE: 126 MMHG | RESPIRATION RATE: 18 BRPM

## 2017-10-26 LAB — FLD RBC: 5000 /UL

## 2017-10-26 RX ADMIN — NYSTATIN SCH APPLIC: 100000 POWDER TOPICAL at 20:55

## 2017-10-26 RX ADMIN — FAMOTIDINE SCH MG: 20 TABLET ORAL at 20:54

## 2017-10-26 RX ADMIN — NYSTATIN SCH APPLIC: 100000 POWDER TOPICAL at 09:25

## 2017-10-26 RX ADMIN — DOXYCYCLINE SCH MLS/HR: 100 INJECTION, POWDER, LYOPHILIZED, FOR SOLUTION INTRAVENOUS at 08:53

## 2017-10-26 RX ADMIN — FOLIC ACID SCH MLS/HR: 5 INJECTION, SOLUTION INTRAMUSCULAR; INTRAVENOUS; SUBCUTANEOUS at 01:21

## 2017-10-26 RX ADMIN — DOCUSATE SODIUM SCH MG: 100 CAPSULE, LIQUID FILLED ORAL at 20:54

## 2017-10-26 RX ADMIN — FAMOTIDINE SCH MG: 20 TABLET ORAL at 09:25

## 2017-10-26 RX ADMIN — TAMOXIFEN CITRATE SCH MG: 10 TABLET, FILM COATED ORAL at 09:28

## 2017-10-26 NOTE — PN
DATE:  10/26/2017

 

 

SUBJECTIVE:  No events overnight.  The patient is alert, sitting in a chair, 
feels better.  Looks comfortable, no fevers.  No labs this morning.

 

MICROBIOLOGY:  Aspirated pleural fluid from Pleurx growing Staphylococcus 
aureus preliminary.  Nares swab pending and blood cultures negative.

 

ANTIMICROBIALS:  The patient is on IV doxycycline.

 

DIAGNOSTICS:  Chest x-ray yesterday revealed minimally decreased right pleural 
effusion.

 

PHYSICAL EXAMINATION:

GENERAL:  This is an obese, well-developed, middle-aged woman who is alert, in 
no distress.

HEENT:  Head atraumatic, normocephalic.  Sclerae anicteric.  Buccal mucosa pink.

NECK:  Supple.

CHEST:  Rise symmetrical.  Breath sounds diminished to the right.  The patient 
has right-sided Pleurx catheter.  

HEART:  S1, S2.

ABDOMEN:  Soft, bowel tones present.

EXTREMITIES:  Without cyanosis.

 

ASSESSMENT:

1.  Metastatic breast carcinoma.

2.  Persistent pleural effusion with a history of Pleurx catheter that seems to 
be infected, with pleural fluid cultures growing Staphylococcus aureus.

3.  Diabetes.

4.  History of craniectomy from brain metastases.

 

PLAN:  The patient remains clinically stable.  We are going to change 
doxycycline to Vanco, await for final cultures of pleural fluid pending.  F/u 
pulmonary rec-s.   Consider removal of Pleurx catheter.

 

 

Dictated By: DIANA RANDALL NP for JERROLD DREYER MD NI/LAKISHA

DD:    10/26/2017 12:52:43

DT:    10/26/2017 15:02:18

Conf#: [id number]

DID#:  [id number]

 

MATT

## 2017-10-26 NOTE — CONS
DATE OF ADMISSION: 10/24/2017

DATE OF CONSULTATION:  10/25/2017

 

 

 

INFECTIOUS DISEASE CONSULTATION NOTE

 

REASON FOR CONSULTATION:  Antibiotic management.

 

HISTORY OF PRESENT ILLNESS:  Brionna Reddy is a 47-year-old female with metastatic breast CA, who 
comes in with pleural effusion and is being seen for antibiotic management.  The patient has a Pleur
X catheter and lately she has been unable to drain her fluid, and has been leaking and she was sent 
to the emergency room for evaluation.  She notes some drainage around the chest tube site.  On admis
hari, her white count is 16.3, H and H 8.2 and 27.2, platelet count 190,000, BUN and creatinine is 2
8/0.46, glucose of 209.

 

PAST MEDICAL HISTORY:  Operations as outlined.

 

FAMILY HISTORY:  Noncontributory.

 

SOCIAL HISTORY:  She does not smoke, drink or abuse drugs.

 

ALLERGIES:  NONE TO PENICILLIN, SULFA OR FOODS.

 

MEDICATIONS:  Per chart.

 

REVIEW OF SYSTEMS:  As per HPI.

 

PHYSICAL EXAMINATION:

GENERAL:  The patient is a chronically ill-appearing female in no acute distress.

VITAL SIGNS:  Stable.  She is afebrile.

SKIN:  Without generalized rash.  She has no icterus.

HEENT:  She has post-craniotomy site which is clean, dry.

NECK:  Supple.

LYMPH NODES:  None palpable.

LUNGS:  Clear to P and A.

HEART:  Without murmur or gallop.

ABDOMEN:  Soft, nontender.  She has decreased breath sounds at the bases, and she has a right chest 
tube in place with leakage.

ABDOMEN:  Soft, nontender without organosplenomegaly or masses.

EXTREMITIES:  Without cyanosis, clubbing or edema.

RECTAL AND GENITAL:  Deferred.

NEUROLOGICAL:  No focal neurological abnormalities.

 

HOSPITAL COURSE:  Her white count today is 13.7.  She received a dose of vancomycin once.  The pleur
al fluid was sent for studies, glucose, LDH, total protein, albumin, cell count, also blood cultures
 were done and catheter fluid was collected.  We will await the outcome of these studies.  She has a
n ultrasound thoracentesis which was ordered.  She has a large right pleural effusion.  Right IJ cat
heter was removed.  Right chest tube is noted inferiorly.  Left lung is clear, no left pleural effus
ion.  Heart sound is normal.  Right chest tube atelectasis throughout the right mid and lower lung z
ones.  

 

IMPRESSION AND PLAN:  At this point, I will continue her off antibiotic therapy until we get the res
ults of her thoracentesis or at least the results of the studies that were done.  Her white count is
 13.7 today.  

 

I will dictate my findings to the hospitalist and to Dr. Carpio, pulmonology.

 

 

Dictated By: JERROLD DREYER MD JD/LAKISHA

DD:    10/25/2017 14:15:41

DT:    10/26/2017 02:27:10

Conf#: [id number]

DID#:  [id number]

CC: GOKUL CARPIO MD; CLAU AGUILAR;*End*

## 2017-10-26 NOTE — PN
Date/Time of Note


Date/Time of Note


DATE: 10/26/17 


TIME: 15:31





Assessment/Plan


VTE Prophylaxis


VTE Prophylaxis Intervention:  ambulation





Lines/Catheters


IV Catheter Type (from Nrs):  Peripheral IV





Assessment/Plan


Chief Complaint/Hosp Course


Patient is a 39-year-old female with a past medical history of metastatic 

breast cancer with metastases to the liver and brain who presents from 

[facility] for malfunctioning Pleurx catheter.





Assessment and plan


Dysfunctional Pleurx catheter


Cellulitis, area around catheter


empyema?, staph aureus. 


Metastatic breast cancer, lung, liver, brain


Diabetes mellitus


Obesity


Status post craniectomy for brain metastases





-Continue Decadron and tamoxifen


-radiation therapy today


-Ultrasound-guided thoracentesis performed, cultures showing staph aureus


-ID consulted, abx per ID, to order PICC if needed


-Pulmonology consulted for malfunctioning Pleurx catheter, will have to remove 

vs reposition.


-Continue home meds as tolerated


-PT OT





DISPO:


-IV abx, ?loculated effusion, Dispo back to rehab once stable


Problems:  





Subjective


24 Hr Interval Summary


Free Text/Dictation


feels better than yesterday





Exam/Review of Systems


Vital Signs


Vitals





 Vital Signs








  Date Time  Temp Pulse Resp B/P Pulse Ox O2 Delivery O2 Flow Rate FiO2


 


10/26/17 13:45 97.6 73 16 139/63 96   


 


10/25/17 20:00      Nasal Cannula  


 


10/25/17 08:00       2.0 














 Intake and Output   


 


 10/25/17 10/25/17 10/26/17





 15:00 23:00 07:00


 


Intake Total  1210 ml 615 ml


 


Output Total  650 ml 1400 ml


 


Balance  560 ml -785 ml











Exam


Physical exam





General: Patient is laying in bed and answers questions appropriately


Mentation: Patient is alert and oriented 4,


Head: R side craniectomy site healing well. no more staples


Eyes: EOMI, pupils reactive to light


Neck: Supple, nontender, midline


Respiratory: decreased lung sounds on the R


Cardiovascular: regular rate, no obvious murmurs


Gastrointestinal: non-tender to palpation, bowel sounds heard.


Neurological: Moves all extremities spontaneously


Skin: erythematous skin around pleurx cath site, leaking catheter





Results


Result Diagram:  


10/25/17 0534                                                                  

              10/25/17 0534





Results 24 hrs





Laboratory Tests








Test


  10/25/17


16:15 10/25/17


17:32 10/25/17


20:46 10/26/17


01:20


 


Body Fluid Type FLUID     


 


Body Fluid Volume 290.0     


 


Body Fluid Color RED     


 


Body Fluid Appearance BLOODY     


 


Body Fluid      


 


Body Fluid RBC (Auto) 5000     


 


Body Fluid Polynuclear WBCs


(%) 58.9  


  


  


  


 


 


Body Fluid Mononuclear Cells


% Auto 41.1  


  


  


  


 


 


Body Fluid Glucose 89     


 


Body Fluid Total Protein 2.7     


 


Body Fluid Lactate


Dehydrogenase 1140  


  


  


  


 


 


Bedside Glucose  138   205   183  














Test


  10/26/17


07:59 10/26/17


12:19 


  


 


 


Bedside Glucose 134   105    











Medications


Medications





 Current Medications


Dexamethasone (Decadron) 4 mg BID PO  Last administered on 10/26/17at 09:26; 

Admin Dose 4 MG;  Start 10/24/17 at 21:00


Docusate Sodium (Colace) 200 mg QHS PO  Last administered on 10/25/17at 20:44; 

Admin Dose 200 MG;  Start 10/24/17 at 21:00


Diagnostic Test (Pha) (Accu-Chek) 1 ea 02 XX  Last administered on 10/26/17at 01

:26; Admin Dose 1 EA;  Start 10/25/17 at 02:00


Ondansetron HCl (Zofran Inj) 4 mg Q6H  PRN IV NAUSEA AND/OR VOMITING;  Start 10/

24/17 at 21:00


Acetaminophen (Tylenol Tab) 650 mg Q6H  PRN PO PAIN LEVEL 1-3 OR FEVER;  Start 

10/24/17 at 21:00


Oxycodone/ Acetaminophen (Percocet (5/ 325)) 1 tab Q6H  PRN PO MODERATE PAIN 

LEVEL 4-6;  Start 10/24/17 at 21:00


Morphine Sulfate (morphine) 2 mg Q4H  PRN IV SEVERE PAIN LEVEL 7-10;  Start 10/

24/17 at 21:00


Docusate Sodium (Colace) 100 mg Q12H  PRN PO CONSTIPATION;  Start 10/24/17 at 21

:00


Magnesium Hydroxide (Milk Of Mag) 30 ml DAILY  PRN PO CONSTIPATION;  Start 10/24

/17 at 21:00


Famotidine (Pepcid) 20 mg Q12 PO  Last administered on 10/26/17at 09:25; Admin 

Dose 20 MG;  Start 10/24/17 at 21:00


Albuterol (Proventil 0.083% (Neb)) 2.5 mg Q2  PRN HHN SHORTNESS OF BREATH;  

Start 10/24/17 at 21:00


Miscellaneous Information 1 ea NOTE XX ;  Start 10/24/17 at 21:30


Glucose (Glutose) 15 gm Q15M  PRN PO DECREASED GLUCOSE;  Start 10/24/17 at 21:30


Glucose (Glutose) 22.5 gm Q15M  PRN PO DECREASED GLUCOSE;  Start 10/24/17 at 21:

30


Dextrose (D50w Syringe) 25 ml Q15M  PRN IV DECREASED GLUCOSE;  Start 10/24/17 

at 21:30


Dextrose (D50w Syringe) 50 ml Q15M  PRN IV DECREASED GLUCOSE;  Start 10/24/17 

at 21:30


Glucagon (Glucagen) 1 mg Q15M  PRN IM DECREASED GLUCOSE;  Start 10/24/17 at 21:

30


Glucose (Glutose) 15 gm Q15M  PRN BUCCAL DECREASED GLUCOSE;  Start 10/24/17 at 

21:30


Nystatin (Nystatin Powder) 1 applic BID TOP  Last administered on 10/26/17at 09:

25; Admin Dose 1 APPLIC;  Start 10/25/17 at 09:00


Tamoxifen Citrate (Nolvadex) 20 mg DAILY PO  Last administered on 10/26/17at 09:

28; Admin Dose 20 MG;  Start 10/25/17 at 13:00


Diagnostic Test (Pha) (Accu-Chek) 1 ea 02 XX ;  Start 10/26/17 at 02:00


Diagnostic Test (Pha) 1 ea 1 ea 02 XX ;  Start 10/26/17 at 02:00


Doxycycline Hyclate/Sodium Chloride (Vibramycin/NS) 250 ml @  250 mls/hr Q12 

IVPB  Last administered on 10/26/17at 08:53; Admin Dose 250 MLS/HR;  Start 10/25

/17 at 21:00











SHIREEN ASHBY Oct 26, 2017 15:42

## 2017-10-26 NOTE — CONS
Date/Time of Note


Date/Time of Note


DATE: 10/26/17 


TIME: 12:11





Consult Date/Type/Reason


Admit Date/Time


Oct 24, 2017 at 18:29


Initial Consult Date


10/25/17


Type of Consultation:  Pulmonary





Subjective


Well-nourished well-developed lady appears comfortable at rest no acute 

distress.  Thoracentesis yesterday 250 cc.





Objective





 Vital Signs








  Date Time  Temp Pulse Resp B/P Pulse Ox O2 Delivery O2 Flow Rate FiO2


 


10/26/17 07:39 98.3 57 18 126/80 97   


 


10/25/17 20:00      Nasal Cannula  


 


10/25/17 08:00       2.0 














 Intake and Output   


 


 10/25/17 10/25/17 10/26/17





 15:00 23:00 07:00


 


Intake Total  1210 ml 615 ml


 


Output Total  650 ml 1400 ml


 


Balance  560 ml -785 ml








Exam


GENERAL:  


VITAL SIGNS:  per chart


NECK:  Supple.  No JVD or lymphadenopathy.


CARDIAC EXAM:  S1, S2. No added sounds or murmurs.


CHEST: Diminished air entry right lung.


ABDOMEN:  Soft, nontender.  No guarding or rebound.


EXTREMITIES:  No cyanosis, clubbing or edema.


NEUROLOGIC:  Generalized weakness.  No focal deficits.





Results/Medications


Result Diagram:  


10/25/17 0534                                                                  

              10/25/17 0534





Results 24 hrs





Laboratory Tests








Test


  10/25/17


12:36 10/25/17


16:15 10/25/17


17:32 10/25/17


20:46


 


Bedside Glucose 123    138   205  


 


Body Fluid Type  FLUID    


 


Body Fluid Volume  290.0    


 


Body Fluid Color  RED    


 


Body Fluid Appearance  BLOODY    


 


Body Fluid WBC  180    


 


Body Fluid RBC (Auto)  5    


 


Body Fluid Polynuclear WBCs


(%) 


  58.9  


  


  


 


 


Body Fluid Mononuclear Cells


% Auto 


  41.1  


  


  


 


 


Body Fluid Glucose  89    


 


Body Fluid Total Protein  2.7    


 


Body Fluid Lactate


Dehydrogenase 


  1140  


  


  


 














Test


  10/26/17


01:20 10/26/17


07:59 


  


 


 


Bedside Glucose 183   134    








Medications





 Current Medications


Dexamethasone (Decadron) 4 mg BID PO  Last administered on 10/26/17at 09:26; 

Admin Dose 4 MG;  Start 10/24/17 at 21:00


Docusate Sodium (Colace) 200 mg QHS PO  Last administered on 10/25/17at 20:44; 

Admin Dose 200 MG;  Start 10/24/17 at 21:00


Diagnostic Test (Pha) (Accu-Chek) 1 ea 02 XX  Last administered on 10/26/17at 01

:26; Admin Dose 1 EA;  Start 10/25/17 at 02:00


Ondansetron HCl (Zofran Inj) 4 mg Q6H  PRN IV NAUSEA AND/OR VOMITING;  Start 10/

24/17 at 21:00


Acetaminophen (Tylenol Tab) 650 mg Q6H  PRN PO PAIN LEVEL 1-3 OR FEVER;  Start 

10/24/17 at 21:00


Oxycodone/ Acetaminophen (Percocet (5/ 325)) 1 tab Q6H  PRN PO MODERATE PAIN 

LEVEL 4-6;  Start 10/24/17 at 21:00


Morphine Sulfate (morphine) 2 mg Q4H  PRN IV SEVERE PAIN LEVEL 7-10;  Start 10/

24/17 at 21:00


Docusate Sodium (Colace) 100 mg Q12H  PRN PO CONSTIPATION;  Start 10/24/17 at 21

:00


Magnesium Hydroxide (Milk Of Mag) 30 ml DAILY  PRN PO CONSTIPATION;  Start 10/24

/17 at 21:00


Famotidine (Pepcid) 20 mg Q12 PO  Last administered on 10/26/17at 09:25; Admin 

Dose 20 MG;  Start 10/24/17 at 21:00


Albuterol (Proventil 0.083% (Neb)) 2.5 mg Q2  PRN HHN SHORTNESS OF BREATH;  

Start 10/24/17 at 21:00


Miscellaneous Information 1 ea NOTE XX ;  Start 10/24/17 at 21:30


Glucose (Glutose) 15 gm Q15M  PRN PO DECREASED GLUCOSE;  Start 10/24/17 at 21:30


Glucose (Glutose) 22.5 gm Q15M  PRN PO DECREASED GLUCOSE;  Start 10/24/17 at 21:

30


Dextrose (D50w Syringe) 25 ml Q15M  PRN IV DECREASED GLUCOSE;  Start 10/24/17 

at 21:30


Dextrose (D50w Syringe) 50 ml Q15M  PRN IV DECREASED GLUCOSE;  Start 10/24/17 

at 21:30


Glucagon (Glucagen) 1 mg Q15M  PRN IM DECREASED GLUCOSE;  Start 10/24/17 at 21:

30


Glucose (Glutose) 15 gm Q15M  PRN BUCCAL DECREASED GLUCOSE;  Start 10/24/17 at 

21:30


Nystatin (Nystatin Powder) 1 applic BID TOP  Last administered on 10/26/17at 09:

25; Admin Dose 1 APPLIC;  Start 10/25/17 at 09:00


Tamoxifen Citrate (Nolvadex) 20 mg DAILY PO  Last administered on 10/26/17at 09:

28; Admin Dose 20 MG;  Start 10/25/17 at 13:00


Diagnostic Test (Pha) (Accu-Chek) 1 ea 02 XX ;  Start 10/26/17 at 02:00


Diagnostic Test (Pha) 1 ea 1 ea 02 XX ;  Start 10/26/17 at 02:00


Doxycycline Hyclate/Sodium Chloride (Vibramycin/NS) 250 ml @  250 mls/hr Q12 

IVPB  Last administered on 10/26/17at 08:53; Admin Dose 250 MLS/HR;  Start 10/25

/17 at 21:00





Assessment/Plan


Chief Complaint/Hosp Course


Assessment





1.  Metastatic breast cancer with recurrent right pleural effusion.


2.  Pleurx catheter site infection and likely tube displacement.





Plan





1.  CT chest to evaluate likely loculated pleural effusion


2.  Radiology to replace Pleurx catheter in appropriate pleural space.


3.  Continue antibiotic coverage for staph infection.


Problems:  











DELFIN LAKHANI [facility], [facility] Oct 26, 2017 12:13

## 2017-10-26 NOTE — CONS
Date/Time of Note


Date/Time of Note


DATE: 10/26/17 


TIME: 19:15





Assessment/Plan


Assessment/Plan


Chief Complaint/Hosp Course


#Her2+/ER+/VT+ metastatic breast ca


-pt is non compliant and when she receives therapy, she is quite responsive to 

therapy


-therefore at this time, although she has terminal disease, there are still 

many more treatment options which she could benefit from


-given her Brain mets, I would consider a combination of Xeloda and Lapatinib 

which can penetrate the blood brain barrier


-once she stabilizes can restart Tamoxifen as well





#Pleural Effusion


-pt is s/p thoracentesis


-pleurex catheter was adjusted 


-continue antibiotics for infection at catheter site





#Brain Mets


-pt is s/p resection 


-pt to start WBRT with Dr. Sprague tomorrow





Problems:  





Consultation Date/Type/Reason


Admit Date/Time


Oct 24, 2017 at 18:29


Date of Consultation:  Oct 26, 2017


Type of Consultation:  Oncology


Reason for Consultation


Metastatic Breast Cancer


Referring Provider:  CLAU AGUILAR of Present Illness





46-year-old female first seen our office in Dec 2014. Her history is as follows:


-05/28/2013 had a right mastectomy on  for a 3 cm poorly differentiated 

infiltrating ductal carcinoma with high-grade ductal carcinoma in situ 

measuring 3 cm with clear margins, but 7/8 nodes were positive for cancer.  Her 

ER and VT were strongly positive.  Ki-67 was high at 82%.  Her HER-2/iman was 

positive at 3+ by IHC,, PT was initially treated by Dr. Claudy Alfonso  who 

presumably gave the patient adjuvant Taxotere/ Cytoxan and Herceptin which was 

completed in 4/2014 with breaks in the therapy due to patient non compliance. 

PT was also given Tamoxifen which she only took for 2 months.





-12/2014 pt presented to our clinic and was restarted on Tamoxifen The patient 

received untilchemotherapy until 04/2014, but I do not have the flow sheet.  

The patient was started on tamoxifen earlier this year, but she stopped after 

two months because of side effects. 


-3/2016 Re-presented to our clinic, off tamoxifen for almost 2 years. PET CT 

done revealed enlarged lymph nodes in R axilla and intrathroacic lymph nodes. 

Also noted were pulmonary nodules, right pleural effusion, liver mets and bone 

mets. She changed insurance.  It should be noted that her BRCA1 and BRCA2 were 

done, which were negative.


5/2016 - PT started TAxotere/ Cytoxan / Herceptin. She received 6 cycles until 

11/15/16. Shew as also placed back on Tamoxifen


8/2016 PET CT showed significant response to treatment with resolved pleural 

effusion, and resolved lymphadenopathy as well as decrease in liver mets and 

resolved bone mets.


1/2017: pt was on single agent Tamoxifen as she wanted to take a break from 

infusional therapy. 


2/2017: PET CT was done which demonstrated continued decrease in 

lymphadenopathy and continued decrease in the hepatic mets. osseous mets were 

stable.


3/2017 was the last time we saw her in clinic.


7/2017 : pt was noted to have recurrent pleural effusions and had a pleurex 

catheter placed and removed. The cytology was malignant. She was to follow up 

with me as an outpatient to re-initiate therapy


9/2017 - CT Chest was done which demonstrated loculated pleural effusion 

concerning for carcinomatosis. Also seen are multiple pulmonary nodules and 

lymph adenopathy


9/22/17 PET CT :  new metabolic uptake in anterior temporal lobe concerning for 

brain mets. extensive new liver mets, progressive osseous mets and progressive 

barbie mets in the bilateral lower neck, right axilla, chest and abdomen. also 

seen is a new right pleural effusion. also seen are progressive pulmonary mets. 


9/26/17 Brain MRI done revealed mass in right frontal calvarium extending to 

overlying scalp soft tissue and underling meninges measuring 5cm. also seen is 

a mass in kia right sphenotemporal buttress extending to dura measuring 1.9 cm. 

also seen in a left parietal lobe lesion measuring 9mm, there is associated 

midline shift and no herniation





Patient is now being admitted for infection at the pleurex catheter site and 

displacement of the pleurex catheter. Patient has since undergone thoracentesis 

for loculated pleural effusion. 250cc of pleural fluid was obtained.


Constitutional:  improved


Eyes:  no complaints


ENT:  no complaints


Respiratory:  shortness of breath


Cardiovascular:  no complaints


Gastrointestinal:  no complaints


Genitourinary:  no complaints


Musculoskeletal:  bone/joint pain


Skin:  no complaints


Neurologic:  no complaints


Endocrine:  no complaints





Past Medical History


metastatic breast cancer with brain mets





Family History


Significant Family History:  no pertinent family hx





Social History


Alcohol Use:  none


Smoking Status:  Never smoker


Drug Use:  none





Exam/Review of Systems


Vital Signs


Vitals





 Vital Signs








  Date Time  Temp Pulse Resp B/P Pulse Ox O2 Delivery O2 Flow Rate FiO2


 


10/26/17 16:42 98.7 68 16 139/80 97   


 


10/25/17 20:00      Nasal Cannula  


 


10/25/17 08:00       2.0 














 Intake and Output   


 


 10/25/17 10/25/17 10/26/17





 15:00 23:00 07:00


 


Intake Total  1210 ml 615 ml


 


Output Total  650 ml 1400 ml


 


Balance  560 ml -785 ml











Exam


Constitutional:  alert, distress


Psych:  no complaints


Head:  normocephalic


Eyes:  nl conjunctiva


ENMT:  nl external ears & nose, nl lips & teeth


Neck:  non-tender, supple


Respiratory:  diminished breath sounds, other (pleurex cather in place)


Cardiovascular:  regular rate and rhythm


Gastrointestinal:  soft


Musculoskeletal:  nl extremities to inspection





Results


Result Diagram:  


10/25/17 0534                                                                  

              10/25/17 0534





Results 24 hrs





Laboratory Tests








Test


  10/25/17


20:46 10/26/17


01:20 10/26/17


07:59 10/26/17


12:19


 


Bedside Glucose 205   183   134   105  














Test


  10/26/17


17:25 


  


  


 


 


Bedside Glucose 140     











Medications


Medications





 Current Medications


Dexamethasone (Decadron) 4 mg BID PO  Last administered on 10/26/17at 09:26; 

Admin Dose 4 MG;  Start 10/24/17 at 21:00


Docusate Sodium (Colace) 200 mg QHS PO  Last administered on 10/25/17at 20:44; 

Admin Dose 200 MG;  Start 10/24/17 at 21:00


Ondansetron HCl (Zofran Inj) 4 mg Q6H  PRN IV NAUSEA AND/OR VOMITING;  Start 10/

24/17 at 21:00


Acetaminophen (Tylenol Tab) 650 mg Q6H  PRN PO PAIN LEVEL 1-3 OR FEVER;  Start 

10/24/17 at 21:00


Oxycodone/ Acetaminophen (Percocet (5/ 325)) 1 tab Q6H  PRN PO MODERATE PAIN 

LEVEL 4-6;  Start 10/24/17 at 21:00


Morphine Sulfate (morphine) 2 mg Q4H  PRN IV SEVERE PAIN LEVEL 7-10;  Start 10/

24/17 at 21:00


Docusate Sodium (Colace) 100 mg Q12H  PRN PO CONSTIPATION;  Start 10/24/17 at 21

:00


Magnesium Hydroxide (Milk Of Mag) 30 ml DAILY  PRN PO CONSTIPATION;  Start 10/24

/17 at 21:00


Famotidine (Pepcid) 20 mg Q12 PO  Last administered on 10/26/17at 09:25; Admin 

Dose 20 MG;  Start 10/24/17 at 21:00


Albuterol (Proventil 0.083% (Neb)) 2.5 mg Q2  PRN HHN SHORTNESS OF BREATH;  

Start 10/24/17 at 21:00


Miscellaneous Information 1 ea NOTE XX ;  Start 10/24/17 at 21:30


Glucose (Glutose) 15 gm Q15M  PRN PO DECREASED GLUCOSE;  Start 10/24/17 at 21:30


Glucose (Glutose) 22.5 gm Q15M  PRN PO DECREASED GLUCOSE;  Start 10/24/17 at 21:

30


Dextrose (D50w Syringe) 25 ml Q15M  PRN IV DECREASED GLUCOSE;  Start 10/24/17 

at 21:30


Dextrose (D50w Syringe) 50 ml Q15M  PRN IV DECREASED GLUCOSE;  Start 10/24/17 

at 21:30


Glucagon (Glucagen) 1 mg Q15M  PRN IM DECREASED GLUCOSE;  Start 10/24/17 at 21:

30


Glucose (Glutose) 15 gm Q15M  PRN BUCCAL DECREASED GLUCOSE;  Start 10/24/17 at 

21:30


Nystatin (Nystatin Powder) 1 applic BID TOP  Last administered on 10/26/17at 09:

25; Admin Dose 1 APPLIC;  Start 10/25/17 at 09:00


Tamoxifen Citrate (Nolvadex) 20 mg DAILY PO  Last administered on 10/26/17at 09:

28; Admin Dose 20 MG;  Start 10/25/17 at 13:00


Diagnostic Test (Pha) 1 ea 1 ea 02 XX ;  Start 10/26/17 at 02:00


Vancomycin HCl 1.75 gm/Sodium Chloride 500 ml @  125 mls/hr ONCE  ONCE IVPB  

Last administered on 10/26/17at 16:44; Admin Dose 125 MLS/HR;  Start 10/26/17 

at 17:00;  Stop 10/26/17 at 20:59


Vancomycin HCl/ Sodium Chloride (Vancocin/NS) 250 ml @  83.333 mls/ hr Q12H 

IVPB ;  Start 10/27/17 at 05:00











KRISTEL ORANTES M.D. Oct 26, 2017 19:30

## 2017-10-26 NOTE — RADRPT
PROCEDURE:   CT Chest Without Contrast

 

CLINICAL INDICATION:  Right pleural effusion

 

TECHNIQUE:   Volumetric acquisition of the thorax was performed without the intravenous administrati
on of contrast.  

 

One or more of the following dose reduction techniques were used:

- Automated exposure control.

- Adjustment of the mA and/or kV according to patient size.

Use of iterative reconstruction technique.

 

Radiation Dose: CTDI = 16.38 mGy; DLP = 661.74 mGy-cm. 

 

COMPARISON:   09/16/2017.

 

FINDINGS:

 

Since the previous study, a percutaneous drainage catheter is been placed from a right lateral inter
costal approach with the tip directed medially within the right inferior hemithorax.

 

Lung fields: There is increasing atelectasis of the right lower lobe and right middle lobe and disco
id atelectatic change is now seen in the right upper lobe. Minimal discoid atelectasis is seen in th
e left posterior sulcus. A 5 mm noncalcified nodule is seen in the superior segment of the left uppe
r lobe, unchanged.

 

The pleural spaces: There is increasing largely loculated appearing right pleural fluid accumulation
 which measures 10 at 12 HU. There is pleural thickening within the posterior sulcus, along the late
ral right hemithorax and most extensively within the anterior right lung base.

 

Lymph nodes: There is a 1.9 cm in short diameter subcarinal node. There are two 1.1 cm in short diam
eter pretracheal nodes, a 1 points is 7 cm in short diameter prevascular node seen superior to the a
ortic arch, and a 1.6 cm right axillary node.

 

Cardiovascular structures: The heart is upper normal in size and there is mild pericardial thickenin
g. The aorta is normal in caliber.

 

Thyroid: Unremarkable.

 

Superior abdominal structures: Focal areas of decreased attenuation are seen within the liver includ
ing 1 measuring 6.8 x 4.3 cm within the posterior right lobe 1 measuring 3.9 cm within the lateral s
egment left lobe, and multiple smaller hypodensities compatible with hepatic metastasis. No adrenal 
mass is evident.

 

Osseous structures: There is increased sclerosis within the sternum suspicious for osteoblastic meta
stasis. The remaining visualized osseous elements appear intact. Mild diffuse degenerative endplate 
changes are seen to the spine.

 

Soft tissues: The right breast is absent.

 

IMPRESSION: 

1.  When compared to the previous study of 09/16/2017, there is again evidence of a previous right m
astectomy.

2.  There is been an interval increase to the large largely loculated appearing right pleural fluid 
accumulation which measures 10-12 HU. There is increasing right lower lobe and right middle lobe ate
lectasis and increasing discoid atelectasis is seen in the right upper lobe. There is increasing nod
ular pleural thickening at the periphery of the right lung and at the anterior right lung base suspi
cious for carcinomatosis. A drainage catheter has been placed from a right lateral intercostal appro
ach with the tip directed medially within the right inferior and posterior hemithorax.

3.  Discoid atelectasis is seen in the left posterior sulcus. There is been no change to the 5 mm no
ncalcified nodule seen in the superior segment of the left lower lobe which could represent a stable
 metastatic deposit.

4.  Mediastinal adenopathy is again evident.

5.  The heart is upper normal in size and there is slightly increased pericardial thickening. The ao
rta is normal in caliber.

6.  There is been a slight interval increase in size to multiple hypodense hepatic lesions compatibl
e with hepatic metastases. The largest is seen in the posterior right lobe measuring 6.8 cm in maxim
al diameter.

7.  There is again increased sclerosis within the sternum suspicious for a blastic metastasis. Degen
erative spine changes are again noted.

_____________________________________________ 

Physician Darlin           Date    Time 

Electronically viewed and signed by Physician Darlin on 10/26/2017 16:18 

 

D:  10/26/2017 16:18  T:  10/26/2017 16:18

/

## 2017-10-27 VITALS — SYSTOLIC BLOOD PRESSURE: 121 MMHG | DIASTOLIC BLOOD PRESSURE: 67 MMHG | RESPIRATION RATE: 18 BRPM

## 2017-10-27 VITALS — SYSTOLIC BLOOD PRESSURE: 117 MMHG | RESPIRATION RATE: 18 BRPM | DIASTOLIC BLOOD PRESSURE: 69 MMHG

## 2017-10-27 VITALS — RESPIRATION RATE: 18 BRPM | DIASTOLIC BLOOD PRESSURE: 85 MMHG | SYSTOLIC BLOOD PRESSURE: 149 MMHG

## 2017-10-27 VITALS — RESPIRATION RATE: 20 BRPM | DIASTOLIC BLOOD PRESSURE: 68 MMHG | SYSTOLIC BLOOD PRESSURE: 111 MMHG

## 2017-10-27 LAB
ABNORMAL IP MESSAGE: 1
ANION GAP SERPL CALC-SCNC: 8 MMOL/L (ref 8–16)
BASOPHILS # BLD AUTO: 0 10^3/UL (ref 0–0.1)
BASOPHILS NFR BLD: 0.1 % (ref 0–2)
BUN SERPL-MCNC: 17 MG/DL (ref 7–20)
CALCIUM SERPL-MCNC: 8.2 MG/DL (ref 8.4–10.2)
CHLORIDE SERPL-SCNC: 105 MMOL/L (ref 97–110)
CO2 SERPL-SCNC: 25 MMOL/L (ref 21–31)
CREAT SERPL-MCNC: 0.44 MG/DL (ref 0.44–1)
EOSINOPHIL # BLD: 0 10^3/UL (ref 0–0.5)
EOSINOPHIL NFR BLD: 0 % (ref 0–7)
ERYTHROCYTE [DISTWIDTH] IN BLOOD BY AUTOMATED COUNT: 22.1 % (ref 11.5–14.5)
GLUCOSE SERPL-MCNC: 144 MG/DL (ref 70–220)
HCT VFR BLD CALC: 27.5 % (ref 37–47)
HGB BLD-MCNC: 8.4 G/DL (ref 12–16)
LYMPHOCYTES # BLD AUTO: 1.8 10^3/UL (ref 0.8–2.9)
LYMPHOCYTES NFR BLD AUTO: 10.8 % (ref 15–51)
MAGNESIUM SERPL-MCNC: 1.7 MG/DL (ref 1.7–2.5)
MCH RBC QN AUTO: 22.3 PG (ref 29–33)
MCHC RBC AUTO-ENTMCNC: 30.5 G/DL (ref 32–37)
MCV RBC AUTO: 73.1 FL (ref 82–101)
MONOCYTES # BLD: 0.9 10^3/UL (ref 0.3–0.9)
MONOCYTES NFR BLD: 5.5 % (ref 0–11)
NEUTROPHILS # BLD: 12.7 10^3/UL (ref 1.6–7.5)
NEUTROPHILS NFR BLD AUTO: 78.1 % (ref 39–77)
NRBC # BLD MANUAL: 0 10^3/UL (ref 0–0)
NRBC BLD AUTO-RTO: 0 /100WBC (ref 0–0)
PHOSPHATE SERPL-MCNC: 3.6 MG/DL (ref 2.5–4.9)
PLATELET # BLD: 196 10^3/UL (ref 140–415)
PMV BLD AUTO: 8.9 FL (ref 7.4–10.4)
POSITIVE DIFF: (no result)
POTASSIUM SERPL-SCNC: 4 MMOL/L (ref 3.5–5.1)
RBC # BLD AUTO: 3.76 10^6/UL (ref 4.2–5.4)
SODIUM SERPL-SCNC: 134 MMOL/L (ref 135–144)
WBC # BLD AUTO: 16.3 10^3/UL (ref 4.8–10.8)

## 2017-10-27 RX ADMIN — TAMOXIFEN CITRATE SCH MG: 10 TABLET, FILM COATED ORAL at 08:04

## 2017-10-27 RX ADMIN — NYSTATIN SCH APPLIC: 100000 POWDER TOPICAL at 20:53

## 2017-10-27 RX ADMIN — FAMOTIDINE SCH MG: 20 TABLET ORAL at 20:47

## 2017-10-27 RX ADMIN — NYSTATIN SCH APPLIC: 100000 POWDER TOPICAL at 08:02

## 2017-10-27 RX ADMIN — VANCOMYCIN HYDROCHLORIDE SCH MLS/HR: 1 INJECTION, POWDER, LYOPHILIZED, FOR SOLUTION INTRAVENOUS at 16:06

## 2017-10-27 RX ADMIN — CEFTRIAXONE SCH MLS/HR: 1 INJECTION, SOLUTION INTRAVENOUS at 13:44

## 2017-10-27 RX ADMIN — VANCOMYCIN HYDROCHLORIDE SCH MLS/HR: 1 INJECTION, POWDER, LYOPHILIZED, FOR SOLUTION INTRAVENOUS at 05:52

## 2017-10-27 RX ADMIN — DOCUSATE SODIUM SCH MG: 100 CAPSULE, LIQUID FILLED ORAL at 20:47

## 2017-10-27 RX ADMIN — FAMOTIDINE SCH MG: 20 TABLET ORAL at 08:02

## 2017-10-27 NOTE — CONS
Date/Time of Note


Date/Time of Note


DATE: 10/27/17 


TIME: 08:58





Assessment/Plan


Assessment/Plan


Chief Complaint/Hosp Course


#Her2+/ER+/WA+ metastatic breast ca


-pt is non compliant and when she receives therapy, she is quite responsive to 

therapy


-therefore at this time, although she has terminal disease, there are still 

many more treatment options which she could benefit from


-given her Brain mets, I would consider a combination of Xeloda and Lapatinib 

which can penetrate the blood brain barrier


-once she stabilizes can restart Tamoxifen as well





#Pleural Effusion with superimposed infection


-pt is s/p thoracentesis


-will need to either remove pleurex catheter or adjust it


-continue antibiotics for infection at catheter site oer ID





#Brain Mets


-pt is s/p resection 


-pt to start WBRT with Dr. Sprague today





Problems:  





Consultation Date/Type/Reason


Admit Date/Time


Oct 24, 2017 at 18:29


Initial Consult Date


10/26/17


Type of Consultation:  Oncology


Reason for Consultation


metastatic breast cancer


Referring Provider:  CLAU AGUILAR





24 HR Interval Summary


Free Text/Dictation


pt doing well this morning. no fevers. to start XRT today





Exam/Review of Systems


Vital Signs


Vitals





 Vital Signs








  Date Time  Temp Pulse Resp B/P Pulse Ox O2 Delivery O2 Flow Rate FiO2


 


10/27/17 07:28 98.0 59 18 117/69 96   


 


10/25/17 20:00      Nasal Cannula  


 


10/25/17 08:00       2.0 














 Intake and Output   


 


 10/26/17 10/26/17 10/27/17





 15:00 23:00 07:00


 


Intake Total 550 ml 1540 ml 


 


Output Total  700 ml 


 


Balance 550 ml 840 ml 











Exam


Constitutional:  alert, oriented


Psych:  no complaints


Head:  atraumatic, normocephalic


Eyes:  nl conjunctiva


ENMT:  nl external ears & nose


Neck:  non-tender, supple


Respiratory:  diminished breath sounds, other (pleurex catheter in place)


Cardiovascular:  regular rate and rhythm


Gastrointestinal:  soft


Musculoskeletal:  nl extremities to inspection





Results


Result Diagram:  


10/27/17 0619                                                                  

              10/27/17 0619





Results 24 hrs





Laboratory Tests








Test


  10/26/17


12:19 10/26/17


17:25 10/26/17


20:43 10/27/17


01:11


 


Bedside Glucose 105   140   188   182  














Test


  10/27/17


06:19 10/27/17


07:47 


  


 


 


White Blood Count 16.3  H   


 


Red Blood Count 3.76  L   


 


Hemoglobin 8.4  L   


 


Hematocrit 27.5  L   


 


Mean Corpuscular Volume 73.1  L   


 


Mean Corpuscular Hemoglobin 22.3  L   


 


Mean Corpuscular Hemoglobin


Concent 30.5  L


  


  


  


 


 


Red Cell Distribution Width 22.1  H   


 


Platelet Count 196     


 


Mean Platelet Volume 8.9     


 


Neutrophils % 78.1  H   


 


Lymphocytes % 10.8  L   


 


Monocytes % 5.5     


 


Eosinophils % 0.0     


 


Basophils % 0.1     


 


Nucleated Red Blood Cells % 0.0     


 


Neutrophils # 12.7  H   


 


Lymphocytes # 1.8     


 


Monocytes # 0.9     


 


Eosinophils # 0.0     


 


Basophils # 0.0     


 


Nucleated Red Blood Cells # 0.0     


 


Sodium Level 134  L   


 


Potassium Level 4.0     


 


Chloride Level 105     


 


Carbon Dioxide Level 25     


 


Anion Gap 8     


 


Blood Urea Nitrogen 17     


 


Creatinine 0.44     


 


Glucose Level 144     


 


Calcium Level 8.2  L   


 


Phosphorus Level 3.6     


 


Magnesium Level 1.7     


 


Bedside Glucose  138    











Medications


Medications





 Current Medications


Dexamethasone (Decadron) 4 mg BID PO  Last administered on 10/27/17at 08:02; 

Admin Dose 4 MG;  Start 10/24/17 at 21:00


Docusate Sodium (Colace) 200 mg QHS PO  Last administered on 10/26/17at 20:54; 

Admin Dose 200 MG;  Start 10/24/17 at 21:00


Ondansetron HCl (Zofran Inj) 4 mg Q6H  PRN IV NAUSEA AND/OR VOMITING;  Start 10/

24/17 at 21:00


Acetaminophen (Tylenol Tab) 650 mg Q6H  PRN PO PAIN LEVEL 1-3 OR FEVER;  Start 

10/24/17 at 21:00


Oxycodone/ Acetaminophen (Percocet (5/ 325)) 1 tab Q6H  PRN PO MODERATE PAIN 

LEVEL 4-6;  Start 10/24/17 at 21:00


Morphine Sulfate (morphine) 2 mg Q4H  PRN IV SEVERE PAIN LEVEL 7-10;  Start 10/

24/17 at 21:00


Docusate Sodium (Colace) 100 mg Q12H  PRN PO CONSTIPATION;  Start 10/24/17 at 21

:00


Magnesium Hydroxide (Milk Of Mag) 30 ml DAILY  PRN PO CONSTIPATION;  Start 10/24

/17 at 21:00


Famotidine (Pepcid) 20 mg Q12 PO  Last administered on 10/27/17at 08:02; Admin 

Dose 20 MG;  Start 10/24/17 at 21:00


Albuterol (Proventil 0.083% (Neb)) 2.5 mg Q2  PRN HHN SHORTNESS OF BREATH;  

Start 10/24/17 at 21:00


Miscellaneous Information 1 ea NOTE XX ;  Start 10/24/17 at 21:30


Glucose (Glutose) 15 gm Q15M  PRN PO DECREASED GLUCOSE;  Start 10/24/17 at 21:30


Glucose (Glutose) 22.5 gm Q15M  PRN PO DECREASED GLUCOSE;  Start 10/24/17 at 21:

30


Dextrose (D50w Syringe) 25 ml Q15M  PRN IV DECREASED GLUCOSE;  Start 10/24/17 

at 21:30


Dextrose (D50w Syringe) 50 ml Q15M  PRN IV DECREASED GLUCOSE;  Start 10/24/17 

at 21:30


Glucagon (Glucagen) 1 mg Q15M  PRN IM DECREASED GLUCOSE;  Start 10/24/17 at 21:

30


Glucose (Glutose) 15 gm Q15M  PRN BUCCAL DECREASED GLUCOSE;  Start 10/24/17 at 

21:30


Nystatin (Nystatin Powder) 1 applic BID TOP  Last administered on 10/27/17at 08:

02; Admin Dose 1 APPLIC;  Start 10/25/17 at 09:00


Tamoxifen Citrate (Nolvadex) 20 mg DAILY PO  Last administered on 10/27/17at 08:

04; Admin Dose 20 MG;  Start 10/25/17 at 13:00


Diagnostic Test (Pha) 1 ea 1 ea 02 XX  Last administered on 10/27/17at 02:58; 

Admin Dose 1 EA;  Start 10/26/17 at 02:00


Vancomycin HCl/ Sodium Chloride (Vancocin/NS) 250 ml @  83.333 mls/ hr Q12H 

IVPB  Last administered on 10/27/17at 05:52; Admin Dose 83.333 MLS/HR;  Start 10

/27/17 at 05:00











KRISTEL ORANTES M.D. Oct 27, 2017 09:01

## 2017-10-27 NOTE — CONS
Date/Time of Note


Date/Time of Note


DATE: 10/27/17 


TIME: 10:53





Consult Date/Type/Reason


Admit Date/Time


Oct 24, 2017 at 18:29


Initial Consult Date


10/25/17


Type of Consultation:  Pulmonary


Ordering Provider:  CLAU AGUILAR





Subjective


Patient remains stable no events overnight.  CT chest shows loculated right 

pleural effusion.





Objective





 Vital Signs








  Date Time  Temp Pulse Resp B/P Pulse Ox O2 Delivery O2 Flow Rate FiO2


 


10/27/17 07:28 98.0 59 18 117/69 96   


 


10/25/17 20:00      Nasal Cannula  


 


10/25/17 08:00       2.0 














 Intake and Output   


 


 10/26/17 10/26/17 10/27/17





 15:00 23:00 07:00


 


Intake Total 550 ml 1540 ml 


 


Output Total  700 ml 


 


Balance 550 ml 840 ml 








Exam


NERAL:  


VITAL SIGNS:  per chart


NECK:  Supple.  No JVD or lymphadenopathy.


CARDIAC EXAM:  S1, S2. No added sounds or murmurs.


CHEST: Diminished air entry right lung.


ABDOMEN:  Soft, nontender.  No guarding or rebound.


EXTREMITIES:  No cyanosis, clubbing or edema.


NEUROLOGIC:  Generalized weakness.  No focal deficits.





Results/Medications


Result Diagram:  


10/27/17 0619                                                                  

              10/27/17 0619





Results 24 hrs





Laboratory Tests








Test


  10/26/17


12:19 10/26/17


17:25 10/26/17


20:43 10/27/17


01:11


 


Bedside Glucose 105   140   188   182  














Test


  10/27/17


06:19 10/27/17


07:47 


  


 


 


White Blood Count 16.3  H   


 


Red Blood Count 3.76  L   


 


Hemoglobin 8.4  L   


 


Hematocrit 27.5  L   


 


Mean Corpuscular Volume 73.1  L   


 


Mean Corpuscular Hemoglobin 22.3  L   


 


Mean Corpuscular Hemoglobin


Concent 30.5  L


  


  


  


 


 


Red Cell Distribution Width 22.1  H   


 


Platelet Count 196     


 


Mean Platelet Volume 8.9     


 


Neutrophils % 78.1  H   


 


Lymphocytes % 10.8  L   


 


Monocytes % 5.5     


 


Eosinophils % 0.0     


 


Basophils % 0.1     


 


Nucleated Red Blood Cells % 0.0     


 


Neutrophils # 12.7  H   


 


Lymphocytes # 1.8     


 


Monocytes # 0.9     


 


Eosinophils # 0.0     


 


Basophils # 0.0     


 


Nucleated Red Blood Cells # 0.0     


 


Sodium Level 134  L   


 


Potassium Level 4.0     


 


Chloride Level 105     


 


Carbon Dioxide Level 25     


 


Anion Gap 8     


 


Blood Urea Nitrogen 17     


 


Creatinine 0.44     


 


Glucose Level 144     


 


Calcium Level 8.2  L   


 


Phosphorus Level 3.6     


 


Magnesium Level 1.7     


 


Bedside Glucose  138    








Medications





 Current Medications


Dexamethasone (Decadron) 4 mg BID PO  Last administered on 10/27/17at 08:02; 

Admin Dose 4 MG;  Start 10/24/17 at 21:00


Docusate Sodium (Colace) 200 mg QHS PO  Last administered on 10/26/17at 20:54; 

Admin Dose 200 MG;  Start 10/24/17 at 21:00


Ondansetron HCl (Zofran Inj) 4 mg Q6H  PRN IV NAUSEA AND/OR VOMITING;  Start 10/

24/17 at 21:00


Acetaminophen (Tylenol Tab) 650 mg Q6H  PRN PO PAIN LEVEL 1-3 OR FEVER;  Start 

10/24/17 at 21:00


Oxycodone/ Acetaminophen (Percocet (5/ 325)) 1 tab Q6H  PRN PO MODERATE PAIN 

LEVEL 4-6;  Start 10/24/17 at 21:00


Morphine Sulfate (morphine) 2 mg Q4H  PRN IV SEVERE PAIN LEVEL 7-10;  Start 10/

24/17 at 21:00


Docusate Sodium (Colace) 100 mg Q12H  PRN PO CONSTIPATION;  Start 10/24/17 at 21

:00


Magnesium Hydroxide (Milk Of Mag) 30 ml DAILY  PRN PO CONSTIPATION;  Start 10/24

/17 at 21:00


Famotidine (Pepcid) 20 mg Q12 PO  Last administered on 10/27/17at 08:02; Admin 

Dose 20 MG;  Start 10/24/17 at 21:00


Albuterol (Proventil 0.083% (Neb)) 2.5 mg Q2  PRN HHN SHORTNESS OF BREATH;  

Start 10/24/17 at 21:00


Miscellaneous Information 1 ea NOTE XX ;  Start 10/24/17 at 21:30


Glucose (Glutose) 15 gm Q15M  PRN PO DECREASED GLUCOSE;  Start 10/24/17 at 21:30


Glucose (Glutose) 22.5 gm Q15M  PRN PO DECREASED GLUCOSE;  Start 10/24/17 at 21:

30


Dextrose (D50w Syringe) 25 ml Q15M  PRN IV DECREASED GLUCOSE;  Start 10/24/17 

at 21:30


Dextrose (D50w Syringe) 50 ml Q15M  PRN IV DECREASED GLUCOSE;  Start 10/24/17 

at 21:30


Glucagon (Glucagen) 1 mg Q15M  PRN IM DECREASED GLUCOSE;  Start 10/24/17 at 21:

30


Glucose (Glutose) 15 gm Q15M  PRN BUCCAL DECREASED GLUCOSE;  Start 10/24/17 at 

21:30


Nystatin (Nystatin Powder) 1 applic BID TOP  Last administered on 10/27/17at 08:

02; Admin Dose 1 APPLIC;  Start 10/25/17 at 09:00


Tamoxifen Citrate (Nolvadex) 20 mg DAILY PO  Last administered on 10/27/17at 08:

04; Admin Dose 20 MG;  Start 10/25/17 at 13:00


Diagnostic Test (Pha) 1 ea 1 ea 02 XX  Last administered on 10/27/17at 02:58; 

Admin Dose 1 EA;  Start 10/26/17 at 02:00


Vancomycin HCl/ Sodium Chloride (Vancocin/NS) 250 ml @  83.333 mls/ hr Q12H 

IVPB  Last administered on 10/27/17at 05:52; Admin Dose 83.333 MLS/HR;  Start 10

/27/17 at 05:00





Assessment/Plan


Chief Complaint/Hosp Course


Assessment





1.  Metastatic breast cancer with recurrent right pleural effusion.  Cultures 

growing gram-positive cocci consistent with staph aureus.


2.  Pleurx catheter site infection and likely tube displacement.





Plan





1.  CT chest trace loculated pleural effusion possible empyema in addition to 

malignant effusion.


2.  Radiology to replace Pleurx catheter in appropriate pleural space.


3.  Continue antibiotic coverage for staph infection.  She will likely require 

decortication given empyema and loculated effusion.


Problems:  











DELFIN LAKHANI MD, [facility] Oct 27, 2017 10:54

## 2017-10-27 NOTE — PN
Date/Time of Note


Date/Time of Note


DATE: 10/27/17 


TIME: 13:25





Assessment/Plan


VTE Prophylaxis


VTE Prophylaxis Intervention:  ambulation





Lines/Catheters


IV Catheter Type (from [facility]):  Peripheral IV





Assessment/Plan


Chief Complaint/Hosp Course


Patient is a 39-year-old female with a past medical history of metastatic 

breast cancer with metastases to the liver and brain who presents from 

[facility] for malfunctioning Pleurx catheter.





Assessment and plan


Dysfunctional Pleurx catheter


Cellulitis, area around catheter


empyema?, staph aureus. 


Metastatic breast cancer, lung, liver, brain


Diabetes mellitus


Obesity


Status post craniectomy for brain metastases





-Continue Decadron and tamoxifen


-radiation therapy today and again tomorrow


-Ultrasound-guided thoracentesis performed, cultures showing staph aureus


-ID consulted, abx per ID, to order PICC if needed


-Pulmonology consulted for malfunctioning Pleurx catheter, CT noted, CT surgery 

consulted, Dr. Saba for possible VATS/pleurodesis


-Continue home meds as tolerated


-PT OT





DISPO:


-IV abx, ?loculated effusion, ?VATS, Dispo back to rehab once stable


Problems:  





Subjective


24 Hr Interval Summary


Free Text/Dictation


no new issues, tolerated radiation therapy well.





Exam/Review of Systems


Vital Signs


Vitals





 Vital Signs








  Date Time  Temp Pulse Resp B/P Pulse Ox O2 Delivery O2 Flow Rate FiO2


 


10/27/17 07:28 98.0 59 18 117/69 96   


 


10/25/17 20:00      Nasal Cannula  


 


10/25/17 08:00       2.0 














 Intake and Output   


 


 10/26/17 10/26/17 10/27/17





 15:00 23:00 07:00


 


Intake Total 550 ml 1540 ml 


 


Output Total  700 ml 


 


Balance 550 ml 840 ml 











Exam


Physical exam





General: Patient is laying in bed and answers questions appropriately


Mentation: Patient is alert and oriented 4,


Head: R side craniectomy site healing well. no more staples


Eyes: EOMI, pupils reactive to light


Neck: Supple, nontender, midline


Respiratory: decreased lung sounds on the R


Cardiovascular: regular rate, no obvious murmurs


Gastrointestinal: non-tender to palpation, bowel sounds heard.


Neurological: Moves all extremities spontaneously


Skin: erythematous skin around pleurx cath site, leaking catheter





Results


Result Diagram:  


10/27/17 0619                                                                  

              10/27/17 0619





Results 24 hrs





Laboratory Tests








Test


  10/26/17


17:25 10/26/17


20:43 10/27/17


01:11 10/27/17


06:19


 


Bedside Glucose 140   188   182   


 


White Blood Count    16.3  H


 


Red Blood Count    3.76  L


 


Hemoglobin    8.4  L


 


Hematocrit    27.5  L


 


Mean Corpuscular Volume    73.1  L


 


Mean Corpuscular Hemoglobin    22.3  L


 


Mean Corpuscular Hemoglobin


Concent 


  


  


  30.5  L


 


 


Red Cell Distribution Width    22.1  H


 


Platelet Count    196  


 


Mean Platelet Volume    8.9  


 


Neutrophils %    78.1  H


 


Lymphocytes %    10.8  L


 


Monocytes %    5.5  


 


Eosinophils %    0.0  


 


Basophils %    0.1  


 


Nucleated Red Blood Cells %    0.0  


 


Neutrophils #    12.7  H


 


Lymphocytes #    1.8  


 


Monocytes #    0.9  


 


Eosinophils #    0.0  


 


Basophils #    0.0  


 


Nucleated Red Blood Cells #    0.0  


 


Sodium Level    134  L


 


Potassium Level    4.0  


 


Chloride Level    105  


 


Carbon Dioxide Level    25  


 


Anion Gap    8  


 


Blood Urea Nitrogen    17  


 


Creatinine    0.44  


 


Glucose Level    144  


 


Calcium Level    8.2  L


 


Phosphorus Level    3.6  


 


Magnesium Level    1.7  














Test


  10/27/17


07:47 10/27/17


11:19 


  


 


 


Bedside Glucose 138   179    











Medications


Medications





 Current Medications


Dexamethasone (Decadron) 4 mg BID PO  Last administered on 10/27/17at 08:02; 

Admin Dose 4 MG;  Start 10/24/17 at 21:00


Docusate Sodium (Colace) 200 mg QHS PO  Last administered on 10/26/17at 20:54; 

Admin Dose 200 MG;  Start 10/24/17 at 21:00


Ondansetron HCl (Zofran Inj) 4 mg Q6H  PRN IV NAUSEA AND/OR VOMITING;  Start 10/

24/17 at 21:00


Acetaminophen (Tylenol Tab) 650 mg Q6H  PRN PO PAIN LEVEL 1-3 OR FEVER;  Start 

10/24/17 at 21:00


Oxycodone/ Acetaminophen (Percocet (5/ 325)) 1 tab Q6H  PRN PO MODERATE PAIN 

LEVEL 4-6;  Start 10/24/17 at 21:00


Morphine Sulfate (morphine) 2 mg Q4H  PRN IV SEVERE PAIN LEVEL 7-10;  Start 10/

24/17 at 21:00


Docusate Sodium (Colace) 100 mg Q12H  PRN PO CONSTIPATION;  Start 10/24/17 at 21

:00


Magnesium Hydroxide (Milk Of Mag) 30 ml DAILY  PRN PO CONSTIPATION;  Start 10/24

/17 at 21:00


Famotidine (Pepcid) 20 mg Q12 PO  Last administered on 10/27/17at 08:02; Admin 

Dose 20 MG;  Start 10/24/17 at 21:00


Albuterol (Proventil 0.083% (Neb)) 2.5 mg Q2  PRN HHN SHORTNESS OF BREATH;  

Start 10/24/17 at 21:00


Miscellaneous Information 1 ea NOTE XX ;  Start 10/24/17 at 21:30


Glucose (Glutose) 15 gm Q15M  PRN PO DECREASED GLUCOSE;  Start 10/24/17 at 21:30


Glucose (Glutose) 22.5 gm Q15M  PRN PO DECREASED GLUCOSE;  Start 10/24/17 at 21:

30


Dextrose (D50w Syringe) 25 ml Q15M  PRN IV DECREASED GLUCOSE;  Start 10/24/17 

at 21:30


Dextrose (D50w Syringe) 50 ml Q15M  PRN IV DECREASED GLUCOSE;  Start 10/24/17 

at 21:30


Glucagon (Glucagen) 1 mg Q15M  PRN IM DECREASED GLUCOSE;  Start 10/24/17 at 21:

30


Glucose (Glutose) 15 gm Q15M  PRN BUCCAL DECREASED GLUCOSE;  Start 10/24/17 at 

21:30


Nystatin (Nystatin Powder) 1 applic BID TOP  Last administered on 10/27/17at 08:

02; Admin Dose 1 APPLIC;  Start 10/25/17 at 09:00


Tamoxifen Citrate (Nolvadex) 20 mg DAILY PO  Last administered on 10/27/17at 08:

04; Admin Dose 20 MG;  Start 10/25/17 at 13:00


Diagnostic Test (Pha) 1 ea 1 ea 02 XX  Last administered on 10/27/17at 02:58; 

Admin Dose 1 EA;  Start 10/26/17 at 02:00


Vancomycin HCl/ Sodium Chloride (Vancocin/NS) 250 ml @  83.333 mls/ hr Q12H 

IVPB  Last administered on 10/27/17at 05:52; Admin Dose 83.333 MLS/HR;  Start 10

/27/17 at 05:00


Miscellaneous Information VANCO TROUGH @  0,400 ON ... ONCE  ONCE XX ;  Start 10

/28/17 at 04:00;  Stop 10/28/17 at 04:01


Ceftriaxone Sodium (Rocephin) 50 ml @  100 mls/hr Q24H IVPB ;  Start 10/27/17 

at 14:00











SHIREEN ASHBY Oct 27, 2017 13:27

## 2017-10-27 NOTE — PN
DATE:  10/27/2017

 

 

SUBJECTIVE:  Patient is awake, feels good, looks comfortable.  Denies pain, no fevers.

 

LABORATORIES:  WBC today 16.3, platelets 196, neutrophils 78.1, BUN 17, creatinine 0.44.

 

MICROBIOLOGY:  Pleural fluid culture grew oxacillin-sensitive Staphylococcus aureus, susceptible to 
majority antibiotics resistant to penicillin G.

 

PHYSICAL EXAMINATION:

GENERAL:  This is an obese, well-developed, middle-aged woman who is awake, in no distress.

HEENT:  Head atraumatic, normocephalic.  Sclerae anicteric.  Buccal mucosa dry.

NECK:  Supple.

CHEST:  Rise symmetrical.  Breath sounds diminished to bases.

HEART:  S1, S2.

ABDOMEN:  Soft, bowel tones present.

EXTREMITIES:  No cyanosis.

 

ASSESSMENT:

1.  Systemic inflammatory response syndrome.

2.  Loculated right pleural effusion, possible empyema with pleural fluid culture growing oxacillin-
sensitive Staphylococcus aureus.

3.  Diabetes.

4.  Metastatic breast carcinoma.

5.  History of craniectomy from brain metastasis.  

 

PLAN:  The patient remains stable.  We are going to change antibiotics to levofloxacin and await car
diothoracic surgery evaluation.  The patient will likely require a video-assisted thoracotomy with d
ecortication pending _____ catheters discontinuation.

 

 

Dictated By: DIANA RANDALL NP for JERROLD DREYER MD NI/LAKISHA

DD:    10/27/2017 12:37:04

DT:    10/27/2017 12:56:04

Conf#: [id number]

DID#:  [id number]

## 2017-10-28 VITALS — SYSTOLIC BLOOD PRESSURE: 108 MMHG | RESPIRATION RATE: 18 BRPM | DIASTOLIC BLOOD PRESSURE: 55 MMHG | HEART RATE: 70 BPM

## 2017-10-28 VITALS — DIASTOLIC BLOOD PRESSURE: 72 MMHG | RESPIRATION RATE: 22 BRPM | SYSTOLIC BLOOD PRESSURE: 121 MMHG

## 2017-10-28 VITALS — RESPIRATION RATE: 18 BRPM | DIASTOLIC BLOOD PRESSURE: 70 MMHG | SYSTOLIC BLOOD PRESSURE: 131 MMHG

## 2017-10-28 VITALS — RESPIRATION RATE: 16 BRPM | DIASTOLIC BLOOD PRESSURE: 58 MMHG | SYSTOLIC BLOOD PRESSURE: 109 MMHG

## 2017-10-28 LAB
ABNORMAL IP MESSAGE: 1
ANION GAP SERPL CALC-SCNC: 13 MMOL/L (ref 8–16)
BASOPHILS # BLD AUTO: 0 10^3/UL (ref 0–0.1)
BASOPHILS NFR BLD: 0.1 % (ref 0–2)
BUN SERPL-MCNC: 21 MG/DL (ref 7–20)
CALCIUM SERPL-MCNC: 8.1 MG/DL (ref 8.4–10.2)
CHLORIDE SERPL-SCNC: 105 MMOL/L (ref 97–110)
CO2 SERPL-SCNC: 24 MMOL/L (ref 21–31)
CREAT SERPL-MCNC: 0.48 MG/DL (ref 0.44–1)
EOSINOPHIL # BLD: 0 10^3/UL (ref 0–0.5)
EOSINOPHIL NFR BLD: 0 % (ref 0–7)
ERYTHROCYTE [DISTWIDTH] IN BLOOD BY AUTOMATED COUNT: 22.3 % (ref 11.5–14.5)
GLUCOSE SERPL-MCNC: 184 MG/DL (ref 70–220)
HCT VFR BLD CALC: 28.1 % (ref 37–47)
HGB BLD-MCNC: 8.4 G/DL (ref 12–16)
LYMPHOCYTES # BLD AUTO: 2 10^3/UL (ref 0.8–2.9)
LYMPHOCYTES NFR BLD AUTO: 10.3 % (ref 15–51)
MAGNESIUM SERPL-MCNC: 1.9 MG/DL (ref 1.7–2.5)
MCH RBC QN AUTO: 22.2 PG (ref 29–33)
MCHC RBC AUTO-ENTMCNC: 29.9 G/DL (ref 32–37)
MCV RBC AUTO: 74.3 FL (ref 82–101)
MONOCYTES # BLD: 1 10^3/UL (ref 0.3–0.9)
MONOCYTES NFR BLD: 5.3 % (ref 0–11)
NEUTROPHILS # BLD: 14.7 10^3/UL (ref 1.6–7.5)
NEUTROPHILS NFR BLD AUTO: 76.9 % (ref 39–77)
NRBC # BLD MANUAL: 0 10^3/UL (ref 0–0)
NRBC BLD AUTO-RTO: 0 /100WBC (ref 0–0)
PHOSPHATE SERPL-MCNC: 3.3 MG/DL (ref 2.5–4.9)
PLATELET # BLD: 200 10^3/UL (ref 140–415)
PMV BLD AUTO: 8.7 FL (ref 7.4–10.4)
POSITIVE DIFF: (no result)
POTASSIUM SERPL-SCNC: 4.5 MMOL/L (ref 3.5–5.1)
RBC # BLD AUTO: 3.78 10^6/UL (ref 4.2–5.4)
SODIUM SERPL-SCNC: 137 MMOL/L (ref 135–144)
WBC # BLD AUTO: 19.1 10^3/UL (ref 4.8–10.8)

## 2017-10-28 RX ADMIN — VANCOMYCIN HYDROCHLORIDE SCH MLS/HR: 1 INJECTION, POWDER, LYOPHILIZED, FOR SOLUTION INTRAVENOUS at 15:59

## 2017-10-28 RX ADMIN — NYSTATIN SCH APPLIC: 100000 POWDER TOPICAL at 21:18

## 2017-10-28 RX ADMIN — TAMOXIFEN CITRATE SCH MG: 10 TABLET, FILM COATED ORAL at 09:17

## 2017-10-28 RX ADMIN — FAMOTIDINE SCH MG: 20 TABLET ORAL at 09:12

## 2017-10-28 RX ADMIN — FAMOTIDINE SCH MG: 20 TABLET ORAL at 21:11

## 2017-10-28 RX ADMIN — DOCUSATE SODIUM SCH MG: 100 CAPSULE, LIQUID FILLED ORAL at 21:10

## 2017-10-28 RX ADMIN — NYSTATIN SCH APPLIC: 100000 POWDER TOPICAL at 10:57

## 2017-10-28 RX ADMIN — CEFTRIAXONE SCH MLS/HR: 1 INJECTION, SOLUTION INTRAVENOUS at 13:40

## 2017-10-28 RX ADMIN — VANCOMYCIN HYDROCHLORIDE SCH MLS/HR: 1 INJECTION, POWDER, LYOPHILIZED, FOR SOLUTION INTRAVENOUS at 06:16

## 2017-10-28 NOTE — PN
Date/Time of Note


Date/Time of Note


DATE: 10/28/17 


TIME: 12:19





Assessment/Plan


VTE Prophylaxis


VTE Prophylaxis Intervention:  ambulation





Lines/Catheters


IV Catheter Type (from [facility]):  Saline Lock





Assessment/Plan


Chief Complaint/Hosp Course


Patient is a 39-year-old female with a past medical history of metastatic 

breast cancer with metastases to the liver and brain who presents from 

[facility] for malfunctioning Pleurx catheter.





Assessment and plan


Dysfunctional Pleurx catheter


Cellulitis, area around catheter


empyema?, staph aureus. 


Metastatic breast cancer, lung, liver, brain


Diabetes mellitus


Obesity


Status post craniectomy for brain metastases





-Continue Decadron and tamoxifen


-radiation therapy today to resume on monday


-Ultrasound-guided thoracentesis performed, cultures showing staph aureus


-ID consulted, abx per ID, to order PICC if needed


-Pulmonology consulted for malfunctioning Pleurx catheter, CT noted, CT surgery 

consulted, Dr. Saba for VATS and pleurodesis sometime upcoming week based 

on schedule


-Continue home meds as tolerated


-PT OT





DISPO:


-IV abx, ?loculated effusion, VATS/pleurodesis this upcoming week, Dispo back 

to rehab once stable


Problems:  





Subjective


24 Hr Interval Summary


Free Text/Dictation


no radiation yesterday, unknown why appt was cancelled





Exam/Review of Systems


Vital Signs


Vitals





 Vital Signs








  Date Time  Temp Pulse Resp B/P Pulse Ox O2 Delivery O2 Flow Rate FiO2


 


10/28/17 07:39 98.1 68 16 109/58 97   


 


10/28/17 02:00      Room Air  


 


10/25/17 08:00       2.0 














 Intake and Output   


 


 10/27/17 10/27/17 10/28/17





 15:00 23:00 07:00


 


Intake Total 250 ml 1500.000 ml 590 ml


 


Balance 250 ml 1500.000 ml 590 ml











Exam


Physical exam





General: Patient is laying in bed and answers questions appropriately


Mentation: Patient is alert and oriented 4,


Head: R side craniectomy site healing well. no more staples


Eyes: EOMI, pupils reactive to light


Neck: Supple, nontender, midline


Respiratory: decreased lung sounds on the R


Cardiovascular: regular rate, no obvious murmurs


Gastrointestinal: non-tender to palpation, bowel sounds heard.


Neurological: Moves all extremities spontaneously


Skin: erythematous skin around pleurx cath site, leaking catheter





Results


Result Diagram:  


10/28/17 0415                                                                  

              10/28/17 0414





Results 24 hrs





Laboratory Tests








Test


  10/27/17


17:00 10/27/17


20:45 10/28/17


01:51 10/28/17


04:14


 


Bedside Glucose 151   184   210   


 


Sodium Level    137  


 


Potassium Level    4.5  


 


Chloride Level    105  


 


Carbon Dioxide Level    24  


 


Anion Gap    13  


 


Blood Urea Nitrogen    21  H


 


Creatinine    0.48  


 


Glucose Level    184  


 


Calcium Level    8.1  L


 


Phosphorus Level    3.3  


 


Magnesium Level    1.9  














Test


  10/28/17


04:15 10/28/17


08:06 10/28/17


12:01 


 


 


White Blood Count 19.1  H   


 


Red Blood Count 3.78  L   


 


Hemoglobin 8.4  L   


 


Hematocrit 28.1  L   


 


Mean Corpuscular Volume 74.3  L   


 


Mean Corpuscular Hemoglobin 22.2  L   


 


Mean Corpuscular Hemoglobin


Concent 29.9  L


  


  


  


 


 


Red Cell Distribution Width 22.3  H   


 


Platelet Count 200     


 


Mean Platelet Volume 8.7     


 


Neutrophils % 76.9     


 


Lymphocytes % 10.3  L   


 


Monocytes % 5.3     


 


Eosinophils % 0.0     


 


Basophils % 0.1     


 


Nucleated Red Blood Cells % 0.0     


 


Neutrophils # 14.7  H   


 


Lymphocytes # 2.0     


 


Monocytes # 1.0  H   


 


Eosinophils # 0.0     


 


Basophils # 0.0     


 


Nucleated Red Blood Cells # 0.0     


 


Vancomycin Level Trough 6.2  L   


 


Bedside Glucose  135   117   











Medications


Medications





 Current Medications


Dexamethasone (Decadron) 4 mg BID PO  Last administered on 10/28/17at 09:12; 

Admin Dose 4 MG;  Start 10/24/17 at 21:00


Docusate Sodium (Colace) 200 mg QHS PO  Last administered on 10/27/17at 20:47; 

Admin Dose 200 MG;  Start 10/24/17 at 21:00


Ondansetron HCl (Zofran Inj) 4 mg Q6H  PRN IV NAUSEA AND/OR VOMITING;  Start 10/

24/17 at 21:00


Acetaminophen (Tylenol Tab) 650 mg Q6H  PRN PO PAIN LEVEL 1-3 OR FEVER;  Start 

10/24/17 at 21:00


Oxycodone/ Acetaminophen (Percocet (5/ 325)) 1 tab Q6H  PRN PO MODERATE PAIN 

LEVEL 4-6;  Start 10/24/17 at 21:00


Morphine Sulfate (morphine) 2 mg Q4H  PRN IV SEVERE PAIN LEVEL 7-10;  Start 10/

24/17 at 21:00


Docusate Sodium (Colace) 100 mg Q12H  PRN PO CONSTIPATION;  Start 10/24/17 at 21

:00


Magnesium Hydroxide (Milk Of Mag) 30 ml DAILY  PRN PO CONSTIPATION;  Start 10/24

/17 at 21:00


Famotidine (Pepcid) 20 mg Q12 PO  Last administered on 10/28/17at 09:12; Admin 

Dose 20 MG;  Start 10/24/17 at 21:00


Albuterol (Proventil 0.083% (Neb)) 2.5 mg Q2  PRN HHN SHORTNESS OF BREATH;  

Start 10/24/17 at 21:00


Miscellaneous Information 1 ea NOTE XX ;  Start 10/24/17 at 21:30


Glucose (Glutose) 15 gm Q15M  PRN PO DECREASED GLUCOSE;  Start 10/24/17 at 21:30


Glucose (Glutose) 22.5 gm Q15M  PRN PO DECREASED GLUCOSE;  Start 10/24/17 at 21:

30


Dextrose (D50w Syringe) 25 ml Q15M  PRN IV DECREASED GLUCOSE;  Start 10/24/17 

at 21:30


Dextrose (D50w Syringe) 50 ml Q15M  PRN IV DECREASED GLUCOSE;  Start 10/24/17 

at 21:30


Glucagon (Glucagen) 1 mg Q15M  PRN IM DECREASED GLUCOSE;  Start 10/24/17 at 21:

30


Glucose (Glutose) 15 gm Q15M  PRN BUCCAL DECREASED GLUCOSE;  Start 10/24/17 at 

21:30


Nystatin (Nystatin Powder) 1 applic BID TOP  Last administered on 10/28/17at 10:

57; Admin Dose 1 APPLIC;  Start 10/25/17 at 09:00


Tamoxifen Citrate (Nolvadex) 20 mg DAILY PO  Last administered on 10/28/17at 09:

17; Admin Dose 20 MG;  Start 10/25/17 at 13:00


Diagnostic Test (Pha) 1 ea 1 ea 02 XX  Last administered on 10/28/17at 02:04; 

Admin Dose 1 EA;  Start 10/26/17 at 02:00


Ceftriaxone Sodium 50 ml @  100 mls/hr Q24H IVPB  Last administered on 10/27/

17at 13:44; Admin Dose 100 MLS/HR;  Start 10/27/17 at 14:00


Vancomycin HCl/ Sodium Chloride (Vancocin/NS) 250 ml @  83.333 mls/ hr Q8H IVPB 

;  Start 10/28/17 at 16:00











SHIREEN ASHBY Oct 28, 2017 12:21

## 2017-10-28 NOTE — CONS
Date/Time of Note


Date/Time of Note


DATE: 10/28/17 


TIME: 16:16





Assessment/Plan


Assessment/Plan


Chief Complaint/Hosp Course


ID PROGRESS NOTE 


CURRENT ABX:  TOTAL ABX DAY # 4 =>  Vanco IV + Ceftriaxone 


24H INTERVAL SUMMARY  


* A/A/O, responsive, sitting up in bed, smiling, polite, good historian, VSS, 

NAD, without dyspnea on room air, no fevers 


* Morbid obesity


*  Samir: 10/25/   Source:    THOR FLD                  


         *** Microbiology *** BODY FLUID CULTURE  Final  


         Organism 1                     STAPHYLOCOCCUS AUREUS


            QUANTITY                    2+


                                    S AUREUS         


                                    M.I.C.    RX     


                                    --------- ---    


     CEFAZOLIN                                 S     


     CIPROFLOXACIN                  <=0.5      S     


     CLINDAMYCIN                    <=0.25     S     


     DOXYCYCLINE                               S     


     ERYTHROMYCIN                   <=0.25     S     


     LEVOFLOXACIN                   <=0.12     S     


     OXACILLIN                      0.5        S     


     PENICILLIN-G                   >=0.5      R     


     RIFAMPIN                       <=0.5      S     


     VANCOMYCIN                     <=0.5      S     


     TRIMETHOPRIM/SULFAMETHOXAZOLE  <=10       S     





-------------------------------------------------





Physical Exam


Physical Exam       


Constitutional: VSS, NAD, morbid obese, A/A/O x4, afebrile 


HEENT: Unremarkable 


Neck: Supple, full ROM 


Respiratory:  Equal chest rise bilaterally, without dyspnea on observation, 

room air 


Cardiovascular:  nl pulses, regular rate and rhythm


Gastrointestinal:  Soft, large pannus 


Extremities:  Warm, no c/c/e





ID ASSESSMENT


48 yo morbid obese  F admit with: 


1.  Systemic inflammatory response syndrome.


2.  Loculated right pleural effusion, possible empyema


* s/p Thora w/ Pleural fluid culture grew oxacillin-sensitive Staphylococcus 

aureus, susceptible to majority antibiotics resistant to penicillin G.


3.  Diabetes.


4.  Metastatic breast carcinoma.


5.  History of craniectomy from brain metastasis.  


(- )MRSA Nares


INVASIVES: PIV


ABX ALLERGIES: PCN 


CURRENT ABX:  TOTAL ABX DAY # 4 =>  Vanco IV + Ceftriaxone 


ID RECOMMENDATIONS


1. Continue current ABX, tolerated Cephalosporin


2. Pending VATS





.


Problems:  





Consultation Date/Type/Reason


Admit Date/Time


Oct 24, 2017 at 18:29


Initial Consult Date


10/26/17


Type of Consultation:  id


Referring Provider:  CLAU AGUILAR





Exam/Review of Systems


Vital Signs


Vitals





 Vital Signs








  Date Time  Temp Pulse Resp B/P Pulse Ox O2 Delivery O2 Flow Rate FiO2


 


10/28/17 14:01 98.3 80 18 131/70 98   


 


10/28/17 02:00      Room Air  


 


10/25/17 08:00       2.0 














 Intake and Output   


 


 10/27/17 10/27/17 10/28/17





 15:00 23:00 07:00


 


Intake Total 250 ml 1500.000 ml 590 ml


 


Balance 250 ml 1500.000 ml 590 ml











Results


Result Diagram:  


10/28/17 0415                                                                  

              10/28/17 0414





Results 24 hrs





Laboratory Tests








Test


  10/27/17


17:00 10/27/17


20:45 10/28/17


01:51 10/28/17


04:14


 


Bedside Glucose 151   184   210   


 


Sodium Level    137  


 


Potassium Level    4.5  


 


Chloride Level    105  


 


Carbon Dioxide Level    24  


 


Anion Gap    13  


 


Blood Urea Nitrogen    21  H


 


Creatinine    0.48  


 


Glucose Level    184  


 


Calcium Level    8.1  L


 


Phosphorus Level    3.3  


 


Magnesium Level    1.9  














Test


  10/28/17


04:15 10/28/17


08:06 10/28/17


12:01 


 


 


White Blood Count 19.1  H   


 


Red Blood Count 3.78  L   


 


Hemoglobin 8.4  L   


 


Hematocrit 28.1  L   


 


Mean Corpuscular Volume 74.3  L   


 


Mean Corpuscular Hemoglobin 22.2  L   


 


Mean Corpuscular Hemoglobin


Concent 29.9  L


  


  


  


 


 


Red Cell Distribution Width 22.3  H   


 


Platelet Count 200     


 


Mean Platelet Volume 8.7     


 


Neutrophils % 76.9     


 


Lymphocytes % 10.3  L   


 


Monocytes % 5.3     


 


Eosinophils % 0.0     


 


Basophils % 0.1     


 


Nucleated Red Blood Cells % 0.0     


 


Neutrophils # 14.7  H   


 


Lymphocytes # 2.0     


 


Monocytes # 1.0  H   


 


Eosinophils # 0.0     


 


Basophils # 0.0     


 


Nucleated Red Blood Cells # 0.0     


 


Vancomycin Level Trough 6.2  L   


 


Bedside Glucose  135   117   











Medications


Medications





 Current Medications


Dexamethasone (Decadron) 4 mg BID PO  Last administered on 10/28/17at 09:12; 

Admin Dose 4 MG;  Start 10/24/17 at 21:00


Docusate Sodium (Colace) 200 mg QHS PO  Last administered on 10/27/17at 20:47; 

Admin Dose 200 MG;  Start 10/24/17 at 21:00


Ondansetron HCl (Zofran Inj) 4 mg Q6H  PRN IV NAUSEA AND/OR VOMITING;  Start 10/

24/17 at 21:00


Acetaminophen (Tylenol Tab) 650 mg Q6H  PRN PO PAIN LEVEL 1-3 OR FEVER;  Start 

10/24/17 at 21:00


Oxycodone/ Acetaminophen (Percocet (5/ 325)) 1 tab Q6H  PRN PO MODERATE PAIN 

LEVEL 4-6;  Start 10/24/17 at 21:00


Morphine Sulfate (morphine) 2 mg Q4H  PRN IV SEVERE PAIN LEVEL 7-10;  Start 10/

24/17 at 21:00


Docusate Sodium (Colace) 100 mg Q12H  PRN PO CONSTIPATION;  Start 10/24/17 at 21

:00


Magnesium Hydroxide (Milk Of Mag) 30 ml DAILY  PRN PO CONSTIPATION;  Start 10/24

/17 at 21:00


Famotidine (Pepcid) 20 mg Q12 PO  Last administered on 10/28/17at 09:12; Admin 

Dose 20 MG;  Start 10/24/17 at 21:00


Albuterol (Proventil 0.083% (Neb)) 2.5 mg Q2  PRN HHN SHORTNESS OF BREATH;  

Start 10/24/17 at 21:00


Miscellaneous Information 1 ea NOTE XX ;  Start 10/24/17 at 21:30


Glucose (Glutose) 15 gm Q15M  PRN PO DECREASED GLUCOSE;  Start 10/24/17 at 21:30


Glucose (Glutose) 22.5 gm Q15M  PRN PO DECREASED GLUCOSE;  Start 10/24/17 at 21:

30


Dextrose (D50w Syringe) 25 ml Q15M  PRN IV DECREASED GLUCOSE;  Start 10/24/17 

at 21:30


Dextrose (D50w Syringe) 50 ml Q15M  PRN IV DECREASED GLUCOSE;  Start 10/24/17 

at 21:30


Glucagon (Glucagen) 1 mg Q15M  PRN IM DECREASED GLUCOSE;  Start 10/24/17 at 21:

30


Glucose (Glutose) 15 gm Q15M  PRN BUCCAL DECREASED GLUCOSE;  Start 10/24/17 at 

21:30


Nystatin (Nystatin Powder) 1 applic BID TOP  Last administered on 10/28/17at 10:

57; Admin Dose 1 APPLIC;  Start 10/25/17 at 09:00


Tamoxifen Citrate (Nolvadex) 20 mg DAILY PO  Last administered on 10/28/17at 09:

17; Admin Dose 20 MG;  Start 10/25/17 at 13:00


Diagnostic Test (Pha) 1 ea 1 ea 02 XX  Last administered on 10/28/17at 02:04; 

Admin Dose 1 EA;  Start 10/26/17 at 02:00


Ceftriaxone Sodium 50 ml @  100 mls/hr Q24H IVPB  Last administered on 10/28/

17at 13:40; Admin Dose 100 MLS/HR;  Start 10/27/17 at 14:00


Vancomycin HCl/ Sodium Chloride (Vancocin/NS) 250 ml @  83.333 mls/ hr Q8H IVPB

  Last administered on 10/28/17at 15:59; Admin Dose 83.333 MLS/HR;  Start 10/28/

17 at 16:00











DEANDRE GIBSON NP Oct 28, 2017 16:24

## 2017-10-28 NOTE — CONS
Date/Time of Note


Date/Time of Note


DATE: 10/28/17 


TIME: 11:27





Consult Date/Type/Reason


Admit Date/Time


Oct 24, 2017 at 18:29


Initial Consult Date


10/25/17


Type of Consultation:  Pulmonary


Ordering Provider:  CLAU AGUILAR





Subjective


Patient stable this morning.  Placed on respiratory isolation.





Objective





 Vital Signs








  Date Time  Temp Pulse Resp B/P Pulse Ox O2 Delivery O2 Flow Rate FiO2


 


10/28/17 07:39 98.1 68 16 109/58 97   


 


10/28/17 02:00      Room Air  


 


10/25/17 08:00       2.0 














 Intake and Output   


 


 10/27/17 10/27/17 10/28/17





 15:00 23:00 07:00


 


Intake Total 250 ml 1500.000 ml 590 ml


 


Balance 250 ml 1500.000 ml 590 ml








Exam


VITAL SIGNS:  per chart


NECK:  Supple.  No JVD or lymphadenopathy.


CARDIAC EXAM:  S1, S2. No added sounds or murmurs.


CHEST: Diminished air entry right lung.


ABDOMEN:  Soft, nontender.  No guarding or rebound.


EXTREMITIES:  No cyanosis, clubbing or edema.


NEUROLOGIC:  Generalized weakness.  No focal deficits.





Results/Medications


Result Diagram:  


10/28/17 0415                                                                  

              10/28/17 0414





Results 24 hrs





Laboratory Tests








Test


  10/27/17


17:00 10/27/17


20:45 10/28/17


01:51 10/28/17


04:14


 


Bedside Glucose 151   184   210   


 


Sodium Level    137  


 


Potassium Level    4.5  


 


Chloride Level    105  


 


Carbon Dioxide Level    24  


 


Anion Gap    13  


 


Blood Urea Nitrogen    21  H


 


Creatinine    0.48  


 


Glucose Level    184  


 


Calcium Level    8.1  L


 


Phosphorus Level    3.3  


 


Magnesium Level    1.9  














Test


  10/28/17


04:15 10/28/17


08:06 


  


 


 


White Blood Count 19.1  H   


 


Red Blood Count 3.78  L   


 


Hemoglobin 8.4  L   


 


Hematocrit 28.1  L   


 


Mean Corpuscular Volume 74.3  L   


 


Mean Corpuscular Hemoglobin 22.2  L   


 


Mean Corpuscular Hemoglobin


Concent 29.9  L


  


  


  


 


 


Red Cell Distribution Width 22.3  H   


 


Platelet Count 200     


 


Mean Platelet Volume 8.7     


 


Neutrophils % 76.9     


 


Lymphocytes % 10.3  L   


 


Monocytes % 5.3     


 


Eosinophils % 0.0     


 


Basophils % 0.1     


 


Nucleated Red Blood Cells % 0.0     


 


Neutrophils # 14.7  H   


 


Lymphocytes # 2.0     


 


Monocytes # 1.0  H   


 


Eosinophils # 0.0     


 


Basophils # 0.0     


 


Nucleated Red Blood Cells # 0.0     


 


Vancomycin Level Trough 6.2  L   


 


Bedside Glucose  135    








Medications





 Current Medications


Dexamethasone (Decadron) 4 mg BID PO  Last administered on 10/28/17at 09:12; 

Admin Dose 4 MG;  Start 10/24/17 at 21:00


Docusate Sodium (Colace) 200 mg QHS PO  Last administered on 10/27/17at 20:47; 

Admin Dose 200 MG;  Start 10/24/17 at 21:00


Ondansetron HCl (Zofran Inj) 4 mg Q6H  PRN IV NAUSEA AND/OR VOMITING;  Start 10/

24/17 at 21:00


Acetaminophen (Tylenol Tab) 650 mg Q6H  PRN PO PAIN LEVEL 1-3 OR FEVER;  Start 

10/24/17 at 21:00


Oxycodone/ Acetaminophen (Percocet (5/ 325)) 1 tab Q6H  PRN PO MODERATE PAIN 

LEVEL 4-6;  Start 10/24/17 at 21:00


Morphine Sulfate (morphine) 2 mg Q4H  PRN IV SEVERE PAIN LEVEL 7-10;  Start 10/

24/17 at 21:00


Docusate Sodium (Colace) 100 mg Q12H  PRN PO CONSTIPATION;  Start 10/24/17 at 21

:00


Magnesium Hydroxide (Milk Of Mag) 30 ml DAILY  PRN PO CONSTIPATION;  Start 10/24

/17 at 21:00


Famotidine (Pepcid) 20 mg Q12 PO  Last administered on 10/28/17at 09:12; Admin 

Dose 20 MG;  Start 10/24/17 at 21:00


Albuterol (Proventil 0.083% (Neb)) 2.5 mg Q2  PRN HHN SHORTNESS OF BREATH;  

Start 10/24/17 at 21:00


Miscellaneous Information 1 ea NOTE XX ;  Start 10/24/17 at 21:30


Glucose (Glutose) 15 gm Q15M  PRN PO DECREASED GLUCOSE;  Start 10/24/17 at 21:30


Glucose (Glutose) 22.5 gm Q15M  PRN PO DECREASED GLUCOSE;  Start 10/24/17 at 21:

30


Dextrose (D50w Syringe) 25 ml Q15M  PRN IV DECREASED GLUCOSE;  Start 10/24/17 

at 21:30


Dextrose (D50w Syringe) 50 ml Q15M  PRN IV DECREASED GLUCOSE;  Start 10/24/17 

at 21:30


Glucagon (Glucagen) 1 mg Q15M  PRN IM DECREASED GLUCOSE;  Start 10/24/17 at 21:

30


Glucose (Glutose) 15 gm Q15M  PRN BUCCAL DECREASED GLUCOSE;  Start 10/24/17 at 

21:30


Nystatin (Nystatin Powder) 1 applic BID TOP  Last administered on 10/28/17at 10:

57; Admin Dose 1 APPLIC;  Start 10/25/17 at 09:00


Tamoxifen Citrate (Nolvadex) 20 mg DAILY PO  Last administered on 10/28/17at 09:

17; Admin Dose 20 MG;  Start 10/25/17 at 13:00


Diagnostic Test (Pha) 1 ea 1 ea 02 XX  Last administered on 10/28/17at 02:04; 

Admin Dose 1 EA;  Start 10/26/17 at 02:00


Ceftriaxone Sodium 50 ml @  100 mls/hr Q24H IVPB  Last administered on 10/27/

17at 13:44; Admin Dose 100 MLS/HR;  Start 10/27/17 at 14:00


Vancomycin HCl/ Sodium Chloride (Vancocin/NS) 250 ml @  83.333 mls/ hr Q8H IVPB 

;  Start 10/28/17 at 16:00





Assessment/Plan


Chief Complaint/Hosp Course


Assessment





1.  Metastatic breast cancer with recurrent right pleural effusion.  Cultures 

growing gram-positive cocci consistent with staph aureus.


2.  Loculated pleural effusion possible empyema


3.  CNS metastasis currently receiving steroids and radiation therapy


4.  Placed in respiratory isolation rule out TB will discuss with primary team





Plan





1.  CT chest trace loculated pleural effusion possible empyema in addition to 

malignant effusion.


2.  Radiology to replace Pleurx catheter in appropriate pleural space.


3.  Continue antibiotic coverage for staph infection.  She will likely require 

decortication given empyema and loculated effusion.


Problems:  











DELFIN LAKHANI MD, [facility] Oct 28, 2017 11:28

## 2017-10-28 NOTE — CONS
DATE OF ADMISSION: 10/24/2017

DATE OF CONSULTATION:  

 

 

 

REASON FOR CONSULTATION:  Pleural effusion.

 

HISTORY OF PRESENT ILLNESS:  This is a 47-year-old female with a history of malignant pleural effusi
on from breast cancer, underwent a PleurX catheter placement, now is being admitted because of a loc
ulated pleural effusion.  The patient underwent a thoracentesis, subsequently had a CT of the chest 
which showed increase in the large loculated effusion on the right side with no pneumothorax.

 

PAST MEDICAL HISTORY:  Hypertension. hyperlipidemia, breast cancer.

 

PAST SURGICAL HISTORY:  PleurX catheter and mastectomy.

 

ALLERGIES:  NONE.

 

SOCIAL HISTORY:  No smoking, drinking or drug use.

 

MEDICATIONS:  List reviewed.

 

PHYSICAL EXAMINATION:

VITAL SIGNS:  Blood pressure is 149/85, pulse is 75, respirations 18, saturation ____.

CARDIOVASCULAR:  Regular rate and rhythm.

LUNGS:  Have diminished breath sounds on the right.

ABDOMEN:  Soft, nontender, nondistended.

EXTREMITIES:  Warm.  No clubbing, cyanosis, or edema.

 

LABORATORY VALUES:  Hemoglobin 8.4, white count 16.3, platelet count 196 and the creatinine is 0.44.

 

IMPRESSION:  Right loculated pleural effusion.

 

RECOMMENDATIONS:  We will proceed with video-assisted thoracic surgery and decortication with pleuro
desis.  Discussed with the patient.

 

 

Dictated By: HEIDY SOOD/LAKISHA

DD:    10/27/2017 19:12:05

DT:    10/28/2017 04:15:31

Conf#: [id number]

DID#:  [id number]

## 2017-10-29 VITALS — DIASTOLIC BLOOD PRESSURE: 56 MMHG | RESPIRATION RATE: 18 BRPM | SYSTOLIC BLOOD PRESSURE: 118 MMHG

## 2017-10-29 VITALS — RESPIRATION RATE: 22 BRPM | SYSTOLIC BLOOD PRESSURE: 125 MMHG | DIASTOLIC BLOOD PRESSURE: 67 MMHG

## 2017-10-29 VITALS — RESPIRATION RATE: 18 BRPM | DIASTOLIC BLOOD PRESSURE: 69 MMHG | SYSTOLIC BLOOD PRESSURE: 119 MMHG

## 2017-10-29 VITALS — RESPIRATION RATE: 18 BRPM | DIASTOLIC BLOOD PRESSURE: 58 MMHG | SYSTOLIC BLOOD PRESSURE: 122 MMHG

## 2017-10-29 LAB
ABNORMAL IP MESSAGE: 1
ANION GAP SERPL CALC-SCNC: 12 MMOL/L (ref 8–16)
BASOPHILS # BLD AUTO: 0 10^3/UL (ref 0–0.1)
BASOPHILS NFR BLD: 0.2 % (ref 0–2)
BUN SERPL-MCNC: 19 MG/DL (ref 7–20)
CALCIUM SERPL-MCNC: 8 MG/DL (ref 8.4–10.2)
CHLORIDE SERPL-SCNC: 105 MMOL/L (ref 97–110)
CO2 SERPL-SCNC: 23 MMOL/L (ref 21–31)
CREAT SERPL-MCNC: 0.47 MG/DL (ref 0.44–1)
EOSINOPHIL # BLD: 0 10^3/UL (ref 0–0.5)
EOSINOPHIL NFR BLD: 0 % (ref 0–7)
ERYTHROCYTE [DISTWIDTH] IN BLOOD BY AUTOMATED COUNT: 22.5 % (ref 11.5–14.5)
GLUCOSE SERPL-MCNC: 169 MG/DL (ref 70–220)
HCT VFR BLD CALC: 28.1 % (ref 37–47)
HGB BLD-MCNC: 8.1 G/DL (ref 12–16)
LYMPHOCYTES # BLD AUTO: 1.9 10^3/UL (ref 0.8–2.9)
LYMPHOCYTES NFR BLD AUTO: 9.7 % (ref 15–51)
MAGNESIUM SERPL-MCNC: 1.8 MG/DL (ref 1.7–2.5)
MCH RBC QN AUTO: 21.5 PG (ref 29–33)
MCHC RBC AUTO-ENTMCNC: 28.8 G/DL (ref 32–37)
MCV RBC AUTO: 74.7 FL (ref 82–101)
MONOCYTES # BLD: 1 10^3/UL (ref 0.3–0.9)
MONOCYTES NFR BLD: 4.9 % (ref 0–11)
NEUTROPHILS # BLD: 15.3 10^3/UL (ref 1.6–7.5)
NEUTROPHILS NFR BLD AUTO: 76.9 % (ref 39–77)
NRBC # BLD MANUAL: 0 10^3/UL (ref 0–0)
NRBC BLD AUTO-RTO: 0 /100WBC (ref 0–0)
PHOSPHATE SERPL-MCNC: 3.4 MG/DL (ref 2.5–4.9)
PLATELET # BLD: 214 10^3/UL (ref 140–415)
PMV BLD AUTO: 9 FL (ref 7.4–10.4)
POSITIVE DIFF: (no result)
POTASSIUM SERPL-SCNC: 4.1 MMOL/L (ref 3.5–5.1)
RBC # BLD AUTO: 3.76 10^6/UL (ref 4.2–5.4)
SODIUM SERPL-SCNC: 136 MMOL/L (ref 135–144)
WBC # BLD AUTO: 19.9 10^3/UL (ref 4.8–10.8)

## 2017-10-29 RX ADMIN — FAMOTIDINE SCH MG: 20 TABLET ORAL at 08:38

## 2017-10-29 RX ADMIN — CEFTRIAXONE SCH MLS/HR: 1 INJECTION, SOLUTION INTRAVENOUS at 14:46

## 2017-10-29 RX ADMIN — TAMOXIFEN CITRATE SCH MG: 10 TABLET, FILM COATED ORAL at 08:47

## 2017-10-29 RX ADMIN — VANCOMYCIN HYDROCHLORIDE SCH MLS/HR: 1 INJECTION, POWDER, LYOPHILIZED, FOR SOLUTION INTRAVENOUS at 08:17

## 2017-10-29 RX ADMIN — VANCOMYCIN HYDROCHLORIDE SCH MLS/HR: 1 INJECTION, POWDER, LYOPHILIZED, FOR SOLUTION INTRAVENOUS at 00:16

## 2017-10-29 RX ADMIN — NYSTATIN SCH APPLIC: 100000 POWDER TOPICAL at 08:49

## 2017-10-29 RX ADMIN — NYSTATIN SCH APPLIC: 100000 POWDER TOPICAL at 20:43

## 2017-10-29 RX ADMIN — VANCOMYCIN HYDROCHLORIDE SCH MLS/HR: 1 INJECTION, POWDER, LYOPHILIZED, FOR SOLUTION INTRAVENOUS at 16:30

## 2017-10-29 RX ADMIN — DOCUSATE SODIUM SCH MG: 100 CAPSULE, LIQUID FILLED ORAL at 20:42

## 2017-10-29 RX ADMIN — FAMOTIDINE SCH MG: 20 TABLET ORAL at 20:42

## 2017-10-29 NOTE — PN
Date/Time of Note


Date/Time of Note


DATE: 10/29/17 


TIME: 12:53





Assessment/Plan


VTE Prophylaxis


VTE Prophylaxis Intervention:  ambulation





Lines/Catheters


IV Catheter Type (from [facility]):  Saline Lock





Assessment/Plan


Chief Complaint/Hosp Course


Patient is a 39-year-old female with a past medical history of metastatic 

breast cancer with metastases to the liver and brain who presents from 

[facility] for malfunctioning Pleurx catheter.





Assessment and plan


Dysfunctional Pleurx catheter


Cellulitis, area around catheter


empyema?, staph aureus. 


Metastatic breast cancer, lung, liver, brain


Diabetes mellitus


Obesity


Status post craniectomy for brain metastases





-Continue Decadron and tamoxifen


-radiation therapy today to resume on monday


-Ultrasound-guided thoracentesis performed, cultures showing staph aureus


-ID consulted, abx per ID, to order PICC if needed


-Pulmonology consulted for malfunctioning Pleurx catheter, CT noted, CT surgery 

consulted, Dr. Saba for VATS and pleurodesis sometime upcoming week based 

on schedule


-Continue home meds as tolerated


-PT OT





DISPO:


-IV abx, ?loculated effusion, VATS/pleurodesis this upcoming week, Dispo back 

to rehab once stable


-radiation therapy will resume tomorrow, patient mistakenly was put on TB 

precautions, TB culture was ordered by mistake.


Problems:  





Subjective


24 Hr Interval Summary


Free Text/Dictation


no acute complaints





Exam/Review of Systems


Vital Signs


Vitals





 Vital Signs








  Date Time  Temp Pulse Resp B/P Pulse Ox O2 Delivery O2 Flow Rate FiO2


 


10/29/17 08:07 98.2 87 18 118/56 96   


 


10/28/17 02:00      Room Air  


 


10/25/17 08:00       2.0 














 Intake and Output   


 


 10/28/17 10/28/17 10/29/17





 15:00 23:00 07:00


 


Intake Total 50 ml  1450 ml


 


Balance 50 ml  1450 ml











Exam


Physical exam





General: Patient is laying in bed and answers questions appropriately


Mentation: Patient is alert and oriented 4,


Head: R side craniectomy site healing well. no more staples


Eyes: EOMI, pupils reactive to light


Neck: Supple, nontender, midline


Respiratory: decreased lung sounds on the R


Cardiovascular: regular rate, no obvious murmurs


Gastrointestinal: non-tender to palpation, bowel sounds heard.


Neurological: Moves all extremities spontaneously


Skin: erythematous skin around pleurx cath site, leaking catheter





Results


Result Diagram:  


10/29/17 0458                                                                  

              10/29/17 0458





Results 24 hrs





Laboratory Tests








Test


  10/28/17


17:15 10/28/17


21:08 10/29/17


01:33 10/29/17


04:58


 


Bedside Glucose 222  H 193   180   


 


White Blood Count    19.9  H


 


Red Blood Count    3.76  L


 


Hemoglobin    8.1  L


 


Hematocrit    28.1  L


 


Mean Corpuscular Volume    74.7  L


 


Mean Corpuscular Hemoglobin    21.5  L


 


Mean Corpuscular Hemoglobin


Concent 


  


  


  28.8  L


 


 


Red Cell Distribution Width    22.5  H


 


Platelet Count    214  


 


Mean Platelet Volume    9.0  


 


Neutrophils %    76.9  


 


Lymphocytes %    9.7  L


 


Monocytes %    4.9  


 


Eosinophils %    0.0  


 


Basophils %    0.2  


 


Nucleated Red Blood Cells %    0.0  


 


Neutrophils #    15.3  H


 


Lymphocytes #    1.9  


 


Monocytes #    1.0  H


 


Eosinophils #    0.0  


 


Basophils #    0.0  


 


Nucleated Red Blood Cells #    0.0  


 


Sodium Level    136  


 


Potassium Level    4.1  


 


Chloride Level    105  


 


Carbon Dioxide Level    23  


 


Anion Gap    12  


 


Blood Urea Nitrogen    19  


 


Creatinine    0.47  


 


Glucose Level    169  


 


Calcium Level    8.0  L


 


Phosphorus Level    3.4  


 


Magnesium Level    1.8  














Test


  10/29/17


08:10 10/29/17


12:16 


  


 


 


Bedside Glucose 150   136    











Medications


Medications





 Current Medications


Dexamethasone (Decadron) 4 mg BID PO  Last administered on 10/29/17at 08:38; 

Admin Dose 4 MG;  Start 10/24/17 at 21:00


Docusate Sodium (Colace) 200 mg QHS PO  Last administered on 10/28/17at 21:10; 

Admin Dose 200 MG;  Start 10/24/17 at 21:00


Ondansetron HCl (Zofran Inj) 4 mg Q6H  PRN IV NAUSEA AND/OR VOMITING;  Start 10/

24/17 at 21:00


Acetaminophen (Tylenol Tab) 650 mg Q6H  PRN PO PAIN LEVEL 1-3 OR FEVER;  Start 

10/24/17 at 21:00


Oxycodone/ Acetaminophen (Percocet (5/ 325)) 1 tab Q6H  PRN PO MODERATE PAIN 

LEVEL 4-6;  Start 10/24/17 at 21:00


Morphine Sulfate (morphine) 2 mg Q4H  PRN IV SEVERE PAIN LEVEL 7-10;  Start 10/

24/17 at 21:00


Docusate Sodium (Colace) 100 mg Q12H  PRN PO CONSTIPATION;  Start 10/24/17 at 21

:00


Magnesium Hydroxide (Milk Of Mag) 30 ml DAILY  PRN PO CONSTIPATION;  Start 10/24

/17 at 21:00


Famotidine (Pepcid) 20 mg Q12 PO  Last administered on 10/29/17at 08:38; Admin 

Dose 20 MG;  Start 10/24/17 at 21:00


Albuterol (Proventil 0.083% (Neb)) 2.5 mg Q2  PRN HHN SHORTNESS OF BREATH;  

Start 10/24/17 at 21:00


Miscellaneous Information 1 ea NOTE XX ;  Start 10/24/17 at 21:30


Glucose (Glutose) 15 gm Q15M  PRN PO DECREASED GLUCOSE;  Start 10/24/17 at 21:30


Glucose (Glutose) 22.5 gm Q15M  PRN PO DECREASED GLUCOSE;  Start 10/24/17 at 21:

30


Dextrose (D50w Syringe) 25 ml Q15M  PRN IV DECREASED GLUCOSE;  Start 10/24/17 

at 21:30


Dextrose (D50w Syringe) 50 ml Q15M  PRN IV DECREASED GLUCOSE;  Start 10/24/17 

at 21:30


Glucagon (Glucagen) 1 mg Q15M  PRN IM DECREASED GLUCOSE;  Start 10/24/17 at 21:

30


Glucose (Glutose) 15 gm Q15M  PRN BUCCAL DECREASED GLUCOSE;  Start 10/24/17 at 

21:30


Nystatin (Nystatin Powder) 1 applic BID TOP  Last administered on 10/29/17at 08:

49; Admin Dose 1 APPLIC;  Start 10/25/17 at 09:00


Tamoxifen Citrate (Nolvadex) 20 mg DAILY PO  Last administered on 10/29/17at 08:

47; Admin Dose 20 MG;  Start 10/25/17 at 13:00


Diagnostic Test (Pha) 1 ea 1 ea 02 XX  Last administered on 10/29/17at 02:00; 

Admin Dose 1 EA;  Start 10/26/17 at 02:00


Ceftriaxone Sodium 50 ml @  100 mls/hr Q24H IVPB  Last administered on 10/28/

17at 13:40; Admin Dose 100 MLS/HR;  Start 10/27/17 at 14:00


Vancomycin HCl/ Sodium Chloride (Vancocin/NS) 250 ml @  83.333 mls/ hr Q8H IVPB

  Last administered on 10/29/17at 08:17; Admin Dose 83.333 MLS/HR;  Start 10/28/

17 at 16:00











SHIREEN ASHBY Oct 29, 2017 12:55

## 2017-10-29 NOTE — CONS
Date/Time of Note


Date/Time of Note


DATE: 10/29/17 


TIME: 10:09





Consult Date/Type/Reason


Admit Date/Time


Oct 24, 2017 at 18:29


Initial Consult Date


10/25/17


Type of Consultation:  Pulmonary


Ordering Provider:  CLAU AGUILAR





Subjective


Doing okay this morning.  Mild dyspnea on exertion.  Vital signs remained 

stable.





Objective





 Vital Signs








  Date Time  Temp Pulse Resp B/P Pulse Ox O2 Delivery O2 Flow Rate FiO2


 


10/29/17 08:07 98.2 87 18 118/56 96   


 


10/28/17 02:00      Room Air  


 


10/25/17 08:00       2.0 














 Intake and Output   


 


 10/28/17 10/28/17 10/29/17





 15:00 23:00 07:00


 


Intake Total 50 ml  1450 ml


 


Balance 50 ml  1450 ml








Exam


GENERAL: Well-developed lady comfortable at rest


VITAL SIGNS:  per chart


NECK:  Supple.  No JVD or lymphadenopathy.


CARDIAC EXAM:  S1, S2. No added sounds or murmurs.


CHEST:  clear bilaterally, No added sounds, rales or wheezes


ABDOMEN:  Soft, nontender.  No guarding or rebound.


EXTREMITIES:  No cyanosis, clubbing or edema.


NEUROLOGIC:  Generalized weakness.  No focal deficits.





Results/Medications


Result Diagram:  


10/29/17 0458                                                                  

              10/29/17 0458





Results 24 hrs





Laboratory Tests








Test


  10/28/17


12:01 10/28/17


17:15 10/28/17


21:08 10/29/17


01:33


 


Bedside Glucose 117   222  H 193   180  














Test


  10/29/17


04:58 10/29/17


08:10 


  


 


 


White Blood Count 19.9  H   


 


Red Blood Count 3.76  L   


 


Hemoglobin 8.1  L   


 


Hematocrit 28.1  L   


 


Mean Corpuscular Volume 74.7  L   


 


Mean Corpuscular Hemoglobin 21.5  L   


 


Mean Corpuscular Hemoglobin


Concent 28.8  L


  


  


  


 


 


Red Cell Distribution Width 22.5  H   


 


Platelet Count 214     


 


Mean Platelet Volume 9.0     


 


Neutrophils % 76.9     


 


Lymphocytes % 9.7  L   


 


Monocytes % 4.9     


 


Eosinophils % 0.0     


 


Basophils % 0.2     


 


Nucleated Red Blood Cells % 0.0     


 


Neutrophils # 15.3  H   


 


Lymphocytes # 1.9     


 


Monocytes # 1.0  H   


 


Eosinophils # 0.0     


 


Basophils # 0.0     


 


Nucleated Red Blood Cells # 0.0     


 


Sodium Level 136     


 


Potassium Level 4.1     


 


Chloride Level 105     


 


Carbon Dioxide Level 23     


 


Anion Gap 12     


 


Blood Urea Nitrogen 19     


 


Creatinine 0.47     


 


Glucose Level 169     


 


Calcium Level 8.0  L   


 


Phosphorus Level 3.4     


 


Magnesium Level 1.8     


 


Bedside Glucose  150    








Medications





 Current Medications


Dexamethasone (Decadron) 4 mg BID PO  Last administered on 10/29/17at 08:38; 

Admin Dose 4 MG;  Start 10/24/17 at 21:00


Docusate Sodium (Colace) 200 mg QHS PO  Last administered on 10/28/17at 21:10; 

Admin Dose 200 MG;  Start 10/24/17 at 21:00


Ondansetron HCl (Zofran Inj) 4 mg Q6H  PRN IV NAUSEA AND/OR VOMITING;  Start 10/

24/17 at 21:00


Acetaminophen (Tylenol Tab) 650 mg Q6H  PRN PO PAIN LEVEL 1-3 OR FEVER;  Start 

10/24/17 at 21:00


Oxycodone/ Acetaminophen (Percocet (5/ 325)) 1 tab Q6H  PRN PO MODERATE PAIN 

LEVEL 4-6;  Start 10/24/17 at 21:00


Morphine Sulfate (morphine) 2 mg Q4H  PRN IV SEVERE PAIN LEVEL 7-10;  Start 10/

24/17 at 21:00


Docusate Sodium (Colace) 100 mg Q12H  PRN PO CONSTIPATION;  Start 10/24/17 at 21

:00


Magnesium Hydroxide (Milk Of Mag) 30 ml DAILY  PRN PO CONSTIPATION;  Start 10/24

/17 at 21:00


Famotidine (Pepcid) 20 mg Q12 PO  Last administered on 10/29/17at 08:38; Admin 

Dose 20 MG;  Start 10/24/17 at 21:00


Albuterol (Proventil 0.083% (Neb)) 2.5 mg Q2  PRN HHN SHORTNESS OF BREATH;  

Start 10/24/17 at 21:00


Miscellaneous Information 1 ea NOTE XX ;  Start 10/24/17 at 21:30


Glucose (Glutose) 15 gm Q15M  PRN PO DECREASED GLUCOSE;  Start 10/24/17 at 21:30


Glucose (Glutose) 22.5 gm Q15M  PRN PO DECREASED GLUCOSE;  Start 10/24/17 at 21:

30


Dextrose (D50w Syringe) 25 ml Q15M  PRN IV DECREASED GLUCOSE;  Start 10/24/17 

at 21:30


Dextrose (D50w Syringe) 50 ml Q15M  PRN IV DECREASED GLUCOSE;  Start 10/24/17 

at 21:30


Glucagon (Glucagen) 1 mg Q15M  PRN IM DECREASED GLUCOSE;  Start 10/24/17 at 21:

30


Glucose (Glutose) 15 gm Q15M  PRN BUCCAL DECREASED GLUCOSE;  Start 10/24/17 at 

21:30


Nystatin (Nystatin Powder) 1 applic BID TOP  Last administered on 10/29/17at 08:

49; Admin Dose 1 APPLIC;  Start 10/25/17 at 09:00


Tamoxifen Citrate (Nolvadex) 20 mg DAILY PO  Last administered on 10/29/17at 08:

47; Admin Dose 20 MG;  Start 10/25/17 at 13:00


Diagnostic Test (Pha) 1 ea 1 ea 02 XX  Last administered on 10/29/17at 02:00; 

Admin Dose 1 EA;  Start 10/26/17 at 02:00


Ceftriaxone Sodium 50 ml @  100 mls/hr Q24H IVPB  Last administered on 10/28/

17at 13:40; Admin Dose 100 MLS/HR;  Start 10/27/17 at 14:00


Vancomycin HCl/ Sodium Chloride (Vancocin/NS) 250 ml @  83.333 mls/ hr Q8H IVPB

  Last administered on 10/29/17at 08:17; Admin Dose 83.333 MLS/HR;  Start 10/28/

17 at 16:00





Assessment/Plan


Chief Complaint/Hosp Course


Assessment





1.  Metastatic breast cancer with recurrent right pleural effusion.  Cultures 

growing gram-positive cocci consistent with staph aureus.


2.  Loculated pleural effusion possible empyema


3.  CNS metastasis currently receiving steroids and radiation therapy


4.  Placed in respiratory isolation rule out TB will discuss with primary team





Plan





1.  CT chest trace loculated pleural effusion possible empyema in addition to 

malignant effusion.


2.  Radiology to replace Pleurx catheter in appropriate pleural space.


3.  Continue antibiotic coverage for staph infection.  Pending decortication.


Problems:  











DELFIN LAKHANI MD, [facility] Oct 29, 2017 10:11

## 2017-10-29 NOTE — CONS
Date/Time of Note


Date/Time of Note


DATE: 10/29/17 


TIME: 19:35





Assessment/Plan


Assessment/Plan


Chief Complaint/Hosp Course


ID PROGRESS NOTE 


CURRENT ABX:  TOTAL ABX DAY # 5 =>  Vanco IV + Ceftriaxone 


24H INTERVAL SUMMARY  


* Clincally status quo == stable NAD, without dyspnea on room air, no fevers 


* Morbid obesity + calm, polite, good historian 


*  Samir: 10/25/   Source:    THOR FLD                  


         *** Microbiology *** BODY FLUID CULTURE  Final  


         Organism 1                     STAPHYLOCOCCUS AUREUS


            QUANTITY                    2+


                                    S AUREUS         


                                    M.I.C.    RX     


                                    --------- ---    


     CEFAZOLIN                                 S     


     CIPROFLOXACIN                  <=0.5      S     


     CLINDAMYCIN                    <=0.25     S     


     DOXYCYCLINE                               S     


     ERYTHROMYCIN                   <=0.25     S     


     LEVOFLOXACIN                   <=0.12     S     


     OXACILLIN                      0.5        S     


     PENICILLIN-G                   >=0.5      R     


     RIFAMPIN                       <=0.5      S     


     VANCOMYCIN                     <=0.5      S     


     TRIMETHOPRIM/SULFAMETHOXAZOLE  <=10       S     





-------------------------------------------------





Physical Exam


Physical Exam       


Constitutional: VSS, NAD, morbid obese, A/A/O x4, afebrile 


HEENT: Unremarkable 


Neck: Supple, full ROM 


Respiratory:  Equal chest rise bilaterally, without dyspnea on observation, 

room air 


Cardiovascular:  nl pulses, regular rate and rhythm


Gastrointestinal:  Soft, large pannus 


Extremities:  Warm, no c/c/e





ID ASSESSMENT


46 yo morbid obese  F admit with: 


1.  Systemic inflammatory response syndrome.


2.  Loculated right pleural effusion, possible empyema


* s/p Thora w/ Pleural fluid culture grew oxacillin-sensitive Staphylococcus 

aureus, susceptible to majority antibiotics resistant to penicillin G.


3.  Diabetes.


4.  Metastatic breast carcinoma.


5.  History of craniectomy from brain metastasis.  


(- )MRSA Nares


INVASIVES: PIV


ABX ALLERGIES: PCN 


CURRENT ABX:  TOTAL ABX DAY # 5 =>  Vanco IV + Ceftriaxone 


ID RECOMMENDATIONS


1. Continue current ABX, tolerated Cephalosporin


2. Pending VATS





.


Problems:  





Consultation Date/Type/Reason


Admit Date/Time


Oct 24, 2017 at 18:29


Initial Consult Date


10/26/17


Type of Consultation:  ID


Referring Provider:  CLAU AGUILAR





Exam/Review of Systems


Vital Signs


Vitals





 Vital Signs








  Date Time  Temp Pulse Resp B/P Pulse Ox O2 Delivery O2 Flow Rate FiO2


 


10/29/17 13:58 98.9 79 18 119/69 98   


 


10/28/17 02:00      Room Air  


 


10/25/17 08:00       2.0 














 Intake and Output   


 


 10/28/17 10/28/17 10/29/17





 15:00 23:00 07:00


 


Intake Total 50 ml  1450 ml


 


Balance 50 ml  1450 ml











Results


Result Diagram:  


10/29/17 0458                                                                  

              10/29/17 0458





Results 24 hrs





Laboratory Tests








Test


  10/28/17


21:08 10/29/17


01:33 10/29/17


04:58 10/29/17


08:10


 


Bedside Glucose 193   180    150  


 


White Blood Count   19.9  H 


 


Red Blood Count   3.76  L 


 


Hemoglobin   8.1  L 


 


Hematocrit   28.1  L 


 


Mean Corpuscular Volume   74.7  L 


 


Mean Corpuscular Hemoglobin   21.5  L 


 


Mean Corpuscular Hemoglobin


Concent 


  


  28.8  L


  


 


 


Red Cell Distribution Width   22.5  H 


 


Platelet Count   214   


 


Mean Platelet Volume   9.0   


 


Neutrophils %   76.9   


 


Lymphocytes %   9.7  L 


 


Monocytes %   4.9   


 


Eosinophils %   0.0   


 


Basophils %   0.2   


 


Nucleated Red Blood Cells %   0.0   


 


Neutrophils #   15.3  H 


 


Lymphocytes #   1.9   


 


Monocytes #   1.0  H 


 


Eosinophils #   0.0   


 


Basophils #   0.0   


 


Nucleated Red Blood Cells #   0.0   


 


Sodium Level   136   


 


Potassium Level   4.1   


 


Chloride Level   105   


 


Carbon Dioxide Level   23   


 


Anion Gap   12   


 


Blood Urea Nitrogen   19   


 


Creatinine   0.47   


 


Glucose Level   169   


 


Calcium Level   8.0  L 


 


Phosphorus Level   3.4   


 


Magnesium Level   1.8   














Test


  10/29/17


12:16 10/29/17


17:09 


  


 


 


Bedside Glucose 136   144    











Medications


Medications





 Current Medications


Dexamethasone (Decadron) 4 mg BID PO  Last administered on 10/29/17at 08:38; 

Admin Dose 4 MG;  Start 10/24/17 at 21:00


Docusate Sodium (Colace) 200 mg QHS PO  Last administered on 10/28/17at 21:10; 

Admin Dose 200 MG;  Start 10/24/17 at 21:00


Ondansetron HCl (Zofran Inj) 4 mg Q6H  PRN IV NAUSEA AND/OR VOMITING;  Start 10/

24/17 at 21:00


Acetaminophen (Tylenol Tab) 650 mg Q6H  PRN PO PAIN LEVEL 1-3 OR FEVER;  Start 

10/24/17 at 21:00


Oxycodone/ Acetaminophen (Percocet (5/ 325)) 1 tab Q6H  PRN PO MODERATE PAIN 

LEVEL 4-6;  Start 10/24/17 at 21:00


Morphine Sulfate (morphine) 2 mg Q4H  PRN IV SEVERE PAIN LEVEL 7-10;  Start 10/

24/17 at 21:00


Docusate Sodium (Colace) 100 mg Q12H  PRN PO CONSTIPATION;  Start 10/24/17 at 21

:00


Magnesium Hydroxide (Milk Of Mag) 30 ml DAILY  PRN PO CONSTIPATION;  Start 10/24

/17 at 21:00


Famotidine (Pepcid) 20 mg Q12 PO  Last administered on 10/29/17at 08:38; Admin 

Dose 20 MG;  Start 10/24/17 at 21:00


Albuterol (Proventil 0.083% (Neb)) 2.5 mg Q2  PRN HHN SHORTNESS OF BREATH;  

Start 10/24/17 at 21:00


Miscellaneous Information 1 ea NOTE XX ;  Start 10/24/17 at 21:30


Glucose (Glutose) 15 gm Q15M  PRN PO DECREASED GLUCOSE;  Start 10/24/17 at 21:30


Glucose (Glutose) 22.5 gm Q15M  PRN PO DECREASED GLUCOSE;  Start 10/24/17 at 21:

30


Dextrose (D50w Syringe) 25 ml Q15M  PRN IV DECREASED GLUCOSE;  Start 10/24/17 

at 21:30


Dextrose (D50w Syringe) 50 ml Q15M  PRN IV DECREASED GLUCOSE;  Start 10/24/17 

at 21:30


Glucagon (Glucagen) 1 mg Q15M  PRN IM DECREASED GLUCOSE;  Start 10/24/17 at 21:

30


Glucose (Glutose) 15 gm Q15M  PRN BUCCAL DECREASED GLUCOSE;  Start 10/24/17 at 

21:30


Nystatin (Nystatin Powder) 1 applic BID TOP  Last administered on 10/29/17at 08:

49; Admin Dose 1 APPLIC;  Start 10/25/17 at 09:00


Tamoxifen Citrate (Nolvadex) 20 mg DAILY PO  Last administered on 10/29/17at 08:

47; Admin Dose 20 MG;  Start 10/25/17 at 13:00


Diagnostic Test (Pha) 1 ea 1 ea 02 XX  Last administered on 10/29/17at 02:00; 

Admin Dose 1 EA;  Start 10/26/17 at 02:00


Ceftriaxone Sodium 50 ml @  100 mls/hr Q24H IVPB  Last administered on 10/29/

17at 14:46; Admin Dose 100 MLS/HR;  Start 10/27/17 at 14:00


Vancomycin HCl/ Sodium Chloride (Vancocin/NS) 250 ml @  83.333 mls/ hr Q8H IVPB

  Last administered on 10/29/17at 16:30; Admin Dose 83.333 MLS/HR;  Start 10/28/

17 at 16:00


Miscellaneous Information (*Rx Drug Level Order Reminder*) VANCOMYCIN TROUGH 10

/ 29 AT 2300 ONCE  ONCE XX ;  Start 10/29/17 at 23:00;  Stop 10/29/17 at 23:01











DEANDRE GIBSON NP Oct 29, 2017 19:36

## 2017-10-29 NOTE — CONS
Date/Time of Note


Date/Time of Note


DATE: 10/29/17 


TIME: 12:14





Assessment/Plan


Assessment/Plan


Chief Complaint/Hosp Course


#Her2+/ER+/CA+ metastatic breast ca


-pt is non compliant and when she receives therapy, she is quite responsive to 

therapy


-therefore at this time, although she has terminal disease, there are still 

many more treatment options which she could benefit from


-given her Brain mets, I would consider a combination of Xeloda and Lapatinib 

which can penetrate the blood brain barrier


-once she stabilizes can restart Tamoxifen as well





#Pleural Effusion with superimposed infection


-pt is s/p thoracentesis


-plan is to proceed with pleurodesis given empyema and recurrent effusion. 


-appreciate pulmonary recs





#Brain Mets


-pt is s/p resection 


-to start WBRT with Dr. Sprague tomorrow





Problems:  





Consultation Date/Type/Reason


Admit Date/Time


Oct 24, 2017 at 18:29


Initial Consult Date


10/26/17


Type of Consultation:  Hematology


Reason for Consultation


metastatic breast cancer to brain


Referring Provider:  CLAU AGUILAR





24 HR Interval Summary


Free Text/Dictation


pt now out of isolation. to proceed with XRT tomorrow. continues on broad 

spectrum antibiotics





Exam/Review of Systems


Vital Signs


Vitals





 Vital Signs








  Date Time  Temp Pulse Resp B/P Pulse Ox O2 Delivery O2 Flow Rate FiO2


 


10/29/17 08:07 98.2 87 18 118/56 96   


 


10/28/17 02:00      Room Air  


 


10/25/17 08:00       2.0 














 Intake and Output   


 


 10/28/17 10/28/17 10/29/17





 15:00 23:00 07:00


 


Intake Total 50 ml  1450 ml


 


Balance 50 ml  1450 ml











Exam


Constitutional:  alert, oriented


Psych:  no complaints


Head:  other (s/p craniotomy, staples in place)


Eyes:  nl conjunctiva


ENMT:  nl external ears & nose


Neck:  supple


Respiratory:  diminished breath sounds, other (pleurex catheter in place)


Cardiovascular:  regular rate and rhythm


Gastrointestinal:  soft


Musculoskeletal:  nl extremities to inspection





Results


Result Diagram:  


10/29/17 0458                                                                  

              10/29/17 0458





Results 24 hrs





Laboratory Tests








Test


  10/28/17


17:15 10/28/17


21:08 10/29/17


01:33 10/29/17


04:58


 


Bedside Glucose 222  H 193   180   


 


White Blood Count    19.9  H


 


Red Blood Count    3.76  L


 


Hemoglobin    8.1  L


 


Hematocrit    28.1  L


 


Mean Corpuscular Volume    74.7  L


 


Mean Corpuscular Hemoglobin    21.5  L


 


Mean Corpuscular Hemoglobin


Concent 


  


  


  28.8  L


 


 


Red Cell Distribution Width    22.5  H


 


Platelet Count    214  


 


Mean Platelet Volume    9.0  


 


Neutrophils %    76.9  


 


Lymphocytes %    9.7  L


 


Monocytes %    4.9  


 


Eosinophils %    0.0  


 


Basophils %    0.2  


 


Nucleated Red Blood Cells %    0.0  


 


Neutrophils #    15.3  H


 


Lymphocytes #    1.9  


 


Monocytes #    1.0  H


 


Eosinophils #    0.0  


 


Basophils #    0.0  


 


Nucleated Red Blood Cells #    0.0  


 


Sodium Level    136  


 


Potassium Level    4.1  


 


Chloride Level    105  


 


Carbon Dioxide Level    23  


 


Anion Gap    12  


 


Blood Urea Nitrogen    19  


 


Creatinine    0.47  


 


Glucose Level    169  


 


Calcium Level    8.0  L


 


Phosphorus Level    3.4  


 


Magnesium Level    1.8  














Test


  10/29/17


08:10 


  


  


 


 


Bedside Glucose 150     











Medications


Medications





 Current Medications


Dexamethasone (Decadron) 4 mg BID PO  Last administered on 10/29/17at 08:38; 

Admin Dose 4 MG;  Start 10/24/17 at 21:00


Docusate Sodium (Colace) 200 mg QHS PO  Last administered on 10/28/17at 21:10; 

Admin Dose 200 MG;  Start 10/24/17 at 21:00


Ondansetron HCl (Zofran Inj) 4 mg Q6H  PRN IV NAUSEA AND/OR VOMITING;  Start 10/

24/17 at 21:00


Acetaminophen (Tylenol Tab) 650 mg Q6H  PRN PO PAIN LEVEL 1-3 OR FEVER;  Start 

10/24/17 at 21:00


Oxycodone/ Acetaminophen (Percocet (5/ 325)) 1 tab Q6H  PRN PO MODERATE PAIN 

LEVEL 4-6;  Start 10/24/17 at 21:00


Morphine Sulfate (morphine) 2 mg Q4H  PRN IV SEVERE PAIN LEVEL 7-10;  Start 10/

24/17 at 21:00


Docusate Sodium (Colace) 100 mg Q12H  PRN PO CONSTIPATION;  Start 10/24/17 at 21

:00


Magnesium Hydroxide (Milk Of Mag) 30 ml DAILY  PRN PO CONSTIPATION;  Start 10/24

/17 at 21:00


Famotidine (Pepcid) 20 mg Q12 PO  Last administered on 10/29/17at 08:38; Admin 

Dose 20 MG;  Start 10/24/17 at 21:00


Albuterol (Proventil 0.083% (Neb)) 2.5 mg Q2  PRN HHN SHORTNESS OF BREATH;  

Start 10/24/17 at 21:00


Miscellaneous Information 1 ea NOTE XX ;  Start 10/24/17 at 21:30


Glucose (Glutose) 15 gm Q15M  PRN PO DECREASED GLUCOSE;  Start 10/24/17 at 21:30


Glucose (Glutose) 22.5 gm Q15M  PRN PO DECREASED GLUCOSE;  Start 10/24/17 at 21:

30


Dextrose (D50w Syringe) 25 ml Q15M  PRN IV DECREASED GLUCOSE;  Start 10/24/17 

at 21:30


Dextrose (D50w Syringe) 50 ml Q15M  PRN IV DECREASED GLUCOSE;  Start 10/24/17 

at 21:30


Glucagon (Glucagen) 1 mg Q15M  PRN IM DECREASED GLUCOSE;  Start 10/24/17 at 21:

30


Glucose (Glutose) 15 gm Q15M  PRN BUCCAL DECREASED GLUCOSE;  Start 10/24/17 at 

21:30


Nystatin (Nystatin Powder) 1 applic BID TOP  Last administered on 10/29/17at 08:

49; Admin Dose 1 APPLIC;  Start 10/25/17 at 09:00


Tamoxifen Citrate (Nolvadex) 20 mg DAILY PO  Last administered on 10/29/17at 08:

47; Admin Dose 20 MG;  Start 10/25/17 at 13:00


Diagnostic Test (Pha) 1 ea 1 ea 02 XX  Last administered on 10/29/17at 02:00; 

Admin Dose 1 EA;  Start 10/26/17 at 02:00


Ceftriaxone Sodium 50 ml @  100 mls/hr Q24H IVPB  Last administered on 10/28/

17at 13:40; Admin Dose 100 MLS/HR;  Start 10/27/17 at 14:00


Vancomycin HCl/ Sodium Chloride (Vancocin/NS) 250 ml @  83.333 mls/ hr Q8H IVPB

  Last administered on 10/29/17at 08:17; Admin Dose 83.333 MLS/HR;  Start 10/28/

17 at 16:00











KRISTEL ORANTES M.D. Oct 29, 2017 12:16

## 2017-10-30 VITALS — DIASTOLIC BLOOD PRESSURE: 63 MMHG | SYSTOLIC BLOOD PRESSURE: 127 MMHG | RESPIRATION RATE: 18 BRPM

## 2017-10-30 VITALS — RESPIRATION RATE: 6 BRPM | SYSTOLIC BLOOD PRESSURE: 117 MMHG | DIASTOLIC BLOOD PRESSURE: 78 MMHG

## 2017-10-30 VITALS — DIASTOLIC BLOOD PRESSURE: 62 MMHG | SYSTOLIC BLOOD PRESSURE: 123 MMHG | RESPIRATION RATE: 19 BRPM

## 2017-10-30 VITALS — SYSTOLIC BLOOD PRESSURE: 116 MMHG | RESPIRATION RATE: 18 BRPM | DIASTOLIC BLOOD PRESSURE: 73 MMHG

## 2017-10-30 LAB
ABNORMAL IP MESSAGE: 1
ANION GAP SERPL CALC-SCNC: 13 MMOL/L (ref 8–16)
BUN SERPL-MCNC: 18 MG/DL (ref 7–20)
CALCIUM SERPL-MCNC: 8.3 MG/DL (ref 8.4–10.2)
CHLORIDE SERPL-SCNC: 105 MMOL/L (ref 97–110)
CO2 SERPL-SCNC: 23 MMOL/L (ref 21–31)
CREAT SERPL-MCNC: 0.43 MG/DL (ref 0.44–1)
ERYTHROCYTE [DISTWIDTH] IN BLOOD BY AUTOMATED COUNT: 22.3 % (ref 11.5–14.5)
GLUCOSE SERPL-MCNC: 157 MG/DL (ref 70–220)
HCT VFR BLD CALC: 29.1 % (ref 37–47)
HGB BLD-MCNC: 8.8 G/DL (ref 12–16)
LYMPHOCYTES # BLD AUTO: 1.4 10^3/UL (ref 0.8–2.9)
MAGNESIUM SERPL-MCNC: 1.8 MG/DL (ref 1.7–2.5)
MCH RBC QN AUTO: 22.4 PG (ref 29–33)
MCHC RBC AUTO-ENTMCNC: 30.2 G/DL (ref 32–37)
MCV RBC AUTO: 74 FL (ref 82–101)
MONOCYTES # BLD: 1.6 10^3/UL (ref 0.3–0.9)
MONOCYTES % (M): 7 % (ref 0–11)
NRBC BLD AUTO-RTO: 0 /100WBC (ref 0–0)
PHOSPHATE SERPL-MCNC: 3.7 MG/DL (ref 2.5–4.9)
PLATELET # BLD: 259 10^3/UL (ref 140–415)
PMV BLD AUTO: 9 FL (ref 7.4–10.4)
POSITIVE DIFF: (no result)
POTASSIUM SERPL-SCNC: 4.2 MMOL/L (ref 3.5–5.1)
RBC # BLD AUTO: 3.93 10^6/UL (ref 4.2–5.4)
SODIUM SERPL-SCNC: 137 MMOL/L (ref 135–144)
WBC # BLD AUTO: 23.2 10^3/UL (ref 4.8–10.8)

## 2017-10-30 RX ADMIN — DOCUSATE SODIUM SCH MG: 100 CAPSULE, LIQUID FILLED ORAL at 20:09

## 2017-10-30 RX ADMIN — FAMOTIDINE SCH MG: 20 TABLET ORAL at 09:47

## 2017-10-30 RX ADMIN — TAMOXIFEN CITRATE SCH MG: 10 TABLET, FILM COATED ORAL at 11:17

## 2017-10-30 RX ADMIN — VANCOMYCIN HYDROCHLORIDE SCH MLS/HR: 1 INJECTION, POWDER, LYOPHILIZED, FOR SOLUTION INTRAVENOUS at 09:49

## 2017-10-30 RX ADMIN — NYSTATIN SCH APPLIC: 100000 POWDER TOPICAL at 20:13

## 2017-10-30 RX ADMIN — FAMOTIDINE SCH MG: 20 TABLET ORAL at 20:09

## 2017-10-30 RX ADMIN — VANCOMYCIN HYDROCHLORIDE SCH MLS/HR: 1 INJECTION, POWDER, LYOPHILIZED, FOR SOLUTION INTRAVENOUS at 00:31

## 2017-10-30 RX ADMIN — CEFTRIAXONE SCH MLS/HR: 1 INJECTION, SOLUTION INTRAVENOUS at 14:27

## 2017-10-30 RX ADMIN — NYSTATIN SCH APPLIC: 100000 POWDER TOPICAL at 09:00

## 2017-10-30 NOTE — CONS
Date/Time of Note


Date/Time of Note


DATE: 10/30/17 


TIME: 11:49





Consult Date/Type/Reason


Admit Date/Time


Oct 24, 2017 at 18:29


Initial Consult Date


10/25/17


Type of Consultation:  Pulmonary


Ordering Provider:  CLAU AGUILAR





Subjective


No significant changes.  Remains stable this morning.





Objective





 Vital Signs








  Date Time  Temp Pulse Resp B/P Pulse Ox O2 Delivery O2 Flow Rate FiO2


 


10/30/17 07:19 98.4 69 6 117/78 99   


 


10/28/17 02:00      Room Air  














 Intake and Output   


 


 10/29/17 10/29/17 10/30/17





 15:00 23:00 07:00


 


Intake Total 250 ml 1480 ml 1050 ml


 


Balance 250 ml 1480 ml 1050 ml








Exam


GENERAL: Well-developed lady comfortable at rest


VITAL SIGNS:  per chart


NECK:  Supple.  No JVD or lymphadenopathy.


CARDIAC EXAM:  S1, S2. No added sounds or murmurs.


CHEST:  clear bilaterally, No added sounds, rales or wheezes


ABDOMEN:  Soft, nontender.  No guarding or rebound.


EXTREMITIES:  No cyanosis, clubbing or edema.


NEUROLOGIC:  Generalized weakness.  No focal deficits.





Results/Medications


Result Diagram:  


10/30/17 0526                                                                  

              10/30/17 0526





Results 24 hrs





Laboratory Tests








Test


  10/29/17


12:16 10/29/17


17:09 10/29/17


20:40 10/29/17


23:07


 


Bedside Glucose 136   144   210   


 


Vancomycin Level Trough    14.2  














Test


  10/30/17


02:27 10/30/17


05:26 10/30/17


07:25 


 


 


Bedside Glucose 190    127   


 


White Blood Count  23.2  H  


 


Red Blood Count  3.93  L  


 


Hemoglobin  8.8  L  


 


Hematocrit  29.1  L  


 


Mean Corpuscular Volume  74.0  L  


 


Mean Corpuscular Hemoglobin  22.4  L  


 


Mean Corpuscular Hemoglobin


Concent 


  30.2  L


  


  


 


 


Red Cell Distribution Width  22.3  H  


 


Platelet Count  259  #  


 


Mean Platelet Volume  9.0    


 


Neutrophils %      


 


Segmented Neutrophils %


(Manual) 


  79  H


  


  


 


 


Band Neutrophils % (Manual)  8  H  


 


Lymphocytes %      


 


Lymphocytes % (Manual)  6  L  


 


Monocytes %      


 


Monocytes % (Manual)  7    


 


Eosinophils %      


 


Basophils %      


 


Nucleated Red Blood Cells %  0.0    


 


Neutrophils #      


 


Neutrophils # (Manual)  18.7  H  


 


Band Neutrophils #  1.8  H  


 


Absolute Lymphocytes (Manual)  1.3    


 


Lymphocytes #  1.4    


 


Monocytes #  1.6  H  


 


Absolute Monocytes (Manual)  1.6  H  


 


Eosinophils #      


 


Basophils #      


 


Nucleated Red Blood Cells #      


 


Sodium Level  137    


 


Potassium Level  4.2    


 


Chloride Level  105    


 


Carbon Dioxide Level  23    


 


Anion Gap  13    


 


Blood Urea Nitrogen  18    


 


Creatinine  0.43  L  


 


Glucose Level  157    


 


Calcium Level  8.3  L  


 


Phosphorus Level  3.7    


 


Magnesium Level  1.8    








Medications





 Current Medications


Dexamethasone (Decadron) 4 mg BID PO  Last administered on 10/30/17at 09:47; 

Admin Dose 4 MG;  Start 10/24/17 at 21:00


Docusate Sodium (Colace) 200 mg QHS PO  Last administered on 10/29/17at 20:42; 

Admin Dose 200 MG;  Start 10/24/17 at 21:00


Ondansetron HCl (Zofran Inj) 4 mg Q6H  PRN IV NAUSEA AND/OR VOMITING;  Start 10/

24/17 at 21:00


Acetaminophen (Tylenol Tab) 650 mg Q6H  PRN PO PAIN LEVEL 1-3 OR FEVER;  Start 

10/24/17 at 21:00


Oxycodone/ Acetaminophen (Percocet (5/ 325)) 1 tab Q6H  PRN PO MODERATE PAIN 

LEVEL 4-6;  Start 10/24/17 at 21:00


Morphine Sulfate (morphine) 2 mg Q4H  PRN IV SEVERE PAIN LEVEL 7-10;  Start 10/

24/17 at 21:00


Docusate Sodium (Colace) 100 mg Q12H  PRN PO CONSTIPATION;  Start 10/24/17 at 21

:00


Magnesium Hydroxide (Milk Of Mag) 30 ml DAILY  PRN PO CONSTIPATION;  Start 10/24

/17 at 21:00


Famotidine (Pepcid) 20 mg Q12 PO  Last administered on 10/30/17at 09:47; Admin 

Dose 20 MG;  Start 10/24/17 at 21:00


Albuterol (Proventil 0.083% (Neb)) 2.5 mg Q2  PRN HHN SHORTNESS OF BREATH;  

Start 10/24/17 at 21:00


Miscellaneous Information 1 ea NOTE XX ;  Start 10/24/17 at 21:30


Glucose (Glutose) 15 gm Q15M  PRN PO DECREASED GLUCOSE;  Start 10/24/17 at 21:30


Glucose (Glutose) 22.5 gm Q15M  PRN PO DECREASED GLUCOSE;  Start 10/24/17 at 21:

30


Dextrose (D50w Syringe) 25 ml Q15M  PRN IV DECREASED GLUCOSE;  Start 10/24/17 

at 21:30


Dextrose (D50w Syringe) 50 ml Q15M  PRN IV DECREASED GLUCOSE;  Start 10/24/17 

at 21:30


Glucagon (Glucagen) 1 mg Q15M  PRN IM DECREASED GLUCOSE;  Start 10/24/17 at 21:

30


Glucose (Glutose) 15 gm Q15M  PRN BUCCAL DECREASED GLUCOSE;  Start 10/24/17 at 

21:30


Nystatin (Nystatin Powder) 1 applic BID TOP  Last administered on 10/30/17at 09:

00; Admin Dose 1 APPLIC;  Start 10/25/17 at 09:00


Tamoxifen Citrate (Nolvadex) 20 mg DAILY PO  Last administered on 10/30/17at 11:

17; Admin Dose 20 MG;  Start 10/25/17 at 13:00


Diagnostic Test (Pha) 1 ea 1 ea 02 XX  Last administered on 10/30/17at 02:34; 

Admin Dose 1 EA;  Start 10/26/17 at 02:00


Ceftriaxone Sodium 50 ml @  100 mls/hr Q24H IVPB  Last administered on 10/29/

17at 14:46; Admin Dose 100 MLS/HR;  Start 10/27/17 at 14:00


Vancomycin HCl/ Sodium Chloride (Vancocin/NS) 250 ml @  83.333 mls/ hr Q8H IVPB

  Last administered on 10/30/17at 09:49; Admin Dose 83.333 MLS/HR;  Start 10/28/

17 at 16:00





Assessment/Plan


Chief Complaint/Hosp Course


Assessment





1.  Metastatic breast cancer with recurrent right pleural effusion.  Cultures 

growing gram-positive cocci consistent with staph aureus.


2.  Loculated pleural effusion possible empyema


3.  CNS metastasis currently receiving steroids and radiation therapy








Plan





1.  CT chest trace loculated pleural effusion possible empyema in addition to 

malignant effusion.


2.  Continue oncology recommendations continues radiation therapy for CNS 

metastasis


3.  Continue antibiotic coverage for staph infection.  Pending decortication.


Problems:  











DELFIN LAKHANI [facility], [facility] Oct 30, 2017 11:50

## 2017-10-30 NOTE — PN
Date/Time of Note


Date/Time of Note


DATE: 10/30/17 


TIME: 15:44





Assessment/Plan


Lines/Catheters


IV Catheter Type (from Nrs):  Saline Lock





Assessment/Plan


Chief Complaint/Hosp Course


 


IMPRESSION:  Right loculated pleural effusion.


 


RECOMMENDATIONS:  We will proceed with video-assisted thoracic surgery and 

decortication with pleurodesis tomorrow.


.  Discussed with the patient.


Problems:  





Subjective


24 Hr Interval Summary


Constitutional:  improved


Pain Control:  mild





Exam/Review of Systems


Vital Signs


Vitals





 Vital Signs








  Date Time  Temp Pulse Resp B/P Pulse Ox O2 Delivery O2 Flow Rate FiO2


 


10/30/17 14:55 98.9 68 18 127/63 97   


 


10/28/17 02:00      Room Air  














 Intake and Output   


 


 10/29/17 10/29/17 10/30/17





 15:00 23:00 07:00


 


Intake Total 250 ml 1480 ml 1050 ml


 


Balance 250 ml 1480 ml 1050 ml











Exam


ENMT:  mucosa pink and moist, nl external ears & nose, nl lips & teeth, nl 

nasal mucosa & septum


Neck:  non-tender, supple


Respiratory:  clear to auscultation, normal air movement


Cardiovascular:  nl pulses, regular rate and rhythm


Gastrointestinal:  nl liver, spleen, non-tender, soft





Results


Result Diagram:  


10/30/17 0526                                                                  

              10/30/17 0526














HEIDY MAXWELL MD Oct 30, 2017 15:44

## 2017-10-30 NOTE — PN
Date/Time of Note


Date/Time of Note


DATE: 10/30/17 


TIME: 12:21





Assessment/Plan


VTE Prophylaxis


VTE Prophylaxis Intervention:  SCD's





Lines/Catheters


IV Catheter Type (from Nrs):  Saline Lock





Assessment/Plan


Assessment/Plan


1.  Dysfunctional Pleurx catheter


- Pulmonology on board and consultation appreciated


- Radiology adjusted catheter placement


- Respiratory status stable at this time





2.  Cellulitis, area around catheter


- ID on board and recommendations appreciated. Continue on antibiotics





3.  Empyema?, staph aureus. 


- CT chest showed trace loculated pleural effusion with possible empyema in 

addition to malignant effusion.


- CT surgery on board and plans for VATS and pleurodesis when able


- Continue IV antibiotics per ID


- Ultrasound-guided thoracentesis performed, cultures showing staph aureus





4.  Metastatic breast cancer, lung, liver, brain, s/p craniectomy 


- currently receiving radiation therapy and tamoxifen and Decadron


- Heme/onc on board and consultation appreciated





5. Diabetes mellitus


- Currently stable





6. Obesity


- Counseled about diet and improving exercise routine when stable





7. Disposition


- Awaiting VATS and pleurodesis





Subjective


24 Hr Interval Summary


Free Text/Dictation


Patient resting comfortably in no acute distress.  Denies any new complaints 

and no acute overnight events.  Plans for radiation today which patient has 

been tolerating.





Exam/Review of Systems


Vital Signs


Vitals





 Vital Signs








  Date Time  Temp Pulse Resp B/P Pulse Ox O2 Delivery O2 Flow Rate FiO2


 


10/30/17 07:19 98.4 69 6 117/78 99   


 


10/28/17 02:00      Room Air  














 Intake and Output   


 


 10/29/17 10/29/17 10/30/17





 15:00 23:00 07:00


 


Intake Total 250 ml 1480 ml 1050 ml


 


Balance 250 ml 1480 ml 1050 ml











Exam


General: Patient is laying in bed and answers questions appropriately. NAD


Mentation: Patient is alert and oriented 4,


Head: R side craniectomy site healing well.


Eyes: EOMI, pupils reactive to light


Neck: Supple, nontender, midline


Respiratory: decreased lung sounds on the R


Cardiovascular: regular rate, no obvious murmurs


Gastrointestinal: non-tender to palpation, bowel sounds heard.


Neurological: Moves all extremities spontaneously


Skin: mild erythematous skin around pleurx cath site





Results


Result Diagram:  


10/30/17 0526                                                                  

              10/30/17 0526





Results 24 hrs





Laboratory Tests








Test


  10/29/17


17:09 10/29/17


20:40 10/29/17


23:07 10/30/17


02:27


 


Bedside Glucose 144   210    190  


 


Vancomycin Level Trough   14.2   














Test


  10/30/17


05:26 10/30/17


07:25 10/30/17


11:48 


 


 


White Blood Count 23.2  H   


 


Red Blood Count 3.93  L   


 


Hemoglobin 8.8  L   


 


Hematocrit 29.1  L   


 


Mean Corpuscular Volume 74.0  L   


 


Mean Corpuscular Hemoglobin 22.4  L   


 


Mean Corpuscular Hemoglobin


Concent 30.2  L


  


  


  


 


 


Red Cell Distribution Width 22.3  H   


 


Platelet Count 259  #   


 


Mean Platelet Volume 9.0     


 


Neutrophils %      


 


Segmented Neutrophils %


(Manual) 79  H


  


  


  


 


 


Band Neutrophils % (Manual) 8  H   


 


Lymphocytes %      


 


Lymphocytes % (Manual) 6  L   


 


Monocytes %      


 


Monocytes % (Manual) 7     


 


Eosinophils %      


 


Basophils %      


 


Nucleated Red Blood Cells % 0.0     


 


Neutrophils #      


 


Neutrophils # (Manual) 18.7  H   


 


Band Neutrophils # 1.8  H   


 


Absolute Lymphocytes (Manual) 1.3     


 


Lymphocytes # 1.4     


 


Monocytes # 1.6  H   


 


Absolute Monocytes (Manual) 1.6  H   


 


Eosinophils #      


 


Basophils #      


 


Nucleated Red Blood Cells #      


 


Sodium Level 137     


 


Potassium Level 4.2     


 


Chloride Level 105     


 


Carbon Dioxide Level 23     


 


Anion Gap 13     


 


Blood Urea Nitrogen 18     


 


Creatinine 0.43  L   


 


Glucose Level 157     


 


Calcium Level 8.3  L   


 


Phosphorus Level 3.7     


 


Magnesium Level 1.8     


 


Bedside Glucose  127   113   











Medications


Medications





 Current Medications


Dexamethasone (Decadron) 4 mg BID PO  Last administered on 10/30/17at 09:47; 

Admin Dose 4 MG;  Start 10/24/17 at 21:00


Docusate Sodium (Colace) 200 mg QHS PO  Last administered on 10/29/17at 20:42; 

Admin Dose 200 MG;  Start 10/24/17 at 21:00


Ondansetron HCl (Zofran Inj) 4 mg Q6H  PRN IV NAUSEA AND/OR VOMITING;  Start 10/

24/17 at 21:00


Acetaminophen (Tylenol Tab) 650 mg Q6H  PRN PO PAIN LEVEL 1-3 OR FEVER;  Start 

10/24/17 at 21:00


Oxycodone/ Acetaminophen (Percocet (5/ 325)) 1 tab Q6H  PRN PO MODERATE PAIN 

LEVEL 4-6;  Start 10/24/17 at 21:00


Morphine Sulfate (morphine) 2 mg Q4H  PRN IV SEVERE PAIN LEVEL 7-10;  Start 10/

24/17 at 21:00


Docusate Sodium (Colace) 100 mg Q12H  PRN PO CONSTIPATION;  Start 10/24/17 at 21

:00


Magnesium Hydroxide (Milk Of Mag) 30 ml DAILY  PRN PO CONSTIPATION;  Start 10/24

/17 at 21:00


Famotidine (Pepcid) 20 mg Q12 PO  Last administered on 10/30/17at 09:47; Admin 

Dose 20 MG;  Start 10/24/17 at 21:00


Albuterol (Proventil 0.083% (Neb)) 2.5 mg Q2  PRN HHN SHORTNESS OF BREATH;  

Start 10/24/17 at 21:00


Miscellaneous Information 1 ea NOTE XX ;  Start 10/24/17 at 21:30


Glucose (Glutose) 15 gm Q15M  PRN PO DECREASED GLUCOSE;  Start 10/24/17 at 21:30


Glucose (Glutose) 22.5 gm Q15M  PRN PO DECREASED GLUCOSE;  Start 10/24/17 at 21:

30


Dextrose (D50w Syringe) 25 ml Q15M  PRN IV DECREASED GLUCOSE;  Start 10/24/17 

at 21:30


Dextrose (D50w Syringe) 50 ml Q15M  PRN IV DECREASED GLUCOSE;  Start 10/24/17 

at 21:30


Glucagon (Glucagen) 1 mg Q15M  PRN IM DECREASED GLUCOSE;  Start 10/24/17 at 21:

30


Glucose (Glutose) 15 gm Q15M  PRN BUCCAL DECREASED GLUCOSE;  Start 10/24/17 at 

21:30


Nystatin (Nystatin Powder) 1 applic BID TOP  Last administered on 10/30/17at 09:

00; Admin Dose 1 APPLIC;  Start 10/25/17 at 09:00


Tamoxifen Citrate (Nolvadex) 20 mg DAILY PO  Last administered on 10/30/17at 11:

17; Admin Dose 20 MG;  Start 10/25/17 at 13:00


Diagnostic Test (Pha) 1 ea 1 ea 02 XX  Last administered on 10/30/17at 02:34; 

Admin Dose 1 EA;  Start 10/26/17 at 02:00


Ceftriaxone Sodium 50 ml @  100 mls/hr Q24H IVPB  Last administered on 10/29/

17at 14:46; Admin Dose 100 MLS/HR;  Start 10/27/17 at 14:00


Vancomycin HCl/ Sodium Chloride (Vancocin/NS) 250 ml @  83.333 mls/ hr Q8H IVPB

  Last administered on 10/30/17at 09:49; Admin Dose 83.333 MLS/HR;  Start 10/28/

17 at 16:00


Miscellaneous Information (*Rx Drug Level Order Reminder*) VANCOMYCIN TROUGH 10

/ 31 AT 1500 ONCE  ONCE XX ;  Start 10/31/17 at 15:00;  Stop 10/31/17 at 15:01











RIVERA RAWLS MD Oct 30, 2017 12:21

## 2017-10-31 VITALS — RESPIRATION RATE: 20 BRPM | SYSTOLIC BLOOD PRESSURE: 98 MMHG | HEART RATE: 68 BPM | DIASTOLIC BLOOD PRESSURE: 56 MMHG

## 2017-10-31 VITALS — DIASTOLIC BLOOD PRESSURE: 78 MMHG | SYSTOLIC BLOOD PRESSURE: 132 MMHG | RESPIRATION RATE: 18 BRPM

## 2017-10-31 VITALS — DIASTOLIC BLOOD PRESSURE: 50 MMHG | HEART RATE: 66 BPM | SYSTOLIC BLOOD PRESSURE: 100 MMHG | RESPIRATION RATE: 21 BRPM

## 2017-10-31 VITALS — RESPIRATION RATE: 22 BRPM | HEART RATE: 72 BPM | DIASTOLIC BLOOD PRESSURE: 57 MMHG | SYSTOLIC BLOOD PRESSURE: 94 MMHG

## 2017-10-31 VITALS — DIASTOLIC BLOOD PRESSURE: 59 MMHG | SYSTOLIC BLOOD PRESSURE: 116 MMHG | RESPIRATION RATE: 19 BRPM

## 2017-10-31 VITALS — HEART RATE: 70 BPM

## 2017-10-31 VITALS — SYSTOLIC BLOOD PRESSURE: 132 MMHG | RESPIRATION RATE: 18 BRPM | DIASTOLIC BLOOD PRESSURE: 60 MMHG

## 2017-10-31 LAB
ABNORMAL IP MESSAGE: 1
ABNORMAL IP MESSAGE: 1
ALBUMIN SERPL-MCNC: 2.4 G/DL (ref 3.3–4.9)
ANION GAP SERPL CALC-SCNC: 10 MMOL/L (ref 8–16)
BASOPHILS # BLD AUTO: 0 10^3/UL (ref 0–0.1)
BASOPHILS # BLD AUTO: 0 10^3/UL (ref 0–0.1)
BASOPHILS NFR BLD: 0.1 % (ref 0–2)
BASOPHILS NFR BLD: 0.2 % (ref 0–2)
BUN SERPL-MCNC: 16 MG/DL (ref 7–20)
CALCIUM SERPL-MCNC: 8.2 MG/DL (ref 8.4–10.2)
CHLORIDE SERPL-SCNC: 107 MMOL/L (ref 97–110)
CO2 SERPL-SCNC: 24 MMOL/L (ref 21–31)
CREAT SERPL-MCNC: 0.41 MG/DL (ref 0.44–1)
EOSINOPHIL # BLD: 0 10^3/UL (ref 0–0.5)
EOSINOPHIL # BLD: 0 10^3/UL (ref 0–0.5)
EOSINOPHIL NFR BLD: 0 % (ref 0–7)
EOSINOPHIL NFR BLD: 0.1 % (ref 0–7)
ERYTHROCYTE [DISTWIDTH] IN BLOOD BY AUTOMATED COUNT: 22.3 % (ref 11.5–14.5)
ERYTHROCYTE [DISTWIDTH] IN BLOOD BY AUTOMATED COUNT: 22.5 % (ref 11.5–14.5)
GLUCOSE SERPL-MCNC: 151 MG/DL (ref 70–220)
HCT VFR BLD CALC: 26.7 % (ref 37–47)
HCT VFR BLD CALC: 28.6 % (ref 37–47)
HGB BLD-MCNC: 8.1 G/DL (ref 12–16)
HGB BLD-MCNC: 9 G/DL (ref 12–16)
LYMPHOCYTES # BLD AUTO: 1.6 10^3/UL (ref 0.8–2.9)
LYMPHOCYTES # BLD AUTO: 2.8 10^3/UL (ref 0.8–2.9)
LYMPHOCYTES NFR BLD AUTO: 14.9 % (ref 15–51)
LYMPHOCYTES NFR BLD AUTO: 8.8 % (ref 15–51)
MAGNESIUM SERPL-MCNC: 1.7 MG/DL (ref 1.7–2.5)
MCH RBC QN AUTO: 22.4 PG (ref 29–33)
MCH RBC QN AUTO: 24.4 PG (ref 29–33)
MCHC RBC AUTO-ENTMCNC: 30.3 G/DL (ref 32–37)
MCHC RBC AUTO-ENTMCNC: 31.5 G/DL (ref 32–37)
MCV RBC AUTO: 73.8 FL (ref 82–101)
MCV RBC AUTO: 77.5 FL (ref 82–101)
MONOCYTES # BLD: 1 10^3/UL (ref 0.3–0.9)
MONOCYTES # BLD: 1.2 10^3/UL (ref 0.3–0.9)
MONOCYTES NFR BLD: 5.4 % (ref 0–11)
MONOCYTES NFR BLD: 6.6 % (ref 0–11)
NEUTROPHILS # BLD: 13.8 10^3/UL (ref 1.6–7.5)
NEUTROPHILS # BLD: 13.8 10^3/UL (ref 1.6–7.5)
NEUTROPHILS NFR BLD AUTO: 73.6 % (ref 39–77)
NEUTROPHILS NFR BLD AUTO: 78 % (ref 39–77)
NRBC # BLD MANUAL: 0 10^3/UL (ref 0–0)
NRBC # BLD MANUAL: 0 10^3/UL (ref 0–0)
NRBC BLD AUTO-RTO: 0 /100WBC (ref 0–0)
NRBC BLD AUTO-RTO: 0 /100WBC (ref 0–0)
PHOSPHATE SERPL-MCNC: 3.6 MG/DL (ref 2.5–4.9)
PLATELET # BLD: 192 10^3/UL (ref 140–415)
PLATELET # BLD: 198 10^3/UL (ref 140–415)
PMV BLD AUTO: 8.5 FL (ref 7.4–10.4)
PMV BLD AUTO: 8.6 FL (ref 7.4–10.4)
POSITIVE DIFF: (no result)
POSITIVE DIFF: (no result)
POTASSIUM SERPL-SCNC: 3.9 MMOL/L (ref 3.5–5.1)
RBC # BLD AUTO: 3.62 10^6/UL (ref 4.2–5.4)
RBC # BLD AUTO: 3.69 10^6/UL (ref 4.2–5.4)
SODIUM SERPL-SCNC: 137 MMOL/L (ref 135–144)
WBC # BLD AUTO: 17.7 10^3/UL (ref 4.8–10.8)
WBC # BLD AUTO: 18.7 10^3/UL (ref 4.8–10.8)

## 2017-10-31 PROCEDURE — 0B5N4ZZ DESTRUCTION OF RIGHT PLEURA, PERCUTANEOUS ENDOSCOPIC APPROACH: ICD-10-PCS | Performed by: THORACIC SURGERY (CARDIOTHORACIC VASCULAR SURGERY)

## 2017-10-31 PROCEDURE — 0BJ08ZZ INSPECTION OF TRACHEOBRONCHIAL TREE, VIA NATURAL OR ARTIFICIAL OPENING ENDOSCOPIC: ICD-10-PCS | Performed by: THORACIC SURGERY (CARDIOTHORACIC VASCULAR SURGERY)

## 2017-10-31 RX ADMIN — FOLIC ACID SCH MLS/HR: 5 INJECTION, SOLUTION INTRAMUSCULAR; INTRAVENOUS; SUBCUTANEOUS at 00:48

## 2017-10-31 RX ADMIN — FAMOTIDINE SCH MG: 20 TABLET ORAL at 09:00

## 2017-10-31 RX ADMIN — MORPHINE SULFATE PRN MG: 2 INJECTION, SOLUTION INTRAMUSCULAR; INTRAVENOUS at 20:41

## 2017-10-31 RX ADMIN — FOLIC ACID SCH MLS/HR: 5 INJECTION, SOLUTION INTRAMUSCULAR; INTRAVENOUS; SUBCUTANEOUS at 13:23

## 2017-10-31 RX ADMIN — FAMOTIDINE SCH MG: 20 TABLET ORAL at 21:00

## 2017-10-31 RX ADMIN — DOCUSATE SODIUM SCH MG: 100 CAPSULE, LIQUID FILLED ORAL at 21:00

## 2017-10-31 RX ADMIN — NYSTATIN SCH APPLIC: 100000 POWDER TOPICAL at 09:42

## 2017-10-31 RX ADMIN — TAMOXIFEN CITRATE SCH MG: 10 TABLET, FILM COATED ORAL at 09:00

## 2017-10-31 RX ADMIN — CEFTRIAXONE SCH MLS/HR: 1 INJECTION, SOLUTION INTRAVENOUS at 13:20

## 2017-10-31 RX ADMIN — FOLIC ACID SCH MLS/HR: 5 INJECTION, SOLUTION INTRAMUSCULAR; INTRAVENOUS; SUBCUTANEOUS at 20:36

## 2017-10-31 NOTE — PN
Date/Time of Note


Date/Time of Note


DATE: 10/31/17 


TIME: 14:33





Assessment/Plan


VTE Prophylaxis


VTE Prophylaxis Intervention:  other





Lines/Catheters


IV Catheter Type (from Nrs):  Peripheral IV





Assessment/Plan


Assessment/Plan


1.  Dysfunctional Pleurx catheter


- Pulmonology on board and consultation appreciated


- Radiology adjusted catheter placement


- Respiratory status stable at this time





2.  Cellulitis, area around catheter


- ID on board and recommendations appreciated. Continue on antibiotics





3.  Empyema?, staph aureus. 


- CT chest showed trace loculated pleural effusion with possible empyema in 

addition to malignant effusion.


- CT surgery on board and plans for VATS and pleurodesis this afternoon


- Continue IV antibiotics per ID


- Ultrasound-guided thoracentesis performed, cultures showing staph aureus





4.  Metastatic breast cancer, lung, liver, brain, s/p craniectomy 


- currently receiving radiation therapy and tamoxifen and Decadron


- Heme/onc on board and consultation appreciated





5. Diabetes mellitus


- Currently stable





6. Obesity


- Counseled about diet and improving exercise routine when stable





7. Disposition


- VATS today





Subjective


24 Hr Interval Summary


Free Text/Dictation


Patient doing well and seen this am.  Received 1 unit pRBC which she tolerated 

well.  Plans for VATS today. No acute overnight events.





Exam/Review of Systems


Vital Signs


Vitals





 Vital Signs








  Date Time  Temp Pulse Resp B/P Pulse Ox O2 Delivery O2 Flow Rate FiO2


 


10/31/17 07:17 97.8 73 18 132/78 99   


 


10/28/17 02:00      Room Air  














 Intake and Output   


 


 10/30/17 10/30/17 10/31/17





 15:00 23:00 07:00


 


Intake Total 300 ml 1620 ml 280 ml


 


Balance 300 ml 1620 ml 280 ml











Exam


General: Patient in NAD. awake and alert


Head: R side craniectomy site healing well.


Eyes: EOMI, pupils reactive to light


Neck: Supple, nontender, midline


Respiratory: decreased lung sounds on the R


Cardiovascular: regular rate, no obvious murmurs


Gastrointestinal: non-tender to palpation, bowel sounds heard.


Neurological: Moves all extremities spontaneously


Skin: mild erythematous skin around pleurx cath site





Results


Result Diagram:  


10/31/17 0512                                                                  

              10/31/17 0512





Results 24 hrs





Laboratory Tests








Test


  10/30/17


17:35 10/30/17


20:08 10/31/17


00:49 10/31/17


05:11


 


Bedside Glucose 142   172   251  H 170  














Test


  10/31/17


05:12 10/31/17


09:28 10/31/17


13:19 


 


 


White Blood Count 17.7  #H   


 


Red Blood Count 3.62  L   


 


Hemoglobin 8.1  L   


 


Hematocrit 26.7  L   


 


Mean Corpuscular Volume 73.8  L   


 


Mean Corpuscular Hemoglobin 22.4  L   


 


Mean Corpuscular Hemoglobin


Concent 30.3  L


  


  


  


 


 


Red Cell Distribution Width 22.3  H   


 


Platelet Count 198  #   


 


Mean Platelet Volume 8.5     


 


Neutrophils % 78.0  H   


 


Lymphocytes % 8.8  L   


 


Monocytes % 5.4     


 


Eosinophils % 0.0     


 


Basophils % 0.1     


 


Nucleated Red Blood Cells % 0.0     


 


Neutrophils # 13.8  H   


 


Lymphocytes # 1.6     


 


Monocytes # 1.0  H   


 


Eosinophils # 0.0     


 


Basophils # 0.0     


 


Nucleated Red Blood Cells # 0.0     


 


Sodium Level 137     


 


Potassium Level 3.9     


 


Chloride Level 107     


 


Carbon Dioxide Level 24     


 


Anion Gap 10     


 


Blood Urea Nitrogen 16     


 


Creatinine 0.41  L   


 


Glucose Level 151     


 


Calcium Level 8.2  L   


 


Phosphorus Level 3.6     


 


Magnesium Level 1.7     


 


Albumin 2.4  L   


 


Bedside Glucose  97   78   











Medications


Medications





 Current Medications


Dexamethasone (Decadron) 4 mg BID PO  Last administered on 10/30/17at 20:09; 

Admin Dose 4 MG;  Start 10/24/17 at 21:00


Docusate Sodium (Colace) 200 mg QHS PO  Last administered on 10/30/17at 20:09; 

Admin Dose 200 MG;  Start 10/24/17 at 21:00


Ondansetron HCl (Zofran Inj) 4 mg Q6H  PRN IV NAUSEA AND/OR VOMITING;  Start 10/

24/17 at 21:00


Acetaminophen (Tylenol Tab) 650 mg Q6H  PRN PO PAIN LEVEL 1-3 OR FEVER;  Start 

10/24/17 at 21:00


Oxycodone/ Acetaminophen (Percocet (5/ 325)) 1 tab Q6H  PRN PO MODERATE PAIN 

LEVEL 4-6;  Start 10/24/17 at 21:00


Morphine Sulfate (morphine) 2 mg Q4H  PRN IV SEVERE PAIN LEVEL 7-10;  Start 10/

24/17 at 21:00


Docusate Sodium (Colace) 100 mg Q12H  PRN PO CONSTIPATION;  Start 10/24/17 at 21

:00


Magnesium Hydroxide (Milk Of Mag) 30 ml DAILY  PRN PO CONSTIPATION;  Start 10/24

/17 at 21:00


Famotidine (Pepcid) 20 mg Q12 PO  Last administered on 10/30/17at 20:09; Admin 

Dose 20 MG;  Start 10/24/17 at 21:00


Albuterol (Proventil 0.083% (Neb)) 2.5 mg Q2  PRN HHN SHORTNESS OF BREATH;  

Start 10/24/17 at 21:00


Miscellaneous Information 1 ea NOTE XX ;  Start 10/24/17 at 21:30


Glucose (Glutose) 15 gm Q15M  PRN PO DECREASED GLUCOSE;  Start 10/24/17 at 21:30


Glucose (Glutose) 22.5 gm Q15M  PRN PO DECREASED GLUCOSE;  Start 10/24/17 at 21:

30


Dextrose (D50w Syringe) 25 ml Q15M  PRN IV DECREASED GLUCOSE;  Start 10/24/17 

at 21:30


Dextrose (D50w Syringe) 50 ml Q15M  PRN IV DECREASED GLUCOSE;  Start 10/24/17 

at 21:30


Glucagon (Glucagen) 1 mg Q15M  PRN IM DECREASED GLUCOSE;  Start 10/24/17 at 21:

30


Glucose (Glutose) 15 gm Q15M  PRN BUCCAL DECREASED GLUCOSE;  Start 10/24/17 at 

21:30


Nystatin (Nystatin Powder) 1 applic BID TOP  Last administered on 10/31/17at 09:

42; Admin Dose 1 APPLIC;  Start 10/25/17 at 09:00


Tamoxifen Citrate (Nolvadex) 20 mg DAILY PO  Last administered on 10/30/17at 11:

17; Admin Dose 20 MG;  Start 10/25/17 at 13:00


Diagnostic Test (Pha) 1 ea 1 ea 02 XX  Last administered on 10/30/17at 02:34; 

Admin Dose 1 EA;  Start 10/26/17 at 02:00


Ceftriaxone Sodium 50 ml @  100 mls/hr Q24H IVPB  Last administered on 10/31/

17at 13:20; Admin Dose 100 MLS/HR;  Start 10/27/17 at 14:00


Sodium Chloride (NS) 1,000 ml @  70 mls/hr [id number] IV  Last administered on 10/

31/17at 00:48; Admin Dose 70 MLS/HR;  Start 10/31/17 at 00:00


Insulin Aspart (Novolog Insulin Pen) NOVOLOG *MILD* ALGORI... Q4 SC  Last 

administered on 10/31/17at 05:15; Admin Dose 1 UNIT;  Start 10/31/17 at 01:00











RIVERA RAWLS MD Oct 31, 2017 14:35

## 2017-10-31 NOTE — PN
DATE:  10/30/2017

 

 

SUBJECTIVE:  The patient is alert, denies pain, discomfort.  No fevers.  Vital signs stable.

 

LABORATORY STUDIES:   WBC today 23.2, H and H 8.8 and 29.1, platelets 259, neutrophils 79, bands 8, 
lymphs 6.  BUN 18, creatinine 0.43.

 

MICROBIOLOGY:  Pleural fluid culture grew oxacillin-sensitive Staphylococcus aureus.

 

ANTIMICROBIALS:  The patient is on:

1.  IV vancomycin.

2.  Rocephin.

 

INDWELLINGS:  The patient has right PleurX.

 

PHYSICAL EXAMINATION:

GENERAL:  This is an obese, well-developed, middle-aged woman who is in no distress.

HEENT:  Head atraumatic, normocephalic.  Sclerae anicteric.  Buccal mucosa dry.

NECK:  Supple.

CHEST:  Rise symmetrical.  Breath sounds clear, diminished to bases.

HEART:  S1, S2.

ABDOMEN:  Soft.  Bowel tones present.

EXTREMITIES:  Without cyanosis.

 

ASSESSMENT:

1.  Systemic inflammatory response syndrome with persistent leukocytosis.

2.  Loculated right pleural effusion, possible empyema, with culture growing oxacillin-sensitive Sta
phylococcus aureus.

3.  Metastatic breast cancer.

4.  Obesity.

5.  Diabetes.

6.  History of craniectomy for brain metastasis.

 

PLAN:  The patient is stable.  She is being seen by multiple consultants.  We are going to discontin
ue vancomycin and keep her on Rocephin.  Await for VATS with pleural catheter removal.

 

 

Dictated By: IDANA RANDALL NP for JERROLD DREYER MD NI/LAKISHA

DD:    10/30/2017 13:45:43

DT:    10/30/2017 17:03:42

Conf#: [id number]

DID#:  [id number]

## 2017-10-31 NOTE — OPR
Date/Time of Note


Date/Time of Note


DATE: 10/31/17 


TIME: 19:17





Operative Report


Procedure Date:  Oct 31, 2017


Preoperative Diagnosis


Right pleural effusion


Postoperative Diagnosis


same


Operation/Procedure Performed


Right VATS pleurodesis decortication


Surgeon


see signature line


Assistant


none


Anesthesia Type:  general


Estimated Blood Loss:  50 - 100 ml's


Transfusion


1 unit  PRBC


Specimen


peel


Grafts/Implants


none


Tubes/Drains


CT


Complications


none


Pt Condition Post Procedure:  critical


Disposition:  PACU


Procedure Description


Dictated











HEIDY MAXWELL MD Oct 31, 2017 19:19

## 2017-10-31 NOTE — CONS
Date/Time of Note


Date/Time of Note


DATE: 10/31/17 


TIME: 13:55





Consult Date/Type/Reason


Admit Date/Time


Oct 24, 2017 at 18:29


Initial Consult Date


10/26/17


Type of Consultation:  ID


Ordering Provider:  CLAU AGUILAR





Objective





 Vital Signs








  Date Time  Temp Pulse Resp B/P Pulse Ox O2 Delivery O2 Flow Rate FiO2


 


10/31/17 07:17 97.8 73 18 132/78 99   


 


10/28/17 02:00      Room Air  














 Intake and Output   


 


 10/30/17 10/30/17 10/31/17





 15:00 23:00 07:00


 


Intake Total 300 ml 1620 ml 280 ml


 


Balance 300 ml 1620 ml 280 ml











Results/Medications


Result Diagram:  


10/31/17 0512                                                                  

              10/31/17 0512





Results 24 hrs





Laboratory Tests








Test


  10/30/17


17:35 10/30/17


20:08 10/31/17


00:49 10/31/17


05:11


 


Bedside Glucose 142   172   251  H 170  














Test


  10/31/17


05:12 10/31/17


09:28 10/31/17


13:19 


 


 


White Blood Count 17.7  #H   


 


Red Blood Count 3.62  L   


 


Hemoglobin 8.1  L   


 


Hematocrit 26.7  L   


 


Mean Corpuscular Volume 73.8  L   


 


Mean Corpuscular Hemoglobin 22.4  L   


 


Mean Corpuscular Hemoglobin


Concent 30.3  L


  


  


  


 


 


Red Cell Distribution Width 22.3  H   


 


Platelet Count 198  #   


 


Mean Platelet Volume 8.5     


 


Neutrophils % 78.0  H   


 


Lymphocytes % 8.8  L   


 


Monocytes % 5.4     


 


Eosinophils % 0.0     


 


Basophils % 0.1     


 


Nucleated Red Blood Cells % 0.0     


 


Neutrophils # 13.8  H   


 


Lymphocytes # 1.6     


 


Monocytes # 1.0  H   


 


Eosinophils # 0.0     


 


Basophils # 0.0     


 


Nucleated Red Blood Cells # 0.0     


 


Sodium Level 137     


 


Potassium Level 3.9     


 


Chloride Level 107     


 


Carbon Dioxide Level 24     


 


Anion Gap 10     


 


Blood Urea Nitrogen 16     


 


Creatinine 0.41  L   


 


Glucose Level 151     


 


Calcium Level 8.2  L   


 


Phosphorus Level 3.6     


 


Magnesium Level 1.7     


 


Albumin 2.4  L   


 


Bedside Glucose  97   78   








Medications





 Current Medications


Dexamethasone (Decadron) 4 mg BID PO  Last administered on 10/30/17at 20:09; 

Admin Dose 4 MG;  Start 10/24/17 at 21:00


Docusate Sodium (Colace) 200 mg QHS PO  Last administered on 10/30/17at 20:09; 

Admin Dose 200 MG;  Start 10/24/17 at 21:00


Ondansetron HCl (Zofran Inj) 4 mg Q6H  PRN IV NAUSEA AND/OR VOMITING;  Start 10/

24/17 at 21:00


Acetaminophen (Tylenol Tab) 650 mg Q6H  PRN PO PAIN LEVEL 1-3 OR FEVER;  Start 

10/24/17 at 21:00


Oxycodone/ Acetaminophen (Percocet (5/ 325)) 1 tab Q6H  PRN PO MODERATE PAIN 

LEVEL 4-6;  Start 10/24/17 at 21:00


Morphine Sulfate (morphine) 2 mg Q4H  PRN IV SEVERE PAIN LEVEL 7-10;  Start 10/

24/17 at 21:00


Docusate Sodium (Colace) 100 mg Q12H  PRN PO CONSTIPATION;  Start 10/24/17 at 21

:00


Magnesium Hydroxide (Milk Of Mag) 30 ml DAILY  PRN PO CONSTIPATION;  Start 10/24

/17 at 21:00


Famotidine (Pepcid) 20 mg Q12 PO  Last administered on 10/30/17at 20:09; Admin 

Dose 20 MG;  Start 10/24/17 at 21:00


Albuterol (Proventil 0.083% (Neb)) 2.5 mg Q2  PRN HHN SHORTNESS OF BREATH;  

Start 10/24/17 at 21:00


Miscellaneous Information 1 ea NOTE XX ;  Start 10/24/17 at 21:30


Glucose (Glutose) 15 gm Q15M  PRN PO DECREASED GLUCOSE;  Start 10/24/17 at 21:30


Glucose (Glutose) 22.5 gm Q15M  PRN PO DECREASED GLUCOSE;  Start 10/24/17 at 21:

30


Dextrose (D50w Syringe) 25 ml Q15M  PRN IV DECREASED GLUCOSE;  Start 10/24/17 

at 21:30


Dextrose (D50w Syringe) 50 ml Q15M  PRN IV DECREASED GLUCOSE;  Start 10/24/17 

at 21:30


Glucagon (Glucagen) 1 mg Q15M  PRN IM DECREASED GLUCOSE;  Start 10/24/17 at 21:

30


Glucose (Glutose) 15 gm Q15M  PRN BUCCAL DECREASED GLUCOSE;  Start 10/24/17 at 

21:30


Nystatin (Nystatin Powder) 1 applic BID TOP  Last administered on 10/31/17at 09:

42; Admin Dose 1 APPLIC;  Start 10/25/17 at 09:00


Tamoxifen Citrate (Nolvadex) 20 mg DAILY PO  Last administered on 10/30/17at 11:

17; Admin Dose 20 MG;  Start 10/25/17 at 13:00


Diagnostic Test (Pha) 1 ea 1 ea 02 XX  Last administered on 10/30/17at 02:34; 

Admin Dose 1 EA;  Start 10/26/17 at 02:00


Ceftriaxone Sodium 50 ml @  100 mls/hr Q24H IVPB  Last administered on 10/31/

17at 13:20; Admin Dose 100 MLS/HR;  Start 10/27/17 at 14:00


Sodium Chloride (NS) 1,000 ml @  70 mls/hr [id number] IV  Last administered on 10/

31/17at 00:48; Admin Dose 70 MLS/HR;  Start 10/31/17 at 00:00


Insulin Aspart (Novolog Insulin Pen) NOVOLOG *MILD* ALGORI... Q4 SC  Last 

administered on 10/31/17at 05:15; Admin Dose 1 UNIT;  Start 10/31/17 at 01:00





Assessment/Plan


Chief Complaint/Hosp Course


SUBJECTIVE:  The patient is alert, denies pain, discomfort.  No fevers.  Vital 

signs stable.


  


MICROBIOLOGY:  Pleural fluid culture grew oxacillin-sensitive Staphylococcus 

aureus.


 


ANTIMICROBIALS:  The patient is on Rocephin.


 


INDWELLINGS: Right PleurX.


 


PHYSICAL EXAMINATION:


GENERAL:  This is an obese, well-developed, middle-aged woman who is in no 

distress.


HEENT:  Head atraumatic, normocephalic.  Sclerae anicteric.  Buccal mucosa dry.


NECK:  Supple.


CHEST:  Rise symmetrical.  Breath sounds clear, diminished to bases.


HEART:  S1, S2.


ABDOMEN:  Soft.  Bowel tones present.


EXTREMITIES:  Without cyanosis.


 


ASSESSMENT:


1.  Systemic inflammatory response syndrome with persistent leukocytosis 2 to #

2.


2.  Loculated right pleural effusion, possible empyema, with culture growing 

oxacillin-sensitive Staphylococcus aureus.


3.  Metastatic breast cancer.


4.  Obesity.


5.  Diabetes.


6.  History of craniectomy for brain metastasis.


 


PLAN:  The patient is stable. Continue abx. Pending VATS  





DW pt/staff


Problems:  











DIANA RANDALL NP Oct 31, 2017 13:57

## 2017-10-31 NOTE — CONS
Date/Time of Note


Date/Time of Note


DATE: 10/31/17 


TIME: 10:46





Consult Date/Type/Reason


Admit Date/Time


Oct 24, 2017 at 18:29


Initial Consult Date


10/25/17


Type of Consultation:  Pulmonary


Ordering Provider:  CLAU AGUILAR





Subjective


Patient comfortable today.  No new events.





Objective





 Vital Signs








  Date Time  Temp Pulse Resp B/P Pulse Ox O2 Delivery O2 Flow Rate FiO2


 


10/31/17 07:17 97.8 73 18 132/78 99   


 


10/28/17 02:00      Room Air  














 Intake and Output   


 


 10/30/17 10/30/17 10/31/17





 15:00 23:00 07:00


 


Intake Total 300 ml 1620 ml 280 ml


 


Balance 300 ml 1620 ml 280 ml








Exam


GENERAL: Well-developed lady comfortable at rest


VITAL SIGNS:  per chart


NECK:  Supple.  No JVD or lymphadenopathy.


CARDIAC EXAM:  S1, S2. No added sounds or murmurs.


CHEST:  clear bilaterally, No added sounds, rales or wheezes


ABDOMEN:  Soft, nontender.  No guarding or rebound.


EXTREMITIES:  No cyanosis, clubbing or edema.


NEUROLOGIC:  Generalized weakness.  No focal deficits.





Results/Medications


Result Diagram:  


10/31/17 0512                                                                  

              10/31/17 0512





Results 24 hrs





Laboratory Tests








Test


  10/30/17


11:48 10/30/17


17:35 10/30/17


20:08 10/31/17


00:49


 


Bedside Glucose 113   142   172   251  H














Test


  10/31/17


05:11 10/31/17


05:12 10/31/17


09:28 


 


 


Bedside Glucose 170    97   


 


White Blood Count  17.7  #H  


 


Red Blood Count  3.62  L  


 


Hemoglobin  8.1  L  


 


Hematocrit  26.7  L  


 


Mean Corpuscular Volume  73.8  L  


 


Mean Corpuscular Hemoglobin  22.4  L  


 


Mean Corpuscular Hemoglobin


Concent 


  30.3  L


  


  


 


 


Red Cell Distribution Width  22.3  H  


 


Platelet Count  198  #  


 


Mean Platelet Volume  8.5    


 


Neutrophils %  78.0  H  


 


Lymphocytes %  8.8  L  


 


Monocytes %  5.4    


 


Eosinophils %  0.0    


 


Basophils %  0.1    


 


Nucleated Red Blood Cells %  0.0    


 


Neutrophils #  13.8  H  


 


Lymphocytes #  1.6    


 


Monocytes #  1.0  H  


 


Eosinophils #  0.0    


 


Basophils #  0.0    


 


Nucleated Red Blood Cells #  0.0    


 


Sodium Level  137    


 


Potassium Level  3.9    


 


Chloride Level  107    


 


Carbon Dioxide Level  24    


 


Anion Gap  10    


 


Blood Urea Nitrogen  16    


 


Creatinine  0.41  L  


 


Glucose Level  151    


 


Calcium Level  8.2  L  


 


Phosphorus Level  3.6    


 


Magnesium Level  1.7    


 


Albumin  2.4  L  








Medications





 Current Medications


Dexamethasone (Decadron) 4 mg BID PO  Last administered on 10/30/17at 20:09; 

Admin Dose 4 MG;  Start 10/24/17 at 21:00


Docusate Sodium (Colace) 200 mg QHS PO  Last administered on 10/30/17at 20:09; 

Admin Dose 200 MG;  Start 10/24/17 at 21:00


Ondansetron HCl (Zofran Inj) 4 mg Q6H  PRN IV NAUSEA AND/OR VOMITING;  Start 10/

24/17 at 21:00


Acetaminophen (Tylenol Tab) 650 mg Q6H  PRN PO PAIN LEVEL 1-3 OR FEVER;  Start 

10/24/17 at 21:00


Oxycodone/ Acetaminophen (Percocet (5/ 325)) 1 tab Q6H  PRN PO MODERATE PAIN 

LEVEL 4-6;  Start 10/24/17 at 21:00


Morphine Sulfate (morphine) 2 mg Q4H  PRN IV SEVERE PAIN LEVEL 7-10;  Start 10/

24/17 at 21:00


Docusate Sodium (Colace) 100 mg Q12H  PRN PO CONSTIPATION;  Start 10/24/17 at 21

:00


Magnesium Hydroxide (Milk Of Mag) 30 ml DAILY  PRN PO CONSTIPATION;  Start 10/24

/17 at 21:00


Famotidine (Pepcid) 20 mg Q12 PO  Last administered on 10/30/17at 20:09; Admin 

Dose 20 MG;  Start 10/24/17 at 21:00


Albuterol (Proventil 0.083% (Neb)) 2.5 mg Q2  PRN HHN SHORTNESS OF BREATH;  

Start 10/24/17 at 21:00


Miscellaneous Information 1 ea NOTE XX ;  Start 10/24/17 at 21:30


Glucose (Glutose) 15 gm Q15M  PRN PO DECREASED GLUCOSE;  Start 10/24/17 at 21:30


Glucose (Glutose) 22.5 gm Q15M  PRN PO DECREASED GLUCOSE;  Start 10/24/17 at 21:

30


Dextrose (D50w Syringe) 25 ml Q15M  PRN IV DECREASED GLUCOSE;  Start 10/24/17 

at 21:30


Dextrose (D50w Syringe) 50 ml Q15M  PRN IV DECREASED GLUCOSE;  Start 10/24/17 

at 21:30


Glucagon (Glucagen) 1 mg Q15M  PRN IM DECREASED GLUCOSE;  Start 10/24/17 at 21:

30


Glucose (Glutose) 15 gm Q15M  PRN BUCCAL DECREASED GLUCOSE;  Start 10/24/17 at 

21:30


Nystatin (Nystatin Powder) 1 applic BID TOP  Last administered on 10/31/17at 09:

42; Admin Dose 1 APPLIC;  Start 10/25/17 at 09:00


Tamoxifen Citrate (Nolvadex) 20 mg DAILY PO  Last administered on 10/30/17at 11:

17; Admin Dose 20 MG;  Start 10/25/17 at 13:00


Diagnostic Test (Pha) 1 ea 1 ea 02 XX  Last administered on 10/30/17at 02:34; 

Admin Dose 1 EA;  Start 10/26/17 at 02:00


Ceftriaxone Sodium 50 ml @  100 mls/hr Q24H IVPB  Last administered on 10/30/

17at 14:27; Admin Dose 100 MLS/HR;  Start 10/27/17 at 14:00


Sodium Chloride (NS) 1,000 ml @  70 mls/hr [id number] IV  Last administered on 10/

31/17at 00:48; Admin Dose 70 MLS/HR;  Start 10/31/17 at 00:00


Insulin Aspart (Novolog Insulin Pen) NOVOLOG *MILD* ALGORI... Q4 SC  Last 

administered on 10/31/17at 05:15; Admin Dose 1 UNIT;  Start 10/31/17 at 01:00





Assessment/Plan


Chief Complaint/Hosp Course


Assessment





1.  Metastatic breast cancer with recurrent right pleural effusion.  Cultures 

growing gram-positive cocci consistent with staph aureus.


2.  Loculated pleural effusion possible empyema scheduled for decortication 

today.


3.  CNS metastasis currently receiving steroids and radiation therapy








Plan





1.  CT chest trace loculated pleural effusion possible empyema in addition to 

malignant effusion.


2.  Continue oncology recommendations continues radiation therapy for CNS 

metastasis


3.  Continue antibiotic coverage for staph infection.  Pending decortication.


Problems:  











DELFIN LAKHANI MD, [facility] Oct 31, 2017 10:46

## 2017-10-31 NOTE — HPN
Date/Time of Note


Date/Time of Note


DATE: 10/31/17 


TIME: 16:02





Interval H&P Admission Note


Pt. seen H&P reviewed:  No system changes











HEIDY MAXWELL MD Oct 31, 2017 16:02

## 2017-11-01 VITALS — RESPIRATION RATE: 21 BRPM | HEART RATE: 72 BPM | DIASTOLIC BLOOD PRESSURE: 49 MMHG | SYSTOLIC BLOOD PRESSURE: 93 MMHG

## 2017-11-01 VITALS — HEART RATE: 88 BPM | SYSTOLIC BLOOD PRESSURE: 112 MMHG | DIASTOLIC BLOOD PRESSURE: 66 MMHG | RESPIRATION RATE: 20 BRPM

## 2017-11-01 VITALS — SYSTOLIC BLOOD PRESSURE: 115 MMHG | DIASTOLIC BLOOD PRESSURE: 48 MMHG | HEART RATE: 87 BPM | RESPIRATION RATE: 18 BRPM

## 2017-11-01 VITALS — DIASTOLIC BLOOD PRESSURE: 72 MMHG | SYSTOLIC BLOOD PRESSURE: 118 MMHG | HEART RATE: 93 BPM | RESPIRATION RATE: 19 BRPM

## 2017-11-01 VITALS — RESPIRATION RATE: 22 BRPM | DIASTOLIC BLOOD PRESSURE: 69 MMHG | HEART RATE: 84 BPM | SYSTOLIC BLOOD PRESSURE: 121 MMHG

## 2017-11-01 VITALS — RESPIRATION RATE: 26 BRPM | DIASTOLIC BLOOD PRESSURE: 67 MMHG | HEART RATE: 89 BPM | SYSTOLIC BLOOD PRESSURE: 104 MMHG

## 2017-11-01 VITALS — RESPIRATION RATE: 17 BRPM | HEART RATE: 85 BPM | SYSTOLIC BLOOD PRESSURE: 81 MMHG | DIASTOLIC BLOOD PRESSURE: 70 MMHG

## 2017-11-01 VITALS — DIASTOLIC BLOOD PRESSURE: 63 MMHG | HEART RATE: 79 BPM | RESPIRATION RATE: 22 BRPM | SYSTOLIC BLOOD PRESSURE: 131 MMHG

## 2017-11-01 VITALS — RESPIRATION RATE: 20 BRPM | HEART RATE: 81 BPM | DIASTOLIC BLOOD PRESSURE: 58 MMHG | SYSTOLIC BLOOD PRESSURE: 113 MMHG

## 2017-11-01 VITALS — HEART RATE: 68 BPM | RESPIRATION RATE: 23 BRPM | DIASTOLIC BLOOD PRESSURE: 68 MMHG | SYSTOLIC BLOOD PRESSURE: 126 MMHG

## 2017-11-01 VITALS — RESPIRATION RATE: 20 BRPM | HEART RATE: 80 BPM | DIASTOLIC BLOOD PRESSURE: 70 MMHG | SYSTOLIC BLOOD PRESSURE: 114 MMHG

## 2017-11-01 VITALS — RESPIRATION RATE: 23 BRPM | SYSTOLIC BLOOD PRESSURE: 108 MMHG | HEART RATE: 86 BPM | DIASTOLIC BLOOD PRESSURE: 52 MMHG

## 2017-11-01 VITALS — HEART RATE: 83 BPM | RESPIRATION RATE: 19 BRPM | DIASTOLIC BLOOD PRESSURE: 68 MMHG | SYSTOLIC BLOOD PRESSURE: 119 MMHG

## 2017-11-01 VITALS — RESPIRATION RATE: 22 BRPM | HEART RATE: 75 BPM | DIASTOLIC BLOOD PRESSURE: 50 MMHG | SYSTOLIC BLOOD PRESSURE: 102 MMHG

## 2017-11-01 VITALS — DIASTOLIC BLOOD PRESSURE: 48 MMHG | HEART RATE: 83 BPM | RESPIRATION RATE: 23 BRPM | SYSTOLIC BLOOD PRESSURE: 96 MMHG

## 2017-11-01 VITALS — RESPIRATION RATE: 13 BRPM | DIASTOLIC BLOOD PRESSURE: 80 MMHG | HEART RATE: 81 BPM | SYSTOLIC BLOOD PRESSURE: 129 MMHG

## 2017-11-01 VITALS — HEART RATE: 86 BPM | DIASTOLIC BLOOD PRESSURE: 64 MMHG | SYSTOLIC BLOOD PRESSURE: 115 MMHG | RESPIRATION RATE: 22 BRPM

## 2017-11-01 VITALS — SYSTOLIC BLOOD PRESSURE: 106 MMHG | DIASTOLIC BLOOD PRESSURE: 63 MMHG | RESPIRATION RATE: 20 BRPM | HEART RATE: 92 BPM

## 2017-11-01 VITALS — HEART RATE: 75 BPM | SYSTOLIC BLOOD PRESSURE: 105 MMHG | DIASTOLIC BLOOD PRESSURE: 73 MMHG | RESPIRATION RATE: 25 BRPM

## 2017-11-01 VITALS — RESPIRATION RATE: 12 BRPM | SYSTOLIC BLOOD PRESSURE: 124 MMHG | DIASTOLIC BLOOD PRESSURE: 71 MMHG | HEART RATE: 97 BPM

## 2017-11-01 VITALS — DIASTOLIC BLOOD PRESSURE: 72 MMHG | RESPIRATION RATE: 20 BRPM | SYSTOLIC BLOOD PRESSURE: 116 MMHG

## 2017-11-01 VITALS — HEART RATE: 98 BPM | DIASTOLIC BLOOD PRESSURE: 60 MMHG | RESPIRATION RATE: 27 BRPM | SYSTOLIC BLOOD PRESSURE: 103 MMHG

## 2017-11-01 VITALS — SYSTOLIC BLOOD PRESSURE: 97 MMHG | HEART RATE: 76 BPM | RESPIRATION RATE: 21 BRPM | DIASTOLIC BLOOD PRESSURE: 51 MMHG

## 2017-11-01 LAB
ABNORMAL IP MESSAGE: 1
ADD UMIC: YES
ALBUMIN SERPL-MCNC: 2.4 G/DL (ref 3.3–4.9)
ANION GAP SERPL CALC-SCNC: 10 MMOL/L (ref 8–16)
BASOPHILS # BLD AUTO: 0 10^3/UL (ref 0–0.1)
BASOPHILS NFR BLD: 0.2 % (ref 0–2)
BUN SERPL-MCNC: 15 MG/DL (ref 7–20)
CALCIUM SERPL-MCNC: 7.4 MG/DL (ref 8.4–10.2)
CHLORIDE SERPL-SCNC: 104 MMOL/L (ref 97–110)
CO2 SERPL-SCNC: 25 MMOL/L (ref 21–31)
COLOR UR: YELLOW
CREAT SERPL-MCNC: 0.45 MG/DL (ref 0.44–1)
EOSINOPHIL # BLD: 0 10^3/UL (ref 0–0.5)
EOSINOPHIL NFR BLD: 0.1 % (ref 0–7)
ERYTHROCYTE [DISTWIDTH] IN BLOOD BY AUTOMATED COUNT: 22.3 % (ref 11.5–14.5)
GLUCOSE SERPL-MCNC: 96 MG/DL (ref 70–220)
GLUCOSE UR STRIP-MCNC: NEGATIVE MG/DL
HCT VFR BLD CALC: 30.7 % (ref 37–47)
HGB BLD-MCNC: 9.5 G/DL (ref 12–16)
KETONES UR STRIP.AUTO-MCNC: NEGATIVE MG/DL
LYMPHOCYTES # BLD AUTO: 2.9 10^3/UL (ref 0.8–2.9)
LYMPHOCYTES NFR BLD AUTO: 16.5 % (ref 15–51)
MAGNESIUM SERPL-MCNC: 1.6 MG/DL (ref 1.7–2.5)
MCH RBC QN AUTO: 23.9 PG (ref 29–33)
MCHC RBC AUTO-ENTMCNC: 30.9 G/DL (ref 32–37)
MCV RBC AUTO: 77.1 FL (ref 82–101)
MONOCYTES # BLD: 1.2 10^3/UL (ref 0.3–0.9)
MONOCYTES NFR BLD: 7 % (ref 0–11)
MUCOUS THREADS #/AREA URNS HPF: (no result) /HPF
NEUTROPHILS # BLD: 12.4 10^3/UL (ref 1.6–7.5)
NEUTROPHILS NFR BLD AUTO: 72.1 % (ref 39–77)
NITRITE UR QL STRIP.AUTO: NEGATIVE MG/DL
NRBC # BLD MANUAL: 0 10^3/UL (ref 0–0)
NRBC BLD AUTO-RTO: 0 /100WBC (ref 0–0)
PHOSPHATE SERPL-MCNC: 4.2 MG/DL (ref 2.5–4.9)
PLATELET # BLD: 178 10^3/UL (ref 140–415)
PMV BLD AUTO: 8.4 FL (ref 7.4–10.4)
POSITIVE DIFF: (no result)
POST-TRANSFUSION BILIRUBIN: 0.5 MG/DL
POTASSIUM SERPL-SCNC: 3.4 MMOL/L (ref 3.5–5.1)
PRETRANSFUSION BILIRUBIN: 0 MG/DL
RBC # BLD AUTO: 3.98 10^6/UL (ref 4.2–5.4)
RBC # UR AUTO: (no result) MG/DL
SODIUM SERPL-SCNC: 136 MMOL/L (ref 135–144)
UR ASCORBIC ACID: NEGATIVE MG/DL
UR BILIRUBIN (DIP): NEGATIVE MG/DL
UR CLARITY: (no result)
UR PH (DIP): 5 (ref 5–9)
UR RBC: 17 /HPF (ref 0–5)
UR SPECIFIC GRAVITY (DIP): 1.01 (ref 1–1.03)
UR TOTAL PROTEIN (DIP): NEGATIVE MG/DL
UROBILINOGEN UR STRIP-ACNC: NEGATIVE MG/DL
WBC # BLD AUTO: 17.2 10^3/UL (ref 4.8–10.8)
WBC # UR STRIP: NEGATIVE LEU/UL

## 2017-11-01 RX ADMIN — NYSTATIN SCH APPLIC: 100000 POWDER TOPICAL at 08:19

## 2017-11-01 RX ADMIN — NYSTATIN SCH APPLIC: 100000 POWDER TOPICAL at 21:29

## 2017-11-01 RX ADMIN — NYSTATIN SCH APPLIC: 100000 POWDER TOPICAL at 02:47

## 2017-11-01 RX ADMIN — DOCUSATE SODIUM SCH MG: 100 CAPSULE, LIQUID FILLED ORAL at 21:16

## 2017-11-01 RX ADMIN — FAMOTIDINE SCH MG: 20 TABLET ORAL at 08:10

## 2017-11-01 RX ADMIN — FAMOTIDINE SCH MG: 20 TABLET ORAL at 21:15

## 2017-11-01 RX ADMIN — FOLIC ACID SCH MLS/HR: 5 INJECTION, SOLUTION INTRAMUSCULAR; INTRAVENOUS; SUBCUTANEOUS at 10:43

## 2017-11-01 RX ADMIN — CEFTRIAXONE SCH MLS/HR: 1 INJECTION, SOLUTION INTRAVENOUS at 13:57

## 2017-11-01 RX ADMIN — MORPHINE SULFATE PRN MG: 2 INJECTION, SOLUTION INTRAMUSCULAR; INTRAVENOUS at 02:47

## 2017-11-01 RX ADMIN — TAMOXIFEN CITRATE SCH MG: 10 TABLET, FILM COATED ORAL at 08:15

## 2017-11-01 NOTE — PN
Date/Time of Note


Date/Time of Note


DATE: 11/1/17 


TIME: 09:42





Assessment/Plan


VTE Prophylaxis


VTE Prophylaxis Intervention:  contraindicated





Lines/Catheters


IV Catheter Type (from [facility]):  A Line


Urinary Cath still in place:  Yes


Reason Cath still needed:  terminal illness/intractable pain





Assessment/Plan


Assessment/Plan


1.  Empyema 2/2 staph aureus. 


- Patient is s/p VATS on 10/31/17 and doing well after procedure.  Chest tube 

in place with serosanguineous drainage


- CT chest showed trace loculated pleural effusion with possible empyema in 

addition to malignant effusion.


- CT surgery on board and appreciate consultation


- Continue IV antibiotics per ID


- Ultrasound-guided thoracentesis performed, cultures showing staph aureus





2.  Dysfunctional Pleurx catheter


- Pulmonology on board and consultation appreciated


- Radiology adjusted catheter placement


- Respiratory status stable at this time





3.  Cellulitis, area around catheter


- ID on board and recommendations appreciated. Continue on antibiotics  





4.  Metastatic breast cancer, lung, liver, brain, s/p craniectomy 


- currently receiving radiation therapy and tamoxifen and Decadron


- Heme/onc on board and consultation appreciated





5. Diabetes mellitus


- Currently stable





6. Obesity


- Counseled about diet and improving exercise routine when able





7. Disposition


- Monitor in ICU 





>30 minutes of critical care spent with patient and mother at bedside.  All 

specialist notes reviewed as well as labs and imaging





Subjective


24 Hr Interval Summary


Free Text/Dictation


patient experiencing pain at chest tube insertion site but states controlled on 

IV morphine.  No acute issues overnight.  Chest tube in place and still has 

significant drainage.





Exam/Review of Systems


Vital Signs


Vitals





 Vital Signs








  Date Time  Temp Pulse Resp B/P Pulse Ox O2 Delivery O2 Flow Rate FiO2


 


11/1/17 08:00 100.4 98 27 103/60 97 Nasal Cannula 2.0 














 Intake and Output   


 


 10/31/17 10/31/17 11/1/17





 15:00 23:00 07:00


 


Intake Total 260 ml 1060 ml 590 ml


 


Output Total  1180 ml 995 ml


 


Balance 260 ml -120 ml -405 ml











Exam


General: Patient in moderate distress secondary to pain at site of chest tube


Head: R side craniectomy site healing well.


Eyes: EOMI, pupils reactive to light


Neck: Supple, nontender, midline


Respiratory: diminished air entry with chest tube in place draining sero 

sanguinous fluid


Cardiovascular: regular rate, no obvious murmurs


Gastrointestinal: non-tender to palpation, bowel sounds heard.


Neurological: Moves all extremities spontaneously





Results


Result Diagram:  


11/1/17 0415                                                                   

             11/1/17 0415





Results 24 hrs





Laboratory Tests








Test


  10/31/17


13:19 10/31/17


20:28 10/31/17


21:23 11/1/17


01:40


 


Bedside Glucose 78   114    117  


 


White Blood Count   18.7  H 


 


Red Blood Count   3.69  L 


 


Hemoglobin   9.0  L 


 


Hematocrit   28.6  L 


 


Mean Corpuscular Volume   77.5  L 


 


Mean Corpuscular Hemoglobin   24.4  L 


 


Mean Corpuscular Hemoglobin


Concent 


  


  31.5  L


  


 


 


Red Cell Distribution Width   22.5  H 


 


Platelet Count   192   


 


Mean Platelet Volume   8.6   


 


Neutrophils %   73.6   


 


Lymphocytes %   14.9  L 


 


Monocytes %   6.6   


 


Eosinophils %   0.1   


 


Basophils %   0.2   


 


Nucleated Red Blood Cells %   0.0   


 


Neutrophils #   13.8  H 


 


Lymphocytes #   2.8   


 


Monocytes #   1.2  H 


 


Eosinophils #   0.0   


 


Basophils #   0.0   


 


Nucleated Red Blood Cells #   0.0   














Test


  11/1/17


04:15 11/1/17


05:25 11/1/17


05:35 11/1/17


07:00


 


White Blood Count 17.2  H   


 


Red Blood Count 3.98  L   


 


Hemoglobin 9.5  L   


 


Hematocrit 30.7  L   


 


Mean Corpuscular Volume 77.1  L   


 


Mean Corpuscular Hemoglobin 23.9  L   


 


Mean Corpuscular Hemoglobin


Concent 30.9  L


  


  


  


 


 


Red Cell Distribution Width 22.3  H   


 


Platelet Count 178     


 


Mean Platelet Volume 8.4     


 


Neutrophils % 72.1     


 


Lymphocytes % 16.5     


 


Monocytes % 7.0     


 


Eosinophils % 0.1     


 


Basophils % 0.2     


 


Nucleated Red Blood Cells % 0.0     


 


Neutrophils # 12.4  H   


 


Lymphocytes # 2.9     


 


Monocytes # 1.2  H   


 


Eosinophils # 0.0     


 


Basophils # 0.0     


 


Nucleated Red Blood Cells # 0.0     


 


Sodium Level 136     


 


Potassium Level 3.4  L   


 


Chloride Level 104     


 


Carbon Dioxide Level 25     


 


Anion Gap 10     


 


Blood Urea Nitrogen 15     


 


Creatinine 0.45     


 


Glucose Level 96  #   


 


Calcium Level 7.4  L   


 


Phosphorus Level 4.2     


 


Magnesium Level 1.6  L   


 


Albumin 2.4  L   


 


Lab Scanned Report


  


  BLOOD


TRANSFUSION 


  


 


 


Bedside Glucose   98   


 


Urine Color    YELLOW  


 


Urine Clarity


  


  


  


  SLIGHTLY


CLOUDY  A


 


Urine pH    5.0  


 


Urine Specific Gravity    1.015  


 


Urine Ketones    NEGATIVE  


 


Urine Nitrite    NEGATIVE  


 


Urine Bilirubin    NEGATIVE  


 


Urine Urobilinogen    NEGATIVE  


 


Urine Leukocyte Esterase    NEGATIVE  


 


Urine Microscopic RBC    17  H


 


Urine Microscopic WBC    4  


 


Urine Mucus    MANY  A


 


Urine Hemoglobin    2+  H


 


Urine Glucose    NEGATIVE  


 


Urine Total Protein    NEGATIVE  














Test


  11/1/17


08:17 


  


  


 


 


Bedside Glucose 91     











Medications


Medications





 Current Medications


Dexamethasone (Decadron) 4 mg BID PO  Last administered on 11/1/17at 08:11; 

Admin Dose 4 MG;  Start 10/24/17 at 21:00


Docusate Sodium (Colace) 200 mg QHS PO  Last administered on 10/30/17at 20:09; 

Admin Dose 200 MG;  Start 10/24/17 at 21:00


Ondansetron HCl (Zofran Inj) 4 mg Q6H  PRN IV NAUSEA AND/OR VOMITING;  Start 10/

24/17 at 21:00


Acetaminophen (Tylenol Tab) 650 mg Q6H  PRN PO PAIN LEVEL 1-3 OR FEVER Last 

administered on 11/1/17at 07:13; Admin Dose 650 MG;  Start 10/24/17 at 21:00


Oxycodone/ Acetaminophen (Percocet (5/ 325)) 1 tab Q6H  PRN PO MODERATE PAIN 

LEVEL 4-6;  Start 10/24/17 at 21:00


Morphine Sulfate (morphine) 2 mg Q4H  PRN IV SEVERE PAIN LEVEL 7-10 Last 

administered on 11/1/17at 02:47; Admin Dose 2 MG;  Start 10/24/17 at 21:00


Docusate Sodium (Colace) 100 mg Q12H  PRN PO CONSTIPATION;  Start 10/24/17 at 21

:00


Magnesium Hydroxide (Milk Of Mag) 30 ml DAILY  PRN PO CONSTIPATION;  Start 10/24

/17 at 21:00


Famotidine (Pepcid) 20 mg Q12 PO  Last administered on 11/1/17at 08:10; Admin 

Dose 20 MG;  Start 10/24/17 at 21:00


Albuterol (Proventil 0.083% (Neb)) 2.5 mg Q2  PRN HHN SHORTNESS OF BREATH;  

Start 10/24/17 at 21:00


Miscellaneous Information 1 ea NOTE XX ;  Start 10/24/17 at 21:30


Glucose (Glutose) 15 gm Q15M  PRN PO DECREASED GLUCOSE;  Start 10/24/17 at 21:30


Glucose (Glutose) 22.5 gm Q15M  PRN PO DECREASED GLUCOSE;  Start 10/24/17 at 21:

30


Dextrose (D50w Syringe) 25 ml Q15M  PRN IV DECREASED GLUCOSE;  Start 10/24/17 

at 21:30


Dextrose (D50w Syringe) 50 ml Q15M  PRN IV DECREASED GLUCOSE;  Start 10/24/17 

at 21:30


Glucagon (Glucagen) 1 mg Q15M  PRN IM DECREASED GLUCOSE;  Start 10/24/17 at 21:

30


Glucose (Glutose) 15 gm Q15M  PRN BUCCAL DECREASED GLUCOSE;  Start 10/24/17 at 

21:30


Nystatin (Nystatin Powder) 1 applic BID TOP  Last administered on 11/1/17at 08:

19; Admin Dose 1 APPLIC;  Start 10/25/17 at 09:00


Tamoxifen Citrate (Nolvadex) 20 mg DAILY PO  Last administered on 11/1/17at 08:

15; Admin Dose 20 MG;  Start 10/25/17 at 13:00


Diagnostic Test (Pha) 1 ea 1 ea 02 XX  Last administered on 10/30/17at 02:34; 

Admin Dose 1 EA;  Start 10/26/17 at 02:00


Ceftriaxone Sodium 50 ml @  100 mls/hr Q24H IVPB  Last administered on 10/31/

17at 13:20; Admin Dose 100 MLS/HR;  Start 10/27/17 at 14:00


Sodium Chloride (NS) 1,000 ml @  70 mls/hr [id number] IV  Last administered on 10/

31/17at 20:36; Admin Dose 70 MLS/HR;  Start 10/31/17 at 00:00


Insulin Aspart NOVOLOG *MILD* ALGORI... Q4 SC  Last administered on 10/31/17at 

05:15; Admin Dose 1 UNIT;  Start 10/31/17 at 01:00


Norepinephrine 250 ml @  1.875 mls/ hr TITRATE IV ;  Start 10/31/17 at 21:00


Magnesium Sulfate (Magnesium Sulfate 4 Gm/100 ml) 100 ml @  25 mls/hr ONCE  

ONCE IVPB ;  Start 11/1/17 at 09:00;  Stop 11/1/17 at 12:59











RIVERA RAWLS MD Nov 1, 2017 09:42

## 2017-11-01 NOTE — PN
DATE:  11/01/2017

 

 

SUBJECTIVE:  No acute changes.  The patient is awake, feels good.  Denies pain, 
looks comfortable.

 

INDWELLINGS:  Right IJ triple-lumen catheter, left radial A-line, right-sided 
chest tube.

 

OBJECTIVE:

VITAL SIGNS:  His temperature 100.4, pulse 98, respirations 27, blood pressure 
103/60, saturation 97% on 2 liters nasal cannula.

 

WBC 17.2, H and H 9.5 and 30.7, platelets 178, neutrophils 72.1.  BUN 15, 
creatinine 0.45.

 

ANTIMICROBIALS:  The patient remains on IV Rocephin.

 

PHYSICAL EXAMINATION:

GENERAL:  This is an obese, well-developed, middle-aged woman who is awake, in 
no distress.

HEENT:  Head atraumatic, normocephalic.  Sclerae anicteric.  Buccal mucosa dry.

NECK:  Supple.

CHEST:  Rise symmetrical.  Breath sounds diminished to bases.

HEART:  S1, S2.

ABDOMEN:  Soft, bowel tones present.

EXTREMITIES:  No cyanosis.

 

ASSESSMENT:

1.  Metastatic breast cancer with recurrent right pleural effusion ?empyema, 
pleural fluid culture growing oxacillin-sensitive Staphylococcus aureus.

2.  Status post VATS.

3.  History of craniectomy for brain metastasis.

4.  Diabetes.

5.  Obesity.

 

PLAN:  The patient remains stable.  Continue present care, antibiotics.  Follow 
cardiothoracic surgery and pulmonary recommendations.  Chemo per oncology.

 

 

Dictated By: DIANA RANDALL NP for JERROLD DREYER MD NI/LAKISHA

DD:    11/01/2017 12:57:51

DT:    11/01/2017 14:35:48

Conf#: [id number]

DID#:  [id number]

 

MTDD

## 2017-11-01 NOTE — OPR
DATE OF OPERATION:  

 

 

PREOPERATIVE DIAGNOSIS:  Right malignant pleural effusion.

 

POSTOPERATIVE DIAGNOSIS:  Right malignant pleural effusion.

 

PROCEDURE:

1.  Video-assisted thoracic surgery, pulmonary decortication.

2.  Right pulmonary pleurodesis.

3.  Bronchoscopy.

 

SURGEON:  Heidy Saba MD

 

ANESTHESIA:  General.

 

CONSENT:  Risks, benefits, complications, alternative therapies explained to the patient and the [facility]
gemma, consent obtained.

 

OPERATIVE TECHNIQUE:  The patient was placed in supine position, prepped and draped in usual sterile
 fashion, intubated.  Bronchoscopy was done.  No evidence of any endobronchial lesions were noted.  
Patient was placed in a left lateral decubitus position, timeout was called, antibiotics were given,
 prepped and draped in usual sterile fashion.  I made a 1 cm incision, 7th intercostal space, mid ax
illary line.  Incision was taken down to the subcutaneous tissue which was then opened using electro
cautery.  A 12 mm trocar was advanced into the pleural cavity.  Bronchoscopy was started.  I made a 
second 1 cm incision anterior axillary line, 5th intercostal space.  Large amounts of _____ was note
d to be around the lung, which was then removed.  The lung was decorticated.  These appeared to be a
 combination of inflammatory _____ carcinomatosis.  Decortication was completed.  Pleurodesis was do
ne with a combination of electrocautery, argon beam coagulation and 2 bottles of talc.  A 32-Omani 
chest tube and a 19-Paul was placed into the trocar incisions, brought out through a lower stab wou
nd, secured to skin using silk sutures.  Patient tolerated procedure well.

 

 

Dictated By: HEIDY SOOD/LAKISHA

DD:    10/31/2017 19:27:01

DT:    11/01/2017 02:05:14

Conf#: [id number]

DID#:  [id number]

CC: CLAU AGUILAR;*EndCC*

## 2017-11-01 NOTE — CONS
Date/Time of Note


Date/Time of Note


DATE: 11/1/17 


TIME: 11:56





Consult Date/Type/Reason


Admit Date/Time


Oct 24, 2017 at 18:29


Initial Consult Date


10/25/17


Type of Consultation:  Pulmonary


Ordering Provider:  CLAU AGUILAR





Subjective


Patient status post VATS decortication.  Is comfortable this morning.  Chest 

tube in place with significant drainage.





Objective





 Vital Signs








  Date Time  Temp Pulse Resp B/P Pulse Ox O2 Delivery O2 Flow Rate FiO2


 


11/1/17 08:00 100.4 98 27 103/60 97 Nasal Cannula 2.0 














 Intake and Output   


 


 10/31/17 10/31/17 11/1/17





 15:00 23:00 07:00


 


Intake Total 260 ml 1060 ml 590 ml


 


Output Total  1180 ml 995 ml


 


Balance 260 ml -120 ml -405 ml








Exam


GENERAL: Well-developed lady comfortable at rest


VITAL SIGNS:  per chart


NECK:  Supple.  No JVD or lymphadenopathy.


CARDIAC EXAM:  S1, S2. No added sounds or murmurs.


CHEST: Diminished air entry right base.  Chest tube in place.


ABDOMEN:  Soft, nontender.  No guarding or rebound.


EXTREMITIES:  No cyanosis, clubbing or edema.


NEUROLOGIC:  Generalized weakness.  No focal deficits.





Results/Medications


Result Diagram:  


11/1/17 0415                                                                   

             11/1/17 0415





Results 24 hrs





Laboratory Tests








Test


  10/31/17


13:19 10/31/17


20:28 10/31/17


21:23 11/1/17


01:40


 


Bedside Glucose 78   114    117  


 


White Blood Count   18.7  H 


 


Red Blood Count   3.69  L 


 


Hemoglobin   9.0  L 


 


Hematocrit   28.6  L 


 


Mean Corpuscular Volume   77.5  L 


 


Mean Corpuscular Hemoglobin   24.4  L 


 


Mean Corpuscular Hemoglobin


Concent 


  


  31.5  L


  


 


 


Red Cell Distribution Width   22.5  H 


 


Platelet Count   192   


 


Mean Platelet Volume   8.6   


 


Neutrophils %   73.6   


 


Lymphocytes %   14.9  L 


 


Monocytes %   6.6   


 


Eosinophils %   0.1   


 


Basophils %   0.2   


 


Nucleated Red Blood Cells %   0.0   


 


Neutrophils #   13.8  H 


 


Lymphocytes #   2.8   


 


Monocytes #   1.2  H 


 


Eosinophils #   0.0   


 


Basophils #   0.0   


 


Nucleated Red Blood Cells #   0.0   














Test


  11/1/17


04:15 11/1/17


05:25 11/1/17


05:35 11/1/17


07:00


 


White Blood Count 17.2  H   


 


Red Blood Count 3.98  L   


 


Hemoglobin 9.5  L   


 


Hematocrit 30.7  L   


 


Mean Corpuscular Volume 77.1  L   


 


Mean Corpuscular Hemoglobin 23.9  L   


 


Mean Corpuscular Hemoglobin


Concent 30.9  L


  


  


  


 


 


Red Cell Distribution Width 22.3  H   


 


Platelet Count 178     


 


Mean Platelet Volume 8.4     


 


Neutrophils % 72.1     


 


Lymphocytes % 16.5     


 


Monocytes % 7.0     


 


Eosinophils % 0.1     


 


Basophils % 0.2     


 


Nucleated Red Blood Cells % 0.0     


 


Neutrophils # 12.4  H   


 


Lymphocytes # 2.9     


 


Monocytes # 1.2  H   


 


Eosinophils # 0.0     


 


Basophils # 0.0     


 


Nucleated Red Blood Cells # 0.0     


 


Sodium Level 136     


 


Potassium Level 3.4  L   


 


Chloride Level 104     


 


Carbon Dioxide Level 25     


 


Anion Gap 10     


 


Blood Urea Nitrogen 15     


 


Creatinine 0.45     


 


Glucose Level 96  #   


 


Calcium Level 7.4  L   


 


Phosphorus Level 4.2     


 


Magnesium Level 1.6  L   


 


Albumin 2.4  L   


 


Lab Scanned Report


  


  BLOOD


TRANSFUSION 


  


 


 


Bedside Glucose   98   


 


Urine Color    YELLOW  


 


Urine Clarity


  


  


  


  SLIGHTLY


CLOUDY  A


 


Urine pH    5.0  


 


Urine Specific Gravity    1.015  


 


Urine Ketones    NEGATIVE  


 


Urine Nitrite    NEGATIVE  


 


Urine Bilirubin    NEGATIVE  


 


Urine Urobilinogen    NEGATIVE  


 


Urine Leukocyte Esterase    NEGATIVE  


 


Urine Microscopic RBC    17  H


 


Urine Microscopic WBC    4  


 


Urine Mucus    MANY  A


 


Urine Hemoglobin    2+  H


 


Urine Glucose    NEGATIVE  


 


Urine Total Protein    NEGATIVE  














Test


  11/1/17


08:17 


  


  


 


 


Bedside Glucose 91     








Medications





 Current Medications


Dexamethasone (Decadron) 4 mg BID PO  Last administered on 11/1/17at 08:11; 

Admin Dose 4 MG;  Start 10/24/17 at 21:00


Docusate Sodium (Colace) 200 mg QHS PO  Last administered on 10/30/17at 20:09; 

Admin Dose 200 MG;  Start 10/24/17 at 21:00


Ondansetron HCl (Zofran Inj) 4 mg Q6H  PRN IV NAUSEA AND/OR VOMITING;  Start 10/

24/17 at 21:00


Acetaminophen (Tylenol Tab) 650 mg Q6H  PRN PO PAIN LEVEL 1-3 OR FEVER Last 

administered on 11/1/17at 07:13; Admin Dose 650 MG;  Start 10/24/17 at 21:00


Oxycodone/ Acetaminophen (Percocet (5/ 325)) 1 tab Q6H  PRN PO MODERATE PAIN 

LEVEL 4-6;  Start 10/24/17 at 21:00


Morphine Sulfate (morphine) 2 mg Q4H  PRN IV SEVERE PAIN LEVEL 7-10 Last 

administered on 11/1/17at 02:47; Admin Dose 2 MG;  Start 10/24/17 at 21:00


Docusate Sodium (Colace) 100 mg Q12H  PRN PO CONSTIPATION;  Start 10/24/17 at 21

:00


Magnesium Hydroxide (Milk Of Mag) 30 ml DAILY  PRN PO CONSTIPATION;  Start 10/24

/17 at 21:00


Famotidine (Pepcid) 20 mg Q12 PO  Last administered on 11/1/17at 08:10; Admin 

Dose 20 MG;  Start 10/24/17 at 21:00


Albuterol (Proventil 0.083% (Neb)) 2.5 mg Q2  PRN HHN SHORTNESS OF BREATH;  

Start 10/24/17 at 21:00


Miscellaneous Information 1 ea NOTE XX ;  Start 10/24/17 at 21:30


Glucose (Glutose) 15 gm Q15M  PRN PO DECREASED GLUCOSE;  Start 10/24/17 at 21:30


Glucose (Glutose) 22.5 gm Q15M  PRN PO DECREASED GLUCOSE;  Start 10/24/17 at 21:

30


Dextrose (D50w Syringe) 25 ml Q15M  PRN IV DECREASED GLUCOSE;  Start 10/24/17 

at 21:30


Dextrose (D50w Syringe) 50 ml Q15M  PRN IV DECREASED GLUCOSE;  Start 10/24/17 

at 21:30


Glucagon (Glucagen) 1 mg Q15M  PRN IM DECREASED GLUCOSE;  Start 10/24/17 at 21:

30


Glucose (Glutose) 15 gm Q15M  PRN BUCCAL DECREASED GLUCOSE;  Start 10/24/17 at 

21:30


Nystatin (Nystatin Powder) 1 applic BID TOP  Last administered on 11/1/17at 08:

19; Admin Dose 1 APPLIC;  Start 10/25/17 at 09:00


Tamoxifen Citrate (Nolvadex) 20 mg DAILY PO  Last administered on 11/1/17at 08:

15; Admin Dose 20 MG;  Start 10/25/17 at 13:00


Diagnostic Test (Pha) 1 ea 1 ea 02 XX  Last administered on 10/30/17at 02:34; 

Admin Dose 1 EA;  Start 10/26/17 at 02:00


Ceftriaxone Sodium 50 ml @  100 mls/hr Q24H IVPB  Last administered on 10/31/

17at 13:20; Admin Dose 100 MLS/HR;  Start 10/27/17 at 14:00


Sodium Chloride (NS) 1,000 ml @  70 mls/hr [id number] IV  Last administered on 11/1 /17at 10:43; Admin Dose 70 MLS/HR;  Start 10/31/17 at 00:00


Insulin Aspart NOVOLOG *MILD* ALGORI... Q4 SC  Last administered on 10/31/17at 

05:15; Admin Dose 1 UNIT;  Start 10/31/17 at 01:00


Norepinephrine 250 ml @  1.875 mls/ hr TITRATE IV ;  Start 10/31/17 at 21:00


Magnesium Sulfate (Magnesium Sulfate 4 Gm/100 ml) 100 ml @  25 mls/hr ONCE  

ONCE IVPB  Last administered on 11/1/17at 10:38; Admin Dose 25 MLS/HR;  Start 11 /1/17 at 09:00;  Stop 11/1/17 at 12:59





Assessment/Plan


Chief Complaint/Hosp Course


Assessment





1.  Metastatic breast cancer with recurrent right pleural effusion.  Cultures 

growing gram-positive cocci consistent with staph aureus.


2.  Loculated pleural effusion possible empyema status post decortication and 

pleurodesis.


3.  CNS metastasis currently receiving steroids and radiation therapy








Plan





1.  Continue chest tube drainage.  Await pleural fluid cultures.  We will 

continue current antibiotics.


2.  Continue oncology recommendations continues radiation therapy for CNS 

metastasis


3.  Continue antibiotic coverage for staph infection.





Continue ICU care today.  Anticipate transfer to telemetry tomorrow if remains 

stable.


Problems:  











DELFIN LAKHANI MD, [facility] Nov 1, 2017 11:57

## 2017-11-02 VITALS — RESPIRATION RATE: 20 BRPM | SYSTOLIC BLOOD PRESSURE: 125 MMHG | DIASTOLIC BLOOD PRESSURE: 81 MMHG | HEART RATE: 90 BPM

## 2017-11-02 VITALS — HEART RATE: 92 BPM | DIASTOLIC BLOOD PRESSURE: 75 MMHG | RESPIRATION RATE: 25 BRPM | SYSTOLIC BLOOD PRESSURE: 116 MMHG

## 2017-11-02 VITALS — HEART RATE: 71 BPM | SYSTOLIC BLOOD PRESSURE: 131 MMHG | DIASTOLIC BLOOD PRESSURE: 77 MMHG | RESPIRATION RATE: 17 BRPM

## 2017-11-02 VITALS — DIASTOLIC BLOOD PRESSURE: 70 MMHG | RESPIRATION RATE: 19 BRPM | HEART RATE: 72 BPM | SYSTOLIC BLOOD PRESSURE: 117 MMHG

## 2017-11-02 VITALS — HEART RATE: 93 BPM | RESPIRATION RATE: 22 BRPM | DIASTOLIC BLOOD PRESSURE: 68 MMHG | SYSTOLIC BLOOD PRESSURE: 113 MMHG

## 2017-11-02 VITALS — DIASTOLIC BLOOD PRESSURE: 70 MMHG | HEART RATE: 86 BPM | RESPIRATION RATE: 20 BRPM | SYSTOLIC BLOOD PRESSURE: 114 MMHG

## 2017-11-02 VITALS — HEART RATE: 77 BPM | SYSTOLIC BLOOD PRESSURE: 135 MMHG | RESPIRATION RATE: 23 BRPM | DIASTOLIC BLOOD PRESSURE: 77 MMHG

## 2017-11-02 VITALS — SYSTOLIC BLOOD PRESSURE: 113 MMHG | RESPIRATION RATE: 16 BRPM | DIASTOLIC BLOOD PRESSURE: 74 MMHG

## 2017-11-02 VITALS — HEART RATE: 93 BPM

## 2017-11-02 VITALS — RESPIRATION RATE: 16 BRPM | SYSTOLIC BLOOD PRESSURE: 124 MMHG | DIASTOLIC BLOOD PRESSURE: 65 MMHG

## 2017-11-02 VITALS — RESPIRATION RATE: 18 BRPM | SYSTOLIC BLOOD PRESSURE: 107 MMHG | DIASTOLIC BLOOD PRESSURE: 68 MMHG

## 2017-11-02 VITALS — HEART RATE: 87 BPM

## 2017-11-02 VITALS — HEART RATE: 71 BPM

## 2017-11-02 LAB
ABNORMAL IP MESSAGE: 1
ALBUMIN SERPL-MCNC: 2.2 G/DL (ref 3.3–4.9)
ANION GAP SERPL CALC-SCNC: 13 MMOL/L (ref 8–16)
APTT BLD: 35.7 SEC (ref 25–35)
BASOPHILS # BLD AUTO: 0 10^3/UL (ref 0–0.1)
BASOPHILS NFR BLD: 0.1 % (ref 0–2)
BUN SERPL-MCNC: 11 MG/DL (ref 7–20)
CALCIUM SERPL-MCNC: 7.6 MG/DL (ref 8.4–10.2)
CHLORIDE SERPL-SCNC: 104 MMOL/L (ref 97–110)
CO2 SERPL-SCNC: 23 MMOL/L (ref 21–31)
CREAT SERPL-MCNC: 0.41 MG/DL (ref 0.44–1)
EOSINOPHIL # BLD: 0 10^3/UL (ref 0–0.5)
EOSINOPHIL NFR BLD: 0 % (ref 0–7)
ERYTHROCYTE [DISTWIDTH] IN BLOOD BY AUTOMATED COUNT: 22.3 % (ref 11.5–14.5)
GLUCOSE SERPL-MCNC: 153 MG/DL (ref 70–220)
HCT VFR BLD CALC: 30.3 % (ref 37–47)
HGB BLD-MCNC: 9.5 G/DL (ref 12–16)
INR PPP: 1.39
LYMPHOCYTES # BLD AUTO: 1.1 10^3/UL (ref 0.8–2.9)
LYMPHOCYTES NFR BLD AUTO: 5.9 % (ref 15–51)
MAGNESIUM SERPL-MCNC: 2 MG/DL (ref 1.7–2.5)
MCH RBC QN AUTO: 24.2 PG (ref 29–33)
MCHC RBC AUTO-ENTMCNC: 31.4 G/DL (ref 32–37)
MCV RBC AUTO: 77.1 FL (ref 82–101)
MONOCYTES # BLD: 0.8 10^3/UL (ref 0.3–0.9)
MONOCYTES NFR BLD: 4.4 % (ref 0–11)
NEUTROPHILS # BLD: 15.9 10^3/UL (ref 1.6–7.5)
NEUTROPHILS NFR BLD AUTO: 86 % (ref 39–77)
NRBC # BLD MANUAL: 0 10^3/UL (ref 0–0)
NRBC BLD AUTO-RTO: 0 /100WBC (ref 0–0)
PHOSPHATE SERPL-MCNC: 3.6 MG/DL (ref 2.5–4.9)
PLATELET # BLD: 168 10^3/UL (ref 140–415)
PLATELET # BLD: 175 10^3/UL (ref 140–415)
PMV BLD AUTO: 8.4 FL (ref 7.4–10.4)
POSITIVE DIFF: (no result)
POTASSIUM SERPL-SCNC: 3.8 MMOL/L (ref 3.5–5.1)
PROTHROMBIN TIME: 17.1 SEC (ref 12.2–14.2)
PT RATIO: 1.3
RBC # BLD AUTO: 3.93 10^6/UL (ref 4.2–5.4)
SODIUM SERPL-SCNC: 136 MMOL/L (ref 135–144)
THROMBIN TIME: 14.2 SEC (ref 13.8–19.1)
WBC # BLD AUTO: 18.5 10^3/UL (ref 4.8–10.8)

## 2017-11-02 RX ADMIN — TAMOXIFEN CITRATE SCH MG: 10 TABLET, FILM COATED ORAL at 08:46

## 2017-11-02 RX ADMIN — FAMOTIDINE SCH MG: 20 TABLET ORAL at 08:46

## 2017-11-02 RX ADMIN — DOCUSATE SODIUM SCH MG: 100 CAPSULE, LIQUID FILLED ORAL at 20:21

## 2017-11-02 RX ADMIN — NYSTATIN SCH APPLIC: 100000 POWDER TOPICAL at 08:49

## 2017-11-02 RX ADMIN — CEFTRIAXONE SCH MLS/HR: 1 INJECTION, SOLUTION INTRAVENOUS at 15:17

## 2017-11-02 RX ADMIN — NYSTATIN SCH APPLIC: 100000 POWDER TOPICAL at 20:22

## 2017-11-02 RX ADMIN — FAMOTIDINE SCH MG: 20 TABLET ORAL at 20:21

## 2017-11-02 NOTE — PN
Date/Time of Note


Date/Time of Note


DATE: 11/2/17 


TIME: 20:07





Assessment/Plan


Lines/Catheters


IV Catheter Type (from Nrs):  Peripheral IV


Guerrero in Place (from Nrsg):  No





Assessment/Plan


Chief Complaint/Hosp Course


 


IMPRESSION:  Right loculated pleural effusion.


 


R status post right VATS pleurodesis decortication


Patient feels much better


CT  700 cc


Less shortness of breath


Check chest x-ray


Will continue chest tube suction


Problems:  





Subjective


24 Hr Interval Summary


Constitutional:  improved


Pain Control:  mild





Exam/Review of Systems


Vital Signs


Vitals





 Vital Signs








  Date Time  Temp Pulse Resp B/P Pulse Ox O2 Delivery O2 Flow Rate FiO2


 


11/2/17 16:37  87      


 


11/2/17 15:17 98.0  16 113/74 97   


 


11/2/17 12:00      Nasal Cannula  


 


11/2/17 10:00       2.0 


 


11/2/17 08:05        21














 Intake and Output   


 


 11/1/17 11/1/17 11/2/17





 15:00 23:00 07:00


 


Intake Total 450 ml 750 ml 1140 ml


 


Output Total 870 ml 1050 ml 


 


Balance -420 ml -300 ml 1140 ml











Exam


ENMT:  mucosa pink and moist, nl external ears & nose, nl lips & teeth, nl 

nasal mucosa & septum


Neck:  non-tender, supple


Respiratory:  clear to auscultation, normal air movement


Cardiovascular:  nl pulses, regular rate and rhythm





Results


Result Diagram:  


11/2/17 0458                                                                   

             11/2/17 0458














HEIDY MAXWELL MD Nov 2, 2017 20:07

## 2017-11-02 NOTE — CONS
Date/Time of Note


Date/Time of Note


DATE: 11/2/17 


TIME: 14:05





Consult Date/Type/Reason


Admit Date/Time


Oct 24, 2017 at 18:29


Initial Consult Date


10/26/17


Type of Consultation:  ID


Ordering Provider:  CLAU AGUILAR





Objective





 Vital Signs








  Date Time  Temp Pulse Resp B/P Pulse Ox O2 Delivery O2 Flow Rate FiO2


 


11/2/17 13:17 98.3 73 16 124/65 97   


 


11/2/17 12:00      Nasal Cannula  


 


11/2/17 10:00       2.0 


 


11/2/17 08:05        21














 Intake and Output   


 


 11/1/17 11/1/17 11/2/17





 15:00 23:00 07:00


 


Intake Total 450 ml 750 ml 1140 ml


 


Output Total 870 ml 1050 ml 


 


Balance -420 ml -300 ml 1140 ml











Results/Medications


Result Diagram:  


11/2/17 0458                                                                   

             11/2/17 0458





Results 24 hrs





Laboratory Tests








Test


  11/1/17


16:51 11/1/17


21:17 11/2/17


00:59 11/2/17


04:58


 


Bedside Glucose 227  H 188   144   


 


White Blood Count    18.5  H


 


Red Blood Count    3.93  L


 


Hemoglobin    9.5  L


 


Hematocrit    30.3  L


 


Mean Corpuscular Volume    77.1  L


 


Mean Corpuscular Hemoglobin    24.2  L


 


Mean Corpuscular Hemoglobin


Concent 


  


  


  31.4  L


 


 


Red Cell Distribution Width    22.3  H


 


Platelet Count    168  


 


Mean Platelet Volume    8.4  


 


Neutrophils %    86.0  H


 


Lymphocytes %    5.9  L


 


Monocytes %    4.4  


 


Eosinophils %    0.0  


 


Basophils %    0.1  


 


Nucleated Red Blood Cells %    0.0  


 


Neutrophils #    15.9  H


 


Lymphocytes #    1.1  


 


Monocytes #    0.8  


 


Eosinophils #    0.0  


 


Basophils #    0.0  


 


Nucleated Red Blood Cells #    0.0  


 


Prothrombin Time    17.1  #H


 


Prothrombin Time Ratio    1.3  


 


INR International Normalized


Ratio 


  


  


  1.39  


 


 


Activated Partial


Thromboplast Time 


  


  


  35.7  H


 


 


Thrombin Time    14.2  


 


Sodium Level    136  


 


Potassium Level    3.8  


 


Chloride Level    104  


 


Carbon Dioxide Level    23  


 


Anion Gap    13  


 


Blood Urea Nitrogen    11  


 


Creatinine    0.41  L


 


Glucose Level    153  


 


Calcium Level    7.6  L


 


Phosphorus Level    3.6  


 


Magnesium Level    2.0  


 


Albumin    2.2  L














Test


  11/2/17


05:55 11/2/17


08:48 11/2/17


13:18 


 


 


Bedside Glucose 142   120   152   








Medications





 Current Medications


Dexamethasone (Decadron) 4 mg BID PO  Last administered on 11/2/17at 08:45; 

Admin Dose 4 MG;  Start 10/24/17 at 21:00


Docusate Sodium (Colace) 200 mg QHS PO  Last administered on 11/1/17at 21:16; 

Admin Dose 200 MG;  Start 10/24/17 at 21:00


Ondansetron HCl (Zofran Inj) 4 mg Q6H  PRN IV NAUSEA AND/OR VOMITING;  Start 10/

24/17 at 21:00


Acetaminophen (Tylenol Tab) 650 mg Q6H  PRN PO PAIN LEVEL 1-3 OR FEVER Last 

administered on 11/1/17at 07:13; Admin Dose 650 MG;  Start 10/24/17 at 21:00


Oxycodone/ Acetaminophen (Percocet (5/ 325)) 1 tab Q6H  PRN PO MODERATE PAIN 

LEVEL 4-6;  Start 10/24/17 at 21:00


Morphine Sulfate (morphine) 2 mg Q4H  PRN IV SEVERE PAIN LEVEL 7-10 Last 

administered on 11/1/17at 02:47; Admin Dose 2 MG;  Start 10/24/17 at 21:00


Docusate Sodium (Colace) 100 mg Q12H  PRN PO CONSTIPATION;  Start 10/24/17 at 21

:00


Magnesium Hydroxide (Milk Of Mag) 30 ml DAILY  PRN PO CONSTIPATION;  Start 10/24

/17 at 21:00


Famotidine (Pepcid) 20 mg Q12 PO  Last administered on 11/2/17at 08:46; Admin 

Dose 20 MG;  Start 10/24/17 at 21:00


Albuterol (Proventil 0.083% (Neb)) 2.5 mg Q2  PRN HHN SHORTNESS OF BREATH;  

Start 10/24/17 at 21:00


Miscellaneous Information 1 ea NOTE XX ;  Start 10/24/17 at 21:30


Glucose (Glutose) 15 gm Q15M  PRN PO DECREASED GLUCOSE;  Start 10/24/17 at 21:30


Glucose (Glutose) 22.5 gm Q15M  PRN PO DECREASED GLUCOSE;  Start 10/24/17 at 21:

30


Dextrose (D50w Syringe) 25 ml Q15M  PRN IV DECREASED GLUCOSE;  Start 10/24/17 

at 21:30


Dextrose (D50w Syringe) 50 ml Q15M  PRN IV DECREASED GLUCOSE;  Start 10/24/17 

at 21:30


Glucagon (Glucagen) 1 mg Q15M  PRN IM DECREASED GLUCOSE;  Start 10/24/17 at 21:

30


Glucose (Glutose) 15 gm Q15M  PRN BUCCAL DECREASED GLUCOSE;  Start 10/24/17 at 

21:30


Nystatin (Nystatin Powder) 1 applic BID TOP  Last administered on 11/2/17at 08:

49; Admin Dose 1 APPLIC;  Start 10/25/17 at 09:00


Tamoxifen Citrate (Nolvadex) 20 mg DAILY PO  Last administered on 11/2/17at 08:

46; Admin Dose 20 MG;  Start 10/25/17 at 13:00


Diagnostic Test (Pha) 1 ea 1 ea 02 XX  Last administered on 11/2/17at 01:38; 

Admin Dose 1 EA;  Start 10/26/17 at 02:00


Ceftriaxone Sodium (Rocephin) 50 ml @  100 mls/hr Q24H IVPB  Last administered 

on 11/1/17at 13:57; Admin Dose 100 MLS/HR;  Start 10/27/17 at 14:00


Insulin Aspart NOVOLOG *MILD* ALGORI... Q4 SC  Last administered on 11/2/17at 13

:45; Admin Dose 1 UNIT;  Start 10/31/17 at 01:00


Norepinephrine (Levophed) 250 ml @  1.875 mls/ hr TITRATE IV ;  Start 10/31/17 

at 21:00





Assessment/Plan


Chief Complaint/Hosp Course


SUBJECTIVE:  No acute changes.  The patient is awake, feels good.  Denies pain, 

looks comfortable.


 


INDWELLINGS:  Right IJ triple-lumen catheter, right-sided chest tube.


 


ANTIMICROBIALS:  The patient remains on IV Rocephin.


 


PHYSICAL EXAMINATION:


GENERAL:  This is an obese, well-developed, middle-aged woman who is awake, in 

no distress.


HEENT:  Head atraumatic, normocephalic.  Sclerae anicteric.  Buccal mucosa dry.


NECK:  Supple.


CHEST:  Rise symmetrical.  Breath sounds diminished to bases.


HEART:  S1, S2.


ABDOMEN:  Soft, bowel tones present.


EXTREMITIES:  No cyanosis.


 


ASSESSMENT:


1.  Metastatic breast cancer with recurrent right pleural effusion ?empyema, 

pleural fluid culture growing oxacillin-sensitive Staphylococcus aureus.


2.  Status post VATS/pleurodesis with decortication.


3.  History of craniectomy for brain metastasis.


4.  Diabetes.


5.  Obesity.


 


PLAN:  Remains stable. Continue present care, antibiotics.  Follow oncology, 

cardiothoracic surgery and pulmonary recommendations.  





DW staff


Problems:  











DIANA RANDALL NP Nov 2, 2017 14:07

## 2017-11-02 NOTE — CONS
Date/Time of Note


Date/Time of Note


DATE: 11/2/17 


TIME: 11:08





Consult Date/Type/Reason


Admit Date/Time


Oct 24, 2017 at 18:29


Initial Consult Date


10/25/17


Type of Consultation:  Pulmonary


Ordering Provider:  CLAU AGUILAR





Subjective


Patient feeling better today still has moderate drainage from chest tube.  Less 

shortness of breath.  Hemodynamically stable.





Objective





 Vital Signs








  Date Time  Temp Pulse Resp B/P Pulse Ox O2 Delivery O2 Flow Rate FiO2


 


11/2/17 10:00  93 22 113/68 94 Nasal Cannula 2.0 


 


11/2/17 08:00 98.0       














 Intake and Output   


 


 11/1/17 11/1/17 11/2/17





 15:00 23:00 07:00


 


Intake Total 450 ml 750 ml 1140 ml


 


Output Total 870 ml 1050 ml 


 


Balance -420 ml -300 ml 1140 ml








Exam


GENERAL: Well-developed lady comfortable at rest


VITAL SIGNS:  per chart


NECK:  Supple.  No JVD or lymphadenopathy.


CARDIAC EXAM:  S1, S2. No added sounds or murmurs.


CHEST: Diminished air entry right base.  Chest tube in place.


ABDOMEN:  Soft, nontender.  No guarding or rebound.


EXTREMITIES:  No cyanosis, clubbing or edema.


NEUROLOGIC:  Generalized weakness.  No focal deficits.





Results/Medications


Result Diagram:  


11/2/17 0458                                                                   

             11/2/17 0458





Results 24 hrs





Laboratory Tests








Test


  11/1/17


12:27 11/1/17


16:51 11/1/17


21:17 11/2/17


00:59


 


Bedside Glucose 111   227  H 188   144  














Test


  11/2/17


04:58 11/2/17


05:55 11/2/17


08:48 


 


 


White Blood Count 18.5  H   


 


Red Blood Count 3.93  L   


 


Hemoglobin 9.5  L   


 


Hematocrit 30.3  L   


 


Mean Corpuscular Volume 77.1  L   


 


Mean Corpuscular Hemoglobin 24.2  L   


 


Mean Corpuscular Hemoglobin


Concent 31.4  L


  


  


  


 


 


Red Cell Distribution Width 22.3  H   


 


Platelet Count 168     


 


Mean Platelet Volume 8.4     


 


Neutrophils % 86.0  H   


 


Lymphocytes % 5.9  L   


 


Monocytes % 4.4     


 


Eosinophils % 0.0     


 


Basophils % 0.1     


 


Nucleated Red Blood Cells % 0.0     


 


Neutrophils # 15.9  H   


 


Lymphocytes # 1.1     


 


Monocytes # 0.8     


 


Eosinophils # 0.0     


 


Basophils # 0.0     


 


Nucleated Red Blood Cells # 0.0     


 


Prothrombin Time 17.1  #H   


 


Prothrombin Time Ratio 1.3     


 


INR International Normalized


Ratio 1.39  


  


  


  


 


 


Activated Partial


Thromboplast Time 35.7  H


  


  


  


 


 


Thrombin Time 14.2     


 


Sodium Level 136     


 


Potassium Level 3.8     


 


Chloride Level 104     


 


Carbon Dioxide Level 23     


 


Anion Gap 13     


 


Blood Urea Nitrogen 11     


 


Creatinine 0.41  L   


 


Glucose Level 153     


 


Calcium Level 7.6  L   


 


Phosphorus Level 3.6     


 


Magnesium Level 2.0     


 


Albumin 2.2  L   


 


Bedside Glucose  142   120   








Medications





 Current Medications


Dexamethasone (Decadron) 4 mg BID PO  Last administered on 11/2/17at 08:45; 

Admin Dose 4 MG;  Start 10/24/17 at 21:00


Docusate Sodium (Colace) 200 mg QHS PO  Last administered on 11/1/17at 21:16; 

Admin Dose 200 MG;  Start 10/24/17 at 21:00


Ondansetron HCl (Zofran Inj) 4 mg Q6H  PRN IV NAUSEA AND/OR VOMITING;  Start 10/

24/17 at 21:00


Acetaminophen (Tylenol Tab) 650 mg Q6H  PRN PO PAIN LEVEL 1-3 OR FEVER Last 

administered on 11/1/17at 07:13; Admin Dose 650 MG;  Start 10/24/17 at 21:00


Oxycodone/ Acetaminophen (Percocet (5/ 325)) 1 tab Q6H  PRN PO MODERATE PAIN 

LEVEL 4-6;  Start 10/24/17 at 21:00


Morphine Sulfate (morphine) 2 mg Q4H  PRN IV SEVERE PAIN LEVEL 7-10 Last 

administered on 11/1/17at 02:47; Admin Dose 2 MG;  Start 10/24/17 at 21:00


Docusate Sodium (Colace) 100 mg Q12H  PRN PO CONSTIPATION;  Start 10/24/17 at 21

:00


Magnesium Hydroxide (Milk Of Mag) 30 ml DAILY  PRN PO CONSTIPATION;  Start 10/24

/17 at 21:00


Famotidine (Pepcid) 20 mg Q12 PO  Last administered on 11/2/17at 08:46; Admin 

Dose 20 MG;  Start 10/24/17 at 21:00


Albuterol (Proventil 0.083% (Neb)) 2.5 mg Q2  PRN HHN SHORTNESS OF BREATH;  

Start 10/24/17 at 21:00


Miscellaneous Information 1 ea NOTE XX ;  Start 10/24/17 at 21:30


Glucose (Glutose) 15 gm Q15M  PRN PO DECREASED GLUCOSE;  Start 10/24/17 at 21:30


Glucose (Glutose) 22.5 gm Q15M  PRN PO DECREASED GLUCOSE;  Start 10/24/17 at 21:

30


Dextrose (D50w Syringe) 25 ml Q15M  PRN IV DECREASED GLUCOSE;  Start 10/24/17 

at 21:30


Dextrose (D50w Syringe) 50 ml Q15M  PRN IV DECREASED GLUCOSE;  Start 10/24/17 

at 21:30


Glucagon (Glucagen) 1 mg Q15M  PRN IM DECREASED GLUCOSE;  Start 10/24/17 at 21:

30


Glucose (Glutose) 15 gm Q15M  PRN BUCCAL DECREASED GLUCOSE;  Start 10/24/17 at 

21:30


Nystatin (Nystatin Powder) 1 applic BID TOP  Last administered on 11/2/17at 08:

49; Admin Dose 1 APPLIC;  Start 10/25/17 at 09:00


Tamoxifen Citrate (Nolvadex) 20 mg DAILY PO  Last administered on 11/2/17at 08:

46; Admin Dose 20 MG;  Start 10/25/17 at 13:00


Diagnostic Test (Pha) 1 ea 1 ea 02 XX  Last administered on 11/2/17at 01:38; 

Admin Dose 1 EA;  Start 10/26/17 at 02:00


Ceftriaxone Sodium (Rocephin) 50 ml @  100 mls/hr Q24H IVPB  Last administered 

on 11/1/17at 13:57; Admin Dose 100 MLS/HR;  Start 10/27/17 at 14:00


Insulin Aspart NOVOLOG *MILD* ALGORI... Q4 SC  Last administered on 11/2/17at 06

:00; Admin Dose 1 UNIT;  Start 10/31/17 at 01:00


Norepinephrine (Levophed) 250 ml @  1.875 mls/ hr TITRATE IV ;  Start 10/31/17 

at 21:00





Assessment/Plan


Chief Complaint/Hosp Course


Assessment





1.  Metastatic breast cancer with recurrent right pleural effusion.  Cultures 

growing gram-positive cocci consistent with staph aureus.


2.  Loculated pleural effusion possible empyema status post decortication and 

pleurodesis.


3.  CNS metastasis currently receiving steroids and radiation therapy








Plan





1.  Continue chest tube drainage.  Await pleural fluid cultures.  Previous 

cultures grew staph aureus.  We will continue current antibiotics.


2.  Continue oncology recommendations continues radiation therapy for CNS 

metastasis


3.  Continue antibiotic coverage for staph infection.





Transfer to telemetry encourage out of bed.


Problems:  











DELFIN LAKHANI MD, [facility] Nov 2, 2017 11:09

## 2017-11-02 NOTE — RADRPT
PROCEDURE:   XR Chest. 

 

CLINICAL INDICATION:     Pneumonia

 

TECHNIQUE:   A single AP view of the chest was obtained.

 

COMPARISON:   Chest x-ray dated 10/25/2017 and CT chest dated 10/26/2017 

 

FINDINGS:

 

A right-sided chest tube is in place.

 

There is a moderate right pleural effusion with right basilar consolidation. Fluid is seen along the
 right minor fissure. There is obscuration of the right diaphragm.  The cardiomediastinal silhouette
 is mildly enlarged.  The osseous structures are unremarkable.   

 

IMPRESSION:

 

 

1.  Moderate right pleural effusion with right basilar consolidation, improved when compared to the 
prior examination. A right chest tube is in place.

2.  Mild cardiomegaly.   

 

 

RPTAT: HH

_____________________________________________ 

.Linda Alberts MD, MD           Date    Time 

Electronically viewed and signed by .Linda Alberts MD, MD on 11/02/2017 07:09 

 

D:  11/02/2017 07:09  T:  11/02/2017 07:09

.G/

## 2017-11-02 NOTE — CONS
Date/Time of Note


Date/Time of Note


DATE: 11/2/17 


TIME: 11:26





Assessment/Plan


Assessment/Plan


Chief Complaint/Hosp Course


#Her2+/ER+/LA+ metastatic breast ca


-pt is non compliant and when she receives therapy, she is quite responsive to 

therapy


-therefore at this time, although she has terminal disease, there are still 

many more treatment options which she could benefit from


-given her Brain mets, I would consider a combination of Xeloda and Lapatinib 

which can penetrate the blood brain barrier


-once she stabilizes can restart Tamoxifen as well





#Pleural Effusion with superimposed infection


-pt is s/p decortication and pleurodesis


-chest tube with considerable serosanguineous drainage


-continue antibiotics given + Staph superinfection infection 


-appreciate pulmonary recs





#Brain Mets


-pt is s/p resection 


-pt needs to continue with WBRT with Dr. Sprague





Problems:  





Consultation Date/Type/Reason


Admit Date/Time


Oct 24, 2017 at 18:29


Initial Consult Date


10/26/17


Type of Consultation:  Oncology


Reason for Consultation


metastatic breast cancer


Referring Provider:  CLAU AGUILAR





24 HR Interval Summary


Free Text/Dictation


pt is status post decortication and pleurodesis. still with considerable 

drainage from chest tube. pt states she is breathing better





Exam/Review of Systems


Vital Signs


Vitals





 Vital Signs








  Date Time  Temp Pulse Resp B/P Pulse Ox O2 Delivery O2 Flow Rate FiO2


 


11/2/17 10:00  93 22 113/68 94 Nasal Cannula 2.0 


 


11/2/17 08:00 98.0       














 Intake and Output   


 


 11/1/17 11/1/17 11/2/17





 15:00 23:00 07:00


 


Intake Total 450 ml 750 ml 1140 ml


 


Output Total 870 ml 1050 ml 


 


Balance -420 ml -300 ml 1140 ml











Exam


Constitutional:  alert, frail, oriented


Head:  atraumatic, normocephalic


Eyes:  nl conjunctiva


ENMT:  nl external ears & nose


Neck:  non-tender, supple


Respiratory:  other (chest tube in place)


Cardiovascular:  nl pulses, regular rate and rhythm


Gastrointestinal:  soft





Results


Result Diagram:  


11/2/17 0458                                                                   

             11/2/17 0458





Results 24 hrs





Laboratory Tests








Test


  11/1/17


12:27 11/1/17


16:51 11/1/17


21:17 11/2/17


00:59


 


Bedside Glucose 111   227  H 188   144  














Test


  11/2/17


04:58 11/2/17


05:55 11/2/17


08:48 


 


 


White Blood Count 18.5  H   


 


Red Blood Count 3.93  L   


 


Hemoglobin 9.5  L   


 


Hematocrit 30.3  L   


 


Mean Corpuscular Volume 77.1  L   


 


Mean Corpuscular Hemoglobin 24.2  L   


 


Mean Corpuscular Hemoglobin


Concent 31.4  L


  


  


  


 


 


Red Cell Distribution Width 22.3  H   


 


Platelet Count 168     


 


Mean Platelet Volume 8.4     


 


Neutrophils % 86.0  H   


 


Lymphocytes % 5.9  L   


 


Monocytes % 4.4     


 


Eosinophils % 0.0     


 


Basophils % 0.1     


 


Nucleated Red Blood Cells % 0.0     


 


Neutrophils # 15.9  H   


 


Lymphocytes # 1.1     


 


Monocytes # 0.8     


 


Eosinophils # 0.0     


 


Basophils # 0.0     


 


Nucleated Red Blood Cells # 0.0     


 


Prothrombin Time 17.1  #H   


 


Prothrombin Time Ratio 1.3     


 


INR International Normalized


Ratio 1.39  


  


  


  


 


 


Activated Partial


Thromboplast Time 35.7  H


  


  


  


 


 


Thrombin Time 14.2     


 


Sodium Level 136     


 


Potassium Level 3.8     


 


Chloride Level 104     


 


Carbon Dioxide Level 23     


 


Anion Gap 13     


 


Blood Urea Nitrogen 11     


 


Creatinine 0.41  L   


 


Glucose Level 153     


 


Calcium Level 7.6  L   


 


Phosphorus Level 3.6     


 


Magnesium Level 2.0     


 


Albumin 2.2  L   


 


Bedside Glucose  142   120   











Medications


Medications





 Current Medications


Dexamethasone (Decadron) 4 mg BID PO  Last administered on 11/2/17at 08:45; 

Admin Dose 4 MG;  Start 10/24/17 at 21:00


Docusate Sodium (Colace) 200 mg QHS PO  Last administered on 11/1/17at 21:16; 

Admin Dose 200 MG;  Start 10/24/17 at 21:00


Ondansetron HCl (Zofran Inj) 4 mg Q6H  PRN IV NAUSEA AND/OR VOMITING;  Start 10/

24/17 at 21:00


Acetaminophen (Tylenol Tab) 650 mg Q6H  PRN PO PAIN LEVEL 1-3 OR FEVER Last 

administered on 11/1/17at 07:13; Admin Dose 650 MG;  Start 10/24/17 at 21:00


Oxycodone/ Acetaminophen (Percocet (5/ 325)) 1 tab Q6H  PRN PO MODERATE PAIN 

LEVEL 4-6;  Start 10/24/17 at 21:00


Morphine Sulfate (morphine) 2 mg Q4H  PRN IV SEVERE PAIN LEVEL 7-10 Last 

administered on 11/1/17at 02:47; Admin Dose 2 MG;  Start 10/24/17 at 21:00


Docusate Sodium (Colace) 100 mg Q12H  PRN PO CONSTIPATION;  Start 10/24/17 at 21

:00


Magnesium Hydroxide (Milk Of Mag) 30 ml DAILY  PRN PO CONSTIPATION;  Start 10/24

/17 at 21:00


Famotidine (Pepcid) 20 mg Q12 PO  Last administered on 11/2/17at 08:46; Admin 

Dose 20 MG;  Start 10/24/17 at 21:00


Albuterol (Proventil 0.083% (Neb)) 2.5 mg Q2  PRN HHN SHORTNESS OF BREATH;  

Start 10/24/17 at 21:00


Miscellaneous Information 1 ea NOTE XX ;  Start 10/24/17 at 21:30


Glucose (Glutose) 15 gm Q15M  PRN PO DECREASED GLUCOSE;  Start 10/24/17 at 21:30


Glucose (Glutose) 22.5 gm Q15M  PRN PO DECREASED GLUCOSE;  Start 10/24/17 at 21:

30


Dextrose (D50w Syringe) 25 ml Q15M  PRN IV DECREASED GLUCOSE;  Start 10/24/17 

at 21:30


Dextrose (D50w Syringe) 50 ml Q15M  PRN IV DECREASED GLUCOSE;  Start 10/24/17 

at 21:30


Glucagon (Glucagen) 1 mg Q15M  PRN IM DECREASED GLUCOSE;  Start 10/24/17 at 21:

30


Glucose (Glutose) 15 gm Q15M  PRN BUCCAL DECREASED GLUCOSE;  Start 10/24/17 at 

21:30


Nystatin (Nystatin Powder) 1 applic BID TOP  Last administered on 11/2/17at 08:

49; Admin Dose 1 APPLIC;  Start 10/25/17 at 09:00


Tamoxifen Citrate (Nolvadex) 20 mg DAILY PO  Last administered on 11/2/17at 08:

46; Admin Dose 20 MG;  Start 10/25/17 at 13:00


Diagnostic Test (Pha) 1 ea 1 ea 02 XX  Last administered on 11/2/17at 01:38; 

Admin Dose 1 EA;  Start 10/26/17 at 02:00


Ceftriaxone Sodium (Rocephin) 50 ml @  100 mls/hr Q24H IVPB  Last administered 

on 11/1/17at 13:57; Admin Dose 100 MLS/HR;  Start 10/27/17 at 14:00


Insulin Aspart NOVOLOG *MILD* ALGORI... Q4 SC  Last administered on 11/2/17at 06

:00; Admin Dose 1 UNIT;  Start 10/31/17 at 01:00


Norepinephrine (Levophed) 250 ml @  1.875 mls/ hr TITRATE IV ;  Start 10/31/17 

at 21:00











KRISTEL ORANTES M.D. Nov 2, 2017 11:30

## 2017-11-02 NOTE — PN
Date/Time of Note


Date/Time of Note


DATE: 11/2/17 


TIME: 09:49





Assessment/Plan


VTE Prophylaxis


VTE Prophylaxis Intervention:  contraindicated





Lines/Catheters


IV Catheter Type (from [facility]):  Peripheral IV


Urinary Cath still in place:  No





Assessment/Plan


Assessment/Plan


1.  Empyema 2/2 staph aureus. 


- Patient is s/p VATS on 10/31/17 and doing well after procedure.  Chest tube 

in place with lightening serosanguineous drainage


- CT chest showed trace loculated pleural effusion with possible empyema in 

addition to malignant effusion.


- CT surgery on board and appreciate consultation


- Continue IV antibiotics per ID


- Ultrasound-guided thoracentesis performed, cultures showing staph aureus





2.  Dysfunctional Pleurx catheter


- Pulmonology on board and consultation appreciated


- CXR shows right moderate pleural effusion with right basilar consolidation, 

improved when compared to the prior examination. A right chest tube is in place.


- Respiratory status stable at this time





3.  Cellulitis, area around catheter


- ID on board and recommendations appreciated. Continue on antibiotics  





4.  Metastatic breast cancer, lung, liver, brain, s/p craniectomy 


- currently receiving radiation therapy and tamoxifen and Decadron


- Heme/onc on board and consultation appreciated


- Radiation as scheduled





5. Diabetes mellitus


- Currently stable





6. Obesity


- Counseled about diet and improving exercise routine when able





7. Disposition


- Stable for transfer to Telemetry





>30 minutes of critical care time was spent with patient at bedside.





Subjective


24 Hr Interval Summary


Free Text/Dictation


Patient feeling better and more awake and energetic this am.  Doing well and 

tolerating diet.  Respiratory status more comfortable as well.   No acute 

overnight events.





Exam/Review of Systems


Vital Signs


Vitals





 Vital Signs








  Date Time  Temp Pulse Resp B/P Pulse Ox O2 Delivery O2 Flow Rate FiO2


 


11/2/17 08:00  86      


 


11/2/17 07:15      Nasal Cannula 2.0 


 


11/2/17 07:00   17 131/77 98   


 


11/2/17 00:00 97.6       














 Intake and Output   


 


 11/1/17 11/1/17 11/2/17





 14:59 22:59 06:59


 


Intake Total 350 ml 780 ml 1210 ml


 


Output Total 720 ml 950 ml 250 ml


 


Balance -370 ml -170 ml 960 ml











Exam


General: Patient more comfortable today in no acute distress


Head: R side craniectomy site healing well.


Eyes: EOMI, pupils reactive to light


Neck: Supple, nontender, midline


Respiratory: diminished air entry with chest tube in place draining sero 

sanguinous fluid


Cardiovascular: regular rate, no obvious murmurs


Gastrointestinal: non-tender to palpation, bowel sounds heard.


Neurological: Moves all extremities spontaneously





Results


Result Diagram:  


11/2/17 0458                                                                   

             11/2/17 0458





Results 24 hrs





Laboratory Tests








Test


  11/1/17


12:27 11/1/17


16:51 11/1/17


21:17 11/2/17


00:59


 


Bedside Glucose 111   227  H 188   144  














Test


  11/2/17


04:58 11/2/17


05:55 11/2/17


08:48 


 


 


White Blood Count 18.5  H   


 


Red Blood Count 3.93  L   


 


Hemoglobin 9.5  L   


 


Hematocrit 30.3  L   


 


Mean Corpuscular Volume 77.1  L   


 


Mean Corpuscular Hemoglobin 24.2  L   


 


Mean Corpuscular Hemoglobin


Concent 31.4  L


  


  


  


 


 


Red Cell Distribution Width 22.3  H   


 


Platelet Count 168     


 


Mean Platelet Volume 8.4     


 


Neutrophils % 86.0  H   


 


Lymphocytes % 5.9  L   


 


Monocytes % 4.4     


 


Eosinophils % 0.0     


 


Basophils % 0.1     


 


Nucleated Red Blood Cells % 0.0     


 


Neutrophils # 15.9  H   


 


Lymphocytes # 1.1     


 


Monocytes # 0.8     


 


Eosinophils # 0.0     


 


Basophils # 0.0     


 


Nucleated Red Blood Cells # 0.0     


 


Prothrombin Time 17.1  #H   


 


Prothrombin Time Ratio 1.3     


 


INR International Normalized


Ratio 1.39  


  


  


  


 


 


Activated Partial


Thromboplast Time 35.7  H


  


  


  


 


 


Thrombin Time 14.2     


 


Sodium Level 136     


 


Potassium Level 3.8     


 


Chloride Level 104     


 


Carbon Dioxide Level 23     


 


Anion Gap 13     


 


Blood Urea Nitrogen 11     


 


Creatinine 0.41  L   


 


Glucose Level 153     


 


Calcium Level 7.6  L   


 


Phosphorus Level 3.6     


 


Magnesium Level 2.0     


 


Albumin 2.2  L   


 


Bedside Glucose  142   120   











Medications


Medications





 Current Medications


Dexamethasone (Decadron) 4 mg BID PO  Last administered on 11/2/17at 08:45; 

Admin Dose 4 MG;  Start 10/24/17 at 21:00


Docusate Sodium (Colace) 200 mg QHS PO  Last administered on 11/1/17at 21:16; 

Admin Dose 200 MG;  Start 10/24/17 at 21:00


Ondansetron HCl (Zofran Inj) 4 mg Q6H  PRN IV NAUSEA AND/OR VOMITING;  Start 10/

24/17 at 21:00


Acetaminophen (Tylenol Tab) 650 mg Q6H  PRN PO PAIN LEVEL 1-3 OR FEVER Last 

administered on 11/1/17at 07:13; Admin Dose 650 MG;  Start 10/24/17 at 21:00


Oxycodone/ Acetaminophen (Percocet (5/ 325)) 1 tab Q6H  PRN PO MODERATE PAIN 

LEVEL 4-6;  Start 10/24/17 at 21:00


Morphine Sulfate (morphine) 2 mg Q4H  PRN IV SEVERE PAIN LEVEL 7-10 Last 

administered on 11/1/17at 02:47; Admin Dose 2 MG;  Start 10/24/17 at 21:00


Docusate Sodium (Colace) 100 mg Q12H  PRN PO CONSTIPATION;  Start 10/24/17 at 21

:00


Magnesium Hydroxide (Milk Of Mag) 30 ml DAILY  PRN PO CONSTIPATION;  Start 10/24

/17 at 21:00


Famotidine (Pepcid) 20 mg Q12 PO  Last administered on 11/2/17at 08:46; Admin 

Dose 20 MG;  Start 10/24/17 at 21:00


Albuterol (Proventil 0.083% (Neb)) 2.5 mg Q2  PRN HHN SHORTNESS OF BREATH;  

Start 10/24/17 at 21:00


Miscellaneous Information 1 ea NOTE XX ;  Start 10/24/17 at 21:30


Glucose (Glutose) 15 gm Q15M  PRN PO DECREASED GLUCOSE;  Start 10/24/17 at 21:30


Glucose (Glutose) 22.5 gm Q15M  PRN PO DECREASED GLUCOSE;  Start 10/24/17 at 21:

30


Dextrose (D50w Syringe) 25 ml Q15M  PRN IV DECREASED GLUCOSE;  Start 10/24/17 

at 21:30


Dextrose (D50w Syringe) 50 ml Q15M  PRN IV DECREASED GLUCOSE;  Start 10/24/17 

at 21:30


Glucagon (Glucagen) 1 mg Q15M  PRN IM DECREASED GLUCOSE;  Start 10/24/17 at 21:

30


Glucose (Glutose) 15 gm Q15M  PRN BUCCAL DECREASED GLUCOSE;  Start 10/24/17 at 

21:30


Nystatin (Nystatin Powder) 1 applic BID TOP  Last administered on 11/2/17at 08:

49; Admin Dose 1 APPLIC;  Start 10/25/17 at 09:00


Tamoxifen Citrate (Nolvadex) 20 mg DAILY PO  Last administered on 11/2/17at 08:

46; Admin Dose 20 MG;  Start 10/25/17 at 13:00


Diagnostic Test (Pha) 1 ea 1 ea 02 XX  Last administered on 11/2/17at 01:38; 

Admin Dose 1 EA;  Start 10/26/17 at 02:00


Ceftriaxone Sodium (Rocephin) 50 ml @  100 mls/hr Q24H IVPB  Last administered 

on 11/1/17at 13:57; Admin Dose 100 MLS/HR;  Start 10/27/17 at 14:00


Insulin Aspart NOVOLOG *MILD* ALGORI... Q4 SC  Last administered on 11/2/17at 06

:00; Admin Dose 1 UNIT;  Start 10/31/17 at 01:00


Norepinephrine (Levophed) 250 ml @  1.875 mls/ hr TITRATE IV ;  Start 10/31/17 

at 21:00











RIVERA RAWLS MD Nov 2, 2017 09:49

## 2017-11-03 VITALS — DIASTOLIC BLOOD PRESSURE: 61 MMHG | SYSTOLIC BLOOD PRESSURE: 132 MMHG | RESPIRATION RATE: 18 BRPM

## 2017-11-03 VITALS — RESPIRATION RATE: 16 BRPM | SYSTOLIC BLOOD PRESSURE: 110 MMHG | DIASTOLIC BLOOD PRESSURE: 59 MMHG

## 2017-11-03 VITALS — SYSTOLIC BLOOD PRESSURE: 119 MMHG | RESPIRATION RATE: 18 BRPM | DIASTOLIC BLOOD PRESSURE: 66 MMHG

## 2017-11-03 VITALS — HEART RATE: 71 BPM

## 2017-11-03 VITALS — DIASTOLIC BLOOD PRESSURE: 71 MMHG | RESPIRATION RATE: 18 BRPM | SYSTOLIC BLOOD PRESSURE: 118 MMHG

## 2017-11-03 VITALS — RESPIRATION RATE: 18 BRPM | DIASTOLIC BLOOD PRESSURE: 61 MMHG | SYSTOLIC BLOOD PRESSURE: 109 MMHG

## 2017-11-03 VITALS — HEART RATE: 69 BPM

## 2017-11-03 LAB
ABNORMAL IP MESSAGE: 1
ANION GAP SERPL CALC-SCNC: 13 MMOL/L (ref 8–16)
BASOPHILS # BLD AUTO: 0 10^3/UL (ref 0–0.1)
BASOPHILS NFR BLD: 0.2 % (ref 0–2)
BUN SERPL-MCNC: 15 MG/DL (ref 7–20)
CALCIUM SERPL-MCNC: 8 MG/DL (ref 8.4–10.2)
CHLORIDE SERPL-SCNC: 104 MMOL/L (ref 97–110)
CO2 SERPL-SCNC: 24 MMOL/L (ref 21–31)
CREAT SERPL-MCNC: 0.42 MG/DL (ref 0.44–1)
EOSINOPHIL # BLD: 0 10^3/UL (ref 0–0.5)
EOSINOPHIL NFR BLD: 0 % (ref 0–7)
ERYTHROCYTE [DISTWIDTH] IN BLOOD BY AUTOMATED COUNT: 22.5 % (ref 11.5–14.5)
GLUCOSE SERPL-MCNC: 150 MG/DL (ref 70–220)
HCT VFR BLD CALC: 30.4 % (ref 37–47)
HGB BLD-MCNC: 9.8 G/DL (ref 12–16)
LYMPHOCYTES # BLD AUTO: 1.4 10^3/UL (ref 0.8–2.9)
LYMPHOCYTES NFR BLD AUTO: 7.6 % (ref 15–51)
MCH RBC QN AUTO: 24.8 PG (ref 29–33)
MCHC RBC AUTO-ENTMCNC: 32.2 G/DL (ref 32–37)
MCV RBC AUTO: 77 FL (ref 82–101)
MONOCYTES # BLD: 0.9 10^3/UL (ref 0.3–0.9)
MONOCYTES NFR BLD: 5 % (ref 0–11)
NEUTROPHILS # BLD: 14.9 10^3/UL (ref 1.6–7.5)
NEUTROPHILS NFR BLD AUTO: 84 % (ref 39–77)
NRBC # BLD MANUAL: 0 10^3/UL (ref 0–0)
NRBC BLD AUTO-RTO: 0 /100WBC (ref 0–0)
PLATELET # BLD: 202 10^3/UL (ref 140–415)
PMV BLD AUTO: 8.6 FL (ref 7.4–10.4)
POSITIVE DIFF: (no result)
POTASSIUM SERPL-SCNC: 4.5 MMOL/L (ref 3.5–5.1)
RBC # BLD AUTO: 3.95 10^6/UL (ref 4.2–5.4)
SODIUM SERPL-SCNC: 136 MMOL/L (ref 135–144)
WBC # BLD AUTO: 17.7 10^3/UL (ref 4.8–10.8)

## 2017-11-03 RX ADMIN — NYSTATIN SCH APPLIC: 100000 POWDER TOPICAL at 09:10

## 2017-11-03 RX ADMIN — NYSTATIN SCH APPLIC: 100000 POWDER TOPICAL at 22:12

## 2017-11-03 RX ADMIN — CEFTRIAXONE SCH MLS/HR: 1 INJECTION, SOLUTION INTRAVENOUS at 13:13

## 2017-11-03 RX ADMIN — TAMOXIFEN CITRATE SCH MG: 10 TABLET, FILM COATED ORAL at 10:43

## 2017-11-03 RX ADMIN — DOCUSATE SODIUM SCH MG: 100 CAPSULE, LIQUID FILLED ORAL at 20:17

## 2017-11-03 RX ADMIN — FAMOTIDINE SCH MG: 20 TABLET ORAL at 20:17

## 2017-11-03 RX ADMIN — FAMOTIDINE SCH MG: 20 TABLET ORAL at 09:10

## 2017-11-03 NOTE — RADRPT
PROCEDURE:   XR Chest. 

 

CLINICAL INDICATION:  Pneumonia. Pleural effusion.

 

TECHNIQUE:  Single portable view of the chest was obtained. 

 

COMPARISON:   11/02/2017 and additional priors 

 

FINDINGS:

Stable position of a right-sided chest tube. Possible tunneled chest tube over the right lung base. 
Stable partially obscured cardiomediastinal silhouette. Similar likely right pleural effusion with r
ight perihilar and basilar airspace opacities.. Left lung is clear. No evidence of pneumothorax.

 

IMPRESSION:

1. Right-sided chest tube and possible tunneled right basilar chest tube.

2. Similar right perihilar and basilar airspace opacities with loculated right pleural effusion.

 

RPTAT:AAJJ

_____________________________________________ 

Physician Fina           Date    Time 

Electronically viewed and signed by Chelsie Kirby Physician on 11/03/2017 16:36 

 

D:  11/03/2017 16:36  T:  11/03/2017 16:36

/

## 2017-11-03 NOTE — PN
Date/Time of Note


Date/Time of Note


DATE: 11/3/17 


TIME: 18:56





Assessment/Plan


Lines/Catheters


IV Catheter Type (from Nrs):  Saline Lock


Guerrero in Place (from Nrs):  No





Assessment/Plan


Chief Complaint/Hosp Course


 


IMPRESSION:  Right loculated pleural effusion.


 


R status post right VATS pleurodesis decortication


Patient feels much better


CT  390 cc


Less shortness of breath


Check chest x-ray


Will continue chest tube suction


Problems:  





Subjective


24 Hr Interval Summary


Constitutional:  improved


Pain Control:  mild





Exam/Review of Systems


Vital Signs


Vitals





 Vital Signs








  Date Time  Temp Pulse Resp B/P Pulse Ox O2 Delivery O2 Flow Rate FiO2


 


11/3/17 13:38 98.5 87 18 119/66 97   


 


11/3/17 02:53       2.0 27


 


11/2/17 20:00      Nasal Cannula  














 Intake and Output   


 


 11/2/17 11/2/17 11/3/17





 15:00 23:00 07:00


 


Intake Total  550 ml 400 ml


 


Output Total  150 ml 2080 ml


 


Balance  400 ml -1680 ml











Exam


Neck:  non-tender, supple


Respiratory:  clear to auscultation, normal air movement


Cardiovascular:  nl pulses, regular rate and rhythm


Gastrointestinal:  nl liver, spleen, non-tender, soft





Results


Result Diagram:  


11/3/17 0616                                                                   

             11/3/17 0616














HEIDY MAXWELL MD Nov 3, 2017 18:56

## 2017-11-03 NOTE — CONS
Date/Time of Note


Date/Time of Note


DATE: 11/3/17 


TIME: 16:36





Consult Date/Type/Reason


Admit Date/Time


Oct 24, 2017 at 18:29


Initial Consult Date


10/25/17


Type of Consultation:  Pulmonary


Ordering Provider:  CLAU AGUILAR





Subjective


Patient continues to remain stable no new events.





Objective





 Vital Signs








  Date Time  Temp Pulse Resp B/P Pulse Ox O2 Delivery O2 Flow Rate FiO2


 


11/3/17 13:38 98.5 87 18 119/66 97   


 


11/3/17 02:53       2.0 27


 


11/2/17 20:00      Nasal Cannula  














 Intake and Output   


 


 11/2/17 11/2/17 11/3/17





 15:00 23:00 07:00


 


Intake Total  550 ml 400 ml


 


Output Total  150 ml 2080 ml


 


Balance  400 ml -1680 ml








Exam


GENERAL: Well-nourished well-developed lady comfortable at rest


VITAL SIGNS:  per chart


NECK:  Supple.  No JVD or lymphadenopathy.


CARDIAC EXAM:  S1, S2. No added sounds or murmurs.


CHEST: Decreased air entry right lung.


ABDOMEN:  Soft, nontender.  No guarding or rebound.


EXTREMITIES:  No cyanosis, clubbing or edema.


NEUROLOGIC:  Generalized weakness.  No focal deficits.





Results/Medications


Result Diagram:  


11/3/17 0616                                                                   

             11/3/17 0616





Results 24 hrs





Laboratory Tests








Test


  11/2/17


17:15 11/2/17


20:26 11/3/17


00:59 11/3/17


04:59


 


Bedside Glucose 175   241  H 194   160  














Test


  11/3/17


06:16 11/3/17


09:00 11/3/17


12:49 


 


 


White Blood Count 17.7  H   


 


Red Blood Count 3.95  L   


 


Hemoglobin 9.8  L   


 


Hematocrit 30.4  L   


 


Mean Corpuscular Volume 77.0  L   


 


Mean Corpuscular Hemoglobin 24.8  L   


 


Mean Corpuscular Hemoglobin


Concent 32.2  


  


  


  


 


 


Red Cell Distribution Width 22.5  H   


 


Platelet Count 202     


 


Mean Platelet Volume 8.6     


 


Neutrophils % 84.0  H   


 


Lymphocytes % 7.6  L   


 


Monocytes % 5.0     


 


Eosinophils % 0.0     


 


Basophils % 0.2     


 


Nucleated Red Blood Cells % 0.0     


 


Neutrophils # 14.9  H   


 


Lymphocytes # 1.4     


 


Monocytes # 0.9     


 


Eosinophils # 0.0     


 


Basophils # 0.0     


 


Nucleated Red Blood Cells # 0.0     


 


Sodium Level 136     


 


Potassium Level 4.5     


 


Chloride Level 104     


 


Carbon Dioxide Level 24     


 


Anion Gap 13     


 


Blood Urea Nitrogen 15     


 


Creatinine 0.42  L   


 


Glucose Level 150     


 


Calcium Level 8.0  L   


 


Bedside Glucose  132   129   








Medications





 Current Medications


Dexamethasone (Decadron) 4 mg BID PO  Last administered on 11/3/17at 09:10; 

Admin Dose 4 MG;  Start 10/24/17 at 21:00


Docusate Sodium (Colace) 200 mg QHS PO  Last administered on 11/2/17at 20:21; 

Admin Dose 200 MG;  Start 10/24/17 at 21:00


Ondansetron HCl (Zofran Inj) 4 mg Q6H  PRN IV NAUSEA AND/OR VOMITING;  Start 10/

24/17 at 21:00


Acetaminophen (Tylenol Tab) 650 mg Q6H  PRN PO PAIN LEVEL 1-3 OR FEVER Last 

administered on 11/1/17at 07:13; Admin Dose 650 MG;  Start 10/24/17 at 21:00


Oxycodone/ Acetaminophen (Percocet (5/ 325)) 1 tab Q6H  PRN PO MODERATE PAIN 

LEVEL 4-6;  Start 10/24/17 at 21:00


Morphine Sulfate (morphine) 2 mg Q4H  PRN IV SEVERE PAIN LEVEL 7-10 Last 

administered on 11/1/17at 02:47; Admin Dose 2 MG;  Start 10/24/17 at 21:00


Docusate Sodium (Colace) 100 mg Q12H  PRN PO CONSTIPATION;  Start 10/24/17 at 21

:00


Magnesium Hydroxide (Milk Of Mag) 30 ml DAILY  PRN PO CONSTIPATION;  Start 10/24

/17 at 21:00


Famotidine (Pepcid) 20 mg Q12 PO  Last administered on 11/3/17at 09:10; Admin 

Dose 20 MG;  Start 10/24/17 at 21:00


Albuterol (Proventil 0.083% (Neb)) 2.5 mg Q2  PRN HHN SHORTNESS OF BREATH;  

Start 10/24/17 at 21:00


Miscellaneous Information 1 ea NOTE XX ;  Start 10/24/17 at 21:30


Glucose (Glutose) 15 gm Q15M  PRN PO DECREASED GLUCOSE;  Start 10/24/17 at 21:30


Glucose (Glutose) 22.5 gm Q15M  PRN PO DECREASED GLUCOSE;  Start 10/24/17 at 21:

30


Dextrose (D50w Syringe) 25 ml Q15M  PRN IV DECREASED GLUCOSE;  Start 10/24/17 

at 21:30


Dextrose (D50w Syringe) 50 ml Q15M  PRN IV DECREASED GLUCOSE;  Start 10/24/17 

at 21:30


Glucagon (Glucagen) 1 mg Q15M  PRN IM DECREASED GLUCOSE;  Start 10/24/17 at 21:

30


Glucose (Glutose) 15 gm Q15M  PRN BUCCAL DECREASED GLUCOSE;  Start 10/24/17 at 

21:30


Nystatin (Nystatin Powder) 1 applic BID TOP  Last administered on 11/3/17at 09:

10; Admin Dose 1 APPLIC;  Start 10/25/17 at 09:00


Tamoxifen Citrate (Nolvadex) 20 mg DAILY PO  Last administered on 11/3/17at 10:

43; Admin Dose 20 MG;  Start 10/25/17 at 13:00


Diagnostic Test (Pha) 1 ea 1 ea 02 XX  Last administered on 11/2/17at 01:38; 

Admin Dose 1 EA;  Start 10/26/17 at 02:00


Ceftriaxone Sodium (Rocephin) 50 ml @  100 mls/hr Q24H IVPB  Last administered 

on 11/3/17at 13:13; Admin Dose 100 MLS/HR;  Start 10/27/17 at 14:00


Insulin Aspart (Novolog Insulin Pen) NOVOLOG *MILD* ALGORI... Q4 SC  Last 

administered on 11/3/17at 05:03; Admin Dose 1 UNIT;  Start 10/31/17 at 01:00





Assessment/Plan


Chief Complaint/Hosp Course


Assessment





1.  Metastatic breast cancer with recurrent right pleural effusion.  Cultures 

growing gram-positive cocci consistent with staph aureus.


2.  Loculated pleural effusion possible empyema status post decortication and 

pleurodesis.


3.  CNS metastasis currently receiving steroids and radiation therapy








Plan





1.  Continue chest tube drainage.  Previous cultures grew staph aureus.  

Antibiotic management discussed with infectious diseases


2.  Continue oncology recommendations continues radiation therapy for CNS 

metastasis


3.  Continue antibiotic coverage for staph infection.





Consider transfer to [facility]


Problems:  











DELFIN LAKHANI [facility] 16:38

## 2017-11-03 NOTE — PN
DATE:  11/03/2017

 

 

SUBJECTIVE:  No events overnight.  The patient is alert, looks comfortable.  Denies pain.  She is af
ebrile.  WBC 17.7, platelets 220, neutrophils 84.  BUN 15, creatinine 0.42.  Pathology if the fluid 
revealed malignancy consistent with metastatic carcinoma compatible with breast primary.  Cultures n
ot sent.

 

ANTIMICROBIALS:  The patient is on IV Rocephin.

 

INDWELLINGS:  Right chest tube.

 

PHYSICAL EXAMINATION:

GENERAL:  This is an obese, well-developed, middle-aged woman who is alert, in no distress.

HEENT:  Head atraumatic, normocephalic.  Sclerae anicteric.  Buccal mucosa dry.

NECK:  Supple.

CHEST:  Rise symmetrical.  Breath sounds diminished to bases.

HEART:  S1, S2.

ABDOMEN:  Soft, bowel tones present.

EXTREMITIES:  Without cyanosis.

 

ASSESSMENT:

1.  Loculated right pleural effusion status post biopsy with pleurocentesis.

2.  Metastatic breast cancer.

3.  Diabetes.

4.  Obesity.

5.  History of craniectomy for brain metastases.

 

PLAN:  Patient remained stable.  Pleural fluid culture on admission grew oxacillin-sensitive Staphyl
ococcus aureus.  She remains on Rocephin.  Follow recommendations of consultants.

 

 

Dictated By: DIANA RANDALL NP for JERROLD DREYER MD NI/LAKISHA

DD:    11/03/2017 13:23:16

DT:    11/03/2017 15:38:40

Conf#: [id number]

DID#:  [id number]

## 2017-11-03 NOTE — CONS
Date/Time of Note


Date/Time of Note


DATE: 11/3/17 


TIME: 11:44





Assessment/Plan


Assessment/Plan


Chief Complaint/Hosp Course


#Her2+/ER+/CO+ metastatic breast ca


-pt is non compliant and when she receives therapy, she is quite responsive to 

therapy


-therefore at this time, although she has terminal disease, there are still 

many more treatment options which she could benefit from


-given her Brain mets, I would consider a combination of Xeloda and Lapatinib 

which can penetrate the blood brain barrier. this will have to start once 

radiation has completed


-can restart Tamoxifen as well





#Pleural Effusion with superimposed infection


-pt is s/p decortication and pleurodesis


-chest tube with considerable serosanguineous drainage


-continue antibiotics given + Staph superinfection infection 


-appreciate pulmonary recs





#Brain Mets


-pt is s/p resection 


-pt needs to continue with WBRT with Dr. Sprague





Problems:  





Consultation Date/Type/Reason


Admit Date/Time


Oct 24, 2017 at 18:29


Initial Consult Date


10/26/17


Type of Consultation:  Hematology


Reason for Consultation


metastatic her 2 positive breast cancer


Referring Provider:  CLAU AGUILAR





24 HR Interval Summary


Free Text/Dictation


resumed brain radiation today





Exam/Review of Systems


Vital Signs


Vitals





 Vital Signs








  Date Time  Temp Pulse Resp B/P Pulse Ox O2 Delivery O2 Flow Rate FiO2


 


11/3/17 08:19 98.0 64 18 118/71 96   


 


11/3/17 02:53       2.0 27


 


11/2/17 20:00      Nasal Cannula  














 Intake and Output   


 


 11/2/17 11/2/17 11/3/17





 15:00 23:00 07:00


 


Intake Total  550 ml 400 ml


 


Output Total  150 ml 2080 ml


 


Balance  400 ml -1680 ml











Exam


Constitutional:  alert, oriented


Psych:  no complaints


Head:  normocephalic


Eyes:  nl conjunctiva


ENMT:  nl external ears & nose


Neck:  supple


Respiratory:  clear to auscultation, diminished breath sounds (chest tube in 

place, draining serosanguanous fluid), other


Cardiovascular:  regular rate and rhythm


Gastrointestinal:  soft





Results


Result Diagram:  


11/3/17 0616                                                                   

             11/3/17 0616





Results 24 hrs





Laboratory Tests








Test


  11/2/17


13:18 11/2/17


17:15 11/2/17


20:26 11/3/17


00:59


 


Bedside Glucose 152   175   241  H 194  














Test


  11/3/17


04:59 11/3/17


06:16 11/3/17


09:00 


 


 


Bedside Glucose 160    132   


 


White Blood Count  17.7  H  


 


Red Blood Count  3.95  L  


 


Hemoglobin  9.8  L  


 


Hematocrit  30.4  L  


 


Mean Corpuscular Volume  77.0  L  


 


Mean Corpuscular Hemoglobin  24.8  L  


 


Mean Corpuscular Hemoglobin


Concent 


  32.2  


  


  


 


 


Red Cell Distribution Width  22.5  H  


 


Platelet Count  202    


 


Mean Platelet Volume  8.6    


 


Neutrophils %  84.0  H  


 


Lymphocytes %  7.6  L  


 


Monocytes %  5.0    


 


Eosinophils %  0.0    


 


Basophils %  0.2    


 


Nucleated Red Blood Cells %  0.0    


 


Neutrophils #  14.9  H  


 


Lymphocytes #  1.4    


 


Monocytes #  0.9    


 


Eosinophils #  0.0    


 


Basophils #  0.0    


 


Nucleated Red Blood Cells #  0.0    


 


Sodium Level  136    


 


Potassium Level  4.5    


 


Chloride Level  104    


 


Carbon Dioxide Level  24    


 


Anion Gap  13    


 


Blood Urea Nitrogen  15    


 


Creatinine  0.42  L  


 


Glucose Level  150    


 


Calcium Level  8.0  L  











Medications


Medications





 Current Medications


Dexamethasone (Decadron) 4 mg BID PO  Last administered on 11/3/17at 09:10; 

Admin Dose 4 MG;  Start 10/24/17 at 21:00


Docusate Sodium (Colace) 200 mg QHS PO  Last administered on 11/2/17at 20:21; 

Admin Dose 200 MG;  Start 10/24/17 at 21:00


Ondansetron HCl (Zofran Inj) 4 mg Q6H  PRN IV NAUSEA AND/OR VOMITING;  Start 10/

24/17 at 21:00


Acetaminophen (Tylenol Tab) 650 mg Q6H  PRN PO PAIN LEVEL 1-3 OR FEVER Last 

administered on 11/1/17at 07:13; Admin Dose 650 MG;  Start 10/24/17 at 21:00


Oxycodone/ Acetaminophen (Percocet (5/ 325)) 1 tab Q6H  PRN PO MODERATE PAIN 

LEVEL 4-6;  Start 10/24/17 at 21:00


Morphine Sulfate (morphine) 2 mg Q4H  PRN IV SEVERE PAIN LEVEL 7-10 Last 

administered on 11/1/17at 02:47; Admin Dose 2 MG;  Start 10/24/17 at 21:00


Docusate Sodium (Colace) 100 mg Q12H  PRN PO CONSTIPATION;  Start 10/24/17 at 21

:00


Magnesium Hydroxide (Milk Of Mag) 30 ml DAILY  PRN PO CONSTIPATION;  Start 10/24

/17 at 21:00


Famotidine (Pepcid) 20 mg Q12 PO  Last administered on 11/3/17at 09:10; Admin 

Dose 20 MG;  Start 10/24/17 at 21:00


Albuterol (Proventil 0.083% (Neb)) 2.5 mg Q2  PRN HHN SHORTNESS OF BREATH;  

Start 10/24/17 at 21:00


Miscellaneous Information 1 ea NOTE XX ;  Start 10/24/17 at 21:30


Glucose (Glutose) 15 gm Q15M  PRN PO DECREASED GLUCOSE;  Start 10/24/17 at 21:30


Glucose (Glutose) 22.5 gm Q15M  PRN PO DECREASED GLUCOSE;  Start 10/24/17 at 21:

30


Dextrose (D50w Syringe) 25 ml Q15M  PRN IV DECREASED GLUCOSE;  Start 10/24/17 

at 21:30


Dextrose (D50w Syringe) 50 ml Q15M  PRN IV DECREASED GLUCOSE;  Start 10/24/17 

at 21:30


Glucagon (Glucagen) 1 mg Q15M  PRN IM DECREASED GLUCOSE;  Start 10/24/17 at 21:

30


Glucose (Glutose) 15 gm Q15M  PRN BUCCAL DECREASED GLUCOSE;  Start 10/24/17 at 

21:30


Nystatin (Nystatin Powder) 1 applic BID TOP  Last administered on 11/3/17at 09:

10; Admin Dose 1 APPLIC;  Start 10/25/17 at 09:00


Tamoxifen Citrate (Nolvadex) 20 mg DAILY PO  Last administered on 11/3/17at 10:

43; Admin Dose 20 MG;  Start 10/25/17 at 13:00


Diagnostic Test (Pha) 1 ea 1 ea 02 XX  Last administered on 11/2/17at 01:38; 

Admin Dose 1 EA;  Start 10/26/17 at 02:00


Ceftriaxone Sodium (Rocephin) 50 ml @  100 mls/hr Q24H IVPB  Last administered 

on 11/2/17at 15:17; Admin Dose 100 MLS/HR;  Start 10/27/17 at 14:00


Insulin Aspart (Novolog Insulin Pen) NOVOLOG *MILD* ALGORI... Q4 SC  Last 

administered on 11/3/17at 05:03; Admin Dose 1 UNIT;  Start 10/31/17 at 01:00











KRISTEL ORANTES M.D. Nov 3, 2017 11:45

## 2017-11-03 NOTE — PN
Date/Time of Note


Date/Time of Note


DATE: 11/3/17 


TIME: 11:09





Assessment/Plan


VTE Prophylaxis


VTE Prophylaxis Intervention:  contraindicated, SCD's





Lines/Catheters


IV Catheter Type (from [facility]):  Saline Lock


Urinary Cath still in place:  No





Assessment/Plan


Assessment/Plan


1.  Empyema 2/2 staph aureus s/p VATS 10/31/17


- Patient doing well and chest tube still in place and continues with 

serosanguineous drainage


- CT chest showed trace loculated pleural effusion with possible empyema in 

addition to malignant effusion.


- CT surgery on board and appreciate consultation


- Continue IV antibiotics per ID


- Ultrasound-guided thoracentesis performed, cultures showing staph aureus





2.  Dysfunctional Pleurx catheter


- Pulmonology on board and consultation appreciated


- Respiratory status stable at this time





3.  Cellulitis, area around catheter


- ID on board and recommendations appreciated. Continue on antibiotics  





4.  Metastatic breast cancer, lung, liver, brain, s/p craniectomy 


- currently receiving radiation therapy and tamoxifen and Decadron


- Heme/onc on board and consultation appreciated


- Radiation as scheduled





5. Diabetes mellitus


- Currently stable





6. Obesity


- Counseled about diet and improving exercise routine when able





7. Disposition


- Continue monitoring on med/surg while chest tube in place





Subjective


24 Hr Interval Summary


Free Text/Dictation


Patient tired this am after radiation this am but states still feeling better 

respiratory wise.  No acute overnight events.  She is concerned it feels like 

swelling on right side of skull.  Denies any headache, nausea, vomiting, visual 

changes, or discharge from site.





Exam/Review of Systems


Vital Signs


Vitals





 Vital Signs








  Date Time  Temp Pulse Resp B/P Pulse Ox O2 Delivery O2 Flow Rate FiO2


 


11/3/17 08:19 98.0 64 18 118/71 96   


 


11/3/17 02:53       2.0 27


 


11/2/17 20:00      Nasal Cannula  














 Intake and Output   


 


 11/2/17 11/2/17 11/3/17





 15:00 23:00 07:00


 


Intake Total  550 ml 400 ml


 


Output Total  150 ml 2080 ml


 


Balance  400 ml -1680 ml











Exam


General: Patient sleepy this am after radiation.  no acute distress and 

experiencing some swelling on right side of skull but no discharge or drainage 

appreciated. 


Head: R side craniectomy site healing well.


Eyes: EOMI, pupils reactive to light


Neck: Supple, nontender, midline


Respiratory: diminished air entry with chest tube in place draining sero 

sanguinous fluid


Cardiovascular: regular rate, no obvious murmurs


Gastrointestinal: non-tender to palpation, bowel sounds heard.


Neurological: Moves all extremities spontaneously





Results


Result Diagram:  


11/3/17 0616                                                                   

             11/3/17 0616





Results 24 hrs





Laboratory Tests








Test


  11/2/17


13:18 11/2/17


17:15 11/2/17


20:26 11/3/17


00:59


 


Bedside Glucose 152   175   241  H 194  














Test


  11/3/17


04:59 11/3/17


06:16 11/3/17


09:00 


 


 


Bedside Glucose 160    132   


 


White Blood Count  17.7  H  


 


Red Blood Count  3.95  L  


 


Hemoglobin  9.8  L  


 


Hematocrit  30.4  L  


 


Mean Corpuscular Volume  77.0  L  


 


Mean Corpuscular Hemoglobin  24.8  L  


 


Mean Corpuscular Hemoglobin


Concent 


  32.2  


  


  


 


 


Red Cell Distribution Width  22.5  H  


 


Platelet Count  202    


 


Mean Platelet Volume  8.6    


 


Neutrophils %  84.0  H  


 


Lymphocytes %  7.6  L  


 


Monocytes %  5.0    


 


Eosinophils %  0.0    


 


Basophils %  0.2    


 


Nucleated Red Blood Cells %  0.0    


 


Neutrophils #  14.9  H  


 


Lymphocytes #  1.4    


 


Monocytes #  0.9    


 


Eosinophils #  0.0    


 


Basophils #  0.0    


 


Nucleated Red Blood Cells #  0.0    


 


Sodium Level  136    


 


Potassium Level  4.5    


 


Chloride Level  104    


 


Carbon Dioxide Level  24    


 


Anion Gap  13    


 


Blood Urea Nitrogen  15    


 


Creatinine  0.42  L  


 


Glucose Level  150    


 


Calcium Level  8.0  L  











Medications


Medications





 Current Medications


Dexamethasone (Decadron) 4 mg BID PO  Last administered on 11/3/17at 09:10; 

Admin Dose 4 MG;  Start 10/24/17 at 21:00


Docusate Sodium (Colace) 200 mg QHS PO  Last administered on 11/2/17at 20:21; 

Admin Dose 200 MG;  Start 10/24/17 at 21:00


Ondansetron HCl (Zofran Inj) 4 mg Q6H  PRN IV NAUSEA AND/OR VOMITING;  Start 10/

24/17 at 21:00


Acetaminophen (Tylenol Tab) 650 mg Q6H  PRN PO PAIN LEVEL 1-3 OR FEVER Last 

administered on 11/1/17at 07:13; Admin Dose 650 MG;  Start 10/24/17 at 21:00


Oxycodone/ Acetaminophen (Percocet (5/ 325)) 1 tab Q6H  PRN PO MODERATE PAIN 

LEVEL 4-6;  Start 10/24/17 at 21:00


Morphine Sulfate (morphine) 2 mg Q4H  PRN IV SEVERE PAIN LEVEL 7-10 Last 

administered on 11/1/17at 02:47; Admin Dose 2 MG;  Start 10/24/17 at 21:00


Docusate Sodium (Colace) 100 mg Q12H  PRN PO CONSTIPATION;  Start 10/24/17 at 21

:00


Magnesium Hydroxide (Milk Of Mag) 30 ml DAILY  PRN PO CONSTIPATION;  Start 10/24

/17 at 21:00


Famotidine (Pepcid) 20 mg Q12 PO  Last administered on 11/3/17at 09:10; Admin 

Dose 20 MG;  Start 10/24/17 at 21:00


Albuterol (Proventil 0.083% (Neb)) 2.5 mg Q2  PRN HHN SHORTNESS OF BREATH;  

Start 10/24/17 at 21:00


Miscellaneous Information 1 ea NOTE XX ;  Start 10/24/17 at 21:30


Glucose (Glutose) 15 gm Q15M  PRN PO DECREASED GLUCOSE;  Start 10/24/17 at 21:30


Glucose (Glutose) 22.5 gm Q15M  PRN PO DECREASED GLUCOSE;  Start 10/24/17 at 21:

30


Dextrose (D50w Syringe) 25 ml Q15M  PRN IV DECREASED GLUCOSE;  Start 10/24/17 

at 21:30


Dextrose (D50w Syringe) 50 ml Q15M  PRN IV DECREASED GLUCOSE;  Start 10/24/17 

at 21:30


Glucagon (Glucagen) 1 mg Q15M  PRN IM DECREASED GLUCOSE;  Start 10/24/17 at 21:

30


Glucose (Glutose) 15 gm Q15M  PRN BUCCAL DECREASED GLUCOSE;  Start 10/24/17 at 

21:30


Nystatin (Nystatin Powder) 1 applic BID TOP  Last administered on 11/3/17at 09:

10; Admin Dose 1 APPLIC;  Start 10/25/17 at 09:00


Tamoxifen Citrate (Nolvadex) 20 mg DAILY PO  Last administered on 11/3/17at 10:

43; Admin Dose 20 MG;  Start 10/25/17 at 13:00


Diagnostic Test (Pha) 1 ea 1 ea 02 XX  Last administered on 11/2/17at 01:38; 

Admin Dose 1 EA;  Start 10/26/17 at 02:00


Ceftriaxone Sodium (Rocephin) 50 ml @  100 mls/hr Q24H IVPB  Last administered 

on 11/2/17at 15:17; Admin Dose 100 MLS/HR;  Start 10/27/17 at 14:00


Insulin Aspart (Novolog Insulin Pen) NOVOLOG *MILD* ALGORI... Q4 SC  Last 

administered on 11/3/17at 05:03; Admin Dose 1 UNIT;  Start 10/31/17 at 01:00











RIVERA RAWLS MD Nov 3, 2017 11:09

## 2017-11-04 VITALS — DIASTOLIC BLOOD PRESSURE: 62 MMHG | SYSTOLIC BLOOD PRESSURE: 119 MMHG | RESPIRATION RATE: 19 BRPM

## 2017-11-04 VITALS — SYSTOLIC BLOOD PRESSURE: 133 MMHG | RESPIRATION RATE: 19 BRPM | DIASTOLIC BLOOD PRESSURE: 71 MMHG

## 2017-11-04 VITALS — SYSTOLIC BLOOD PRESSURE: 114 MMHG | RESPIRATION RATE: 19 BRPM | DIASTOLIC BLOOD PRESSURE: 64 MMHG

## 2017-11-04 LAB
ABNORMAL IP MESSAGE: 1
ANION GAP SERPL CALC-SCNC: 12 MMOL/L (ref 8–16)
BASOPHILS # BLD AUTO: 0 10^3/UL (ref 0–0.1)
BASOPHILS NFR BLD: 0.2 % (ref 0–2)
BUN SERPL-MCNC: 19 MG/DL (ref 7–20)
CALCIUM SERPL-MCNC: 8.1 MG/DL (ref 8.4–10.2)
CHLORIDE SERPL-SCNC: 106 MMOL/L (ref 97–110)
CO2 SERPL-SCNC: 23 MMOL/L (ref 21–31)
CREAT SERPL-MCNC: 0.41 MG/DL (ref 0.44–1)
EOSINOPHIL # BLD: 0 10^3/UL (ref 0–0.5)
EOSINOPHIL NFR BLD: 0 % (ref 0–7)
ERYTHROCYTE [DISTWIDTH] IN BLOOD BY AUTOMATED COUNT: 23 % (ref 11.5–14.5)
GLUCOSE SERPL-MCNC: 177 MG/DL (ref 70–220)
HCT VFR BLD CALC: 30.2 % (ref 37–47)
HGB BLD-MCNC: 9.2 G/DL (ref 12–16)
LYMPHOCYTES # BLD AUTO: 1.2 10^3/UL (ref 0.8–2.9)
LYMPHOCYTES NFR BLD AUTO: 10.5 % (ref 15–51)
MCH RBC QN AUTO: 23.9 PG (ref 29–33)
MCHC RBC AUTO-ENTMCNC: 30.5 G/DL (ref 32–37)
MCV RBC AUTO: 78.4 FL (ref 82–101)
MONOCYTES # BLD: 0.6 10^3/UL (ref 0.3–0.9)
MONOCYTES NFR BLD: 5.5 % (ref 0–11)
NEUTROPHILS # BLD: 9.3 10^3/UL (ref 1.6–7.5)
NEUTROPHILS NFR BLD AUTO: 79.8 % (ref 39–77)
NRBC # BLD MANUAL: 0 10^3/UL (ref 0–0)
NRBC BLD AUTO-RTO: 0 /100WBC (ref 0–0)
PLATELET # BLD: 190 10^3/UL (ref 140–415)
PMV BLD AUTO: 8.8 FL (ref 7.4–10.4)
POSITIVE DIFF: (no result)
POTASSIUM SERPL-SCNC: 4.6 MMOL/L (ref 3.5–5.1)
RBC # BLD AUTO: 3.85 10^6/UL (ref 4.2–5.4)
SODIUM SERPL-SCNC: 136 MMOL/L (ref 135–144)
WBC # BLD AUTO: 11.7 10^3/UL (ref 4.8–10.8)

## 2017-11-04 RX ADMIN — FAMOTIDINE SCH MG: 20 TABLET ORAL at 20:18

## 2017-11-04 RX ADMIN — FAMOTIDINE SCH MG: 20 TABLET ORAL at 09:52

## 2017-11-04 RX ADMIN — NYSTATIN SCH APPLIC: 100000 POWDER TOPICAL at 09:00

## 2017-11-04 RX ADMIN — CEFTRIAXONE SCH MLS/HR: 1 INJECTION, SOLUTION INTRAVENOUS at 13:27

## 2017-11-04 RX ADMIN — NYSTATIN SCH APPLIC: 100000 POWDER TOPICAL at 20:21

## 2017-11-04 RX ADMIN — TAMOXIFEN CITRATE SCH MG: 10 TABLET, FILM COATED ORAL at 09:56

## 2017-11-04 RX ADMIN — DOCUSATE SODIUM SCH MG: 100 CAPSULE, LIQUID FILLED ORAL at 20:18

## 2017-11-04 NOTE — RADRPT
PROCEDURE:   XR Chest. 

 

CLINICAL INDICATION: Pneumonia, CHF.

 

TECHNIQUE:   Single portable view  of the chest was obtained 

 

COMPARISON:   CR CHEST 5/11/2016; CR CHEST 5/10/2016; CR CHEST 5/5/2016 

 

FINDINGS:

The cardiomediastinal silhouette is stable in size and configuration. A right-sided chest tube tip o
verlies the anterior fourth rib. A drainage catheter overlies the right base. Rounded opacity within
 the right mid lung probably represents pleural fluid within the minor fissure.  Interval slight inc
rease size of a right-sided pleural effusion and increased right base consolidation or atelectasis i
s noted. No evidence of a pneumothorax.

 

 

IMPRESSION:

 

1.  Increased sized right-sided pleural effusion. Rounded opacity within the right mid lung likely r
epresents pleural fluid in the major fissure.

2. Increased consolidation or atelectasis within the right lower lobe.

3.  Right chest tube and right base drainage catheter.

 

RPTAT: HRSR

_____________________________________________ 

Physician Jose D           Date    Time 

Electronically viewed and signed by Medhat Childers Physician on 11/04/2017 09:57 

 

D:  11/04/2017 09:57  T:  11/04/2017 09:57

RR/

## 2017-11-04 NOTE — CONS
Date/Time of Note


Date/Time of Note


DATE: 11/4/17 


TIME: 16:11





Consultation Date/Type/Reason


Admit Date/Time


Oct 24, 2017 at 18:29


Initial Consult Date





SUBJECTIVE:  4


48 y/o female  with Empyema 2/2 staph aureus s/p VATS 10/31/17. Metastatic 

breast CA.  


 No events overnight.  The patient is alert, looks comfortable.  Denies fever, 

chills,and pain. 


VS: 133/71  P:56   R:19    T:98.6   SO2:99%





LABS: WBC- 11.7  H&H: 9.2/30.2   BMP-stable.


Pathology :


 Right lung deep peel, decortication:


-- Metastatic carcinoma, with morphological features compatible with a breast 

primary.


-- Hemorrhagic fibrinous and focally neutrophilic exudate.





Pleural fluid culture on admission grew oxacillin-sensitive Staphylococcus 

aureus. 





ANTIMICROBIALS:  The patient is on IV Rocephin.


 


INDWELLINGS:  Right chest tube.


 


PHYSICAL EXAMINATION:


GENERAL:  This is an obese, well-developed, middle-aged woman who is alert, in 

no distress.


HEENT:  Head atraumatic, normocephalic.  Sclerae anicteric.  Buccal mucosa dry.


NECK:  Supple.


CHEST:  Rise symmetrical.  Breath sounds diminished to bases.


HEART:  S1, S2.


ABDOMEN:  Soft, bowel tones present.


EXTREMITIES:  Without cyanosis.


 


ASSESSMENT:


1.  Loculated right pleural effusion status post biopsy with pleurocentesis.


2.  Metastatic breast cancer.


3.  Diabetes.


4.  Obesity.


5.  History of craniectomy for brain metastases.


 


PLAN:  Patient remained stable. Continue IV Rocephin. Currently on IV 

dexamethasone.  Follow recommendations of consultants.


 





Type of Consultation:  ID


Referring Provider:  CLAU AGUILAR





Exam/Review of Systems


Vital Signs


Vitals





 Vital Signs








  Date Time  Temp Pulse Resp B/P Pulse Ox O2 Delivery O2 Flow Rate FiO2


 


11/4/17 08:35 98.6 56 19 133/71 99   


 


11/3/17 20:55        21


 


11/3/17 02:53       2.0 


 


11/2/17 20:00      Nasal Cannula  














 Intake and Output   


 


 11/3/17 11/3/17 11/4/17





 15:00 23:00 07:00


 


Intake Total 50 ml  


 


Output Total  300 ml 


 


Balance 50 ml -300 ml 











Results


Result Diagram:  


11/4/17 0426                                                                   

             11/4/17 0426





Results 24 hrs





Laboratory Tests








Test


  11/3/17


17:46 11/3/17


20:12 11/3/17


22:09 11/4/17


02:49


 


Bedside Glucose 206   232  H 266  H 199  














Test


  11/4/17


04:26 11/4/17


08:47 11/4/17


12:05 


 


 


White Blood Count 11.7  #H   


 


Red Blood Count 3.85  L   


 


Hemoglobin 9.2  L   


 


Hematocrit 30.2  L   


 


Mean Corpuscular Volume 78.4  L   


 


Mean Corpuscular Hemoglobin 23.9  L   


 


Mean Corpuscular Hemoglobin


Concent 30.5  L


  


  


  


 


 


Red Cell Distribution Width 23.0  H   


 


Platelet Count 190     


 


Mean Platelet Volume 8.8     


 


Neutrophils % 79.8  H   


 


Lymphocytes % 10.5  L   


 


Monocytes % 5.5     


 


Eosinophils % 0.0     


 


Basophils % 0.2     


 


Nucleated Red Blood Cells % 0.0     


 


Neutrophils # 9.3  H   


 


Lymphocytes # 1.2     


 


Monocytes # 0.6     


 


Eosinophils # 0.0     


 


Basophils # 0.0     


 


Nucleated Red Blood Cells # 0.0     


 


Sodium Level 136     


 


Potassium Level 4.6     


 


Chloride Level 106     


 


Carbon Dioxide Level 23     


 


Anion Gap 12     


 


Blood Urea Nitrogen 19     


 


Creatinine 0.41  L   


 


Glucose Level 177     


 


Calcium Level 8.1  L   


 


Bedside Glucose  133   99   











Medications


Medications





 Current Medications


Dexamethasone (Decadron) 4 mg BID PO  Last administered on 11/4/17at 09:52; 

Admin Dose 4 MG;  Start 10/24/17 at 21:00


Docusate Sodium (Colace) 200 mg QHS PO  Last administered on 11/3/17at 20:17; 

Admin Dose 200 MG;  Start 10/24/17 at 21:00


Ondansetron HCl (Zofran Inj) 4 mg Q6H  PRN IV NAUSEA AND/OR VOMITING;  Start 10/

24/17 at 21:00


Acetaminophen (Tylenol Tab) 650 mg Q6H  PRN PO PAIN LEVEL 1-3 OR FEVER Last 

administered on 11/1/17at 07:13; Admin Dose 650 MG;  Start 10/24/17 at 21:00


Oxycodone/ Acetaminophen (Percocet (5/ 325)) 1 tab Q6H  PRN PO MODERATE PAIN 

LEVEL 4-6;  Start 10/24/17 at 21:00


Morphine Sulfate (morphine) 2 mg Q4H  PRN IV SEVERE PAIN LEVEL 7-10 Last 

administered on 11/1/17at 02:47; Admin Dose 2 MG;  Start 10/24/17 at 21:00


Docusate Sodium (Colace) 100 mg Q12H  PRN PO CONSTIPATION;  Start 10/24/17 at 21

:00


Magnesium Hydroxide (Milk Of Mag) 30 ml DAILY  PRN PO CONSTIPATION;  Start 10/24

/17 at 21:00


Famotidine (Pepcid) 20 mg Q12 PO  Last administered on 11/4/17at 09:52; Admin 

Dose 20 MG;  Start 10/24/17 at 21:00


Albuterol (Proventil 0.083% (Neb)) 2.5 mg Q2  PRN HHN SHORTNESS OF BREATH;  

Start 10/24/17 at 21:00


Miscellaneous Information 1 ea NOTE XX ;  Start 10/24/17 at 21:30


Glucose (Glutose) 15 gm Q15M  PRN PO DECREASED GLUCOSE;  Start 10/24/17 at 21:30


Glucose (Glutose) 22.5 gm Q15M  PRN PO DECREASED GLUCOSE;  Start 10/24/17 at 21:

30


Dextrose (D50w Syringe) 25 ml Q15M  PRN IV DECREASED GLUCOSE;  Start 10/24/17 

at 21:30


Dextrose (D50w Syringe) 50 ml Q15M  PRN IV DECREASED GLUCOSE;  Start 10/24/17 

at 21:30


Glucagon (Glucagen) 1 mg Q15M  PRN IM DECREASED GLUCOSE;  Start 10/24/17 at 21:

30


Glucose (Glutose) 15 gm Q15M  PRN BUCCAL DECREASED GLUCOSE;  Start 10/24/17 at 

21:30


Nystatin (Nystatin Powder) 1 applic BID TOP  Last administered on 11/3/17at 22:

12; Admin Dose 1 APPLIC;  Start 10/25/17 at 09:00


Tamoxifen Citrate 20 mg 20 mg DAILY PO  Last administered on 11/4/17at 09:56; 

Admin Dose 20 MG;  Start 10/25/17 at 13:00


Ceftriaxone Sodium (Rocephin) 50 ml @  100 mls/hr Q24H IVPB  Last administered 

on 11/4/17at 13:27; Admin Dose 100 MLS/HR;  Start 10/27/17 at 14:00


Diagnostic Test (Pha) (Accu-Chek) 1 ea 02 XX ;  Start 11/4/17 at 02:00











GABRIEL TONG Nov 4, 2017 16:20

## 2017-11-04 NOTE — CONS
Date/Time of Note


Date/Time of Note


DATE: 11/4/17 


TIME: 15:32





Consult Date/Type/Reason


Admit Date/Time


Oct 24, 2017 at 18:29


Initial Consult Date


10/26/17


Type of Consultation:  Pulmonary


Ordering Provider:  CLAU AGUILAR





Subjective


No events.





Objective





 Vital Signs








  Date Time  Temp Pulse Resp B/P Pulse Ox O2 Delivery O2 Flow Rate FiO2


 


11/4/17 08:35 98.6 56 19 133/71 99   


 


11/3/17 20:55        21


 


11/3/17 02:53       2.0 


 


11/2/17 20:00      Nasal Cannula  














 Intake and Output   


 


 11/3/17 11/3/17 11/4/17





 15:00 23:00 07:00


 


Intake Total 50 ml  


 


Output Total  300 ml 


 


Balance 50 ml -300 ml 








Exam


HEENT: Neck supple; no JVD; no LAD


CVS: RRR, S1 and S2


CHEST: Decreased BS right 


ABD: Soft, NT, + BS


EXT: No c/c/e





Results/Medications


Result Diagram:  


11/4/17 0426                                                                   

             11/4/17 0426





Results 24 hrs





Laboratory Tests








Test


  11/3/17


17:46 11/3/17


20:12 11/3/17


22:09 11/4/17


02:49


 


Bedside Glucose 206   232  H 266  H 199  














Test


  11/4/17


04:26 11/4/17


08:47 11/4/17


12:05 


 


 


White Blood Count 11.7  #H   


 


Red Blood Count 3.85  L   


 


Hemoglobin 9.2  L   


 


Hematocrit 30.2  L   


 


Mean Corpuscular Volume 78.4  L   


 


Mean Corpuscular Hemoglobin 23.9  L   


 


Mean Corpuscular Hemoglobin


Concent 30.5  L


  


  


  


 


 


Red Cell Distribution Width 23.0  H   


 


Platelet Count 190     


 


Mean Platelet Volume 8.8     


 


Neutrophils % 79.8  H   


 


Lymphocytes % 10.5  L   


 


Monocytes % 5.5     


 


Eosinophils % 0.0     


 


Basophils % 0.2     


 


Nucleated Red Blood Cells % 0.0     


 


Neutrophils # 9.3  H   


 


Lymphocytes # 1.2     


 


Monocytes # 0.6     


 


Eosinophils # 0.0     


 


Basophils # 0.0     


 


Nucleated Red Blood Cells # 0.0     


 


Sodium Level 136     


 


Potassium Level 4.6     


 


Chloride Level 106     


 


Carbon Dioxide Level 23     


 


Anion Gap 12     


 


Blood Urea Nitrogen 19     


 


Creatinine 0.41  L   


 


Glucose Level 177     


 


Calcium Level 8.1  L   


 


Bedside Glucose  133   99   








Medications





 Current Medications


Dexamethasone (Decadron) 4 mg BID PO  Last administered on 11/4/17at 09:52; 

Admin Dose 4 MG;  Start 10/24/17 at 21:00


Docusate Sodium (Colace) 200 mg QHS PO  Last administered on 11/3/17at 20:17; 

Admin Dose 200 MG;  Start 10/24/17 at 21:00


Ondansetron HCl (Zofran Inj) 4 mg Q6H  PRN IV NAUSEA AND/OR VOMITING;  Start 10/

24/17 at 21:00


Acetaminophen (Tylenol Tab) 650 mg Q6H  PRN PO PAIN LEVEL 1-3 OR FEVER Last 

administered on 11/1/17at 07:13; Admin Dose 650 MG;  Start 10/24/17 at 21:00


Oxycodone/ Acetaminophen (Percocet (5/ 325)) 1 tab Q6H  PRN PO MODERATE PAIN 

LEVEL 4-6;  Start 10/24/17 at 21:00


Morphine Sulfate (morphine) 2 mg Q4H  PRN IV SEVERE PAIN LEVEL 7-10 Last 

administered on 11/1/17at 02:47; Admin Dose 2 MG;  Start 10/24/17 at 21:00


Docusate Sodium (Colace) 100 mg Q12H  PRN PO CONSTIPATION;  Start 10/24/17 at 21

:00


Magnesium Hydroxide (Milk Of Mag) 30 ml DAILY  PRN PO CONSTIPATION;  Start 10/24

/17 at 21:00


Famotidine (Pepcid) 20 mg Q12 PO  Last administered on 11/4/17at 09:52; Admin 

Dose 20 MG;  Start 10/24/17 at 21:00


Albuterol (Proventil 0.083% (Neb)) 2.5 mg Q2  PRN HHN SHORTNESS OF BREATH;  

Start 10/24/17 at 21:00


Miscellaneous Information 1 ea NOTE XX ;  Start 10/24/17 at 21:30


Glucose (Glutose) 15 gm Q15M  PRN PO DECREASED GLUCOSE;  Start 10/24/17 at 21:30


Glucose (Glutose) 22.5 gm Q15M  PRN PO DECREASED GLUCOSE;  Start 10/24/17 at 21:

30


Dextrose (D50w Syringe) 25 ml Q15M  PRN IV DECREASED GLUCOSE;  Start 10/24/17 

at 21:30


Dextrose (D50w Syringe) 50 ml Q15M  PRN IV DECREASED GLUCOSE;  Start 10/24/17 

at 21:30


Glucagon (Glucagen) 1 mg Q15M  PRN IM DECREASED GLUCOSE;  Start 10/24/17 at 21:

30


Glucose (Glutose) 15 gm Q15M  PRN BUCCAL DECREASED GLUCOSE;  Start 10/24/17 at 

21:30


Nystatin (Nystatin Powder) 1 applic BID TOP  Last administered on 11/3/17at 22:

12; Admin Dose 1 APPLIC;  Start 10/25/17 at 09:00


Tamoxifen Citrate 20 mg 20 mg DAILY PO  Last administered on 11/4/17at 09:56; 

Admin Dose 20 MG;  Start 10/25/17 at 13:00


Ceftriaxone Sodium (Rocephin) 50 ml @  100 mls/hr Q24H IVPB  Last administered 

on 11/4/17at 13:27; Admin Dose 100 MLS/HR;  Start 10/27/17 at 14:00


Diagnostic Test (Pha) (Accu-Chek) 1 ea 02 XX ;  Start 11/4/17 at 02:00





Assessment/Plan


Additional Assessment/Plan


IMP: 


1.  Metastatic breast cancer with recurrent right pleural effusion.  Cultures 

growing gram-positive cocci consistent with staph aureus.


2.  Loculated pleural effusion possible empyema status post decortication and 

pleurodesis.


3.  CNS metastasis currently receiving steroids and radiation therapy








RECS: 


1.  CT drainage per CTS 


2.  Continue oncology recommendations continues radiation therapy for CNS 

metastasis


3.  Continue antibiotic coverage for staph infection.











JULY COPELAND MD Nov 4, 2017 15:33

## 2017-11-04 NOTE — PN
Date/Time of Note


Date/Time of Note


DATE: 11/4/17 


TIME: 14:48





Assessment/Plan


Lines/Catheters


IV Catheter Type (from Nrsg):  Saline Lock


Guerrero in Place (from Nrsg):  No





Assessment/Plan


Chief Complaint/Hosp Course


 


IMPRESSION:  Right loculated pleural effusion.


 


R status post right VATS pleurodesis decortication


Patient feels much better


CT  300 cc


Less shortness of breath


Check chest x-ray


Will continue chest tube suction


Problems:  





Subjective


24 Hr Interval Summary


Constitutional:  improved


Pain Control:  mild





Exam/Review of Systems


Vital Signs


Vitals





 Vital Signs








  Date Time  Temp Pulse Resp B/P Pulse Ox O2 Delivery O2 Flow Rate FiO2


 


11/4/17 08:35 98.6 56 19 133/71 99   


 


11/3/17 20:55        21


 


11/3/17 02:53       2.0 


 


11/2/17 20:00      Nasal Cannula  














 Intake and Output   


 


 11/3/17 11/3/17 11/4/17





 15:00 23:00 07:00


 


Intake Total 50 ml  


 


Output Total  300 ml 


 


Balance 50 ml -300 ml 











Exam


ENMT:  mucosa pink and moist, nl external ears & nose, nl lips & teeth, nl 

nasal mucosa & septum


Neck:  non-tender, supple


Respiratory:  clear to auscultation, normal air movement





Results


Result Diagram:  


11/4/17 0426                                                                   

             11/4/17 0426














HEIDY MAXWELL MD Nov 4, 2017 14:49

## 2017-11-04 NOTE — PN
Date/Time of Note


Date/Time of Note


DATE: 11/4/17 


TIME: 12:46





Assessment/Plan


VTE Prophylaxis


VTE Prophylaxis Intervention:  contraindicated, SCD's





Lines/Catheters


IV Catheter Type (from [facility]):  Saline Lock


Urinary Cath still in place:  No





Assessment/Plan


Assessment/Plan


1.  Empyema 2/2 staph aureus s/p VATS 10/31/17


- Patient doing well and chest tube still in place and draining serous fluid


- repeat CXR shows increased sized right-sided pleural effusion. Rounded 

opacity within the right mid lung likely represents pleural fluid in the major 

fissure. Increased consolidation or atelectasis within the right lower lobe.


- started on incentive spirometry


- CT chest showed trace loculated pleural effusion with possible empyema in 

addition to malignant effusion.


- CT surgery on board and appreciate consultation


- ID on board and recommendations appreciated. continue on IV antibiotics, day 8


- Ultrasound-guided thoracentesis performed, cultures showing staph aureus





2.  Dysfunctional Pleurx catheter


- Pulmonology on board and consultation appreciated


- Respiratory status stable at this time





3.  Cellulitis, area around catheter- improving


- ID on board and recommendations appreciated. Continue on antibiotics  





4.  Metastatic breast cancer, lung, liver, brain, s/p craniectomy 


- currently receiving radiation therapy and tamoxifen and Decadron


- Heme/onc on board and consultation appreciated


- Radiation as scheduled





5. Diabetes mellitus


- Currently stable





6. Obesity


- Counseled about diet and improving exercise routine when able





7. Disposition


- Continue monitoring on med/surg while chest tube in place





Subjective


24 Hr Interval Summary


Free Text/Dictation


Patient feeling better this am and feels like shes getting more air entry with 

deep breaths.  Denies any new complaints.  swelling right side of head and neck 

improving.  No acute overnight events. fluid from chest tube clearing as well.





Exam/Review of Systems


Vital Signs


Vitals





 Vital Signs








  Date Time  Temp Pulse Resp B/P Pulse Ox O2 Delivery O2 Flow Rate FiO2


 


11/4/17 08:35 98.6 56 19 133/71 99   


 


11/3/17 20:55        21


 


11/3/17 02:53       2.0 


 


11/2/17 20:00      Nasal Cannula  














 Intake and Output   


 


 11/3/17 11/3/17 11/4/17





 15:00 23:00 07:00


 


Intake Total 50 ml  


 


Output Total  300 ml 


 


Balance 50 ml -300 ml 











Exam


General: Patient awake and alert, no acute distress. 


Head: R side craniectomy site healing well. fluctuant edema right side but 

improving


Eyes: EOMI, pupils reactive to light


Neck: Supple, nontender, midline


Respiratory: diminished air entry with chest tube in place draining yellow 

serous fluid


Cardiovascular: regular rate, no obvious murmurs


Gastrointestinal: non-tender to palpation, bowel sounds heard.


Neurological: Moves all extremities spontaneously





Results


Result Diagram:  


11/4/17 0426                                                                   

             11/4/17 0426





Results 24 hrs





Laboratory Tests








Test


  11/3/17


12:49 11/3/17


17:46 11/3/17


20:12 11/3/17


22:09


 


Bedside Glucose 129   206   232  H 266  H














Test


  11/4/17


02:49 11/4/17


04:26 11/4/17


08:47 11/4/17


12:05


 


Bedside Glucose 199    133   99  


 


White Blood Count  11.7  #H  


 


Red Blood Count  3.85  L  


 


Hemoglobin  9.2  L  


 


Hematocrit  30.2  L  


 


Mean Corpuscular Volume  78.4  L  


 


Mean Corpuscular Hemoglobin  23.9  L  


 


Mean Corpuscular Hemoglobin


Concent 


  30.5  L


  


  


 


 


Red Cell Distribution Width  23.0  H  


 


Platelet Count  190    


 


Mean Platelet Volume  8.8    


 


Neutrophils %  79.8  H  


 


Lymphocytes %  10.5  L  


 


Monocytes %  5.5    


 


Eosinophils %  0.0    


 


Basophils %  0.2    


 


Nucleated Red Blood Cells %  0.0    


 


Neutrophils #  9.3  H  


 


Lymphocytes #  1.2    


 


Monocytes #  0.6    


 


Eosinophils #  0.0    


 


Basophils #  0.0    


 


Nucleated Red Blood Cells #  0.0    


 


Sodium Level  136    


 


Potassium Level  4.6    


 


Chloride Level  106    


 


Carbon Dioxide Level  23    


 


Anion Gap  12    


 


Blood Urea Nitrogen  19    


 


Creatinine  0.41  L  


 


Glucose Level  177    


 


Calcium Level  8.1  L  











Medications


Medications





 Current Medications


Dexamethasone (Decadron) 4 mg BID PO  Last administered on 11/4/17at 09:52; 

Admin Dose 4 MG;  Start 10/24/17 at 21:00


Docusate Sodium (Colace) 200 mg QHS PO  Last administered on 11/3/17at 20:17; 

Admin Dose 200 MG;  Start 10/24/17 at 21:00


Ondansetron HCl (Zofran Inj) 4 mg Q6H  PRN IV NAUSEA AND/OR VOMITING;  Start 10/

24/17 at 21:00


Acetaminophen (Tylenol Tab) 650 mg Q6H  PRN PO PAIN LEVEL 1-3 OR FEVER Last 

administered on 11/1/17at 07:13; Admin Dose 650 MG;  Start 10/24/17 at 21:00


Oxycodone/ Acetaminophen (Percocet (5/ 325)) 1 tab Q6H  PRN PO MODERATE PAIN 

LEVEL 4-6;  Start 10/24/17 at 21:00


Morphine Sulfate (morphine) 2 mg Q4H  PRN IV SEVERE PAIN LEVEL 7-10 Last 

administered on 11/1/17at 02:47; Admin Dose 2 MG;  Start 10/24/17 at 21:00


Docusate Sodium (Colace) 100 mg Q12H  PRN PO CONSTIPATION;  Start 10/24/17 at 21

:00


Magnesium Hydroxide (Milk Of Mag) 30 ml DAILY  PRN PO CONSTIPATION;  Start 10/24

/17 at 21:00


Famotidine (Pepcid) 20 mg Q12 PO  Last administered on 11/4/17at 09:52; Admin 

Dose 20 MG;  Start 10/24/17 at 21:00


Albuterol (Proventil 0.083% (Neb)) 2.5 mg Q2  PRN HHN SHORTNESS OF BREATH;  

Start 10/24/17 at 21:00


Miscellaneous Information 1 ea NOTE XX ;  Start 10/24/17 at 21:30


Glucose (Glutose) 15 gm Q15M  PRN PO DECREASED GLUCOSE;  Start 10/24/17 at 21:30


Glucose (Glutose) 22.5 gm Q15M  PRN PO DECREASED GLUCOSE;  Start 10/24/17 at 21:

30


Dextrose (D50w Syringe) 25 ml Q15M  PRN IV DECREASED GLUCOSE;  Start 10/24/17 

at 21:30


Dextrose (D50w Syringe) 50 ml Q15M  PRN IV DECREASED GLUCOSE;  Start 10/24/17 

at 21:30


Glucagon (Glucagen) 1 mg Q15M  PRN IM DECREASED GLUCOSE;  Start 10/24/17 at 21:

30


Glucose (Glutose) 15 gm Q15M  PRN BUCCAL DECREASED GLUCOSE;  Start 10/24/17 at 

21:30


Nystatin (Nystatin Powder) 1 applic BID TOP  Last administered on 11/3/17at 22:

12; Admin Dose 1 APPLIC;  Start 10/25/17 at 09:00


Tamoxifen Citrate 20 mg 20 mg DAILY PO  Last administered on 11/4/17at 09:56; 

Admin Dose 20 MG;  Start 10/25/17 at 13:00


Ceftriaxone Sodium (Rocephin) 50 ml @  100 mls/hr Q24H IVPB  Last administered 

on 11/3/17at 13:13; Admin Dose 100 MLS/HR;  Start 10/27/17 at 14:00


Diagnostic Test (Pha) (Accu-Chek) 1 ea 02 XX ;  Start 11/4/17 at 02:00











RIVERA RAWLS MD Nov 4, 2017 12:51

## 2017-11-05 VITALS — RESPIRATION RATE: 16 BRPM | SYSTOLIC BLOOD PRESSURE: 143 MMHG | DIASTOLIC BLOOD PRESSURE: 77 MMHG

## 2017-11-05 VITALS — RESPIRATION RATE: 19 BRPM | SYSTOLIC BLOOD PRESSURE: 116 MMHG | DIASTOLIC BLOOD PRESSURE: 61 MMHG

## 2017-11-05 VITALS — SYSTOLIC BLOOD PRESSURE: 140 MMHG | RESPIRATION RATE: 20 BRPM | DIASTOLIC BLOOD PRESSURE: 79 MMHG

## 2017-11-05 VITALS — RESPIRATION RATE: 20 BRPM | SYSTOLIC BLOOD PRESSURE: 125 MMHG | DIASTOLIC BLOOD PRESSURE: 68 MMHG

## 2017-11-05 LAB
ABNORMAL IP MESSAGE: 1
ALBUMIN SERPL-MCNC: 2.5 G/DL (ref 3.3–4.9)
ANION GAP SERPL CALC-SCNC: 11 MMOL/L (ref 8–16)
BASOPHILS # BLD AUTO: 0 10^3/UL (ref 0–0.1)
BASOPHILS NFR BLD: 0.1 % (ref 0–2)
BUN SERPL-MCNC: 19 MG/DL (ref 7–20)
CALCIUM SERPL-MCNC: 8.6 MG/DL (ref 8.4–10.2)
CHLORIDE SERPL-SCNC: 103 MMOL/L (ref 97–110)
CO2 SERPL-SCNC: 28 MMOL/L (ref 21–31)
CREAT SERPL-MCNC: 0.5 MG/DL (ref 0.44–1)
EOSINOPHIL # BLD: 0 10^3/UL (ref 0–0.5)
EOSINOPHIL NFR BLD: 0 % (ref 0–7)
ERYTHROCYTE [DISTWIDTH] IN BLOOD BY AUTOMATED COUNT: 23.2 % (ref 11.5–14.5)
GLUCOSE SERPL-MCNC: 149 MG/DL (ref 70–220)
HCT VFR BLD CALC: 30.9 % (ref 37–47)
HGB BLD-MCNC: 9.5 G/DL (ref 12–16)
LYMPHOCYTES # BLD AUTO: 1.8 10^3/UL (ref 0.8–2.9)
LYMPHOCYTES NFR BLD AUTO: 15.4 % (ref 15–51)
MAGNESIUM SERPL-MCNC: 1.8 MG/DL (ref 1.7–2.5)
MCH RBC QN AUTO: 23.9 PG (ref 29–33)
MCHC RBC AUTO-ENTMCNC: 30.7 G/DL (ref 32–37)
MCV RBC AUTO: 77.6 FL (ref 82–101)
MONOCYTES # BLD: 0.8 10^3/UL (ref 0.3–0.9)
MONOCYTES NFR BLD: 6.9 % (ref 0–11)
NEUTROPHILS # BLD: 8.4 10^3/UL (ref 1.6–7.5)
NEUTROPHILS NFR BLD AUTO: 73.1 % (ref 39–77)
NRBC # BLD MANUAL: 0 10^3/UL (ref 0–0)
NRBC BLD AUTO-RTO: 0 /100WBC (ref 0–0)
PHOSPHATE SERPL-MCNC: 4.3 MG/DL (ref 2.5–4.9)
PLATELET # BLD: 189 10^3/UL (ref 140–415)
PMV BLD AUTO: 9 FL (ref 7.4–10.4)
POSITIVE DIFF: (no result)
POTASSIUM SERPL-SCNC: 4.6 MMOL/L (ref 3.5–5.1)
RBC # BLD AUTO: 3.98 10^6/UL (ref 4.2–5.4)
SODIUM SERPL-SCNC: 137 MMOL/L (ref 135–144)
WBC # BLD AUTO: 11.4 10^3/UL (ref 4.8–10.8)

## 2017-11-05 RX ADMIN — FAMOTIDINE SCH MG: 20 TABLET ORAL at 20:19

## 2017-11-05 RX ADMIN — CEFTRIAXONE SCH MLS/HR: 1 INJECTION, SOLUTION INTRAVENOUS at 13:11

## 2017-11-05 RX ADMIN — DOCUSATE SODIUM SCH MG: 100 CAPSULE, LIQUID FILLED ORAL at 20:20

## 2017-11-05 RX ADMIN — NYSTATIN SCH APPLIC: 100000 POWDER TOPICAL at 20:21

## 2017-11-05 RX ADMIN — TAMOXIFEN CITRATE SCH MG: 10 TABLET, FILM COATED ORAL at 09:36

## 2017-11-05 RX ADMIN — NYSTATIN SCH APPLIC: 100000 POWDER TOPICAL at 09:32

## 2017-11-05 RX ADMIN — FAMOTIDINE SCH MG: 20 TABLET ORAL at 09:31

## 2017-11-05 NOTE — PN
Date/Time of Note


Date/Time of Note


DATE: 11/5/17 


TIME: 13:29





Assessment/Plan


VTE Prophylaxis


VTE Prophylaxis Intervention:  contraindicated





Lines/Catheters


IV Catheter Type (from [facility]):  Peripheral IV


Urinary Cath still in place:  No





Assessment/Plan


Assessment/Plan


1.  Empyema 2/2 staph aureus s/p VATS 10/31/17


- Patient doing well and chest tube still in place and draining serous fluid 

but output decreasing daily


- repeat CXR shows increased sized right-sided pleural effusion. Rounded 

opacity within the right mid lung likely represents pleural fluid in the major 

fissure. Increased consolidation or atelectasis within the right lower lobe.


- started on incentive spirometry


- CT chest showed trace loculated pleural effusion with possible empyema in 

addition to malignant effusion.


- CT surgery on board and appreciate consultation


- ID on board and recommendations appreciated. continue on IV antibiotics, day 9


- Ultrasound-guided thoracentesis performed, cultures showing staph aureus





2.  Dysfunctional Pleurx catheter


- Pulmonology on board and consultation appreciated


- Respiratory status stable at this time





3.  Cellulitis, area around catheter- improving


- ID on board and recommendations appreciated. Continue on antibiotics  





4.  Metastatic breast cancer, lung, liver, brain, s/p craniectomy 


- currently receiving radiation therapy and tamoxifen and Decadron


- Heme/onc on board and consultation appreciated


- Radiation as scheduled





5. Diabetes mellitus


- Currently stable





6. Obesity


- Counseled about diet and improving exercise routine when able





7. Disposition


- Continue monitoring on med/surg while chest tube in place





Subjective


24 Hr Interval Summary


Free Text/Dictation


patient doing well and chest tube still in place, draining serous fluid that is 

lightly tinged pink.  No new complaints and no acute overnight events





Exam/Review of Systems


Vital Signs


Vitals





 Vital Signs








  Date Time  Temp Pulse Resp B/P Pulse Ox O2 Delivery O2 Flow Rate FiO2


 


11/5/17 07:55 98.0 53 20 125/68 100   


 


11/3/17 20:55        21


 


11/3/17 02:53       2.0 


 


11/2/17 20:00      Nasal Cannula  














 Intake and Output   


 


 11/4/17 11/4/17 11/5/17





 15:00 23:00 07:00


 


Intake Total 50 ml 840 ml 720 ml


 


Output Total 170 ml 170 ml 160 ml


 


Balance -120 ml 670 ml 560 ml











Exam


General: Patient awake and alert, no acute distress. 


Head: R side craniectomy site healing well. improving fluctuant edema right side


Eyes: EOMI, pupils reactive to light


Neck: Supple, nontender, midline


Respiratory: diminished air entry with chest tube in place draining slightly 

pink tinged serous fluid


Cardiovascular: regular rate, no obvious murmurs


Gastrointestinal: non-tender to palpation, bowel sounds heard.


Neurological: Moves all extremities spontaneously





Results


Result Diagram:  


11/5/17 0424                                                                   

             11/5/17 0424





Results 24 hrs





Laboratory Tests








Test


  11/4/17


18:07 11/4/17


20:16 11/5/17


01:49 11/5/17


04:24


 


Bedside Glucose 142   223  H 176   


 


White Blood Count    11.4  H


 


Red Blood Count    3.98  L


 


Hemoglobin    9.5  L


 


Hematocrit    30.9  L


 


Mean Corpuscular Volume    77.6  L


 


Mean Corpuscular Hemoglobin    23.9  L


 


Mean Corpuscular Hemoglobin


Concent 


  


  


  30.7  L


 


 


Red Cell Distribution Width    23.2  H


 


Platelet Count    189  


 


Mean Platelet Volume    9.0  


 


Neutrophils %    73.1  


 


Lymphocytes %    15.4  


 


Monocytes %    6.9  


 


Eosinophils %    0.0  


 


Basophils %    0.1  


 


Nucleated Red Blood Cells %    0.0  


 


Neutrophils #    8.4  H


 


Lymphocytes #    1.8  


 


Monocytes #    0.8  


 


Eosinophils #    0.0  


 


Basophils #    0.0  


 


Nucleated Red Blood Cells #    0.0  


 


Sodium Level    137  


 


Potassium Level    4.6  


 


Chloride Level    103  


 


Carbon Dioxide Level    28  


 


Anion Gap    11  


 


Blood Urea Nitrogen    19  


 


Creatinine    0.50  


 


Glucose Level    149  


 


Calcium Level    8.6  


 


Phosphorus Level    4.3  


 


Magnesium Level    1.8  


 


Albumin    2.5  L














Test


  11/5/17


09:13 11/5/17


12:19 


  


 


 


Bedside Glucose 125   164    











Medications


Medications





 Current Medications


Dexamethasone (Decadron) 4 mg BID PO  Last administered on 11/5/17at 09:31; 

Admin Dose 4 MG;  Start 10/24/17 at 21:00


Docusate Sodium (Colace) 200 mg QHS PO  Last administered on 11/4/17at 20:18; 

Admin Dose 200 MG;  Start 10/24/17 at 21:00


Ondansetron HCl (Zofran Inj) 4 mg Q6H  PRN IV NAUSEA AND/OR VOMITING;  Start 10/

24/17 at 21:00


Acetaminophen (Tylenol Tab) 650 mg Q6H  PRN PO PAIN LEVEL 1-3 OR FEVER Last 

administered on 11/1/17at 07:13; Admin Dose 650 MG;  Start 10/24/17 at 21:00


Oxycodone/ Acetaminophen (Percocet (5/ 325)) 1 tab Q6H  PRN PO MODERATE PAIN 

LEVEL 4-6;  Start 10/24/17 at 21:00


Morphine Sulfate (morphine) 2 mg Q4H  PRN IV SEVERE PAIN LEVEL 7-10 Last 

administered on 11/1/17at 02:47; Admin Dose 2 MG;  Start 10/24/17 at 21:00


Docusate Sodium (Colace) 100 mg Q12H  PRN PO CONSTIPATION;  Start 10/24/17 at 21

:00


Magnesium Hydroxide (Milk Of Mag) 30 ml DAILY  PRN PO CONSTIPATION;  Start 10/24

/17 at 21:00


Famotidine (Pepcid) 20 mg Q12 PO  Last administered on 11/5/17at 09:31; Admin 

Dose 20 MG;  Start 10/24/17 at 21:00


Albuterol (Proventil 0.083% (Neb)) 2.5 mg Q2  PRN HHN SHORTNESS OF BREATH;  

Start 10/24/17 at 21:00


Miscellaneous Information 1 ea NOTE XX ;  Start 10/24/17 at 21:30


Glucose (Glutose) 15 gm Q15M  PRN PO DECREASED GLUCOSE;  Start 10/24/17 at 21:30


Glucose (Glutose) 22.5 gm Q15M  PRN PO DECREASED GLUCOSE;  Start 10/24/17 at 21:

30


Dextrose (D50w Syringe) 25 ml Q15M  PRN IV DECREASED GLUCOSE;  Start 10/24/17 

at 21:30


Dextrose (D50w Syringe) 50 ml Q15M  PRN IV DECREASED GLUCOSE;  Start 10/24/17 

at 21:30


Glucagon (Glucagen) 1 mg Q15M  PRN IM DECREASED GLUCOSE;  Start 10/24/17 at 21:

30


Glucose (Glutose) 15 gm Q15M  PRN BUCCAL DECREASED GLUCOSE;  Start 10/24/17 at 

21:30


Nystatin (Nystatin Powder) 1 applic BID TOP  Last administered on 11/5/17at 09:

32; Admin Dose 1 APPLIC;  Start 10/25/17 at 09:00


Tamoxifen Citrate 20 mg 20 mg DAILY PO  Last administered on 11/5/17at 09:36; 

Admin Dose 20 MG;  Start 10/25/17 at 13:00


Ceftriaxone Sodium (Rocephin) 50 ml @  100 mls/hr Q24H IVPB  Last administered 

on 11/5/17at 13:11; Admin Dose 100 MLS/HR;  Start 10/27/17 at 14:00


Diagnostic Test (Pha) (Accu-Chek) 1 ea 02 XX ;  Start 11/4/17 at 02:00











RIVERA RAWLS MD Nov 5, 2017 13:29

## 2017-11-05 NOTE — PN
Date/Time of Note


Date/Time of Note


DATE: 11/5/17 


TIME: 00:50





Assessment/Plan


Lines/Catheters


IV Catheter Type (from [facility]):  Saline Lock


Guerrero in Place (from [facility]):  No





Assessment/Plan


Chief Complaint/Hosp Course


 


IMPRESSION:  Right loculated pleural effusion.


 


R status post right VATS pleurodesis decortication


Patient feels much better


CT  170   cc


Less shortness of breath


Check chest x-ray


Will continue chest tube suction


Problems:  





Subjective


24 Hr Interval Summary


Constitutional:  improved


Pain Control:  mild





Exam/Review of Systems


Vital Signs


Vitals





 Vital Signs








  Date Time  Temp Pulse Resp B/P Pulse Ox O2 Delivery O2 Flow Rate FiO2


 


11/4/17 20:00 98.6 62 19 119/62 100   


 


11/3/17 20:55        21


 


11/3/17 02:53       2.0 


 


11/2/17 20:00      Nasal Cannula  














 Intake and Output   


 


 11/4/17 11/4/17 11/5/17





 15:00 23:00 07:00


 


Intake Total 50 ml 840 ml 


 


Output Total 170 ml 170 ml 


 


Balance -120 ml 670 ml 











Exam


ENMT:  mucosa pink and moist, nl external ears & nose, nl lips & teeth, nl 

nasal mucosa & septum


Neck:  non-tender, supple


Respiratory:  clear to auscultation, normal air movement


Cardiovascular:  nl pulses, regular rate and rhythm





Results


Result Diagram:  


11/4/17 0426 11/4/17 0426














HEIDY MAXWELL MD Nov 5, 2017 00:51

## 2017-11-05 NOTE — CONS
Date/Time of Note


Date/Time of Note


DATE: 11/5/17 


TIME: 16:24





Consult Date/Type/Reason


Admit Date/Time


Oct 24, 2017 at 18:29


Initial Consult Date


10/26/17


Type of Consultation:  Pulm


Ordering Provider:  CLAU AGUILAR





Subjective


No events.





Objective





 Vital Signs








  Date Time  Temp Pulse Resp B/P Pulse Ox O2 Delivery O2 Flow Rate FiO2


 


11/5/17 07:55 98.0 53 20 125/68 100   


 


11/3/17 20:55        21


 


11/3/17 02:53       2.0 


 


11/2/17 20:00      Nasal Cannula  














 Intake and Output   


 


 11/4/17 11/4/17 11/5/17





 15:00 23:00 07:00


 


Intake Total 50 ml 840 ml 720 ml


 


Output Total 170 ml 170 ml 160 ml


 


Balance -120 ml 670 ml 560 ml








Exam


HEENT: Neck supple; no JVD; no LAD


CVS: RRR, S1 and S2


CHEST: Decreased BS right 


ABD: Soft, NT, + BS


EXT: No c/c/e





Results/Medications


Result Diagram:  


11/5/17 0424                                                                   

             11/5/17 0424





Results 24 hrs





Laboratory Tests








Test


  11/4/17


18:07 11/4/17


20:16 11/5/17


01:49 11/5/17


04:24


 


Bedside Glucose 142   223  H 176   


 


White Blood Count    11.4  H


 


Red Blood Count    3.98  L


 


Hemoglobin    9.5  L


 


Hematocrit    30.9  L


 


Mean Corpuscular Volume    77.6  L


 


Mean Corpuscular Hemoglobin    23.9  L


 


Mean Corpuscular Hemoglobin


Concent 


  


  


  30.7  L


 


 


Red Cell Distribution Width    23.2  H


 


Platelet Count    189  


 


Mean Platelet Volume    9.0  


 


Neutrophils %    73.1  


 


Lymphocytes %    15.4  


 


Monocytes %    6.9  


 


Eosinophils %    0.0  


 


Basophils %    0.1  


 


Nucleated Red Blood Cells %    0.0  


 


Neutrophils #    8.4  H


 


Lymphocytes #    1.8  


 


Monocytes #    0.8  


 


Eosinophils #    0.0  


 


Basophils #    0.0  


 


Nucleated Red Blood Cells #    0.0  


 


Sodium Level    137  


 


Potassium Level    4.6  


 


Chloride Level    103  


 


Carbon Dioxide Level    28  


 


Anion Gap    11  


 


Blood Urea Nitrogen    19  


 


Creatinine    0.50  


 


Glucose Level    149  


 


Calcium Level    8.6  


 


Phosphorus Level    4.3  


 


Magnesium Level    1.8  


 


Albumin    2.5  L














Test


  11/5/17


09:13 11/5/17


12:19 


  


 


 


Bedside Glucose 125   164    








Medications





 Current Medications


Dexamethasone (Decadron) 4 mg BID PO  Last administered on 11/5/17at 09:31; 

Admin Dose 4 MG;  Start 10/24/17 at 21:00


Docusate Sodium (Colace) 200 mg QHS PO  Last administered on 11/4/17at 20:18; 

Admin Dose 200 MG;  Start 10/24/17 at 21:00


Ondansetron HCl (Zofran Inj) 4 mg Q6H  PRN IV NAUSEA AND/OR VOMITING;  Start 10/

24/17 at 21:00


Acetaminophen (Tylenol Tab) 650 mg Q6H  PRN PO PAIN LEVEL 1-3 OR FEVER Last 

administered on 11/1/17at 07:13; Admin Dose 650 MG;  Start 10/24/17 at 21:00


Oxycodone/ Acetaminophen (Percocet (5/ 325)) 1 tab Q6H  PRN PO MODERATE PAIN 

LEVEL 4-6;  Start 10/24/17 at 21:00


Morphine Sulfate (morphine) 2 mg Q4H  PRN IV SEVERE PAIN LEVEL 7-10 Last 

administered on 11/1/17at 02:47; Admin Dose 2 MG;  Start 10/24/17 at 21:00


Docusate Sodium (Colace) 100 mg Q12H  PRN PO CONSTIPATION;  Start 10/24/17 at 21

:00


Magnesium Hydroxide (Milk Of Mag) 30 ml DAILY  PRN PO CONSTIPATION;  Start 10/24

/17 at 21:00


Famotidine (Pepcid) 20 mg Q12 PO  Last administered on 11/5/17at 09:31; Admin 

Dose 20 MG;  Start 10/24/17 at 21:00


Albuterol (Proventil 0.083% (Neb)) 2.5 mg Q2  PRN HHN SHORTNESS OF BREATH;  

Start 10/24/17 at 21:00


Miscellaneous Information 1 ea NOTE XX ;  Start 10/24/17 at 21:30


Glucose (Glutose) 15 gm Q15M  PRN PO DECREASED GLUCOSE;  Start 10/24/17 at 21:30


Glucose (Glutose) 22.5 gm Q15M  PRN PO DECREASED GLUCOSE;  Start 10/24/17 at 21:

30


Dextrose (D50w Syringe) 25 ml Q15M  PRN IV DECREASED GLUCOSE;  Start 10/24/17 

at 21:30


Dextrose (D50w Syringe) 50 ml Q15M  PRN IV DECREASED GLUCOSE;  Start 10/24/17 

at 21:30


Glucagon (Glucagen) 1 mg Q15M  PRN IM DECREASED GLUCOSE;  Start 10/24/17 at 21:

30


Glucose (Glutose) 15 gm Q15M  PRN BUCCAL DECREASED GLUCOSE;  Start 10/24/17 at 

21:30


Nystatin (Nystatin Powder) 1 applic BID TOP  Last administered on 11/5/17at 09:

32; Admin Dose 1 APPLIC;  Start 10/25/17 at 09:00


Tamoxifen Citrate 20 mg 20 mg DAILY PO  Last administered on 11/5/17at 09:36; 

Admin Dose 20 MG;  Start 10/25/17 at 13:00


Ceftriaxone Sodium (Rocephin) 50 ml @  100 mls/hr Q24H IVPB  Last administered 

on 11/5/17at 13:11; Admin Dose 100 MLS/HR;  Start 10/27/17 at 14:00


Diagnostic Test (Pha) (Accu-Chek) 1 ea 02 XX ;  Start 11/4/17 at 02:00





Assessment/Plan


Additional Assessment/Plan


IMP: 


1.  Metastatic breast cancer with recurrent right pleural effusion.  


2.  Loculated pleural effusion c/w empyema status post decortication and 

pleurodesis.


3.  CNS metastasis currently receiving steroids and radiation therapy








RECS: 


1.  CT to suction 


2.  Abx


3.  ICS and pain control











JULY COPELAND MD Nov 5, 2017 16:25

## 2017-11-05 NOTE — CONS
Date/Time of Note


Date/Time of Note


DATE: 11/5/17 


TIME: 16:51





Assessment/Plan


Assessment/Plan


Chief Complaint/Hosp Course


ID PROGRESS NOTE 


CURRENT ABX:  TOTAL ABX DAY # 13 => Ceftriaxone 


24H INTERVAL SUMMARY  


* Sitting up on edge of bed w/CT intact, no complaints, denies pain, no dyspnea 

on room air, no fevers 


* Morbid obesity + calm, polite, good historian 


* POD #5=> s/p VATS 10/31/17   Right malignant pleural effusion.





-------------------------------------------------





Physical Exam


Physical Exam       


Constitutional: VSS, NAD, morbid obese, A/A/O x4, afebrile 


HEENT: Unremarkable 


Neck: Supple, full ROM 


Respiratory:  Equal chest rise bilaterally, without dyspnea on observation, 

room air 


Cardiovascular:  nl pulses, regular rate and rhythm


Gastrointestinal:  Soft, large pannus 


Extremities:  Warm, no c/c/e





ID ASSESSMENT


46 yo morbid obese  F admit with: 


1.  Systemic inflammatory response syndrome.


2.  Loculated right malignant pleural effusion.pleural effusion, possible 

empyema


* s/p Thora w/ Pleural fluid culture grew oxacillin-sensitive Staphylococcus 

aureus


* POD 10/31/17 -> s/p VATS  Right malignant pleural effusion.


3.  Diabetes.


4.  Metastatic breast carcinoma.


5.  History of craniectomy from brain metastasis.  


(- )MRSA Nares


INVASIVES: PIV


ABX ALLERGIES: PCN 


CURRENT ABX:  TOTAL ABX DAY  # 13 =>  Ceftriaxone 


ID RECOMMENDATIONS


1. Continue current ABX - follow CT surgeon recs








.


Problems:  





Consultation Date/Type/Reason


Admit Date/Time


Oct 24, 2017 at 18:29


Initial Consult Date


10/26/17


Type of Consultation:  ID


Referring Provider:  CLAU AGUILAR





Exam/Review of Systems


Vital Signs


Vitals





 Vital Signs








  Date Time  Temp Pulse Resp B/P Pulse Ox O2 Delivery O2 Flow Rate FiO2


 


11/5/17 16:47 97.5 69 20 140/79 100   


 


11/3/17 20:55        21


 


11/3/17 02:53       2.0 


 


11/2/17 20:00      Nasal Cannula  














 Intake and Output   


 


 11/4/17 11/4/17 11/5/17





 15:00 23:00 07:00


 


Intake Total 50 ml 840 ml 720 ml


 


Output Total 170 ml 170 ml 160 ml


 


Balance -120 ml 670 ml 560 ml











Results


Result Diagram:  


11/5/17 0424                                                                   

             11/5/17 0424





Results 24 hrs





Laboratory Tests








Test


  11/4/17


18:07 11/4/17


20:16 11/5/17


01:49 11/5/17


04:24


 


Bedside Glucose 142   223  H 176   


 


White Blood Count    11.4  H


 


Red Blood Count    3.98  L


 


Hemoglobin    9.5  L


 


Hematocrit    30.9  L


 


Mean Corpuscular Volume    77.6  L


 


Mean Corpuscular Hemoglobin    23.9  L


 


Mean Corpuscular Hemoglobin


Concent 


  


  


  30.7  L


 


 


Red Cell Distribution Width    23.2  H


 


Platelet Count    189  


 


Mean Platelet Volume    9.0  


 


Neutrophils %    73.1  


 


Lymphocytes %    15.4  


 


Monocytes %    6.9  


 


Eosinophils %    0.0  


 


Basophils %    0.1  


 


Nucleated Red Blood Cells %    0.0  


 


Neutrophils #    8.4  H


 


Lymphocytes #    1.8  


 


Monocytes #    0.8  


 


Eosinophils #    0.0  


 


Basophils #    0.0  


 


Nucleated Red Blood Cells #    0.0  


 


Sodium Level    137  


 


Potassium Level    4.6  


 


Chloride Level    103  


 


Carbon Dioxide Level    28  


 


Anion Gap    11  


 


Blood Urea Nitrogen    19  


 


Creatinine    0.50  


 


Glucose Level    149  


 


Calcium Level    8.6  


 


Phosphorus Level    4.3  


 


Magnesium Level    1.8  


 


Albumin    2.5  L














Test


  11/5/17


09:13 11/5/17


12:19 


  


 


 


Bedside Glucose 125   164    











Medications


Medications





 Current Medications


Dexamethasone (Decadron) 4 mg BID PO  Last administered on 11/5/17at 09:31; 

Admin Dose 4 MG;  Start 10/24/17 at 21:00


Docusate Sodium (Colace) 200 mg QHS PO  Last administered on 11/4/17at 20:18; 

Admin Dose 200 MG;  Start 10/24/17 at 21:00


Ondansetron HCl (Zofran Inj) 4 mg Q6H  PRN IV NAUSEA AND/OR VOMITING;  Start 10/

24/17 at 21:00


Acetaminophen (Tylenol Tab) 650 mg Q6H  PRN PO PAIN LEVEL 1-3 OR FEVER Last 

administered on 11/1/17at 07:13; Admin Dose 650 MG;  Start 10/24/17 at 21:00


Oxycodone/ Acetaminophen (Percocet (5/ 325)) 1 tab Q6H  PRN PO MODERATE PAIN 

LEVEL 4-6;  Start 10/24/17 at 21:00


Morphine Sulfate (morphine) 2 mg Q4H  PRN IV SEVERE PAIN LEVEL 7-10 Last 

administered on 11/1/17at 02:47; Admin Dose 2 MG;  Start 10/24/17 at 21:00


Docusate Sodium (Colace) 100 mg Q12H  PRN PO CONSTIPATION;  Start 10/24/17 at 21

:00


Magnesium Hydroxide (Milk Of Mag) 30 ml DAILY  PRN PO CONSTIPATION;  Start 10/24

/17 at 21:00


Famotidine (Pepcid) 20 mg Q12 PO  Last administered on 11/5/17at 09:31; Admin 

Dose 20 MG;  Start 10/24/17 at 21:00


Albuterol (Proventil 0.083% (Neb)) 2.5 mg Q2  PRN HHN SHORTNESS OF BREATH;  

Start 10/24/17 at 21:00


Miscellaneous Information 1 ea NOTE XX ;  Start 10/24/17 at 21:30


Glucose (Glutose) 15 gm Q15M  PRN PO DECREASED GLUCOSE;  Start 10/24/17 at 21:30


Glucose (Glutose) 22.5 gm Q15M  PRN PO DECREASED GLUCOSE;  Start 10/24/17 at 21:

30


Dextrose (D50w Syringe) 25 ml Q15M  PRN IV DECREASED GLUCOSE;  Start 10/24/17 

at 21:30


Dextrose (D50w Syringe) 50 ml Q15M  PRN IV DECREASED GLUCOSE;  Start 10/24/17 

at 21:30


Glucagon (Glucagen) 1 mg Q15M  PRN IM DECREASED GLUCOSE;  Start 10/24/17 at 21:

30


Glucose (Glutose) 15 gm Q15M  PRN BUCCAL DECREASED GLUCOSE;  Start 10/24/17 at 

21:30


Nystatin (Nystatin Powder) 1 applic BID TOP  Last administered on 11/5/17at 09:

32; Admin Dose 1 APPLIC;  Start 10/25/17 at 09:00


Tamoxifen Citrate 20 mg 20 mg DAILY PO  Last administered on 11/5/17at 09:36; 

Admin Dose 20 MG;  Start 10/25/17 at 13:00


Ceftriaxone Sodium (Rocephin) 50 ml @  100 mls/hr Q24H IVPB  Last administered 

on 11/5/17at 13:11; Admin Dose 100 MLS/HR;  Start 10/27/17 at 14:00


Diagnostic Test (Pha) (Accu-Chek) 1 ea 02 XX ;  Start 11/4/17 at 02:00











DEANDRE GIBSON NP Nov 5, 2017 16:57

## 2017-11-06 VITALS — DIASTOLIC BLOOD PRESSURE: 66 MMHG | SYSTOLIC BLOOD PRESSURE: 115 MMHG | RESPIRATION RATE: 18 BRPM

## 2017-11-06 VITALS — SYSTOLIC BLOOD PRESSURE: 138 MMHG | DIASTOLIC BLOOD PRESSURE: 71 MMHG | RESPIRATION RATE: 16 BRPM

## 2017-11-06 VITALS — SYSTOLIC BLOOD PRESSURE: 121 MMHG | DIASTOLIC BLOOD PRESSURE: 68 MMHG | RESPIRATION RATE: 18 BRPM

## 2017-11-06 LAB
ABNORMAL IP MESSAGE: 1
ALBUMIN SERPL-MCNC: 2.7 G/DL (ref 3.3–4.9)
ANION GAP SERPL CALC-SCNC: 10 MMOL/L (ref 8–16)
BASOPHILS # BLD AUTO: 0 10^3/UL (ref 0–0.1)
BASOPHILS NFR BLD: 0.1 % (ref 0–2)
BUN SERPL-MCNC: 17 MG/DL (ref 7–20)
CALCIUM SERPL-MCNC: 8.5 MG/DL (ref 8.4–10.2)
CHLORIDE SERPL-SCNC: 104 MMOL/L (ref 97–110)
CO2 SERPL-SCNC: 27 MMOL/L (ref 21–31)
CREAT SERPL-MCNC: 0.43 MG/DL (ref 0.44–1)
EOSINOPHIL # BLD: 0 10^3/UL (ref 0–0.5)
EOSINOPHIL NFR BLD: 0 % (ref 0–7)
ERYTHROCYTE [DISTWIDTH] IN BLOOD BY AUTOMATED COUNT: 23.3 % (ref 11.5–14.5)
GLUCOSE SERPL-MCNC: 124 MG/DL (ref 70–220)
HCT VFR BLD CALC: 32.6 % (ref 37–47)
HGB BLD-MCNC: 9.9 G/DL (ref 12–16)
LYMPHOCYTES # BLD AUTO: 2 10^3/UL (ref 0.8–2.9)
LYMPHOCYTES NFR BLD AUTO: 15 % (ref 15–51)
MAGNESIUM SERPL-MCNC: 1.7 MG/DL (ref 1.7–2.5)
MCH RBC QN AUTO: 23.5 PG (ref 29–33)
MCHC RBC AUTO-ENTMCNC: 30.4 G/DL (ref 32–37)
MCV RBC AUTO: 77.3 FL (ref 82–101)
MONOCYTES # BLD: 0.8 10^3/UL (ref 0.3–0.9)
MONOCYTES NFR BLD: 6.2 % (ref 0–11)
NEUTROPHILS # BLD: 9.7 10^3/UL (ref 1.6–7.5)
NEUTROPHILS NFR BLD AUTO: 74.2 % (ref 39–77)
NRBC # BLD MANUAL: 0 10^3/UL (ref 0–0)
NRBC BLD AUTO-RTO: 0 /100WBC (ref 0–0)
PHOSPHATE SERPL-MCNC: 4 MG/DL (ref 2.5–4.9)
PLATELET # BLD: 202 10^3/UL (ref 140–415)
PMV BLD AUTO: 9.1 FL (ref 7.4–10.4)
POSITIVE DIFF: (no result)
POTASSIUM SERPL-SCNC: 4.4 MMOL/L (ref 3.5–5.1)
RBC # BLD AUTO: 4.22 10^6/UL (ref 4.2–5.4)
SODIUM SERPL-SCNC: 137 MMOL/L (ref 135–144)
WBC # BLD AUTO: 13 10^3/UL (ref 4.8–10.8)

## 2017-11-06 RX ADMIN — FAMOTIDINE SCH MG: 20 TABLET ORAL at 21:00

## 2017-11-06 RX ADMIN — TAMOXIFEN CITRATE SCH MG: 10 TABLET, FILM COATED ORAL at 09:20

## 2017-11-06 RX ADMIN — CEFTRIAXONE SCH MLS/HR: 1 INJECTION, SOLUTION INTRAVENOUS at 15:28

## 2017-11-06 RX ADMIN — NYSTATIN SCH APPLIC: 100000 POWDER TOPICAL at 09:10

## 2017-11-06 RX ADMIN — NYSTATIN SCH APPLIC: 100000 POWDER TOPICAL at 21:00

## 2017-11-06 RX ADMIN — DOCUSATE SODIUM SCH MG: 100 CAPSULE, LIQUID FILLED ORAL at 21:00

## 2017-11-06 RX ADMIN — FAMOTIDINE SCH MG: 20 TABLET ORAL at 09:09

## 2017-11-06 NOTE — CONS
Date/Time of Note


Date/Time of Note


DATE: 11/6/17 


TIME: 09:43





Assessment/Plan


Assessment/Plan


Chief Complaint/Hosp Course


#Her2+/ER+/OK+ metastatic breast ca


-pt is non compliant and when she receives therapy, she is quite responsive to 

therapy


-therefore at this time, although she has terminal disease, there are still 

many more treatment options which she could benefit from


-given her Brain mets, I would consider a combination of Xeloda and Lapatinib 

which can penetrate the blood brain barrier. this will have to start once 

radiation has completed


-can restart Tamoxifen as well





#Pleural Effusion with superimposed infection


-pt is s/p decortication and pleurodesis


-chest tube with considerable serosanguineous drainage


-continue antibiotics given + Staph superinfection infection 


-appreciate pulmonary recs





#Brain Mets


-pt is s/p resection 


-pt needs to continue with WBRT with Dr. Sprague. to proceed with radiation today





Problems:  





Consultation Date/Type/Reason


Admit Date/Time


Oct 24, 2017 at 18:29


Initial Consult Date


10/26/17


Type of Consultation:  Hematology


Reason for Consultation


metastatic breast cancer


Referring Provider:  CLAU AGUILAR





24 HR Interval Summary


Free Text/Dictation


chest tube is putting out less drainage





Exam/Review of Systems


Vital Signs


Vitals





 Vital Signs








  Date Time  Temp Pulse Resp B/P Pulse Ox O2 Delivery O2 Flow Rate FiO2


 


11/6/17 01:49 98.3 60 16 138/71 97   


 


11/3/17 20:55        21


 


11/3/17 02:53       2.0 


 


11/2/17 20:00      Nasal Cannula  














 Intake and Output   


 


 11/5/17 11/5/17 11/6/17





 15:00 23:00 07:00


 


Intake Total 50 ml 900 ml 850 ml


 


Output Total  200 ml 


 


Balance 50 ml 700 ml 850 ml











Exam


Constitutional:  alert, oriented


Psych:  no complaints


Head:  normocephalic


Eyes:  nl conjunctiva


ENMT:  nl external ears & nose


Neck:  non-tender, supple


Respiratory:  other (chest tube in place)


Cardiovascular:  regular rate and rhythm


Gastrointestinal:  soft





Results


Result Diagram:  


11/6/17 0442 11/6/17 0442





Results 24 hrs





Laboratory Tests








Test


  11/5/17


12:19 11/5/17


17:53 11/5/17


20:17 11/6/17


04:42


 


Bedside Glucose 164   124   155   


 


White Blood Count    13.0  H


 


Red Blood Count    4.22  


 


Hemoglobin    9.9  L


 


Hematocrit    32.6  L


 


Mean Corpuscular Volume    77.3  L


 


Mean Corpuscular Hemoglobin    23.5  L


 


Mean Corpuscular Hemoglobin


Concent 


  


  


  30.4  L


 


 


Red Cell Distribution Width    23.3  H


 


Platelet Count    202  


 


Mean Platelet Volume    9.1  


 


Neutrophils %    74.2  


 


Lymphocytes %    15.0  


 


Monocytes %    6.2  


 


Eosinophils %    0.0  


 


Basophils %    0.1  


 


Nucleated Red Blood Cells %    0.0  


 


Neutrophils #    9.7  H


 


Lymphocytes #    2.0  


 


Monocytes #    0.8  


 


Eosinophils #    0.0  


 


Basophils #    0.0  


 


Nucleated Red Blood Cells #    0.0  


 


Sodium Level    137  


 


Potassium Level    4.4  


 


Chloride Level    104  


 


Carbon Dioxide Level    27  


 


Anion Gap    10  


 


Blood Urea Nitrogen    17  


 


Creatinine    0.43  L


 


Glucose Level    124  


 


Calcium Level    8.5  


 


Phosphorus Level    4.0  


 


Magnesium Level    1.7  


 


Albumin    2.7  L














Test


  11/6/17


08:42 


  


  


 


 


Bedside Glucose 80     











Medications


Medications





 Current Medications


Dexamethasone (Decadron) 4 mg BID PO  Last administered on 11/6/17at 09:09; 

Admin Dose 4 MG;  Start 10/24/17 at 21:00


Docusate Sodium (Colace) 200 mg QHS PO  Last administered on 11/4/17at 20:18; 

Admin Dose 200 MG;  Start 10/24/17 at 21:00


Ondansetron HCl (Zofran Inj) 4 mg Q6H  PRN IV NAUSEA AND/OR VOMITING;  Start 10/

24/17 at 21:00


Acetaminophen (Tylenol Tab) 650 mg Q6H  PRN PO PAIN LEVEL 1-3 OR FEVER Last 

administered on 11/1/17at 07:13; Admin Dose 650 MG;  Start 10/24/17 at 21:00


Oxycodone/ Acetaminophen (Percocet (5/ 325)) 1 tab Q6H  PRN PO MODERATE PAIN 

LEVEL 4-6;  Start 10/24/17 at 21:00


Morphine Sulfate (morphine) 2 mg Q4H  PRN IV SEVERE PAIN LEVEL 7-10 Last 

administered on 11/1/17at 02:47; Admin Dose 2 MG;  Start 10/24/17 at 21:00


Docusate Sodium (Colace) 100 mg Q12H  PRN PO CONSTIPATION;  Start 10/24/17 at 21

:00


Magnesium Hydroxide (Milk Of Mag) 30 ml DAILY  PRN PO CONSTIPATION;  Start 10/24

/17 at 21:00


Famotidine (Pepcid) 20 mg Q12 PO  Last administered on 11/6/17at 09:09; Admin 

Dose 20 MG;  Start 10/24/17 at 21:00


Albuterol (Proventil 0.083% (Neb)) 2.5 mg Q2  PRN HHN SHORTNESS OF BREATH;  

Start 10/24/17 at 21:00


Miscellaneous Information 1 ea NOTE XX ;  Start 10/24/17 at 21:30


Glucose (Glutose) 15 gm Q15M  PRN PO DECREASED GLUCOSE;  Start 10/24/17 at 21:30


Glucose (Glutose) 22.5 gm Q15M  PRN PO DECREASED GLUCOSE;  Start 10/24/17 at 21:

30


Dextrose (D50w Syringe) 25 ml Q15M  PRN IV DECREASED GLUCOSE;  Start 10/24/17 

at 21:30


Dextrose (D50w Syringe) 50 ml Q15M  PRN IV DECREASED GLUCOSE;  Start 10/24/17 

at 21:30


Glucagon (Glucagen) 1 mg Q15M  PRN IM DECREASED GLUCOSE;  Start 10/24/17 at 21:

30


Glucose (Glutose) 15 gm Q15M  PRN BUCCAL DECREASED GLUCOSE;  Start 10/24/17 at 

21:30


Nystatin (Nystatin Powder) 1 applic BID TOP  Last administered on 11/6/17at 09:

10; Admin Dose 1 APPLIC;  Start 10/25/17 at 09:00


Tamoxifen Citrate 20 mg 20 mg DAILY PO  Last administered on 11/6/17at 09:20; 

Admin Dose 20 MG;  Start 10/25/17 at 13:00


Ceftriaxone Sodium (Rocephin) 50 ml @  100 mls/hr Q24H IVPB  Last administered 

on 11/5/17at 13:11; Admin Dose 100 MLS/HR;  Start 10/27/17 at 14:00


Diagnostic Test (Pha) (Accu-Chek) 1 ea 02 XX ;  Start 11/4/17 at 02:00











KRISTEL ORANTES M.D. Nov 6, 2017 09:45

## 2017-11-06 NOTE — CONS
Date/Time of Note


Date/Time of Note


DATE: 11/6/17 


TIME: 16:10





Consult Date/Type/Reason


Admit Date/Time


Oct 24, 2017 at 18:29


Initial Consult Date


10/25/17


Type of Consultation:  Pulmonary


Ordering Provider:  CLAU AGUILAR





Subjective


No new events comfortable this morning.  Some pain around chest tube insertion 

site.





Objective





 Vital Signs








  Date Time  Temp Pulse Resp B/P Pulse Ox O2 Delivery O2 Flow Rate FiO2


 


11/6/17 08:30 98.9 75 18 121/68 98   


 


11/3/17 20:55        21


 


11/3/17 02:53       2.0 


 


11/2/17 20:00      Nasal Cannula  














 Intake and Output   


 


 11/5/17 11/5/17 11/6/17





 15:00 23:00 07:00


 


Intake Total 50 ml 900 ml 850 ml


 


Output Total  200 ml 


 


Balance 50 ml 700 ml 850 ml








Exam


HEENT: Neck supple; no JVD; no LAD


CVS: RRR, S1 and S2


CHEST: Decreased BS right 


ABD: Soft, NT, + BS


EXT: No c/c/e





Results/Medications


Result Diagram:  


11/6/17 0442                                                                   

             11/6/17 0442





Results 24 hrs





Laboratory Tests








Test


  11/5/17


17:53 11/5/17


20:17 11/6/17


04:42 11/6/17


08:42


 


Bedside Glucose 124   155    80  


 


White Blood Count   13.0  H 


 


Red Blood Count   4.22   


 


Hemoglobin   9.9  L 


 


Hematocrit   32.6  L 


 


Mean Corpuscular Volume   77.3  L 


 


Mean Corpuscular Hemoglobin   23.5  L 


 


Mean Corpuscular Hemoglobin


Concent 


  


  30.4  L


  


 


 


Red Cell Distribution Width   23.3  H 


 


Platelet Count   202   


 


Mean Platelet Volume   9.1   


 


Neutrophils %   74.2   


 


Lymphocytes %   15.0   


 


Monocytes %   6.2   


 


Eosinophils %   0.0   


 


Basophils %   0.1   


 


Nucleated Red Blood Cells %   0.0   


 


Neutrophils #   9.7  H 


 


Lymphocytes #   2.0   


 


Monocytes #   0.8   


 


Eosinophils #   0.0   


 


Basophils #   0.0   


 


Nucleated Red Blood Cells #   0.0   


 


Sodium Level   137   


 


Potassium Level   4.4   


 


Chloride Level   104   


 


Carbon Dioxide Level   27   


 


Anion Gap   10   


 


Blood Urea Nitrogen   17   


 


Creatinine   0.43  L 


 


Glucose Level   124   


 


Calcium Level   8.5   


 


Phosphorus Level   4.0   


 


Magnesium Level   1.7   


 


Albumin   2.7  L 














Test


  11/6/17


13:39 


  


  


 


 


Bedside Glucose 102     








Medications





 Current Medications


Dexamethasone (Decadron) 4 mg BID PO  Last administered on 11/6/17at 09:09; 

Admin Dose 4 MG;  Start 10/24/17 at 21:00


Docusate Sodium (Colace) 200 mg QHS PO  Last administered on 11/4/17at 20:18; 

Admin Dose 200 MG;  Start 10/24/17 at 21:00


Ondansetron HCl (Zofran Inj) 4 mg Q6H  PRN IV NAUSEA AND/OR VOMITING;  Start 10/

24/17 at 21:00


Acetaminophen (Tylenol Tab) 650 mg Q6H  PRN PO PAIN LEVEL 1-3 OR FEVER Last 

administered on 11/1/17at 07:13; Admin Dose 650 MG;  Start 10/24/17 at 21:00


Oxycodone/ Acetaminophen (Percocet (5/ 325)) 1 tab Q6H  PRN PO MODERATE PAIN 

LEVEL 4-6;  Start 10/24/17 at 21:00


Morphine Sulfate (morphine) 2 mg Q4H  PRN IV SEVERE PAIN LEVEL 7-10 Last 

administered on 11/1/17at 02:47; Admin Dose 2 MG;  Start 10/24/17 at 21:00


Docusate Sodium (Colace) 100 mg Q12H  PRN PO CONSTIPATION;  Start 10/24/17 at 21

:00


Magnesium Hydroxide (Milk Of Mag) 30 ml DAILY  PRN PO CONSTIPATION;  Start 10/24

/17 at 21:00


Famotidine (Pepcid) 20 mg Q12 PO  Last administered on 11/6/17at 09:09; Admin 

Dose 20 MG;  Start 10/24/17 at 21:00


Albuterol (Proventil 0.083% (Neb)) 2.5 mg Q2  PRN HHN SHORTNESS OF BREATH;  

Start 10/24/17 at 21:00


Miscellaneous Information 1 ea NOTE XX ;  Start 10/24/17 at 21:30


Glucose (Glutose) 15 gm Q15M  PRN PO DECREASED GLUCOSE;  Start 10/24/17 at 21:30


Glucose (Glutose) 22.5 gm Q15M  PRN PO DECREASED GLUCOSE;  Start 10/24/17 at 21:

30


Dextrose (D50w Syringe) 25 ml Q15M  PRN IV DECREASED GLUCOSE;  Start 10/24/17 

at 21:30


Dextrose (D50w Syringe) 50 ml Q15M  PRN IV DECREASED GLUCOSE;  Start 10/24/17 

at 21:30


Glucagon (Glucagen) 1 mg Q15M  PRN IM DECREASED GLUCOSE;  Start 10/24/17 at 21:

30


Glucose (Glutose) 15 gm Q15M  PRN BUCCAL DECREASED GLUCOSE;  Start 10/24/17 at 

21:30


Nystatin (Nystatin Powder) 1 applic BID TOP  Last administered on 11/6/17at 09:

10; Admin Dose 1 APPLIC;  Start 10/25/17 at 09:00


Tamoxifen Citrate 20 mg 20 mg DAILY PO  Last administered on 11/6/17at 09:20; 

Admin Dose 20 MG;  Start 10/25/17 at 13:00


Ceftriaxone Sodium (Rocephin) 50 ml @  100 mls/hr Q24H IVPB  Last administered 

on 11/6/17at 15:28; Admin Dose 100 MLS/HR;  Start 10/27/17 at 14:00


Diagnostic Test (Pha) (Accu-Chek) 1 ea 02 XX ;  Start 11/4/17 at 02:00





Assessment/Plan


Chief Complaint/Hosp Course


Assessment





1.  Metastatic breast cancer with recurrent right pleural effusion.  Cultures 

growing gram-positive cocci consistent with staph aureus.


2.  Loculated pleural effusion possible empyema status post decortication and 

pleurodesis.


3.  CNS metastasis currently receiving steroids and radiation therapy








Plan





1.  Continue chest tube drainage.  Previous cultures grew staph aureus.  

Antibiotic management discussed with infectious diseases


2.  Continue oncology recommendations continues radiation therapy for CNS 

metastasis


3.  Continue antibiotic coverage for staph infection.





Continue radiation therapy


Problems:  











DELFIN LAKHANI MD, [facility] Nov 6, 2017 16:11

## 2017-11-06 NOTE — PN
Date/Time of Note


Date/Time of Note


DATE: 11/6/17 


TIME: 15:14





Assessment/Plan


Lines/Catheters


IV Catheter Type (from Nrsg):  Peripheral IV


Guerrero in Place (from Nrsg):  No





Assessment/Plan


Chief Complaint/Hosp Course


 


IMPRESSION:  Right loculated pleural effusion.


 


R status post right VATS pleurodesis decortication


Patient feels much better


CT  100   cc


Less shortness of breath


Check chest x-ray


Will continue chest tube suction


Problems:  





Subjective


24 Hr Interval Summary


Constitutional:  improved


Pain Control:  mild





Exam/Review of Systems


Vital Signs


Vitals





 Vital Signs








  Date Time  Temp Pulse Resp B/P Pulse Ox O2 Delivery O2 Flow Rate FiO2


 


11/6/17 08:30 98.9 75 18 121/68 98   


 


11/3/17 20:55        21


 


11/3/17 02:53       2.0 


 


11/2/17 20:00      Nasal Cannula  














 Intake and Output   


 


 11/5/17 11/5/17 11/6/17





 15:00 23:00 07:00


 


Intake Total 50 ml 900 ml 850 ml


 


Output Total  200 ml 


 


Balance 50 ml 700 ml 850 ml











Exam


ENMT:  mucosa pink and moist, nl external ears & nose, nl lips & teeth, nl 

nasal mucosa & septum


Neck:  non-tender, supple


Respiratory:  clear to auscultation, normal air movement


Cardiovascular:  nl pulses, regular rate and rhythm





Results


Result Diagram:  


11/6/17 0442 11/6/17 0442














HEIDY MAXWELL MD Nov 6, 2017 15:15

## 2017-11-06 NOTE — PN
Date/Time of Note


Date/Time of Note


DATE: 11/6/17 


TIME: 15:00





Assessment/Plan


VTE Prophylaxis


VTE Prophylaxis Intervention:  ambulation





Lines/Catheters


IV Catheter Type (from [facility]):  Peripheral IV


Urinary Cath still in place:  No





Assessment/Plan


Chief Complaint/Hosp Course


Patient is a 39-year-old female with a past medical history of metastatic 

breast cancer with metastases to the liver and brain who presents from 

[facility] for malfunctioning Pleurx catheter.





1.  Empyema 2/2 staph aureus s/p VATS 10/31/17


- Patient doing well and chest tube still in place and draining serous fluid 

but output decreasing daily


- repeat CXR shows increased sized right-sided pleural effusion. Rounded 

opacity within the right mid lung likely represents pleural fluid in the major 

fissure. Increased consolidation or atelectasis within the right lower lobe.


- started on incentive spirometry


- CT chest showed trace loculated pleural effusion with possible empyema in 

addition to malignant effusion.


- CT surgery on board and appreciate consultation


- ID on board and recommendations appreciated. continue on IV antibiotics


- Ultrasound-guided thoracentesis performed, cultures showing staph aureus





2.  Dysfunctional Pleurx catheter


- Pulmonology on board and consultation appreciated


- Respiratory status stable at this time





3.  Cellulitis, area around catheter- improving


- ID on board and recommendations appreciated. Continue on antibiotics  





4.  Metastatic breast cancer, lung, liver, brain, s/p craniectomy 


- currently receiving radiation therapy and tamoxifen and Decadron


- Heme/onc on board and consultation appreciated


- Radiation as scheduled





5. Diabetes mellitus


- Currently stable





6. Obesity


- Counseled about diet and improving exercise routine when able





7. Disposition


- Continue monitoring on med/surg while chest tube in place


Problems:  





Subjective


24 Hr Interval Summary


Free Text/Dictation


returned from radiation therapy. tolerated well.





Exam/Review of Systems


Vital Signs


Vitals





 Vital Signs








  Date Time  Temp Pulse Resp B/P Pulse Ox O2 Delivery O2 Flow Rate FiO2


 


11/6/17 08:30 98.9 75 18 121/68 98   


 


11/3/17 20:55        21


 


11/3/17 02:53       2.0 


 


11/2/17 20:00      Nasal Cannula  














 Intake and Output   


 


 11/5/17 11/5/17 11/6/17





 15:00 23:00 07:00


 


Intake Total 50 ml 900 ml 850 ml


 


Output Total  200 ml 


 


Balance 50 ml 700 ml 850 ml











Exam


General: Patient awake and alert, no acute distress. 


Head: R side craniectomy site healing well. improving fluctuant edema right side


Eyes: EOMI, pupils reactive to light


Neck: Supple, nontender, midline


Respiratory: diminished air entry with chest tube in place draining slightly 

pink tinged serous fluid


Cardiovascular: regular rate, no obvious murmurs


Gastrointestinal: non-tender to palpation, bowel sounds heard.


Neurological: Moves all extremities spontaneously





Results


Result Diagram:  


11/6/17 0442                                                                   

             11/6/17 0442





Results 24 hrs





Laboratory Tests








Test


  11/5/17


17:53 11/5/17


20:17 11/6/17


04:42 11/6/17


08:42


 


Bedside Glucose 124   155    80  


 


White Blood Count   13.0  H 


 


Red Blood Count   4.22   


 


Hemoglobin   9.9  L 


 


Hematocrit   32.6  L 


 


Mean Corpuscular Volume   77.3  L 


 


Mean Corpuscular Hemoglobin   23.5  L 


 


Mean Corpuscular Hemoglobin


Concent 


  


  30.4  L


  


 


 


Red Cell Distribution Width   23.3  H 


 


Platelet Count   202   


 


Mean Platelet Volume   9.1   


 


Neutrophils %   74.2   


 


Lymphocytes %   15.0   


 


Monocytes %   6.2   


 


Eosinophils %   0.0   


 


Basophils %   0.1   


 


Nucleated Red Blood Cells %   0.0   


 


Neutrophils #   9.7  H 


 


Lymphocytes #   2.0   


 


Monocytes #   0.8   


 


Eosinophils #   0.0   


 


Basophils #   0.0   


 


Nucleated Red Blood Cells #   0.0   


 


Sodium Level   137   


 


Potassium Level   4.4   


 


Chloride Level   104   


 


Carbon Dioxide Level   27   


 


Anion Gap   10   


 


Blood Urea Nitrogen   17   


 


Creatinine   0.43  L 


 


Glucose Level   124   


 


Calcium Level   8.5   


 


Phosphorus Level   4.0   


 


Magnesium Level   1.7   


 


Albumin   2.7  L 














Test


  11/6/17


13:39 


  


  


 


 


Bedside Glucose 102     











Medications


Medications





 Current Medications


Dexamethasone (Decadron) 4 mg BID PO  Last administered on 11/6/17at 09:09; 

Admin Dose 4 MG;  Start 10/24/17 at 21:00


Docusate Sodium (Colace) 200 mg QHS PO  Last administered on 11/4/17at 20:18; 

Admin Dose 200 MG;  Start 10/24/17 at 21:00


Ondansetron HCl (Zofran Inj) 4 mg Q6H  PRN IV NAUSEA AND/OR VOMITING;  Start 10/

24/17 at 21:00


Acetaminophen (Tylenol Tab) 650 mg Q6H  PRN PO PAIN LEVEL 1-3 OR FEVER Last 

administered on 11/1/17at 07:13; Admin Dose 650 MG;  Start 10/24/17 at 21:00


Oxycodone/ Acetaminophen (Percocet (5/ 325)) 1 tab Q6H  PRN PO MODERATE PAIN 

LEVEL 4-6;  Start 10/24/17 at 21:00


Morphine Sulfate (morphine) 2 mg Q4H  PRN IV SEVERE PAIN LEVEL 7-10 Last 

administered on 11/1/17at 02:47; Admin Dose 2 MG;  Start 10/24/17 at 21:00


Docusate Sodium (Colace) 100 mg Q12H  PRN PO CONSTIPATION;  Start 10/24/17 at 21

:00


Magnesium Hydroxide (Milk Of Mag) 30 ml DAILY  PRN PO CONSTIPATION;  Start 10/24

/17 at 21:00


Famotidine (Pepcid) 20 mg Q12 PO  Last administered on 11/6/17at 09:09; Admin 

Dose 20 MG;  Start 10/24/17 at 21:00


Albuterol (Proventil 0.083% (Neb)) 2.5 mg Q2  PRN HHN SHORTNESS OF BREATH;  

Start 10/24/17 at 21:00


Miscellaneous Information 1 ea NOTE XX ;  Start 10/24/17 at 21:30


Glucose (Glutose) 15 gm Q15M  PRN PO DECREASED GLUCOSE;  Start 10/24/17 at 21:30


Glucose (Glutose) 22.5 gm Q15M  PRN PO DECREASED GLUCOSE;  Start 10/24/17 at 21:

30


Dextrose (D50w Syringe) 25 ml Q15M  PRN IV DECREASED GLUCOSE;  Start 10/24/17 

at 21:30


Dextrose (D50w Syringe) 50 ml Q15M  PRN IV DECREASED GLUCOSE;  Start 10/24/17 

at 21:30


Glucagon (Glucagen) 1 mg Q15M  PRN IM DECREASED GLUCOSE;  Start 10/24/17 at 21:

30


Glucose (Glutose) 15 gm Q15M  PRN BUCCAL DECREASED GLUCOSE;  Start 10/24/17 at 

21:30


Nystatin (Nystatin Powder) 1 applic BID TOP  Last administered on 11/6/17at 09:

10; Admin Dose 1 APPLIC;  Start 10/25/17 at 09:00


Tamoxifen Citrate 20 mg 20 mg DAILY PO  Last administered on 11/6/17at 09:20; 

Admin Dose 20 MG;  Start 10/25/17 at 13:00


Ceftriaxone Sodium (Rocephin) 50 ml @  100 mls/hr Q24H IVPB  Last administered 

on 11/5/17at 13:11; Admin Dose 100 MLS/HR;  Start 10/27/17 at 14:00


Diagnostic Test (Pha) (Accu-Chek) 1 ea 02 XX ;  Start 11/4/17 at 02:00











SHIREEN ASHBY Nov 6, 2017 15:01

## 2017-11-07 VITALS — SYSTOLIC BLOOD PRESSURE: 115 MMHG | HEART RATE: 81 BPM | RESPIRATION RATE: 16 BRPM | DIASTOLIC BLOOD PRESSURE: 79 MMHG

## 2017-11-07 VITALS — DIASTOLIC BLOOD PRESSURE: 75 MMHG | RESPIRATION RATE: 16 BRPM | SYSTOLIC BLOOD PRESSURE: 127 MMHG | HEART RATE: 79 BPM

## 2017-11-07 VITALS — DIASTOLIC BLOOD PRESSURE: 78 MMHG | RESPIRATION RATE: 18 BRPM | SYSTOLIC BLOOD PRESSURE: 135 MMHG

## 2017-11-07 VITALS — SYSTOLIC BLOOD PRESSURE: 127 MMHG | DIASTOLIC BLOOD PRESSURE: 68 MMHG | RESPIRATION RATE: 20 BRPM

## 2017-11-07 VITALS — RESPIRATION RATE: 18 BRPM | SYSTOLIC BLOOD PRESSURE: 97 MMHG | DIASTOLIC BLOOD PRESSURE: 50 MMHG

## 2017-11-07 LAB
ABNORMAL IP MESSAGE: 1
ALBUMIN SERPL-MCNC: 2.6 G/DL (ref 3.3–4.9)
ANION GAP SERPL CALC-SCNC: 10 MMOL/L (ref 8–16)
BASOPHILS # BLD AUTO: 0 10^3/UL (ref 0–0.1)
BASOPHILS NFR BLD: 0.3 % (ref 0–2)
BUN SERPL-MCNC: 20 MG/DL (ref 7–20)
CALCIUM SERPL-MCNC: 8.4 MG/DL (ref 8.4–10.2)
CHLORIDE SERPL-SCNC: 104 MMOL/L (ref 97–110)
CO2 SERPL-SCNC: 27 MMOL/L (ref 21–31)
CREAT SERPL-MCNC: 0.48 MG/DL (ref 0.44–1)
EOSINOPHIL # BLD: 0 10^3/UL (ref 0–0.5)
EOSINOPHIL NFR BLD: 0.1 % (ref 0–7)
ERYTHROCYTE [DISTWIDTH] IN BLOOD BY AUTOMATED COUNT: 23.2 % (ref 11.5–14.5)
GLUCOSE SERPL-MCNC: 145 MG/DL (ref 70–220)
HCT VFR BLD CALC: 33 % (ref 37–47)
HGB BLD-MCNC: 10.1 G/DL (ref 12–16)
LYMPHOCYTES # BLD AUTO: 1.3 10^3/UL (ref 0.8–2.9)
LYMPHOCYTES NFR BLD AUTO: 9.7 % (ref 15–51)
MAGNESIUM SERPL-MCNC: 1.7 MG/DL (ref 1.7–2.5)
MCH RBC QN AUTO: 24.2 PG (ref 29–33)
MCHC RBC AUTO-ENTMCNC: 30.6 G/DL (ref 32–37)
MCV RBC AUTO: 78.9 FL (ref 82–101)
MONOCYTES # BLD: 0.6 10^3/UL (ref 0.3–0.9)
MONOCYTES NFR BLD: 4.9 % (ref 0–11)
NEUTROPHILS # BLD: 10.4 10^3/UL (ref 1.6–7.5)
NEUTROPHILS NFR BLD AUTO: 80.6 % (ref 39–77)
NRBC # BLD MANUAL: 0 10^3/UL (ref 0–0)
NRBC BLD AUTO-RTO: 0 /100WBC (ref 0–0)
PHOSPHATE SERPL-MCNC: 3.8 MG/DL (ref 2.5–4.9)
PLATELET # BLD: 158 10^3/UL (ref 140–415)
PMV BLD AUTO: 9.2 FL (ref 7.4–10.4)
POSITIVE DIFF: (no result)
POTASSIUM SERPL-SCNC: 4.6 MMOL/L (ref 3.5–5.1)
RBC # BLD AUTO: 4.18 10^6/UL (ref 4.2–5.4)
SODIUM SERPL-SCNC: 136 MMOL/L (ref 135–144)
WBC # BLD AUTO: 12.9 10^3/UL (ref 4.8–10.8)

## 2017-11-07 RX ADMIN — CEFTRIAXONE SCH MLS/HR: 1 INJECTION, SOLUTION INTRAVENOUS at 13:49

## 2017-11-07 RX ADMIN — TAMOXIFEN CITRATE SCH MG: 10 TABLET, FILM COATED ORAL at 09:19

## 2017-11-07 RX ADMIN — FAMOTIDINE SCH MG: 20 TABLET ORAL at 09:12

## 2017-11-07 RX ADMIN — FAMOTIDINE SCH MG: 20 TABLET ORAL at 20:28

## 2017-11-07 RX ADMIN — NYSTATIN SCH APPLIC: 100000 POWDER TOPICAL at 20:29

## 2017-11-07 RX ADMIN — NYSTATIN SCH APPLIC: 100000 POWDER TOPICAL at 09:12

## 2017-11-07 RX ADMIN — DOCUSATE SODIUM SCH MG: 100 CAPSULE, LIQUID FILLED ORAL at 20:27

## 2017-11-07 NOTE — RADRPT
PROCEDURE:   XR Chest. 

 

CLINICAL INDICATION:   Shortness of breath.

 

TECHNIQUE:   Single frontal view. 

 

COMPARISON:   11/04/2017. 

 

FINDINGS:

A large-bore right chest tube is present with the side-hole in the soft tissues of the chest wall. A
 small bore right chest tube is coiled in the right pleural space inferiorly. There is a moderate ri
ght pleural effusion, unchanged. There is no left pleural effusion. There is atelectasis throughout 
the right mid and lower lung zones, unchanged. The lungs are otherwise clear.

The heart is enlarged. 

There is no pleural effusion. 

There is no pneumothorax.   

 

IMPRESSION:

1.  The large bore right chest tube has been retracted and the side hole is now in the soft tissues 
of the right chest wall.

2.  No other change from the 11/04/2017 chest radiograph.

 

RPTAT: QQ

_____________________________________________ 

.Charly Espinal MD, MD           Date    Time 

Electronically viewed and signed by .Charly Espinal MD, MD on 11/07/2017 08:06 

 

D:  11/07/2017 08:06  T:  11/07/2017 08:06

.R/

## 2017-11-07 NOTE — PN
DATE:  11/07/2017

 

 

INFECTIOUS DISEASE PROGRESS NOTE

 

SUBJECTIVE:  No events overnight.  The patient is alert, feels good, looks comfortable, no fevers.

 

WBC 12.9, platelets 158, neutrophils 80.6, BUN 20, creatinine 0.48.

 

DIAGNOSTICS:  Chest x-ray this morning revealed a large bore right chest tube has been retracted and
 the side hole is now in the soft tissue of the right chest wall, no other changes.

 

INDWELLINGS:  The patient has a right-sided chest tube.

 

ANTIMICROBIALS:  Rocephin.

 

PHYSICAL EXAMINATION:

GENERAL:  This is an obese, well-developed, middle-aged woman who is awake, in no distress.

HEENT:  Head atraumatic, normocephalic.  Sclerae anicteric.  Buccal mucosa dry.

NECK:  Supple.

CHEST:  Rise symmetrical.  Breath sounds diminished to bases.

HEART:  S1, S2.

ABDOMEN:  Soft, bowel tones present.

EXTREMITIES:  Without cyanosis.

 

ASSESSMENT:

1.  Right-sided loculated pleural effusion consistent with empyema with fluid culture growing oxacil
fidel-sensitive Staphylococcus aureus.  The patient is status post decortication and pleurodesis on 10
/31/2017.

2.  Metastatic breast cancer.

3.  Diabetes.

4.  History of craniectomy for brain metastasis.

 

PLAN:  The patient remains stable, scheduled for radiation today.  She is being followed by multiple
 consultants.  I will continue her on antibiotics for a total of 4 weeks.  Chest tube management as 
per cardiothoracic surgery.

 

 

Dictated By: DIANA RANDALL NP for JERROLD DREYER MD NI/LAKISHA

DD:    11/07/2017 11:47:02

DT:    11/07/2017 12:15:23

Conf#: [id number]

DID#:  [id number]

## 2017-11-07 NOTE — PN
Date/Time of Note


Date/Time of Note


DATE: 11/7/17 


TIME: 21:02





Assessment/Plan


Lines/Catheters


IV Catheter Type (from Nrs):  Peripheral IV


Guerrero in Place (from Nrs):  No





Assessment/Plan


Chief Complaint/Hosp Course


 


IMPRESSION:  Right loculated pleural effusion.


 


R status post right VATS pleurodesis decortication


Patient feels much better


CT  100   cc


Less shortness of breath


Check chest x-ray


Will continue chest tube suction


Problems:  





Subjective


24 Hr Interval Summary


Constitutional:  improved


Pain Control:  mild





Exam/Review of Systems


Vital Signs


Vitals





 Vital Signs








  Date Time  Temp Pulse Resp B/P Pulse Ox O2 Delivery O2 Flow Rate FiO2


 


11/7/17 17:30 98.0 79 16 127/75 99 Room Air  


 


11/3/17 20:55        21














 Intake and Output   


 


 11/6/17 11/6/17 11/7/17





 15:00 23:00 07:00


 


Intake Total   750 ml


 


Balance   750 ml











Exam


ENMT:  mucosa pink and moist, nl external ears & nose, nl lips & teeth, nl 

nasal mucosa & septum


Neck:  non-tender, supple


Respiratory:  clear to auscultation, normal air movement


Cardiovascular:  nl pulses, regular rate and rhythm





Results


Result Diagram:  


11/7/17 0440 11/7/17 0440














HEIDY MAXWELL MD Nov 7, 2017 21:02

## 2017-11-07 NOTE — CONS
Date/Time of Note


Date/Time of Note


DATE: 11/7/17 


TIME: 12:46





Consultation Date/Type/Reason


Admit Date/Time


Oct 24, 2017 at 18:29


Initial Consult Date


THIS IS A LATE ENTERY FOR 11/06/17 DUE TO COMPUTER SYSTEM BEING DOWN YESTERDAY





SUBJECTIVE:  


48 y/o female  with Empyema 2/2 staph aureus s/p VATS 10/31/17. Metastatic 

breast CA.  


 No events overnight.  The patient is alert, looks comfortable.  Denies fever, 

chills,and pain. 


VS: 115/66  P:72   R:18    T:98.0   SO2:97%





LABS: WBC- 13.0  H&H: 9.9/32.6   BMP-stable.


Pathology :


 Right lung deep peel, decortication:


-- Metastatic carcinoma, with morphological features compatible with a breast 

primary.


-- Hemorrhagic fibrinous and focally neutrophilic exudate.





Pleural fluid culture on admission grew oxacillin-sensitive Staphylococcus 

aureus. 





ANTIMICROBIALS:  The patient is on IV Rocephin.


 


INDWELLINGS:  Right chest tube.


 


PHYSICAL EXAMINATION:


GENERAL:  This is an obese, well-developed, middle-aged woman who is alert, in 

no distress.


HEENT:  Head atraumatic, normocephalic.  Sclerae anicteric.  Buccal mucosa dry.


NECK:  Supple.


CHEST:  Rise symmetrical.  Breath sounds diminished to bases.


HEART:  S1, S2.


ABDOMEN:  Soft, bowel tones present.


EXTREMITIES:  Without cyanosis.


 


ASSESSMENT:


1.  Loculated right pleural effusion status post biopsy with pleurocentesis.


2.  Metastatic breast cancer.


3.  Diabetes.


4.  Obesity.


5.  History of craniectomy for brain metastases.


 


PLAN:  Patient remained stable. Continue IV Rocephin. Continuing PO 

dexamethasone. Will obtain CXR in AM Follow recommendations of consultants.


 





Type of Consultation:  ID


Referring Provider:  CLAU AGUILAR





Exam/Review of Systems


Vital Signs


Vitals





 Vital Signs








  Date Time  Temp Pulse Resp B/P Pulse Ox O2 Delivery O2 Flow Rate FiO2


 


11/7/17 08:02 98.0 81 16 115/79 98 Room Air  


 


11/3/17 20:55        21














 Intake and Output   


 


 11/6/17 11/6/17 11/7/17





 14:59 22:59 06:59


 


Intake Total   750 ml


 


Balance   750 ml











Results


Result Diagram:  


11/7/17 0440                                                                   

             11/7/17 0440





Results 24 hrs





Laboratory Tests








Test


  11/6/17


13:39 11/6/17


17:35 11/6/17


21:21 11/7/17


02:30


 


Bedside Glucose 102   117   197   168  














Test


  11/7/17


04:40 11/7/17


08:19 


  


 


 


White Blood Count 12.9  H   


 


Red Blood Count 4.18  L   


 


Hemoglobin 10.1  L   


 


Hematocrit 33.0  L   


 


Mean Corpuscular Volume 78.9  L   


 


Mean Corpuscular Hemoglobin 24.2  L   


 


Mean Corpuscular Hemoglobin


Concent 30.6  L


  


  


  


 


 


Red Cell Distribution Width 23.2  H   


 


Platelet Count 158  #   


 


Mean Platelet Volume 9.2     


 


Neutrophils % 80.6  H   


 


Lymphocytes % 9.7  L   


 


Monocytes % 4.9     


 


Eosinophils % 0.1     


 


Basophils % 0.3     


 


Nucleated Red Blood Cells % 0.0     


 


Neutrophils # 10.4  H   


 


Lymphocytes # 1.3     


 


Monocytes # 0.6     


 


Eosinophils # 0.0     


 


Basophils # 0.0     


 


Nucleated Red Blood Cells # 0.0     


 


Sodium Level 136     


 


Potassium Level 4.6     


 


Chloride Level 104     


 


Carbon Dioxide Level 27     


 


Anion Gap 10     


 


Blood Urea Nitrogen 20     


 


Creatinine 0.48     


 


Glucose Level 145     


 


Calcium Level 8.4     


 


Phosphorus Level 3.8     


 


Magnesium Level 1.7     


 


Albumin 2.6  L   


 


Bedside Glucose  117    











Medications


Medications





 Current Medications


Dexamethasone (Decadron) 4 mg BID PO  Last administered on 11/7/17at 09:12; 

Admin Dose 4 MG;  Start 10/24/17 at 21:00


Docusate Sodium (Colace) 200 mg QHS PO  Last administered on 11/6/17at 21:00; 

Admin Dose 200 MG;  Start 10/24/17 at 21:00


Ondansetron HCl (Zofran Inj) 4 mg Q6H  PRN IV NAUSEA AND/OR VOMITING;  Start 10/

24/17 at 21:00


Acetaminophen (Tylenol Tab) 650 mg Q6H  PRN PO PAIN LEVEL 1-3 OR FEVER Last 

administered on 11/1/17at 07:13; Admin Dose 650 MG;  Start 10/24/17 at 21:00


Oxycodone/ Acetaminophen (Percocet (5/ 325)) 1 tab Q6H  PRN PO MODERATE PAIN 

LEVEL 4-6;  Start 10/24/17 at 21:00


Morphine Sulfate (morphine) 2 mg Q4H  PRN IV SEVERE PAIN LEVEL 7-10 Last 

administered on 11/1/17at 02:47; Admin Dose 2 MG;  Start 10/24/17 at 21:00


Docusate Sodium (Colace) 100 mg Q12H  PRN PO CONSTIPATION;  Start 10/24/17 at 21

:00


Magnesium Hydroxide (Milk Of Mag) 30 ml DAILY  PRN PO CONSTIPATION;  Start 10/24

/17 at 21:00


Famotidine (Pepcid) 20 mg Q12 PO  Last administered on 11/7/17at 09:12; Admin 

Dose 20 MG;  Start 10/24/17 at 21:00


Albuterol (Proventil 0.083% (Neb)) 2.5 mg Q2  PRN HHN SHORTNESS OF BREATH;  

Start 10/24/17 at 21:00


Miscellaneous Information 1 ea NOTE XX ;  Start 10/24/17 at 21:30


Glucose (Glutose) 15 gm Q15M  PRN PO DECREASED GLUCOSE;  Start 10/24/17 at 21:30


Glucose (Glutose) 22.5 gm Q15M  PRN PO DECREASED GLUCOSE;  Start 10/24/17 at 21:

30


Dextrose (D50w Syringe) 25 ml Q15M  PRN IV DECREASED GLUCOSE;  Start 10/24/17 

at 21:30


Dextrose (D50w Syringe) 50 ml Q15M  PRN IV DECREASED GLUCOSE;  Start 10/24/17 

at 21:30


Glucagon (Glucagen) 1 mg Q15M  PRN IM DECREASED GLUCOSE;  Start 10/24/17 at 21:

30


Glucose (Glutose) 15 gm Q15M  PRN BUCCAL DECREASED GLUCOSE;  Start 10/24/17 at 

21:30


Nystatin (Nystatin Powder) 1 applic BID TOP  Last administered on 11/7/17at 09:

12; Admin Dose 1 APPLIC;  Start 10/25/17 at 09:00


Tamoxifen Citrate 20 mg 20 mg DAILY PO  Last administered on 11/7/17at 09:19; 

Admin Dose 20 MG;  Start 10/25/17 at 13:00


Ceftriaxone Sodium (Rocephin) 50 ml @  100 mls/hr Q24H IVPB  Last administered 

on 11/6/17at 15:28; Admin Dose 100 MLS/HR;  Start 10/27/17 at 14:00


Diagnostic Test (Pha) (Accu-Chek) 1 ea 02 XX  Last administered on 11/7/17at 02:

46; Admin Dose 1 EA;  Start 11/4/17 at 02:00











GABRIEL TONG Nov 7, 2017 12:51

## 2017-11-07 NOTE — PN
Date/Time of Note


Date/Time of Note


DATE: 11/7/17 


TIME: 13:39





Assessment/Plan


VTE Prophylaxis


VTE Prophylaxis Intervention:  ambulation





Lines/Catheters


IV Catheter Type (from [facility]):  Peripheral IV


Urinary Cath still in place:  No





Assessment/Plan


Chief Complaint/Hosp Course


Patient is a 39-year-old female with a past medical history of metastatic 

breast cancer with metastases to the liver and brain who presents from 

[facility] for malfunctioning Pleurx catheter.





1.  Empyema 2/2 staph aureus s/p VATS 10/31/17


- Patient doing well and chest tube still in place and draining serous fluid 

but output decreasing daily


- repeat CXR shows increased sized right-sided pleural effusion. Rounded 

opacity within the right mid lung likely represents pleural fluid in the major 

fissure. Increased consolidation or atelectasis within the right lower lobe.


- started on incentive spirometry


- CT chest showed trace loculated pleural effusion with possible empyema in 

addition to malignant effusion.


- CT surgery on board and appreciate consultation


- ID on board and recommendations appreciated. continue on IV antibiotics


- Ultrasound-guided thoracentesis performed, cultures showing staph aureus





2.  Dysfunctional Pleurx catheter


- Pulmonology on board and consultation appreciated


- Respiratory status stable at this time





3.  Cellulitis, area around catheter- improving


- ID on board and recommendations appreciated. Continue on antibiotics  





4.  Metastatic breast cancer, lung, liver, brain, s/p craniectomy 


- currently receiving radiation therapy and tamoxifen and Decadron


- Heme/onc on board and consultation appreciated


- Radiation as scheduled





5. Diabetes mellitus


- Currently stable





6. Obesity


- Counseled about diet and improving exercise routine when able





7. Disposition


- Continue monitoring on med/surg while chest tube in place


Problems:  





Subjective


24 Hr Interval Summary


Free Text/Dictation


no acute complaints, about to go to radiation





Exam/Review of Systems


Vital Signs


Vitals





 Vital Signs








  Date Time  Temp Pulse Resp B/P Pulse Ox O2 Delivery O2 Flow Rate FiO2


 


11/7/17 08:02 98.0 81 16 115/79 98 Room Air  


 


11/3/17 20:55        21














 Intake and Output   


 


 11/6/17 11/6/17 11/7/17





 15:00 23:00 07:00


 


Intake Total   750 ml


 


Balance   750 ml











Exam


General: Patient awake and alert, no acute distress. 


Head: R side craniectomy site healing well. improving fluctuant edema right side


Eyes: EOMI, pupils reactive to light


Neck: Supple, nontender, midline


Respiratory: diminished air entry with chest tube in place draining slightly 

pink tinged serous fluid


Cardiovascular: regular rate, no obvious murmurs


Gastrointestinal: non-tender to palpation, bowel sounds heard.


Neurological: Moves all extremities spontaneously





Results


Result Diagram:  


11/7/17 0440                                                                   

             11/7/17 0440





Results 24 hrs





Laboratory Tests








Test


  11/6/17


17:35 11/6/17


21:21 11/7/17


02:30 11/7/17


04:40


 


Bedside Glucose 117   197   168   


 


White Blood Count    12.9  H


 


Red Blood Count    4.18  L


 


Hemoglobin    10.1  L


 


Hematocrit    33.0  L


 


Mean Corpuscular Volume    78.9  L


 


Mean Corpuscular Hemoglobin    24.2  L


 


Mean Corpuscular Hemoglobin


Concent 


  


  


  30.6  L


 


 


Red Cell Distribution Width    23.2  H


 


Platelet Count    158  #


 


Mean Platelet Volume    9.2  


 


Neutrophils %    80.6  H


 


Lymphocytes %    9.7  L


 


Monocytes %    4.9  


 


Eosinophils %    0.1  


 


Basophils %    0.3  


 


Nucleated Red Blood Cells %    0.0  


 


Neutrophils #    10.4  H


 


Lymphocytes #    1.3  


 


Monocytes #    0.6  


 


Eosinophils #    0.0  


 


Basophils #    0.0  


 


Nucleated Red Blood Cells #    0.0  


 


Sodium Level    136  


 


Potassium Level    4.6  


 


Chloride Level    104  


 


Carbon Dioxide Level    27  


 


Anion Gap    10  


 


Blood Urea Nitrogen    20  


 


Creatinine    0.48  


 


Glucose Level    145  


 


Calcium Level    8.4  


 


Phosphorus Level    3.8  


 


Magnesium Level    1.7  


 


Albumin    2.6  L














Test


  11/7/17


08:19 


  


  


 


 


Bedside Glucose 117     











Medications


Medications





 Current Medications


Dexamethasone (Decadron) 4 mg BID PO  Last administered on 11/7/17at 09:12; 

Admin Dose 4 MG;  Start 10/24/17 at 21:00


Docusate Sodium (Colace) 200 mg QHS PO  Last administered on 11/6/17at 21:00; 

Admin Dose 200 MG;  Start 10/24/17 at 21:00


Ondansetron HCl (Zofran Inj) 4 mg Q6H  PRN IV NAUSEA AND/OR VOMITING;  Start 10/

24/17 at 21:00


Acetaminophen (Tylenol Tab) 650 mg Q6H  PRN PO PAIN LEVEL 1-3 OR FEVER Last 

administered on 11/1/17at 07:13; Admin Dose 650 MG;  Start 10/24/17 at 21:00


Oxycodone/ Acetaminophen (Percocet (5/ 325)) 1 tab Q6H  PRN PO MODERATE PAIN 

LEVEL 4-6;  Start 10/24/17 at 21:00


Morphine Sulfate (morphine) 2 mg Q4H  PRN IV SEVERE PAIN LEVEL 7-10 Last 

administered on 11/1/17at 02:47; Admin Dose 2 MG;  Start 10/24/17 at 21:00


Docusate Sodium (Colace) 100 mg Q12H  PRN PO CONSTIPATION;  Start 10/24/17 at 21

:00


Magnesium Hydroxide (Milk Of Mag) 30 ml DAILY  PRN PO CONSTIPATION;  Start 10/24

/17 at 21:00


Famotidine (Pepcid) 20 mg Q12 PO  Last administered on 11/7/17at 09:12; Admin 

Dose 20 MG;  Start 10/24/17 at 21:00


Albuterol (Proventil 0.083% (Neb)) 2.5 mg Q2  PRN HHN SHORTNESS OF BREATH;  

Start 10/24/17 at 21:00


Miscellaneous Information 1 ea NOTE XX ;  Start 10/24/17 at 21:30


Glucose (Glutose) 15 gm Q15M  PRN PO DECREASED GLUCOSE;  Start 10/24/17 at 21:30


Glucose (Glutose) 22.5 gm Q15M  PRN PO DECREASED GLUCOSE;  Start 10/24/17 at 21:

30


Dextrose (D50w Syringe) 25 ml Q15M  PRN IV DECREASED GLUCOSE;  Start 10/24/17 

at 21:30


Dextrose (D50w Syringe) 50 ml Q15M  PRN IV DECREASED GLUCOSE;  Start 10/24/17 

at 21:30


Glucagon (Glucagen) 1 mg Q15M  PRN IM DECREASED GLUCOSE;  Start 10/24/17 at 21:

30


Glucose (Glutose) 15 gm Q15M  PRN BUCCAL DECREASED GLUCOSE;  Start 10/24/17 at 

21:30


Nystatin (Nystatin Powder) 1 applic BID TOP  Last administered on 11/7/17at 09:

12; Admin Dose 1 APPLIC;  Start 10/25/17 at 09:00


Tamoxifen Citrate 20 mg 20 mg DAILY PO  Last administered on 11/7/17at 09:19; 

Admin Dose 20 MG;  Start 10/25/17 at 13:00


Ceftriaxone Sodium (Rocephin) 50 ml @  100 mls/hr Q24H IVPB  Last administered 

on 11/6/17at 15:28; Admin Dose 100 MLS/HR;  Start 10/27/17 at 14:00


Diagnostic Test (Pha) (Accu-Chek) 1 ea 02 XX  Last administered on 11/7/17at 02:

46; Admin Dose 1 EA;  Start 11/4/17 at 02:00











SHIREEN ASHBY Nov 7, 2017 13:39

## 2017-11-08 VITALS — SYSTOLIC BLOOD PRESSURE: 127 MMHG | RESPIRATION RATE: 16 BRPM | DIASTOLIC BLOOD PRESSURE: 80 MMHG

## 2017-11-08 VITALS — DIASTOLIC BLOOD PRESSURE: 67 MMHG | RESPIRATION RATE: 18 BRPM | SYSTOLIC BLOOD PRESSURE: 126 MMHG

## 2017-11-08 VITALS — SYSTOLIC BLOOD PRESSURE: 120 MMHG | RESPIRATION RATE: 18 BRPM | DIASTOLIC BLOOD PRESSURE: 65 MMHG

## 2017-11-08 VITALS — RESPIRATION RATE: 18 BRPM | DIASTOLIC BLOOD PRESSURE: 67 MMHG | SYSTOLIC BLOOD PRESSURE: 121 MMHG

## 2017-11-08 LAB
ABNORMAL IP MESSAGE: 1
ANION GAP SERPL CALC-SCNC: 14 MMOL/L (ref 8–16)
BASOPHILS # BLD AUTO: 0 10^3/UL (ref 0–0.1)
BASOPHILS NFR BLD: 0.2 % (ref 0–2)
BUN SERPL-MCNC: 16 MG/DL (ref 7–20)
CALCIUM SERPL-MCNC: 8.2 MG/DL (ref 8.4–10.2)
CHLORIDE SERPL-SCNC: 104 MMOL/L (ref 97–110)
CO2 SERPL-SCNC: 23 MMOL/L (ref 21–31)
CREAT SERPL-MCNC: 0.45 MG/DL (ref 0.44–1)
EOSINOPHIL # BLD: 0 10^3/UL (ref 0–0.5)
EOSINOPHIL NFR BLD: 0 % (ref 0–7)
ERYTHROCYTE [DISTWIDTH] IN BLOOD BY AUTOMATED COUNT: 23.1 % (ref 11.5–14.5)
GLUCOSE SERPL-MCNC: 166 MG/DL (ref 70–220)
HCT VFR BLD CALC: 31 % (ref 37–47)
HGB BLD-MCNC: 9.8 G/DL (ref 12–16)
LYMPHOCYTES # BLD AUTO: 1.5 10^3/UL (ref 0.8–2.9)
LYMPHOCYTES NFR BLD AUTO: 12.5 % (ref 15–51)
MAGNESIUM SERPL-MCNC: 1.8 MG/DL (ref 1.7–2.5)
MCH RBC QN AUTO: 24.9 PG (ref 29–33)
MCHC RBC AUTO-ENTMCNC: 31.6 G/DL (ref 32–37)
MCV RBC AUTO: 78.9 FL (ref 82–101)
MONOCYTES # BLD: 0.5 10^3/UL (ref 0.3–0.9)
MONOCYTES NFR BLD: 4.6 % (ref 0–11)
NEUTROPHILS # BLD: 9.1 10^3/UL (ref 1.6–7.5)
NEUTROPHILS NFR BLD AUTO: 78.2 % (ref 39–77)
NRBC # BLD MANUAL: 0 10^3/UL (ref 0–0)
NRBC BLD AUTO-RTO: 0 /100WBC (ref 0–0)
PHOSPHATE SERPL-MCNC: 3.4 MG/DL (ref 2.5–4.9)
PLATELET # BLD: 138 10^3/UL (ref 140–415)
PMV BLD AUTO: 8.8 FL (ref 7.4–10.4)
POSITIVE DIFF: (no result)
POTASSIUM SERPL-SCNC: 4.3 MMOL/L (ref 3.5–5.1)
RBC # BLD AUTO: 3.93 10^6/UL (ref 4.2–5.4)
SODIUM SERPL-SCNC: 137 MMOL/L (ref 135–144)
WBC # BLD AUTO: 11.6 10^3/UL (ref 4.8–10.8)

## 2017-11-08 RX ADMIN — FAMOTIDINE SCH MG: 20 TABLET ORAL at 21:08

## 2017-11-08 RX ADMIN — FAMOTIDINE SCH MG: 20 TABLET ORAL at 08:44

## 2017-11-08 RX ADMIN — NYSTATIN SCH APPLIC: 100000 POWDER TOPICAL at 21:12

## 2017-11-08 RX ADMIN — TAMOXIFEN CITRATE SCH MG: 10 TABLET, FILM COATED ORAL at 08:49

## 2017-11-08 RX ADMIN — CEFTRIAXONE SCH MLS/HR: 1 INJECTION, SOLUTION INTRAVENOUS at 13:52

## 2017-11-08 RX ADMIN — DOCUSATE SODIUM SCH MG: 100 CAPSULE, LIQUID FILLED ORAL at 21:08

## 2017-11-08 RX ADMIN — NYSTATIN SCH APPLIC: 100000 POWDER TOPICAL at 08:45

## 2017-11-08 NOTE — CONS
Date/Time of Note


Date/Time of Note


DATE: 11/8/17 


TIME: 12:39





Assessment/Plan


Assessment/Plan


Chief Complaint/Hosp Course


SUBJECTIVE:  No events overnight.  The patient is alert, feels good, looks 

comfortable, no fevers.


 


INDWELLINGS:  The patient has a right-sided chest tube.


 


ANTIMICROBIALS:  Rocephin.


 


PHYSICAL EXAMINATION:


GENERAL:  This is an obese, well-developed, middle-aged woman who is awake, in 

no distress.


HEENT:  Head atraumatic, normocephalic.  Sclerae anicteric.  Buccal mucosa dry.


NECK:  Supple.


CHEST:  Rise symmetrical.  Breath sounds diminished to bases.


HEART:  S1, S2.


ABDOMEN:  Soft, bowel tones present.


EXTREMITIES:  Without cyanosis.


 


ASSESSMENT:


1.  Right-sided loculated pleural effusion consistent with empyema with fluid 

culture growing oxacillin-sensitive Staphylococcus aureus.  The patient is 

status post decortication and pleurodesis on 10/31/2017.


2.  Metastatic breast cancer.


3.  Diabetes.


4.  History of craniectomy for brain metastasis.


 


PLAN:  The patient remains stable.  She is being followed by multiple 

consultants.  We will continue her on current antibiotics for a total of 4 

weeks.  Chest tube management as per cardiothoracic surgery.





DW staff


Problems:  





Consultation Date/Type/Reason


Admit Date/Time


Oct 24, 2017 at 18:29


Initial Consult Date


10/26/17


Type of Consultation:  ID


Referring Provider:  CLAU AGUILAR





Exam/Review of Systems


Vital Signs


Vitals





 Vital Signs








  Date Time  Temp Pulse Resp B/P Pulse Ox O2 Delivery O2 Flow Rate FiO2


 


11/8/17 08:00 98.1 83 16 127/80 96   


 


11/7/17 17:30      Room Air  














 Intake and Output   


 


 11/7/17 11/7/17 11/8/17





 14:59 22:59 06:59


 


Intake Total 50 ml 1340 ml 1100 ml


 


Output Total 100 ml 600 ml 1300 ml


 


Balance -50 ml 740 ml -200 ml











Results


Result Diagram:  


11/8/17 0429                                                                   

             11/8/17 0429





Results 24 hrs





Laboratory Tests








Test


  11/7/17


13:47 11/7/17


18:23 11/7/17


20:26 11/8/17


04:29


 


Bedside Glucose 168   142   198   


 


White Blood Count    11.6  H


 


Red Blood Count    3.93  L


 


Hemoglobin    9.8  L


 


Hematocrit    31.0  L


 


Mean Corpuscular Volume    78.9  L


 


Mean Corpuscular Hemoglobin    24.9  L


 


Mean Corpuscular Hemoglobin


Concent 


  


  


  31.6  L


 


 


Red Cell Distribution Width    23.1  H


 


Platelet Count    138  L


 


Mean Platelet Volume    8.8  


 


Neutrophils %    78.2  H


 


Lymphocytes %    12.5  L


 


Monocytes %    4.6  


 


Eosinophils %    0.0  


 


Basophils %    0.2  


 


Nucleated Red Blood Cells %    0.0  


 


Neutrophils #    9.1  H


 


Lymphocytes #    1.5  


 


Monocytes #    0.5  


 


Eosinophils #    0.0  


 


Basophils #    0.0  


 


Nucleated Red Blood Cells #    0.0  


 


Sodium Level    137  


 


Potassium Level    4.3  


 


Chloride Level    104  


 


Carbon Dioxide Level    23  


 


Anion Gap    14  


 


Blood Urea Nitrogen    16  


 


Creatinine    0.45  


 


Glucose Level    166  


 


Calcium Level    8.2  L


 


Phosphorus Level    3.4  


 


Magnesium Level    1.8  














Test


  11/8/17


08:42 


  


  


 


 


Bedside Glucose 114     











Medications


Medications





 Current Medications


Dexamethasone (Decadron) 4 mg BID PO  Last administered on 11/8/17at 08:44; 

Admin Dose 4 MG;  Start 10/24/17 at 21:00


Docusate Sodium (Colace) 200 mg QHS PO  Last administered on 11/7/17at 20:27; 

Admin Dose 100 MG;  Start 10/24/17 at 21:00


Ondansetron HCl (Zofran Inj) 4 mg Q6H  PRN IV NAUSEA AND/OR VOMITING;  Start 10/

24/17 at 21:00


Acetaminophen (Tylenol Tab) 650 mg Q6H  PRN PO PAIN LEVEL 1-3 OR FEVER Last 

administered on 11/1/17at 07:13; Admin Dose 650 MG;  Start 10/24/17 at 21:00


Oxycodone/ Acetaminophen (Percocet (5/ 325)) 1 tab Q6H  PRN PO MODERATE PAIN 

LEVEL 4-6;  Start 10/24/17 at 21:00


Morphine Sulfate (morphine) 2 mg Q4H  PRN IV SEVERE PAIN LEVEL 7-10 Last 

administered on 11/1/17at 02:47; Admin Dose 2 MG;  Start 10/24/17 at 21:00


Docusate Sodium (Colace) 100 mg Q12H  PRN PO CONSTIPATION;  Start 10/24/17 at 21

:00


Magnesium Hydroxide (Milk Of Mag) 30 ml DAILY  PRN PO CONSTIPATION;  Start 10/24

/17 at 21:00


Famotidine (Pepcid) 20 mg Q12 PO  Last administered on 11/8/17at 08:44; Admin 

Dose 20 MG;  Start 10/24/17 at 21:00


Albuterol (Proventil 0.083% (Neb)) 2.5 mg Q2  PRN HHN SHORTNESS OF BREATH;  

Start 10/24/17 at 21:00


Miscellaneous Information 1 ea NOTE XX ;  Start 10/24/17 at 21:30


Glucose (Glutose) 15 gm Q15M  PRN PO DECREASED GLUCOSE;  Start 10/24/17 at 21:30


Glucose (Glutose) 22.5 gm Q15M  PRN PO DECREASED GLUCOSE;  Start 10/24/17 at 21:

30


Dextrose (D50w Syringe) 25 ml Q15M  PRN IV DECREASED GLUCOSE;  Start 10/24/17 

at 21:30


Dextrose (D50w Syringe) 50 ml Q15M  PRN IV DECREASED GLUCOSE;  Start 10/24/17 

at 21:30


Glucagon (Glucagen) 1 mg Q15M  PRN IM DECREASED GLUCOSE;  Start 10/24/17 at 21:

30


Glucose (Glutose) 15 gm Q15M  PRN BUCCAL DECREASED GLUCOSE;  Start 10/24/17 at 

21:30


Nystatin (Nystatin Powder) 1 applic BID TOP  Last administered on 11/8/17at 08:

45; Admin Dose 1 APPLIC;  Start 10/25/17 at 09:00


Tamoxifen Citrate 20 mg 20 mg DAILY PO  Last administered on 11/8/17at 08:49; 

Admin Dose 20 MG;  Start 10/25/17 at 13:00


Ceftriaxone Sodium (Rocephin) 50 ml @  100 mls/hr Q24H IVPB  Last administered 

on 11/7/17at 13:49; Admin Dose 100 MLS/HR;  Start 10/27/17 at 14:00


Diagnostic Test (Pha) (Accu-Chek) 1 ea 02 XX  Last administered on 11/7/17at 02:

46; Admin Dose 1 EA;  Start 11/4/17 at 02:00











DIANA RANDALL NP Nov 8, 2017 12:39

## 2017-11-08 NOTE — PN
Date/Time of Note


Date/Time of Note


DATE: 11/8/17 


TIME: 13:48





Assessment/Plan


VTE Prophylaxis


VTE Prophylaxis Intervention:  ambulation





Lines/Catheters


IV Catheter Type (from [facility]):  Saline Lock


Urinary Cath still in place:  No





Assessment/Plan


Chief Complaint/Hosp Course


Patient is a 39-year-old female with a past medical history of metastatic 

breast cancer with metastases to the liver and brain who presents from 

[facility] for malfunctioning Pleurx catheter.





1.  Empyema 2/2 staph aureus s/p VATS 10/31/17


- Patient doing well and chest tube still in place and draining serous fluid 

but output decreasing daily


- repeat CXR shows increased sized right-sided pleural effusion. Rounded 

opacity within the right mid lung likely represents pleural fluid in the major 

fissure. Increased consolidation or atelectasis within the right lower lobe.


- started on incentive spirometry


- CT chest showed trace loculated pleural effusion with possible empyema in 

addition to malignant effusion.


- CT surgery on board and appreciate consultation


- ID on board and recommendations appreciated. continue on IV antibiotics


- Ultrasound-guided thoracentesis performed, cultures showing staph aureus





2.  Dysfunctional Pleurx catheter


- Pulmonology on board and consultation appreciated


- Respiratory status stable at this time





3.  Cellulitis, area around catheter- improving


- ID on board and recommendations appreciated. Continue on antibiotics  





4.  Metastatic breast cancer, lung, liver, brain, s/p craniectomy 


- currently receiving radiation therapy and tamoxifen and Decadron


- Heme/onc on board and consultation appreciated


- Radiation as scheduled





5. Diabetes mellitus


- Currently stable





6. Obesity


- Counseled about diet and improving exercise routine when able





7. Disposition


- Continue monitoring on med/surg while chest tube in place


Problems:  





Subjective


24 Hr Interval Summary


Free Text/Dictation


no acute overnight issues, fell yesterday, no head trauma, doing well





Exam/Review of Systems


Vital Signs


Vitals





 Vital Signs








  Date Time  Temp Pulse Resp B/P Pulse Ox O2 Delivery O2 Flow Rate FiO2


 


11/8/17 08:00 98.1 83 16 127/80 96   


 


11/7/17 17:30      Room Air  














 Intake and Output   


 


 11/7/17 11/7/17 11/8/17





 15:00 23:00 07:00


 


Intake Total 50 ml 1340 ml 1100 ml


 


Output Total 100 ml 600 ml 1300 ml


 


Balance -50 ml 740 ml -200 ml











Exam


General: Patient awake and alert, no acute distress. 


Head: R side craniectomy site healing well. improving fluctuant edema right side


Eyes: EOMI, pupils reactive to light


Neck: Supple, nontender, midline


Respiratory: diminished air entry with chest tube in place draining slightly 

pink tinged serous fluid


Cardiovascular: regular rate, no obvious murmurs


Gastrointestinal: non-tender to palpation, bowel sounds heard.


Neurological: Moves all extremities spontaneously





Results


Result Diagram:  


11/8/17 0429 11/8/17 0429





Results 24 hrs





Laboratory Tests








Test


  11/7/17


18:23 11/7/17


20:26 11/8/17


04:29 11/8/17


08:42


 


Bedside Glucose 142   198    114  


 


White Blood Count   11.6  H 


 


Red Blood Count   3.93  L 


 


Hemoglobin   9.8  L 


 


Hematocrit   31.0  L 


 


Mean Corpuscular Volume   78.9  L 


 


Mean Corpuscular Hemoglobin   24.9  L 


 


Mean Corpuscular Hemoglobin


Concent 


  


  31.6  L


  


 


 


Red Cell Distribution Width   23.1  H 


 


Platelet Count   138  L 


 


Mean Platelet Volume   8.8   


 


Neutrophils %   78.2  H 


 


Lymphocytes %   12.5  L 


 


Monocytes %   4.6   


 


Eosinophils %   0.0   


 


Basophils %   0.2   


 


Nucleated Red Blood Cells %   0.0   


 


Neutrophils #   9.1  H 


 


Lymphocytes #   1.5   


 


Monocytes #   0.5   


 


Eosinophils #   0.0   


 


Basophils #   0.0   


 


Nucleated Red Blood Cells #   0.0   


 


Sodium Level   137   


 


Potassium Level   4.3   


 


Chloride Level   104   


 


Carbon Dioxide Level   23   


 


Anion Gap   14   


 


Blood Urea Nitrogen   16   


 


Creatinine   0.45   


 


Glucose Level   166   


 


Calcium Level   8.2  L 


 


Phosphorus Level   3.4   


 


Magnesium Level   1.8   














Test


  11/8/17


13:07 


  


  


 


 


Bedside Glucose 149     











Medications


Medications





 Current Medications


Dexamethasone (Decadron) 4 mg BID PO  Last administered on 11/8/17at 08:44; 

Admin Dose 4 MG;  Start 10/24/17 at 21:00


Docusate Sodium (Colace) 200 mg QHS PO  Last administered on 11/7/17at 20:27; 

Admin Dose 100 MG;  Start 10/24/17 at 21:00


Ondansetron HCl (Zofran Inj) 4 mg Q6H  PRN IV NAUSEA AND/OR VOMITING;  Start 10/

24/17 at 21:00


Acetaminophen (Tylenol Tab) 650 mg Q6H  PRN PO PAIN LEVEL 1-3 OR FEVER Last 

administered on 11/1/17at 07:13; Admin Dose 650 MG;  Start 10/24/17 at 21:00


Oxycodone/ Acetaminophen (Percocet (5/ 325)) 1 tab Q6H  PRN PO MODERATE PAIN 

LEVEL 4-6;  Start 10/24/17 at 21:00


Morphine Sulfate (morphine) 2 mg Q4H  PRN IV SEVERE PAIN LEVEL 7-10 Last 

administered on 11/1/17at 02:47; Admin Dose 2 MG;  Start 10/24/17 at 21:00


Docusate Sodium (Colace) 100 mg Q12H  PRN PO CONSTIPATION;  Start 10/24/17 at 21

:00


Magnesium Hydroxide (Milk Of Mag) 30 ml DAILY  PRN PO CONSTIPATION;  Start 10/24

/17 at 21:00


Famotidine (Pepcid) 20 mg Q12 PO  Last administered on 11/8/17at 08:44; Admin 

Dose 20 MG;  Start 10/24/17 at 21:00


Albuterol (Proventil 0.083% (Neb)) 2.5 mg Q2  PRN HHN SHORTNESS OF BREATH;  

Start 10/24/17 at 21:00


Miscellaneous Information 1 ea NOTE XX ;  Start 10/24/17 at 21:30


Glucose (Glutose) 15 gm Q15M  PRN PO DECREASED GLUCOSE;  Start 10/24/17 at 21:30


Glucose (Glutose) 22.5 gm Q15M  PRN PO DECREASED GLUCOSE;  Start 10/24/17 at 21:

30


Dextrose (D50w Syringe) 25 ml Q15M  PRN IV DECREASED GLUCOSE;  Start 10/24/17 

at 21:30


Dextrose (D50w Syringe) 50 ml Q15M  PRN IV DECREASED GLUCOSE;  Start 10/24/17 

at 21:30


Glucagon (Glucagen) 1 mg Q15M  PRN IM DECREASED GLUCOSE;  Start 10/24/17 at 21:

30


Glucose (Glutose) 15 gm Q15M  PRN BUCCAL DECREASED GLUCOSE;  Start 10/24/17 at 

21:30


Nystatin (Nystatin Powder) 1 applic BID TOP  Last administered on 11/8/17at 08:

45; Admin Dose 1 APPLIC;  Start 10/25/17 at 09:00


Tamoxifen Citrate 20 mg 20 mg DAILY PO  Last administered on 11/8/17at 08:49; 

Admin Dose 20 MG;  Start 10/25/17 at 13:00


Ceftriaxone Sodium (Rocephin) 50 ml @  100 mls/hr Q24H IVPB  Last administered 

on 11/7/17at 13:49; Admin Dose 100 MLS/HR;  Start 10/27/17 at 14:00


Diagnostic Test (Pha) (Accu-Chek) 1 ea 02 XX  Last administered on 11/7/17at 02:

46; Admin Dose 1 EA;  Start 11/4/17 at 02:00











SHIREEN ASHBY Nov 8, 2017 13:49

## 2017-11-08 NOTE — PN
Date/Time of Note


Date/Time of Note


DATE: 11/8/17 


TIME: 18:33





Assessment/Plan


Lines/Catheters


IV Catheter Type (from Nrs):  Saline Lock


Guerrero in Place (from Nrs):  No





Assessment/Plan


Chief Complaint/Hosp Course


 


IMPRESSION:  Right loculated pleural effusion.


 


R status post right VATS pleurodesis decortication


Patient feels much better


CT  200   cc


Less shortness of breath


Check chest x-ray


Will continue chest tube suction


Problems:  





Subjective


24 Hr Interval Summary


Pain Control:  mild





Exam/Review of Systems


Vital Signs


Vitals





 Vital Signs








  Date Time  Temp Pulse Resp B/P Pulse Ox O2 Delivery O2 Flow Rate FiO2


 


11/8/17 14:00 98.7 71 18 121/67 97   


 


11/7/17 17:30      Room Air  














 Intake and Output   


 


 11/7/17 11/7/17 11/8/17





 15:00 23:00 07:00


 


Intake Total 50 ml 1340 ml 1100 ml


 


Output Total 100 ml 600 ml 1300 ml


 


Balance -50 ml 740 ml -200 ml











Exam


ENMT:  mucosa pink and moist, nl external ears & nose, nl lips & teeth, nl 

nasal mucosa & septum


Neck:  non-tender, supple


Respiratory:  clear to auscultation, normal air movement


Cardiovascular:  nl pulses, regular rate and rhythm





Results


Result Diagram:  


11/8/17 0429                                                                   

             11/8/17 0429














HEIDY MAXWELL MD Nov 8, 2017 18:33

## 2017-11-09 VITALS — SYSTOLIC BLOOD PRESSURE: 125 MMHG | DIASTOLIC BLOOD PRESSURE: 77 MMHG | RESPIRATION RATE: 18 BRPM

## 2017-11-09 VITALS — RESPIRATION RATE: 18 BRPM | DIASTOLIC BLOOD PRESSURE: 69 MMHG | SYSTOLIC BLOOD PRESSURE: 127 MMHG

## 2017-11-09 VITALS — SYSTOLIC BLOOD PRESSURE: 116 MMHG | RESPIRATION RATE: 16 BRPM | DIASTOLIC BLOOD PRESSURE: 65 MMHG

## 2017-11-09 RX ADMIN — NYSTATIN SCH APPLIC: 100000 POWDER TOPICAL at 08:34

## 2017-11-09 RX ADMIN — FAMOTIDINE SCH MG: 20 TABLET ORAL at 08:34

## 2017-11-09 RX ADMIN — NYSTATIN SCH APPLIC: 100000 POWDER TOPICAL at 21:13

## 2017-11-09 RX ADMIN — CEFTRIAXONE SCH MLS/HR: 1 INJECTION, SOLUTION INTRAVENOUS at 14:16

## 2017-11-09 RX ADMIN — FAMOTIDINE SCH MG: 20 TABLET ORAL at 21:10

## 2017-11-09 RX ADMIN — DOCUSATE SODIUM SCH MG: 100 CAPSULE, LIQUID FILLED ORAL at 21:11

## 2017-11-09 RX ADMIN — TAMOXIFEN CITRATE SCH MG: 10 TABLET, FILM COATED ORAL at 08:36

## 2017-11-09 NOTE — CONS
Date/Time of Note


Date/Time of Note


DATE: 11/9/17 


TIME: 13:05





Consult Date/Type/Reason


Admit Date/Time


Oct 24, 2017 at 18:29


Initial Consult Date


10/26/17


Type of Consultation:  ID


Ordering Provider:  CLAU AGUILAR





Objective





 Vital Signs








  Date Time  Temp Pulse Resp B/P Pulse Ox O2 Delivery O2 Flow Rate FiO2


 


11/9/17 08:00 98.6 58 18 127/69 99   


 


11/7/17 17:30      Room Air  














 Intake and Output   


 


 11/8/17 11/8/17 11/9/17





 15:00 23:00 07:00


 


Intake Total 50 ml 700 ml 540 ml


 


Output Total  525 ml 880 ml


 


Balance 50 ml 175 ml -340 ml











Results/Medications


Result Diagram:  


11/8/17 0429                                                                   

             11/8/17 0429





Results 24 hrs





Laboratory Tests








Test


  11/8/17


13:07 11/8/17


17:47 11/8/17


21:06 11/9/17


02:06


 


Bedside Glucose 149   138   207   156  














Test


  11/9/17


08:32 11/9/17


11:44 


  


 


 


Bedside Glucose 112   174    








Medications





 Current Medications


Dexamethasone (Decadron) 4 mg BID PO  Last administered on 11/9/17at 08:34; 

Admin Dose 4 MG;  Start 10/24/17 at 21:00


Docusate Sodium (Colace) 200 mg QHS PO  Last administered on 11/8/17at 21:08; 

Admin Dose 200 MG;  Start 10/24/17 at 21:00


Ondansetron HCl (Zofran Inj) 4 mg Q6H  PRN IV NAUSEA AND/OR VOMITING;  Start 10/

24/17 at 21:00


Acetaminophen (Tylenol Tab) 650 mg Q6H  PRN PO PAIN LEVEL 1-3 OR FEVER Last 

administered on 11/1/17at 07:13; Admin Dose 650 MG;  Start 10/24/17 at 21:00


Oxycodone/ Acetaminophen (Percocet (5/ 325)) 1 tab Q6H  PRN PO MODERATE PAIN 

LEVEL 4-6;  Start 10/24/17 at 21:00


Morphine Sulfate (morphine) 2 mg Q4H  PRN IV SEVERE PAIN LEVEL 7-10 Last 

administered on 11/1/17at 02:47; Admin Dose 2 MG;  Start 10/24/17 at 21:00


Docusate Sodium (Colace) 100 mg Q12H  PRN PO CONSTIPATION;  Start 10/24/17 at 21

:00


Magnesium Hydroxide (Milk Of Mag) 30 ml DAILY  PRN PO CONSTIPATION;  Start 10/24

/17 at 21:00


Famotidine (Pepcid) 20 mg Q12 PO  Last administered on 11/9/17at 08:34; Admin 

Dose 20 MG;  Start 10/24/17 at 21:00


Albuterol (Proventil 0.083% (Neb)) 2.5 mg Q2  PRN HHN SHORTNESS OF BREATH;  

Start 10/24/17 at 21:00


Miscellaneous Information 1 ea NOTE XX ;  Start 10/24/17 at 21:30


Glucose (Glutose) 15 gm Q15M  PRN PO DECREASED GLUCOSE;  Start 10/24/17 at 21:30


Glucose (Glutose) 22.5 gm Q15M  PRN PO DECREASED GLUCOSE;  Start 10/24/17 at 21:

30


Dextrose (D50w Syringe) 25 ml Q15M  PRN IV DECREASED GLUCOSE;  Start 10/24/17 

at 21:30


Dextrose (D50w Syringe) 50 ml Q15M  PRN IV DECREASED GLUCOSE;  Start 10/24/17 

at 21:30


Glucagon (Glucagen) 1 mg Q15M  PRN IM DECREASED GLUCOSE;  Start 10/24/17 at 21:

30


Glucose (Glutose) 15 gm Q15M  PRN BUCCAL DECREASED GLUCOSE;  Start 10/24/17 at 

21:30


Nystatin (Nystatin Powder) 1 applic BID TOP  Last administered on 11/9/17at 08:

34; Admin Dose 1 APPLIC;  Start 10/25/17 at 09:00


Tamoxifen Citrate 20 mg 20 mg DAILY PO  Last administered on 11/9/17at 08:36; 

Admin Dose 20 MG;  Start 10/25/17 at 13:00


Ceftriaxone Sodium (Rocephin) 50 ml @  100 mls/hr Q24H IVPB  Last administered 

on 11/8/17at 13:52; Admin Dose 100 MLS/HR;  Start 10/27/17 at 14:00


Diagnostic Test (Pha) (Accu-Chek) 1 ea 02 XX  Last administered on 11/9/17at 02:

07; Admin Dose 1 EA;  Start 11/4/17 at 02:00





Assessment/Plan


Chief Complaint/Hosp Course


SUBJECTIVE:  No events overnight. Looks comfortable, no fevers.


 


INDWELLINGS:  The patient has a right-sided chest tube.


 


ANTIMICROBIALS:  Rocephin.


 


PHYSICAL EXAMINATION:


GENERAL:  This is an obese, well-developed, middle-aged woman who is awake, in 

no distress.


HEENT:  Head atraumatic, normocephalic.  Sclerae anicteric.  Buccal mucosa dry.


NECK:  Supple.


CHEST:  Rise symmetrical.  Breath sounds diminished to bases.


HEART:  S1, S2.


ABDOMEN:  Soft, bowel tones present.


EXTREMITIES:  Without cyanosis.


 


ASSESSMENT:


1.  Right-sided loculated pleural effusion consistent with empyema with fluid 

culture growing oxacillin-sensitive Staphylococcus aureus.  The patient is 

status post decortication and pleurodesis on 10/31/2017.


2.  Metastatic breast cancer.


3.  Diabetes.


4.  History of craniectomy for brain metastasis.


 


PLAN:  The patient remains stable.  Continue abx for total of 4 weeks for 

empyema. Pulmonary/CTS/oncology rec-s





DW staff


Problems:  











DIANA RANDALL NP Nov 9, 2017 13:06

## 2017-11-09 NOTE — PN
Date/Time of Note


Date/Time of Note


DATE: 11/9/17 


TIME: 14:07





Assessment/Plan


VTE Prophylaxis


VTE Prophylaxis Intervention:  ambulation





Lines/Catheters


IV Catheter Type (from [facility]):  Saline Lock


Urinary Cath still in place:  No





Assessment/Plan


Chief Complaint/Hosp Course


Patient is a 39-year-old female with a past medical history of metastatic 

breast cancer with metastases to the liver and brain who presents from 

[facility] for malfunctioning Pleurx catheter.





1.  Empyema 2/2 staph aureus s/p VATS 10/31/17


- Patient doing well and chest tube still in place and draining serous fluid 

but output decreasing daily


- repeat CXR shows increased sized right-sided pleural effusion. Rounded 

opacity within the right mid lung likely represents pleural fluid in the major 

fissure. Increased consolidation or atelectasis within the right lower lobe.


- started on incentive spirometry


- CT chest showed trace loculated pleural effusion with possible empyema in 

addition to malignant effusion.


- CT surgery on board and appreciate consultation


- ID on board and recommendations appreciated. continue on IV antibiotics


- Ultrasound-guided thoracentesis performed, cultures showing staph aureus





2.  Dysfunctional Pleurx catheter


- Pulmonology on board and consultation appreciated


- Respiratory status stable at this time





3.  Cellulitis, area around catheter- improving


- ID on board and recommendations appreciated. Continue on antibiotics  





4.  Metastatic breast cancer, lung, liver, brain, s/p craniectomy 


- currently receiving radiation therapy and tamoxifen and Decadron


- Heme/onc on board and consultation appreciated


- Radiation as scheduled





5. Diabetes mellitus


- Currently stable





6. Obesity


- Counseled about diet and improving exercise routine when able





7. Disposition


- Continue monitoring on med/surg while chest tube in place


- will continue with chest tube management, IV abx for 4 weeks total, DC to SNF 

when chest tube situation finalized and accepting facility that will 

accommodate travel to and from radiation therapy and heme/onc, Dr. Loco.


Problems:  





Subjective


24 Hr Interval Summary


Free Text/Dictation


no acute complaints





Exam/Review of Systems


Vital Signs


Vitals





 Vital Signs








  Date Time  Temp Pulse Resp B/P Pulse Ox O2 Delivery O2 Flow Rate FiO2


 


11/9/17 08:00 98.6 58 18 127/69 99   


 


11/7/17 17:30      Room Air  














 Intake and Output   


 


 11/8/17 11/8/17 11/9/17





 15:00 23:00 07:00


 


Intake Total 50 ml 700 ml 540 ml


 


Output Total  525 ml 880 ml


 


Balance 50 ml 175 ml -340 ml











Exam


General: Patient awake and alert, no acute distress. 


Head: R side craniectomy site healing well. improving fluctuant edema right side


Eyes: EOMI, pupils reactive to light


Neck: Supple, nontender, midline


Respiratory: diminished air entry with chest tube in place draining slightly 

pink tinged serous fluid


Cardiovascular: regular rate, no obvious murmurs


Gastrointestinal: non-tender to palpation, bowel sounds heard.


Neurological: Moves all extremities spontaneously





Results


Result Diagram:  


11/8/17 0429 11/8/17 0429





Results 24 hrs





Laboratory Tests








Test


  11/8/17


17:47 11/8/17


21:06 11/9/17


02:06 11/9/17


08:32


 


Bedside Glucose 138   207   156   112  














Test


  11/9/17


11:44 


  


  


 


 


Bedside Glucose 174     











Medications


Medications





 Current Medications


Dexamethasone (Decadron) 4 mg BID PO  Last administered on 11/9/17at 08:34; 

Admin Dose 4 MG;  Start 10/24/17 at 21:00


Docusate Sodium (Colace) 200 mg QHS PO  Last administered on 11/8/17at 21:08; 

Admin Dose 200 MG;  Start 10/24/17 at 21:00


Ondansetron HCl (Zofran Inj) 4 mg Q6H  PRN IV NAUSEA AND/OR VOMITING;  Start 10/

24/17 at 21:00


Acetaminophen (Tylenol Tab) 650 mg Q6H  PRN PO PAIN LEVEL 1-3 OR FEVER Last 

administered on 11/1/17at 07:13; Admin Dose 650 MG;  Start 10/24/17 at 21:00


Oxycodone/ Acetaminophen (Percocet (5/ 325)) 1 tab Q6H  PRN PO MODERATE PAIN 

LEVEL 4-6;  Start 10/24/17 at 21:00


Morphine Sulfate (morphine) 2 mg Q4H  PRN IV SEVERE PAIN LEVEL 7-10 Last 

administered on 11/1/17at 02:47; Admin Dose 2 MG;  Start 10/24/17 at 21:00


Docusate Sodium (Colace) 100 mg Q12H  PRN PO CONSTIPATION;  Start 10/24/17 at 21

:00


Magnesium Hydroxide (Milk Of Mag) 30 ml DAILY  PRN PO CONSTIPATION;  Start 10/24

/17 at 21:00


Famotidine (Pepcid) 20 mg Q12 PO  Last administered on 11/9/17at 08:34; Admin 

Dose 20 MG;  Start 10/24/17 at 21:00


Albuterol (Proventil 0.083% (Neb)) 2.5 mg Q2  PRN HHN SHORTNESS OF BREATH;  

Start 10/24/17 at 21:00


Miscellaneous Information 1 ea NOTE XX ;  Start 10/24/17 at 21:30


Glucose (Glutose) 15 gm Q15M  PRN PO DECREASED GLUCOSE;  Start 10/24/17 at 21:30


Glucose (Glutose) 22.5 gm Q15M  PRN PO DECREASED GLUCOSE;  Start 10/24/17 at 21:

30


Dextrose (D50w Syringe) 25 ml Q15M  PRN IV DECREASED GLUCOSE;  Start 10/24/17 

at 21:30


Dextrose (D50w Syringe) 50 ml Q15M  PRN IV DECREASED GLUCOSE;  Start 10/24/17 

at 21:30


Glucagon (Glucagen) 1 mg Q15M  PRN IM DECREASED GLUCOSE;  Start 10/24/17 at 21:

30


Glucose (Glutose) 15 gm Q15M  PRN BUCCAL DECREASED GLUCOSE;  Start 10/24/17 at 

21:30


Nystatin (Nystatin Powder) 1 applic BID TOP  Last administered on 11/9/17at 08:

34; Admin Dose 1 APPLIC;  Start 10/25/17 at 09:00


Tamoxifen Citrate 20 mg 20 mg DAILY PO  Last administered on 11/9/17at 08:36; 

Admin Dose 20 MG;  Start 10/25/17 at 13:00


Ceftriaxone Sodium (Rocephin) 50 ml @  100 mls/hr Q24H IVPB  Last administered 

on 11/8/17at 13:52; Admin Dose 100 MLS/HR;  Start 10/27/17 at 14:00


Diagnostic Test (Pha) (Accu-Chek) 1 ea 02 XX  Last administered on 11/9/17at 02:

07; Admin Dose 1 EA;  Start 11/4/17 at 02:00











SHIREEN ASHBY Nov 9, 2017 14:08

## 2017-11-09 NOTE — PN
Date/Time of Note


Date/Time of Note


DATE: 11/9/17 


TIME: 19:09





Assessment/Plan


Lines/Catheters


IV Catheter Type (from Nrsg):  Saline Lock


Guerrero in Place (from Nrsg):  No





Assessment/Plan


Chief Complaint/Hosp Course


 


IMPRESSION:  Right loculated pleural effusion.


 


R status post right VATS pleurodesis decortication


Patient feels much better


Will DC  CT


Less shortness of breath


Check chest x-ray


Will continue chest tube suction


Problems:  





Subjective


24 Hr Interval Summary


Constitutional:  improved


Pain Control:  mild





Exam/Review of Systems


Vital Signs


Vitals





 Vital Signs








  Date Time  Temp Pulse Resp B/P Pulse Ox O2 Delivery O2 Flow Rate FiO2


 


11/9/17 08:00 98.6 58 18 127/69 99   


 


11/7/17 17:30      Room Air  














 Intake and Output   


 


 11/8/17 11/8/17 11/9/17





 14:59 22:59 06:59


 


Intake Total 50 ml 700 ml 540 ml


 


Output Total  525 ml 880 ml


 


Balance 50 ml 175 ml -340 ml











Exam


ENMT:  mucosa pink and moist, nl external ears & nose, nl lips & teeth, nl 

nasal mucosa & septum


Neck:  non-tender, supple


Respiratory:  clear to auscultation, normal air movement


Cardiovascular:  nl pulses, regular rate and rhythm


Gastrointestinal:  nl liver, spleen, non-tender, soft





Results


Result Diagram:  


11/8/17 0429                                                                   

             11/8/17 0429














HEIDY MAXWELL MD Nov 9, 2017 19:09

## 2017-11-10 VITALS — RESPIRATION RATE: 20 BRPM | SYSTOLIC BLOOD PRESSURE: 133 MMHG | DIASTOLIC BLOOD PRESSURE: 75 MMHG

## 2017-11-10 VITALS — SYSTOLIC BLOOD PRESSURE: 133 MMHG | DIASTOLIC BLOOD PRESSURE: 81 MMHG | RESPIRATION RATE: 18 BRPM

## 2017-11-10 VITALS — RESPIRATION RATE: 18 BRPM | DIASTOLIC BLOOD PRESSURE: 74 MMHG | SYSTOLIC BLOOD PRESSURE: 125 MMHG

## 2017-11-10 VITALS — RESPIRATION RATE: 18 BRPM | DIASTOLIC BLOOD PRESSURE: 76 MMHG | SYSTOLIC BLOOD PRESSURE: 133 MMHG

## 2017-11-10 RX ADMIN — DOCUSATE SODIUM SCH MG: 100 CAPSULE, LIQUID FILLED ORAL at 21:41

## 2017-11-10 RX ADMIN — NYSTATIN SCH APPLIC: 100000 POWDER TOPICAL at 08:41

## 2017-11-10 RX ADMIN — NYSTATIN SCH APPLIC: 100000 POWDER TOPICAL at 21:50

## 2017-11-10 RX ADMIN — FAMOTIDINE SCH MG: 20 TABLET ORAL at 21:41

## 2017-11-10 RX ADMIN — CEFTRIAXONE SCH MLS/HR: 1 INJECTION, SOLUTION INTRAVENOUS at 14:07

## 2017-11-10 RX ADMIN — TAMOXIFEN CITRATE SCH MG: 10 TABLET, FILM COATED ORAL at 08:39

## 2017-11-10 RX ADMIN — FAMOTIDINE SCH MG: 20 TABLET ORAL at 08:36

## 2017-11-10 NOTE — CONS
Date/Time of Note


Date/Time of Note


DATE: 11/10/17 


TIME: 12:36





Assessment/Plan


Assessment/Plan


Chief Complaint/Hosp Course


SUBJECTIVE:  No events overnight. Alert, feels good, no fevers.


 


INDWELLINGS:  The patient has a right-sided chest tube.


 


ANTIMICROBIALS:  Rocephin.


 


PHYSICAL EXAMINATION:


GENERAL:  This is an obese, well-developed, middle-aged woman who is awake, in 

no distress.


HEENT:  Head atraumatic, normocephalic.  Sclerae anicteric.  Buccal mucosa dry.


NECK:  Supple.


CHEST:  Rise symmetrical.  Breath sounds diminished to bases.


HEART:  S1, S2.


ABDOMEN:  Soft, bowel tones present.


EXTREMITIES:  Without cyanosis.


 


ASSESSMENT:


1.  Right-sided loculated pleural effusion consistent with empyema with fluid 

culture growing oxacillin-sensitive Staphylococcus aureus.  The patient is 

status post decortication and pleurodesis on 10/31/2017.


2.  Metastatic breast cancer.


3.  Diabetes.


4.  History of craniectomy for brain metastasis.


 


PLAN:  The patient remains stable.  Continue abx for total of 4 weeks for 

empyema. Pulmonary/CTS/oncology rec-s





DW staff


Problems:  





Consultation Date/Type/Reason


Admit Date/Time


Oct 24, 2017 at 18:29


Initial Consult Date


10/26/17


Type of Consultation:  ID


Referring Provider:  CLAU AGUILAR





Exam/Review of Systems


Vital Signs


Vitals





 Vital Signs








  Date Time  Temp Pulse Resp B/P Pulse Ox O2 Delivery O2 Flow Rate FiO2


 


11/10/17 07:00 98.7 87 18 133/76 97   


 


11/7/17 17:30      Room Air  














 Intake and Output   


 


 11/9/17 11/9/17 11/10/17





 15:00 23:00 07:00


 


Intake Total 50 ml 1120 ml 820 ml


 


Output Total  130 ml 200 ml


 


Balance 50 ml 990 ml 620 ml











Results


Result Diagram:  


11/8/17 0429                                                                   

             11/8/17 0429





Results 24 hrs





Laboratory Tests








Test


  11/9/17


17:57 11/9/17


21:10 11/10/17


08:34


 


Bedside Glucose 150   174   133  











Medications


Medications





 Current Medications


Dexamethasone (Decadron) 4 mg BID PO  Last administered on 11/10/17at 08:36; 

Admin Dose 4 MG;  Start 10/24/17 at 21:00


Docusate Sodium (Colace) 200 mg QHS PO  Last administered on 11/9/17at 21:11; 

Admin Dose 200 MG;  Start 10/24/17 at 21:00


Ondansetron HCl (Zofran Inj) 4 mg Q6H  PRN IV NAUSEA AND/OR VOMITING;  Start 10/

24/17 at 21:00


Acetaminophen (Tylenol Tab) 650 mg Q6H  PRN PO PAIN LEVEL 1-3 OR FEVER Last 

administered on 11/1/17at 07:13; Admin Dose 650 MG;  Start 10/24/17 at 21:00


Oxycodone/ Acetaminophen (Percocet (5/ 325)) 1 tab Q6H  PRN PO MODERATE PAIN 

LEVEL 4-6;  Start 10/24/17 at 21:00


Morphine Sulfate (morphine) 2 mg Q4H  PRN IV SEVERE PAIN LEVEL 7-10 Last 

administered on 11/1/17at 02:47; Admin Dose 2 MG;  Start 10/24/17 at 21:00


Docusate Sodium (Colace) 100 mg Q12H  PRN PO CONSTIPATION;  Start 10/24/17 at 21

:00


Magnesium Hydroxide (Milk Of Mag) 30 ml DAILY  PRN PO CONSTIPATION;  Start 10/24

/17 at 21:00


Famotidine (Pepcid) 20 mg Q12 PO  Last administered on 11/10/17at 08:36; Admin 

Dose 20 MG;  Start 10/24/17 at 21:00


Albuterol (Proventil 0.083% (Neb)) 2.5 mg Q2  PRN HHN SHORTNESS OF BREATH;  

Start 10/24/17 at 21:00


Miscellaneous Information 1 ea NOTE XX ;  Start 10/24/17 at 21:30


Glucose (Glutose) 15 gm Q15M  PRN PO DECREASED GLUCOSE;  Start 10/24/17 at 21:30


Glucose (Glutose) 22.5 gm Q15M  PRN PO DECREASED GLUCOSE;  Start 10/24/17 at 21:

30


Dextrose (D50w Syringe) 25 ml Q15M  PRN IV DECREASED GLUCOSE;  Start 10/24/17 

at 21:30


Dextrose (D50w Syringe) 50 ml Q15M  PRN IV DECREASED GLUCOSE;  Start 10/24/17 

at 21:30


Glucagon (Glucagen) 1 mg Q15M  PRN IM DECREASED GLUCOSE;  Start 10/24/17 at 21:

30


Glucose (Glutose) 15 gm Q15M  PRN BUCCAL DECREASED GLUCOSE;  Start 10/24/17 at 

21:30


Nystatin (Nystatin Powder) 1 applic BID TOP  Last administered on 11/9/17at 21:

13; Admin Dose 1 APPLIC;  Start 10/25/17 at 09:00


Tamoxifen Citrate 20 mg 20 mg DAILY PO  Last administered on 11/10/17at 08:39; 

Admin Dose 20 MG;  Start 10/25/17 at 13:00


Ceftriaxone Sodium (Rocephin) 50 ml @  100 mls/hr Q24H IVPB  Last administered 

on 11/9/17at 14:16; Admin Dose 100 MLS/HR;  Start 10/27/17 at 14:00


Diagnostic Test (Pha) (Accu-Chek) 1 ea 02 XX  Last administered on 11/9/17at 02:

07; Admin Dose 1 EA;  Start 11/4/17 at 02:00











DIANA RANDALL NP Nov 10, 2017 12:37

## 2017-11-10 NOTE — PN
Date/Time of Note


Date/Time of Note


DATE: 11/10/17 


TIME: 16:27





Assessment/Plan


Lines/Catheters


IV Catheter Type (from [facility]):  Peripheral IV


Guerrero in Place (from Nrs):  No





Assessment/Plan


Chief Complaint/Hosp Course


 


IMPRESSION:  Right loculated pleural effusion.


 


R status post right VATS pleurodesis decortication


Patient feels much better


will continue CT sxn


 cc


Less shortness of breath


Check chest x-ray


Will continue chest tube suction


Problems:  





Subjective


24 Hr Interval Summary


Constitutional:  improved


Pain Control:  mild





Exam/Review of Systems


Vital Signs


Vitals





 Vital Signs








  Date Time  Temp Pulse Resp B/P Pulse Ox O2 Delivery O2 Flow Rate FiO2


 


11/10/17 14:00 98.6 80 18 125/74 98   


 


11/7/17 17:30      Room Air  














 Intake and Output   


 


 11/9/17 11/9/17 11/10/17





 15:00 23:00 07:00


 


Intake Total 50 ml 1120 ml 820 ml


 


Output Total  130 ml 200 ml


 


Balance 50 ml 990 ml 620 ml











Exam


ENMT:  mucosa pink and moist, nl external ears & nose, nl lips & teeth, nl 

nasal mucosa & septum


Neck:  non-tender, supple


Respiratory:  clear to auscultation, normal air movement


Cardiovascular:  nl pulses, regular rate and rhythm





Results


Result Diagram:  


11/8/17 0429                                                                   

             11/8/17 0429














HEIDY MAXWELL MD Nov 10, 2017 16:27

## 2017-11-10 NOTE — PN
Date/Time of Note


Date/Time of Note


DATE: 11/10/17 


TIME: 11:28





Assessment/Plan


VTE Prophylaxis


VTE Prophylaxis Intervention:  ambulation





Lines/Catheters


IV Catheter Type (from [facility]):  Saline Lock


Urinary Cath still in place:  No





Assessment/Plan


Chief Complaint/Hosp Course


Patient is a 39-year-old female with a past medical history of metastatic 

breast cancer with metastases to the liver and brain who presents from 

[facility] for malfunctioning Pleurx catheter.





1.  Empyema 2/2 staph aureus s/p VATS 10/31/17


- Patient doing well and chest tube still in place and draining serous fluid 

but output decreasing daily


- repeat CXR shows increased sized right-sided pleural effusion. Rounded 

opacity within the right mid lung likely represents pleural fluid in the major 

fissure. Increased consolidation or atelectasis within the right lower lobe.


- started on incentive spirometry


- CT chest showed trace loculated pleural effusion with possible empyema in 

addition to malignant effusion.


- CT surgery on board and appreciate consultation


- ID on board and recommendations appreciated. continue on IV antibiotics


- Ultrasound-guided thoracentesis performed, cultures showing staph aureus





2.  Dysfunctional Pleurx catheter


- Pulmonology on board and consultation appreciated


- Respiratory status stable at this time





3.  Cellulitis, area around catheter- improving


- ID on board and recommendations appreciated. Continue on antibiotics  





4.  Metastatic breast cancer, lung, liver, brain, s/p craniectomy 


- currently receiving radiation therapy and tamoxifen and Decadron


- Heme/onc on board and consultation appreciated


- Radiation as scheduled





5. Diabetes mellitus


- Currently stable





6. Obesity


- Counseled about diet and improving exercise routine when able





7. Disposition


- Continue monitoring on med/surg while chest tube in place


- will continue with chest tube management, IV abx for 4 weeks total, DC to SNF 

when chest tube situation finalized and accepting facility that will 

accommodate travel to and from radiation therapy and heme/onc, Dr. Loco.


Problems:  





Subjective


24 Hr Interval Summary


Free Text/Dictation


no acute overnight events





Exam/Review of Systems


Vital Signs


Vitals





 Vital Signs








  Date Time  Temp Pulse Resp B/P Pulse Ox O2 Delivery O2 Flow Rate FiO2


 


11/10/17 07:00 98.7 87 18 133/76 97   


 


11/7/17 17:30      Room Air  














 Intake and Output   


 


 11/9/17 11/9/17 11/10/17





 15:00 23:00 07:00


 


Intake Total 50 ml 1120 ml 820 ml


 


Output Total  130 ml 200 ml


 


Balance 50 ml 990 ml 620 ml











Exam


General: Patient awake and alert, no acute distress. 


Head: R side craniectomy site healing well. improving fluctuant edema right side


Eyes: EOMI, pupils reactive to light


Neck: Supple, nontender, midline


Respiratory: diminished air entry with chest tube in place draining slightly 

pink tinged serous fluid


Cardiovascular: regular rate, no obvious murmurs


Gastrointestinal: non-tender to palpation, bowel sounds heard.


Neurological: Moves all extremities spontaneously





Results


Result Diagram:  


11/8/17 0429 11/8/17 0429





Results 24 hrs





Laboratory Tests








Test


  11/9/17


11:44 11/9/17


17:57 11/9/17


21:10 11/10/17


08:34


 


Bedside Glucose 174   150   174   133  











Medications


Medications





 Current Medications


Dexamethasone (Decadron) 4 mg BID PO  Last administered on 11/10/17at 08:36; 

Admin Dose 4 MG;  Start 10/24/17 at 21:00


Docusate Sodium (Colace) 200 mg QHS PO  Last administered on 11/9/17at 21:11; 

Admin Dose 200 MG;  Start 10/24/17 at 21:00


Ondansetron HCl (Zofran Inj) 4 mg Q6H  PRN IV NAUSEA AND/OR VOMITING;  Start 10/

24/17 at 21:00


Acetaminophen (Tylenol Tab) 650 mg Q6H  PRN PO PAIN LEVEL 1-3 OR FEVER Last 

administered on 11/1/17at 07:13; Admin Dose 650 MG;  Start 10/24/17 at 21:00


Oxycodone/ Acetaminophen (Percocet (5/ 325)) 1 tab Q6H  PRN PO MODERATE PAIN 

LEVEL 4-6;  Start 10/24/17 at 21:00


Morphine Sulfate (morphine) 2 mg Q4H  PRN IV SEVERE PAIN LEVEL 7-10 Last 

administered on 11/1/17at 02:47; Admin Dose 2 MG;  Start 10/24/17 at 21:00


Docusate Sodium (Colace) 100 mg Q12H  PRN PO CONSTIPATION;  Start 10/24/17 at 21

:00


Magnesium Hydroxide (Milk Of Mag) 30 ml DAILY  PRN PO CONSTIPATION;  Start 10/24

/17 at 21:00


Famotidine (Pepcid) 20 mg Q12 PO  Last administered on 11/10/17at 08:36; Admin 

Dose 20 MG;  Start 10/24/17 at 21:00


Albuterol (Proventil 0.083% (Neb)) 2.5 mg Q2  PRN HHN SHORTNESS OF BREATH;  

Start 10/24/17 at 21:00


Miscellaneous Information 1 ea NOTE XX ;  Start 10/24/17 at 21:30


Glucose (Glutose) 15 gm Q15M  PRN PO DECREASED GLUCOSE;  Start 10/24/17 at 21:30


Glucose (Glutose) 22.5 gm Q15M  PRN PO DECREASED GLUCOSE;  Start 10/24/17 at 21:

30


Dextrose (D50w Syringe) 25 ml Q15M  PRN IV DECREASED GLUCOSE;  Start 10/24/17 

at 21:30


Dextrose (D50w Syringe) 50 ml Q15M  PRN IV DECREASED GLUCOSE;  Start 10/24/17 

at 21:30


Glucagon (Glucagen) 1 mg Q15M  PRN IM DECREASED GLUCOSE;  Start 10/24/17 at 21:

30


Glucose (Glutose) 15 gm Q15M  PRN BUCCAL DECREASED GLUCOSE;  Start 10/24/17 at 

21:30


Nystatin (Nystatin Powder) 1 applic BID TOP  Last administered on 11/9/17at 21:

13; Admin Dose 1 APPLIC;  Start 10/25/17 at 09:00


Tamoxifen Citrate 20 mg 20 mg DAILY PO  Last administered on 11/10/17at 08:39; 

Admin Dose 20 MG;  Start 10/25/17 at 13:00


Ceftriaxone Sodium (Rocephin) 50 ml @  100 mls/hr Q24H IVPB  Last administered 

on 11/9/17at 14:16; Admin Dose 100 MLS/HR;  Start 10/27/17 at 14:00


Diagnostic Test (Pha) (Accu-Chek) 1 ea 02 XX  Last administered on 11/9/17at 02:

07; Admin Dose 1 EA;  Start 11/4/17 at 02:00











SHIREEN ASHBY Nov 10, 2017 11:28

## 2017-11-10 NOTE — RADRPT
PROCEDURE:   XR Chest 1 view. 

 

CLINICAL INDICATION:   Shortness of breath.

 

TECHNIQUE:   AP views of the chest were obtained. 

 

COMPARISON:   DR CRAIG 11/7/2017 

 

FINDINGS:

The heart is large.  Calcified atherosclerosis is noted in the aorta. Right mid and lower lung infil
trates, combined with small to moderate pleural effusion are stable. Right-sided pleural drain and r
ight-sided chest tube are stable. No pneumothorax as visualized. Atelectasis is noted in the left lo
wer lobe.  Osseous structures are intact. 

 

IMPRESSION:

Cardiomegaly with calcified atherosclerosis in the aorta. 

 

Stable right-sided pleural drain and right-sided chest tube. No visualized pneumothorax.

 

Stable right mid and lower lung infiltrates, combined with small to moderate pleural effusion.

 

Scattered atelectasis in the left lower lobe.

 

 

RPTAT: AA

_____________________________________________ 

.Abdoul Mack MD, MD           Date    Time 

Electronically viewed and signed by .Abdoul Mack MD, MD on 11/10/2017 16:56 

 

D:  11/10/2017 16:56  T:  11/10/2017 16:56

.P/

## 2017-11-11 VITALS — RESPIRATION RATE: 18 BRPM | DIASTOLIC BLOOD PRESSURE: 67 MMHG | SYSTOLIC BLOOD PRESSURE: 126 MMHG

## 2017-11-11 VITALS — DIASTOLIC BLOOD PRESSURE: 72 MMHG | RESPIRATION RATE: 19 BRPM | SYSTOLIC BLOOD PRESSURE: 122 MMHG

## 2017-11-11 VITALS — RESPIRATION RATE: 20 BRPM | DIASTOLIC BLOOD PRESSURE: 74 MMHG | SYSTOLIC BLOOD PRESSURE: 152 MMHG

## 2017-11-11 VITALS — RESPIRATION RATE: 18 BRPM | DIASTOLIC BLOOD PRESSURE: 79 MMHG | SYSTOLIC BLOOD PRESSURE: 119 MMHG

## 2017-11-11 LAB
ABNORMAL IP MESSAGE: 1
ANION GAP SERPL CALC-SCNC: 15 MMOL/L (ref 8–16)
BASOPHILS # BLD AUTO: 0 10^3/UL (ref 0–0.1)
BASOPHILS NFR BLD: 0.3 % (ref 0–2)
BUN SERPL-MCNC: 21 MG/DL (ref 7–20)
CALCIUM SERPL-MCNC: 8.1 MG/DL (ref 8.4–10.2)
CHLORIDE SERPL-SCNC: 104 MMOL/L (ref 97–110)
CO2 SERPL-SCNC: 21 MMOL/L (ref 21–31)
CREAT SERPL-MCNC: 0.45 MG/DL (ref 0.44–1)
EOSINOPHIL # BLD: 0 10^3/UL (ref 0–0.5)
EOSINOPHIL NFR BLD: 0 % (ref 0–7)
ERYTHROCYTE [DISTWIDTH] IN BLOOD BY AUTOMATED COUNT: 23.2 % (ref 11.5–14.5)
GLUCOSE SERPL-MCNC: 201 MG/DL (ref 70–220)
HCT VFR BLD CALC: 31.7 % (ref 37–47)
HGB BLD-MCNC: 9.8 G/DL (ref 12–16)
LYMPHOCYTES # BLD AUTO: 1.4 10^3/UL (ref 0.8–2.9)
LYMPHOCYTES NFR BLD AUTO: 12.6 % (ref 15–51)
MAGNESIUM SERPL-MCNC: 1.7 MG/DL (ref 1.7–2.5)
MCH RBC QN AUTO: 24.6 PG (ref 29–33)
MCHC RBC AUTO-ENTMCNC: 30.9 G/DL (ref 32–37)
MCV RBC AUTO: 79.4 FL (ref 82–101)
MONOCYTES # BLD: 0.6 10^3/UL (ref 0.3–0.9)
MONOCYTES NFR BLD: 5.5 % (ref 0–11)
NEUTROPHILS # BLD: 8.7 10^3/UL (ref 1.6–7.5)
NEUTROPHILS NFR BLD AUTO: 77.7 % (ref 39–77)
NRBC # BLD MANUAL: 0 10^3/UL (ref 0–0)
NRBC BLD AUTO-RTO: 0 /100WBC (ref 0–0)
PHOSPHATE SERPL-MCNC: 3.5 MG/DL (ref 2.5–4.9)
PLATELET # BLD: 132 10^3/UL (ref 140–415)
PMV BLD AUTO: 9.4 FL (ref 7.4–10.4)
POSITIVE DIFF: (no result)
POTASSIUM SERPL-SCNC: 4.1 MMOL/L (ref 3.5–5.1)
RBC # BLD AUTO: 3.99 10^6/UL (ref 4.2–5.4)
SODIUM SERPL-SCNC: 136 MMOL/L (ref 135–144)
WBC # BLD AUTO: 11.2 10^3/UL (ref 4.8–10.8)

## 2017-11-11 RX ADMIN — DOCUSATE SODIUM SCH MG: 100 CAPSULE, LIQUID FILLED ORAL at 21:00

## 2017-11-11 RX ADMIN — TAMOXIFEN CITRATE SCH MG: 10 TABLET, FILM COATED ORAL at 08:56

## 2017-11-11 RX ADMIN — NYSTATIN SCH APPLIC: 100000 POWDER TOPICAL at 08:58

## 2017-11-11 RX ADMIN — NYSTATIN SCH APPLIC: 100000 POWDER TOPICAL at 21:00

## 2017-11-11 RX ADMIN — FAMOTIDINE SCH MG: 20 TABLET ORAL at 08:53

## 2017-11-11 RX ADMIN — CEFTRIAXONE SCH MLS/HR: 1 INJECTION, SOLUTION INTRAVENOUS at 14:47

## 2017-11-11 RX ADMIN — FAMOTIDINE SCH MG: 20 TABLET ORAL at 22:11

## 2017-11-11 NOTE — PN
Date/Time of Note


Date/Time of Note


DATE: 11/11/17 


TIME: 09:53





Assessment/Plan


VTE Prophylaxis


VTE Prophylaxis Intervention:  SCD's





Lines/Catheters


IV Catheter Type (from [facility]):  Saline Lock


Urinary Cath still in place:  No





Assessment/Plan


Assessment/Plan


1.  Empyema 2/2 staph aureus s/p VATS 10/31/17


- Patient doing well and chest tube still in place and draining serous fluid


- Per pulm, spoke with CT surgery about changing patient to [place] drain so 

can be d/c home


- On incentive spirometry


- CXR yesterday shows stable b/l pleural effusion


- CT surgery on board and appreciate consultation


- ID on board and recommendations appreciated. continue on IV antibiotics for 4 

weeks


- Ultrasound-guided thoracentesis performed, cultures showing staph aureus





2.  Dysfunctional Pleurx catheter


- Pulmonology on board and consultation appreciated


- Respiratory status stable at this time





3.  Cellulitis, area around catheter- improved


- ID on board and recommendations appreciated





4.  Metastatic breast cancer, lung, liver, brain, s/p craniectomy 


- currently receiving radiation therapy and tamoxifen and Decadron


- Heme/onc on board and consultation appreciated


- Radiation as scheduled





5. Diabetes mellitus


- Currently stable





6. Obesity


- Counseled about diet and improving exercise routine when able





7. Disposition


- Continue monitoring on med/surg while chest tube in place


- will continue with chest tube management, IV abx for 4 weeks total





Subjective


24 Hr Interval Summary


Free Text/Dictation


Patient doing well with no new complaints.  Did not receive radiation last 

night because of timing.  No acute overnight events. Anxious to get out of the 

hospital.





Exam/Review of Systems


Vital Signs


Vitals





 Vital Signs








  Date Time  Temp Pulse Resp B/P Pulse Ox O2 Delivery O2 Flow Rate FiO2


 


11/11/17 07:36 98.2 79 18 119/79 97   


 


11/7/17 17:30      Room Air  














 Intake and Output   


 


 11/10/17 11/10/17 11/11/17





 15:00 23:00 07:00


 


Intake Total 50 ml 1080 ml 960 ml


 


Output Total  800 ml 1000 ml


 


Balance 50 ml 280 ml -40 ml











Exam


General: Patient awake and alert, no acute distress. 


Head: R side craniectomy site healing well. improving fluctuant edema right side


Eyes: EOMI, pupils reactive to light


Neck: Supple, nontender, midline


Respiratory: diminished air entry with chest tube in place draining slightly 

pink tinged serous fluid


Cardiovascular: regular rate, no obvious murmurs


Gastrointestinal: non-tender to palpation, bowel sounds heard.


Neurological: Moves all extremities spontaneously





Results


Result Diagram:  


11/11/17 0425 11/11/17 0425





Results 24 hrs





Laboratory Tests








Test


  11/10/17


12:42 11/10/17


17:36 11/10/17


21:43 11/11/17


02:53


 


Bedside Glucose 170   133   232  H 248  H














Test


  11/11/17


04:25 11/11/17


08:51 


  


 


 


White Blood Count 11.2  H   


 


Red Blood Count 3.99  L   


 


Hemoglobin 9.8  L   


 


Hematocrit 31.7  L   


 


Mean Corpuscular Volume 79.4  L   


 


Mean Corpuscular Hemoglobin 24.6  L   


 


Mean Corpuscular Hemoglobin


Concent 30.9  L


  


  


  


 


 


Red Cell Distribution Width 23.2  H   


 


Platelet Count 132  L   


 


Mean Platelet Volume 9.4     


 


Neutrophils % 77.7  H   


 


Lymphocytes % 12.6  L   


 


Monocytes % 5.5     


 


Eosinophils % 0.0     


 


Basophils % 0.3     


 


Nucleated Red Blood Cells % 0.0     


 


Neutrophils # 8.7  H   


 


Lymphocytes # 1.4     


 


Monocytes # 0.6     


 


Eosinophils # 0.0     


 


Basophils # 0.0     


 


Nucleated Red Blood Cells # 0.0     


 


Sodium Level 136     


 


Potassium Level 4.1     


 


Chloride Level 104     


 


Carbon Dioxide Level 21     


 


Anion Gap 15     


 


Blood Urea Nitrogen 21  H   


 


Creatinine 0.45     


 


Glucose Level 201     


 


Calcium Level 8.1  L   


 


Phosphorus Level 3.5     


 


Magnesium Level 1.7     


 


Bedside Glucose  126    











Medications


Medications





 Current Medications


Dexamethasone (Decadron) 4 mg BID PO  Last administered on 11/11/17at 08:58; 

Admin Dose 4 MG;  Start 10/24/17 at 21:00


Docusate Sodium (Colace) 200 mg QHS PO  Last administered on 11/10/17at 21:41; 

Admin Dose 200 MG;  Start 10/24/17 at 21:00


Ondansetron HCl (Zofran Inj) 4 mg Q6H  PRN IV NAUSEA AND/OR VOMITING;  Start 10/

24/17 at 21:00


Acetaminophen (Tylenol Tab) 650 mg Q6H  PRN PO PAIN LEVEL 1-3 OR FEVER Last 

administered on 11/1/17at 07:13; Admin Dose 650 MG;  Start 10/24/17 at 21:00


Oxycodone/ Acetaminophen (Percocet (5/ 325)) 1 tab Q6H  PRN PO MODERATE PAIN 

LEVEL 4-6;  Start 10/24/17 at 21:00


Morphine Sulfate (morphine) 2 mg Q4H  PRN IV SEVERE PAIN LEVEL 7-10 Last 

administered on 11/1/17at 02:47; Admin Dose 2 MG;  Start 10/24/17 at 21:00


Docusate Sodium (Colace) 100 mg Q12H  PRN PO CONSTIPATION;  Start 10/24/17 at 21

:00


Magnesium Hydroxide (Milk Of Mag) 30 ml DAILY  PRN PO CONSTIPATION;  Start 10/24

/17 at 21:00


Famotidine (Pepcid) 20 mg Q12 PO  Last administered on 11/11/17at 08:53; Admin 

Dose 20 MG;  Start 10/24/17 at 21:00


Albuterol (Proventil 0.083% (Neb)) 2.5 mg Q2  PRN HHN SHORTNESS OF BREATH;  

Start 10/24/17 at 21:00


Miscellaneous Information 1 ea NOTE XX ;  Start 10/24/17 at 21:30


Glucose (Glutose) 15 gm Q15M  PRN PO DECREASED GLUCOSE;  Start 10/24/17 at 21:30


Glucose (Glutose) 22.5 gm Q15M  PRN PO DECREASED GLUCOSE;  Start 10/24/17 at 21:

30


Dextrose (D50w Syringe) 25 ml Q15M  PRN IV DECREASED GLUCOSE;  Start 10/24/17 

at 21:30


Dextrose (D50w Syringe) 50 ml Q15M  PRN IV DECREASED GLUCOSE;  Start 10/24/17 

at 21:30


Glucagon (Glucagen) 1 mg Q15M  PRN IM DECREASED GLUCOSE;  Start 10/24/17 at 21:

30


Glucose (Glutose) 15 gm Q15M  PRN BUCCAL DECREASED GLUCOSE;  Start 10/24/17 at 

21:30


Nystatin (Nystatin Powder) 1 applic BID TOP  Last administered on 11/11/17at 08:

58; Admin Dose 1 APPLIC;  Start 10/25/17 at 09:00


Tamoxifen Citrate 20 mg 20 mg DAILY PO  Last administered on 11/11/17at 08:56; 

Admin Dose 20 MG;  Start 10/25/17 at 13:00


Ceftriaxone Sodium (Rocephin) 50 ml @  100 mls/hr Q24H IVPB  Last administered 

on 11/10/17at 14:07; Admin Dose 100 MLS/HR;  Start 10/27/17 at 14:00


Diagnostic Test (Pha) (Accu-Chek) 1 ea 02 XX  Last administered on 11/9/17at 02:

07; Admin Dose 1 EA;  Start 11/4/17 at 02:00











RIVERA RAWLS MD Nov 11, 2017 09:53

## 2017-11-11 NOTE — PN
Date/Time of Note


Date/Time of Note


DATE: 11/11/17 


TIME: 20:17





Assessment/Plan


Lines/Catheters


IV Catheter Type (from Nrs):  Saline Lock


Guerrero in Place (from Nrs):  No





Assessment/Plan


Chief Complaint/Hosp Course


 


IMPRESSION:  Right loculated pleural effusion.


 


R status post right VATS pleurodesis decortication


Patient feels much better


One CT DCec


Check chest x-ray


Problems:  





Subjective


24 Hr Interval Summary


Constitutional:  improved


Pain Control:  mild





Exam/Review of Systems


Vital Signs


Vitals





 Vital Signs








  Date Time  Temp Pulse Resp B/P Pulse Ox O2 Delivery O2 Flow Rate FiO2


 


11/11/17 14:35 98.2 77 20 152/74 95   


 


11/7/17 17:30      Room Air  














 Intake and Output   


 


 11/10/17 11/10/17 11/11/17





 14:59 22:59 06:59


 


Intake Total 50 ml 1080 ml 960 ml


 


Output Total  800 ml 1000 ml


 


Balance 50 ml 280 ml -40 ml











Results


Result Diagram:  


11/11/17 0425                                                                  

              11/11/17 0425














HEIDY MAXWELL MD Nov 11, 2017 20:17

## 2017-11-11 NOTE — CONS
Date/Time of Note


Date/Time of Note


DATE: 11/11/17 


TIME: 11:51





Consult Date/Type/Reason


Admit Date/Time


Oct 24, 2017 at 18:29


Initial Consult Date


10/25/17


Type of Consultation:   Pulm


Ordering Provider:  CLAU AGUILAR





Subjective


Remains comfortable.  Still with moderate output from chest tube.





Objective





 Vital Signs








  Date Time  Temp Pulse Resp B/P Pulse Ox O2 Delivery O2 Flow Rate FiO2


 


11/11/17 07:36 98.2 79 18 119/79 97   


 


11/7/17 17:30      Room Air  














 Intake and Output   


 


 11/10/17 11/10/17 11/11/17





 15:00 23:00 07:00


 


Intake Total 50 ml 1080 ml 960 ml


 


Output Total  800 ml 1000 ml


 


Balance 50 ml 280 ml -40 ml








Exam


GENERAL: Well-nourished well-developed lady comfortable at rest no acute 

distress


VITAL SIGNS:  per chart


NECK:  Supple.  No JVD or lymphadenopathy.


CARDIAC EXAM:  S1, S2. No added sounds or murmurs.


CHEST: Diminished air entry right lung chest tube in place.


ABDOMEN:  Soft, nontender.  No guarding or rebound.


EXTREMITIES:  No cyanosis, clubbing or edema.


NEUROLOGIC:  Generalized weakness.  No focal deficits.





Results/Medications


Result Diagram:  


11/11/17 0425                                                                  

              11/11/17 0425





Results 24 hrs





Laboratory Tests








Test


  11/10/17


12:42 11/10/17


17:36 11/10/17


21:43 11/11/17


02:53


 


Bedside Glucose 170   133   232  H 248  H














Test


  11/11/17


04:25 11/11/17


08:51 


  


 


 


White Blood Count 11.2  H   


 


Red Blood Count 3.99  L   


 


Hemoglobin 9.8  L   


 


Hematocrit 31.7  L   


 


Mean Corpuscular Volume 79.4  L   


 


Mean Corpuscular Hemoglobin 24.6  L   


 


Mean Corpuscular Hemoglobin


Concent 30.9  L


  


  


  


 


 


Red Cell Distribution Width 23.2  H   


 


Platelet Count 132  L   


 


Mean Platelet Volume 9.4     


 


Neutrophils % 77.7  H   


 


Lymphocytes % 12.6  L   


 


Monocytes % 5.5     


 


Eosinophils % 0.0     


 


Basophils % 0.3     


 


Nucleated Red Blood Cells % 0.0     


 


Neutrophils # 8.7  H   


 


Lymphocytes # 1.4     


 


Monocytes # 0.6     


 


Eosinophils # 0.0     


 


Basophils # 0.0     


 


Nucleated Red Blood Cells # 0.0     


 


Sodium Level 136     


 


Potassium Level 4.1     


 


Chloride Level 104     


 


Carbon Dioxide Level 21     


 


Anion Gap 15     


 


Blood Urea Nitrogen 21  H   


 


Creatinine 0.45     


 


Glucose Level 201     


 


Calcium Level 8.1  L   


 


Phosphorus Level 3.5     


 


Magnesium Level 1.7     


 


Bedside Glucose  126    








Medications





 Current Medications


Dexamethasone (Decadron) 4 mg BID PO  Last administered on 11/11/17at 08:58; 

Admin Dose 4 MG;  Start 10/24/17 at 21:00


Docusate Sodium (Colace) 200 mg QHS PO  Last administered on 11/10/17at 21:41; 

Admin Dose 200 MG;  Start 10/24/17 at 21:00


Ondansetron HCl (Zofran Inj) 4 mg Q6H  PRN IV NAUSEA AND/OR VOMITING;  Start 10/

24/17 at 21:00


Acetaminophen (Tylenol Tab) 650 mg Q6H  PRN PO PAIN LEVEL 1-3 OR FEVER Last 

administered on 11/1/17at 07:13; Admin Dose 650 MG;  Start 10/24/17 at 21:00


Oxycodone/ Acetaminophen (Percocet (5/ 325)) 1 tab Q6H  PRN PO MODERATE PAIN 

LEVEL 4-6;  Start 10/24/17 at 21:00


Morphine Sulfate (morphine) 2 mg Q4H  PRN IV SEVERE PAIN LEVEL 7-10 Last 

administered on 11/1/17at 02:47; Admin Dose 2 MG;  Start 10/24/17 at 21:00


Docusate Sodium (Colace) 100 mg Q12H  PRN PO CONSTIPATION;  Start 10/24/17 at 21

:00


Magnesium Hydroxide (Milk Of Mag) 30 ml DAILY  PRN PO CONSTIPATION;  Start 10/24

/17 at 21:00


Famotidine (Pepcid) 20 mg Q12 PO  Last administered on 11/11/17at 08:53; Admin 

Dose 20 MG;  Start 10/24/17 at 21:00


Albuterol (Proventil 0.083% (Neb)) 2.5 mg Q2  PRN HHN SHORTNESS OF BREATH;  

Start 10/24/17 at 21:00


Miscellaneous Information 1 ea NOTE XX ;  Start 10/24/17 at 21:30


Glucose (Glutose) 15 gm Q15M  PRN PO DECREASED GLUCOSE;  Start 10/24/17 at 21:30


Glucose (Glutose) 22.5 gm Q15M  PRN PO DECREASED GLUCOSE;  Start 10/24/17 at 21:

30


Dextrose (D50w Syringe) 25 ml Q15M  PRN IV DECREASED GLUCOSE;  Start 10/24/17 

at 21:30


Dextrose (D50w Syringe) 50 ml Q15M  PRN IV DECREASED GLUCOSE;  Start 10/24/17 

at 21:30


Glucagon (Glucagen) 1 mg Q15M  PRN IM DECREASED GLUCOSE;  Start 10/24/17 at 21:

30


Glucose (Glutose) 15 gm Q15M  PRN BUCCAL DECREASED GLUCOSE;  Start 10/24/17 at 

21:30


Nystatin (Nystatin Powder) 1 applic BID TOP  Last administered on 11/11/17at 08:

58; Admin Dose 1 APPLIC;  Start 10/25/17 at 09:00


Tamoxifen Citrate 20 mg 20 mg DAILY PO  Last administered on 11/11/17at 08:56; 

Admin Dose 20 MG;  Start 10/25/17 at 13:00


Ceftriaxone Sodium (Rocephin) 50 ml @  100 mls/hr Q24H IVPB  Last administered 

on 11/10/17at 14:07; Admin Dose 100 MLS/HR;  Start 10/27/17 at 14:00


Diagnostic Test (Pha) (Accu-Chek) 1 ea 02 XX  Last administered on 11/9/17at 02:

07; Admin Dose 1 EA;  Start 11/4/17 at 02:00





Assessment/Plan


Chief Complaint/Hosp Course


Assessment





1.  Metastatic breast cancer with recurrent right pleural effusion.  Cultures 

growing gram-positive cocci consistent with staph aureus.


2.  Loculated pleural effusion possible empyema status post decortication and 

pleurodesis.  Still with moderate chest tube output.


3.  CNS metastasis currently receiving steroids and radiation therapy








Plan





1.  Continue chest tube drainage.  Discussed with thoracic surgery will change 

to [place] tube so that patient can be discharged home.


2.  Continue oncology recommendations continues radiation therapy for CNS 

metastasis


3.  Antibiotics per primary team





Continue radiation therapy


Problems:  











DELFIN [facility]AKHANI [facility], [facility] Nov 11, 2017 12:01

## 2017-11-11 NOTE — CONS
Date/Time of Note


Date/Time of Note


DATE: 11/11/17 


TIME: 16:53





Consult Date/Type/Reason


Admit Date/Time


Oct 24, 2017 at 18:29


Initial Consult Date


10/26/17


Type of Consultation:  ID


Ordering Provider:  CLAU AGUILAR





Objective





 Vital Signs








  Date Time  Temp Pulse Resp B/P Pulse Ox O2 Delivery O2 Flow Rate FiO2


 


11/11/17 14:35 98.2 77 20 152/74 95   


 


11/7/17 17:30      Room Air  














 Intake and Output   


 


 11/10/17 11/10/17 11/11/17





 15:00 23:00 07:00


 


Intake Total 50 ml 1080 ml 960 ml


 


Output Total  800 ml 1000 ml


 


Balance 50 ml 280 ml -40 ml











Results/Medications


Result Diagram:  


11/11/17 0425                                                                  

              11/11/17 0425





Results 24 hrs





Laboratory Tests








Test


  11/10/17


17:36 11/10/17


21:43 11/11/17


02:53 11/11/17


04:25


 


Bedside Glucose 133   232  H 248  H 


 


White Blood Count    11.2  H


 


Red Blood Count    3.99  L


 


Hemoglobin    9.8  L


 


Hematocrit    31.7  L


 


Mean Corpuscular Volume    79.4  L


 


Mean Corpuscular Hemoglobin    24.6  L


 


Mean Corpuscular Hemoglobin


Concent 


  


  


  30.9  L


 


 


Red Cell Distribution Width    23.2  H


 


Platelet Count    132  L


 


Mean Platelet Volume    9.4  


 


Neutrophils %    77.7  H


 


Lymphocytes %    12.6  L


 


Monocytes %    5.5  


 


Eosinophils %    0.0  


 


Basophils %    0.3  


 


Nucleated Red Blood Cells %    0.0  


 


Neutrophils #    8.7  H


 


Lymphocytes #    1.4  


 


Monocytes #    0.6  


 


Eosinophils #    0.0  


 


Basophils #    0.0  


 


Nucleated Red Blood Cells #    0.0  


 


Sodium Level    136  


 


Potassium Level    4.1  


 


Chloride Level    104  


 


Carbon Dioxide Level    21  


 


Anion Gap    15  


 


Blood Urea Nitrogen    21  H


 


Creatinine    0.45  


 


Glucose Level    201  


 


Calcium Level    8.1  L


 


Phosphorus Level    3.5  


 


Magnesium Level    1.7  














Test


  11/11/17


08:51 11/11/17


12:44 


  


 


 


Bedside Glucose 126   194    








Medications





 Current Medications


Dexamethasone (Decadron) 4 mg BID PO  Last administered on 11/11/17at 08:58; 

Admin Dose 4 MG;  Start 10/24/17 at 21:00


Docusate Sodium (Colace) 200 mg QHS PO  Last administered on 11/10/17at 21:41; 

Admin Dose 200 MG;  Start 10/24/17 at 21:00


Ondansetron HCl (Zofran Inj) 4 mg Q6H  PRN IV NAUSEA AND/OR VOMITING;  Start 10/

24/17 at 21:00


Acetaminophen (Tylenol Tab) 650 mg Q6H  PRN PO PAIN LEVEL 1-3 OR FEVER Last 

administered on 11/1/17at 07:13; Admin Dose 650 MG;  Start 10/24/17 at 21:00


Oxycodone/ Acetaminophen (Percocet (5/ 325)) 1 tab Q6H  PRN PO MODERATE PAIN 

LEVEL 4-6;  Start 10/24/17 at 21:00


Morphine Sulfate (morphine) 2 mg Q4H  PRN IV SEVERE PAIN LEVEL 7-10 Last 

administered on 11/1/17at 02:47; Admin Dose 2 MG;  Start 10/24/17 at 21:00


Docusate Sodium (Colace) 100 mg Q12H  PRN PO CONSTIPATION;  Start 10/24/17 at 21

:00


Magnesium Hydroxide (Milk Of Mag) 30 ml DAILY  PRN PO CONSTIPATION;  Start 10/24

/17 at 21:00


Famotidine (Pepcid) 20 mg Q12 PO  Last administered on 11/11/17at 08:53; Admin 

Dose 20 MG;  Start 10/24/17 at 21:00


Albuterol (Proventil 0.083% (Neb)) 2.5 mg Q2  PRN HHN SHORTNESS OF BREATH;  

Start 10/24/17 at 21:00


Miscellaneous Information 1 ea NOTE XX ;  Start 10/24/17 at 21:30


Glucose (Glutose) 15 gm Q15M  PRN PO DECREASED GLUCOSE;  Start 10/24/17 at 21:30


Glucose (Glutose) 22.5 gm Q15M  PRN PO DECREASED GLUCOSE;  Start 10/24/17 at 21:

30


Dextrose (D50w Syringe) 25 ml Q15M  PRN IV DECREASED GLUCOSE;  Start 10/24/17 

at 21:30


Dextrose (D50w Syringe) 50 ml Q15M  PRN IV DECREASED GLUCOSE;  Start 10/24/17 

at 21:30


Glucagon (Glucagen) 1 mg Q15M  PRN IM DECREASED GLUCOSE;  Start 10/24/17 at 21:

30


Glucose (Glutose) 15 gm Q15M  PRN BUCCAL DECREASED GLUCOSE;  Start 10/24/17 at 

21:30


Nystatin (Nystatin Powder) 1 applic BID TOP  Last administered on 11/11/17at 08:

58; Admin Dose 1 APPLIC;  Start 10/25/17 at 09:00


Tamoxifen Citrate 20 mg 20 mg DAILY PO  Last administered on 11/11/17at 08:56; 

Admin Dose 20 MG;  Start 10/25/17 at 13:00


Ceftriaxone Sodium (Rocephin) 50 ml @  100 mls/hr Q24H IVPB  Last administered 

on 11/11/17at 14:47; Admin Dose 100 MLS/HR;  Start 10/27/17 at 14:00


Diagnostic Test (Pha) (Accu-Chek) 1 ea 02 XX  Last administered on 11/9/17at 02:

07; Admin Dose 1 EA;  Start 11/4/17 at 02:00





Assessment/Plan


Chief Complaint/Hosp Course


SUBJECTIVE:  No events overnight. Alert, feels good, no fevers.


 


INDWELLINGS: Right chest tube.


 


ANTIMICROBIALS:  Rocephin.


 


PHYSICAL EXAMINATION:


GENERAL:  This is an obese, well-developed, middle-aged woman who is awake, in 

no distress.


HEENT:  Head atraumatic, normocephalic.  Sclerae anicteric.  Buccal mucosa dry.


NECK:  Supple.


CHEST:  Rise symmetrical.  Breath sounds diminished to bases.


HEART:  S1, S2.


ABDOMEN:  Soft, bowel tones present.


EXTREMITIES:  Without cyanosis.


 


ASSESSMENT:


1.  Right-sided loculated pleural effusion consistent with empyema with fluid 

culture growing oxacillin-sensitive Staphylococcus aureus.  The patient is 

status post decortication and pleurodesis on 10/31/2017.


2.  Metastatic breast cancer.


3.  Diabetes.


4.  History of craniectomy for brain metastasis.


 


PLAN:  The patient remains stable.  Continue abx for total of 4 weeks for 

empyema. Pulmonary/CTS/oncology rec-s





DW staff


Problems:  











DIANA RANDALL NP Nov 11, 2017 16:54

## 2017-11-12 VITALS — DIASTOLIC BLOOD PRESSURE: 73 MMHG | RESPIRATION RATE: 18 BRPM | SYSTOLIC BLOOD PRESSURE: 132 MMHG

## 2017-11-12 VITALS — DIASTOLIC BLOOD PRESSURE: 70 MMHG | RESPIRATION RATE: 19 BRPM | SYSTOLIC BLOOD PRESSURE: 115 MMHG

## 2017-11-12 VITALS — RESPIRATION RATE: 20 BRPM | DIASTOLIC BLOOD PRESSURE: 79 MMHG | SYSTOLIC BLOOD PRESSURE: 123 MMHG

## 2017-11-12 VITALS — RESPIRATION RATE: 20 BRPM | DIASTOLIC BLOOD PRESSURE: 89 MMHG | SYSTOLIC BLOOD PRESSURE: 148 MMHG

## 2017-11-12 LAB
ABNORMAL IP MESSAGE: 1
BASOPHILS # BLD AUTO: 0 10^3/UL (ref 0–0.1)
BASOPHILS NFR BLD: 0.3 % (ref 0–2)
EOSINOPHIL # BLD: 0 10^3/UL (ref 0–0.5)
EOSINOPHIL NFR BLD: 0 % (ref 0–7)
ERYTHROCYTE [DISTWIDTH] IN BLOOD BY AUTOMATED COUNT: 23.1 % (ref 11.5–14.5)
HCT VFR BLD CALC: 32.5 % (ref 37–47)
HGB BLD-MCNC: 10.1 G/DL (ref 12–16)
LYMPHOCYTES # BLD AUTO: 1.6 10^3/UL (ref 0.8–2.9)
LYMPHOCYTES NFR BLD AUTO: 12.4 % (ref 15–51)
MCH RBC QN AUTO: 24.6 PG (ref 29–33)
MCHC RBC AUTO-ENTMCNC: 31.1 G/DL (ref 32–37)
MCV RBC AUTO: 79.1 FL (ref 82–101)
MONOCYTES # BLD: 0.7 10^3/UL (ref 0.3–0.9)
MONOCYTES NFR BLD: 5.4 % (ref 0–11)
NEUTROPHILS # BLD: 10.2 10^3/UL (ref 1.6–7.5)
NEUTROPHILS NFR BLD AUTO: 77.9 % (ref 39–77)
NRBC # BLD MANUAL: 0 10^3/UL (ref 0–0)
NRBC BLD AUTO-RTO: 0 /100WBC (ref 0–0)
PLATELET # BLD: 134 10^3/UL (ref 140–415)
PMV BLD AUTO: 9.1 FL (ref 7.4–10.4)
POSITIVE DIFF: (no result)
RBC # BLD AUTO: 4.11 10^6/UL (ref 4.2–5.4)
WBC # BLD AUTO: 13.1 10^3/UL (ref 4.8–10.8)

## 2017-11-12 RX ADMIN — FAMOTIDINE SCH MG: 20 TABLET ORAL at 09:34

## 2017-11-12 RX ADMIN — NYSTATIN SCH APPLIC: 100000 POWDER TOPICAL at 09:00

## 2017-11-12 RX ADMIN — CEFTRIAXONE SCH MLS/HR: 1 INJECTION, SOLUTION INTRAVENOUS at 14:46

## 2017-11-12 RX ADMIN — TAMOXIFEN CITRATE SCH MG: 10 TABLET, FILM COATED ORAL at 09:35

## 2017-11-12 RX ADMIN — FAMOTIDINE SCH MG: 20 TABLET ORAL at 22:05

## 2017-11-12 RX ADMIN — NYSTATIN SCH APPLIC: 100000 POWDER TOPICAL at 22:13

## 2017-11-12 RX ADMIN — DOCUSATE SODIUM SCH MG: 100 CAPSULE, LIQUID FILLED ORAL at 22:04

## 2017-11-12 NOTE — RADRPT
PROCEDURE:   XR Chest. 

 

CLINICAL INDICATION:   Pleural effusions. Dyspnea. 

 

TECHNIQUE:   Single frontal chest x-ray. 

 

COMPARISON:   11/10/2017 

 

FINDINGS:

 

Consolidation with partial loculated effusion in the right lower lung is stable. Coiled chest tube/P
leurx catheter in the right lung base is stable. A second chest tube seen previously has been remove
d.  There is no pneumothorax. Minimal underlying vascular congestion is seen within the lungs, stabl
e. The heart size is mildly enlarged and the mediastinal silhouette is unremarkable. No other acute 
changes are present. The osseous structures are unremarkable. 

 

IMPRESSION:

 

1.  Stable consolidation and partially loculated effusion in the right lower lung.

2.  Removal of one of the right-sided chest tube seen previously, without pneumothorax.

3.  Single remaining chest tube in place coiled within the right lung base, stable.

4.  Cardiomegaly with minimal central vascular congestion, stable over time.

 

 

RPTAT: PP

_____________________________________________ 

.Claudy Jacobson MD, MD           Date    Time 

Electronically viewed and signed by .Claudy Jacobson MD, MD on 11/12/2017 13:14 

 

D:  11/12/2017 13:14  T:  11/12/2017 13:14

.B/

## 2017-11-12 NOTE — CONS
Date/Time of Note


Date/Time of Note


DATE: 11/12/17 


TIME: 12:51





Consult Date/Type/Reason


Admit Date/Time


Oct 24, 2017 at 18:29


Initial Consult Date


10/25/17


Type of Consultation:  Pulmonary


Ordering Provider:  CLAU AGUILAR





Subjective


Still has moderate drainage from chest tube.  No chest pain no shortness of 

breath.





Objective





 Vital Signs








  Date Time  Temp Pulse Resp B/P Pulse Ox O2 Delivery O2 Flow Rate FiO2


 


11/12/17 08:08 98.6 67 20 123/79 96   














 Intake and Output   


 


 11/11/17 11/11/17 11/12/17





 15:00 23:00 07:00


 


Intake Total  1050 ml 1000 ml


 


Output Total  300 ml 1200 ml


 


Balance  750 ml -200 ml








Exam


GENERAL: Well-nourished well-developed lady comfortable at rest no acute 

distress


VITAL SIGNS:  per chart


NECK:  Supple.  No JVD or lymphadenopathy.


CARDIAC EXAM:  S1, S2. No added sounds or murmurs.


CHEST: Diminished air entry right lung chest tube in place.


ABDOMEN:  Soft, nontender.  No guarding or rebound.


EXTREMITIES:  No cyanosis, clubbing or edema.


NEUROLOGIC:  Generalized weakness.  No focal deficits.





Results/Medications


Result Diagram:  


11/12/17 0420                                                                  

              11/11/17 0425





Results 24 hrs





Laboratory Tests








Test


  11/11/17


17:30 11/11/17


22:09 11/12/17


04:20 11/12/17


08:35


 


Bedside Glucose 290  H 180    105  


 


White Blood Count   13.1  H 


 


Red Blood Count   4.11  L 


 


Hemoglobin   10.1  L 


 


Hematocrit   32.5  L 


 


Mean Corpuscular Volume   79.1  L 


 


Mean Corpuscular Hemoglobin   24.6  L 


 


Mean Corpuscular Hemoglobin


Concent 


  


  31.1  L


  


 


 


Red Cell Distribution Width   23.1  H 


 


Platelet Count   134  L 


 


Mean Platelet Volume   9.1   


 


Neutrophils %   77.9  H 


 


Lymphocytes %   12.4  L 


 


Monocytes %   5.4   


 


Eosinophils %   0.0   


 


Basophils %   0.3   


 


Nucleated Red Blood Cells %   0.0   


 


Neutrophils #   10.2  H 


 


Lymphocytes #   1.6   


 


Monocytes #   0.7   


 


Eosinophils #   0.0   


 


Basophils #   0.0   


 


Nucleated Red Blood Cells #   0.0   














Test


  11/12/17


12:33 


  


  


 


 


Bedside Glucose 141     








Medications





 Current Medications


Dexamethasone (Decadron) 4 mg BID PO  Last administered on 11/12/17at 09:34; 

Admin Dose 4 MG;  Start 10/24/17 at 21:00


Docusate Sodium (Colace) 200 mg QHS PO  Last administered on 11/10/17at 21:41; 

Admin Dose 200 MG;  Start 10/24/17 at 21:00


Ondansetron HCl (Zofran Inj) 4 mg Q6H  PRN IV NAUSEA AND/OR VOMITING;  Start 10/

24/17 at 21:00


Acetaminophen (Tylenol Tab) 650 mg Q6H  PRN PO PAIN LEVEL 1-3 OR FEVER Last 

administered on 11/1/17at 07:13; Admin Dose 650 MG;  Start 10/24/17 at 21:00


Oxycodone/ Acetaminophen (Percocet (5/ 325)) 1 tab Q6H  PRN PO MODERATE PAIN 

LEVEL 4-6;  Start 10/24/17 at 21:00


Morphine Sulfate (morphine) 2 mg Q4H  PRN IV SEVERE PAIN LEVEL 7-10 Last 

administered on 11/1/17at 02:47; Admin Dose 2 MG;  Start 10/24/17 at 21:00


Docusate Sodium (Colace) 100 mg Q12H  PRN PO CONSTIPATION Last administered on 

11/11/17at 22:10; Admin Dose 100 MG;  Start 10/24/17 at 21:00


Magnesium Hydroxide (Milk Of Mag) 30 ml DAILY  PRN PO CONSTIPATION;  Start 10/24

/17 at 21:00


Famotidine (Pepcid) 20 mg Q12 PO  Last administered on 11/12/17at 09:34; Admin 

Dose 20 MG;  Start 10/24/17 at 21:00


Albuterol (Proventil 0.083% (Neb)) 2.5 mg Q2  PRN HHN SHORTNESS OF BREATH;  

Start 10/24/17 at 21:00


Miscellaneous Information 1 ea NOTE XX ;  Start 10/24/17 at 21:30


Glucose (Glutose) 15 gm Q15M  PRN PO DECREASED GLUCOSE;  Start 10/24/17 at 21:30


Glucose (Glutose) 22.5 gm Q15M  PRN PO DECREASED GLUCOSE;  Start 10/24/17 at 21:

30


Dextrose (D50w Syringe) 25 ml Q15M  PRN IV DECREASED GLUCOSE;  Start 10/24/17 

at 21:30


Dextrose (D50w Syringe) 50 ml Q15M  PRN IV DECREASED GLUCOSE;  Start 10/24/17 

at 21:30


Glucagon (Glucagen) 1 mg Q15M  PRN IM DECREASED GLUCOSE;  Start 10/24/17 at 21:

30


Glucose (Glutose) 15 gm Q15M  PRN BUCCAL DECREASED GLUCOSE;  Start 10/24/17 at 

21:30


Nystatin (Nystatin Powder) 1 applic BID TOP  Last administered on 11/11/17at 21:

00; Admin Dose 1 APPLIC;  Start 10/25/17 at 09:00


Tamoxifen Citrate 20 mg 20 mg DAILY PO  Last administered on 11/12/17at 09:35; 

Admin Dose 20 MG;  Start 10/25/17 at 13:00


Ceftriaxone Sodium (Rocephin) 50 ml @  100 mls/hr Q24H IVPB  Last administered 

on 11/11/17at 14:47; Admin Dose 100 MLS/HR;  Start 10/27/17 at 14:00


Diagnostic Test (Pha) (Accu-Chek) 1 ea 02 XX  Last administered on 11/9/17at 02:

07; Admin Dose 1 EA;  Start 11/4/17 at 02:00





Assessment/Plan


Chief Complaint/Hosp Course


Assessment





1.  Metastatic breast cancer with recurrent right pleural effusion.  Cultures 

growing gram-positive cocci consistent with staph aureus.


2.  Loculated pleural effusion possible empyema status post decortication and 

pleurodesis.  Still with moderate chest tube output.


3.  CNS metastasis currently receiving steroids and radiation therapy








Plan





1.  Continue chest tube drainage.  Discussed with thoracic surgery will change 

to [place] tube so that patient can be discharged home.


2.  Continue oncology recommendations continues radiation therapy for CNS 

metastasis


3.  Leukocytosis likely secondary to dexamethasone.





Continue radiation therapy


Discharge planning.


Problems:  











DELFIN LAKHANI MD, [facility] Nov 12, 2017 12:52

## 2017-11-12 NOTE — CONS
Date/Time of Note


Date/Time of Note


DATE: 11/12/17 


TIME: 15:17





Consultation Date/Type/Reason


Admit Date/Time


Oct 24, 2017 at 18:29


Initial Consult Date


SUBJECTIVE:  


46 y/o female  with Empyema 2/2 staph aureus s/p VATS 10/31/17. Metastatic 

breast CA.  


 No events overnight.  The patient is alert, looks comfortable.  Denies fever, 

chest pain or SOB. 


VS: 123/79  P:67   R:20    T:98.6   SO2:96%





LABS: WBC- 13.1  H&H: 10.1/32.5  


CXR 11/12/17:  


1.Stable consolidation and partially loculated effusion in the right lower lung.


2.  Removal of one of the right-sided chest tube seen previously, without 

pneumothorax.


3.  Single remaining chest tube in place coiled within the right lung base, 

stable.


4.  Cardiomegaly with minimal central vascular congestion, stable over time.





Pleural fluid culture on admission grew oxacillin-sensitive Staphylococcus 

aureus. 





ANTIMICROBIALS:  The patient is on IV Rocephin.


 


INDWELLINGS:  Right chest tube.


 


PHYSICAL EXAMINATION:


GENERAL:  This is an obese, well-developed, middle-aged woman who is alert, in 

no distress.


HEENT:  Head atraumatic, normocephalic.  Sclerae anicteric.  Buccal mucosa dry.


NECK:  Supple.


CHEST:  Rise symmetrical.  Breath sounds diminished to bases.


HEART:  S1, S2.


ABDOMEN:  Soft, bowel tones present.


EXTREMITIES:  Without cyanosis.


 


ASSESSMENT:


1.  Loculated right pleural effusion status post biopsy with pleurocentesis.


2.  Metastatic breast cancer.


3.  Diabetes.


4.  Obesity.


5.  History of craniectomy for brain metastases. CNS metastasis currently 

receiving steroids and radiation therapy


 


PLAN:  Patient remained stable. Continue IV Rocephin x4 wks.  


 





Type of Consultation:  ID


Referring Provider:  CLAU AGUILAR





Exam/Review of Systems


Vital Signs


Vitals





 Vital Signs








  Date Time  Temp Pulse Resp B/P Pulse Ox O2 Delivery O2 Flow Rate FiO2


 


11/12/17 08:08 98.6 67 20 123/79 96   














 Intake and Output   


 


 11/11/17 11/11/17 11/12/17





 15:00 23:00 07:00


 


Intake Total  1050 ml 1000 ml


 


Output Total  300 ml 1200 ml


 


Balance  750 ml -200 ml











Results


Result Diagram:  


11/12/17 0420                                                                  

              11/11/17 0425





Results 24 hrs





Laboratory Tests








Test


  11/11/17


17:30 11/11/17


22:09 11/12/17


04:20 11/12/17


08:35


 


Bedside Glucose 290  H 180    105  


 


White Blood Count   13.1  H 


 


Red Blood Count   4.11  L 


 


Hemoglobin   10.1  L 


 


Hematocrit   32.5  L 


 


Mean Corpuscular Volume   79.1  L 


 


Mean Corpuscular Hemoglobin   24.6  L 


 


Mean Corpuscular Hemoglobin


Concent 


  


  31.1  L


  


 


 


Red Cell Distribution Width   23.1  H 


 


Platelet Count   134  L 


 


Mean Platelet Volume   9.1   


 


Neutrophils %   77.9  H 


 


Lymphocytes %   12.4  L 


 


Monocytes %   5.4   


 


Eosinophils %   0.0   


 


Basophils %   0.3   


 


Nucleated Red Blood Cells %   0.0   


 


Neutrophils #   10.2  H 


 


Lymphocytes #   1.6   


 


Monocytes #   0.7   


 


Eosinophils #   0.0   


 


Basophils #   0.0   


 


Nucleated Red Blood Cells #   0.0   














Test


  11/12/17


12:33 


  


  


 


 


Bedside Glucose 141     











Medications


Medications





 Current Medications


Dexamethasone (Decadron) 4 mg BID PO  Last administered on 11/12/17at 09:34; 

Admin Dose 4 MG;  Start 10/24/17 at 21:00


Docusate Sodium (Colace) 200 mg QHS PO  Last administered on 11/10/17at 21:41; 

Admin Dose 200 MG;  Start 10/24/17 at 21:00


Ondansetron HCl (Zofran Inj) 4 mg Q6H  PRN IV NAUSEA AND/OR VOMITING;  Start 10/

24/17 at 21:00


Acetaminophen (Tylenol Tab) 650 mg Q6H  PRN PO PAIN LEVEL 1-3 OR FEVER Last 

administered on 11/1/17at 07:13; Admin Dose 650 MG;  Start 10/24/17 at 21:00


Oxycodone/ Acetaminophen (Percocet (5/ 325)) 1 tab Q6H  PRN PO MODERATE PAIN 

LEVEL 4-6;  Start 10/24/17 at 21:00


Morphine Sulfate (morphine) 2 mg Q4H  PRN IV SEVERE PAIN LEVEL 7-10 Last 

administered on 11/1/17at 02:47; Admin Dose 2 MG;  Start 10/24/17 at 21:00


Docusate Sodium (Colace) 100 mg Q12H  PRN PO CONSTIPATION Last administered on 

11/11/17at 22:10; Admin Dose 100 MG;  Start 10/24/17 at 21:00


Magnesium Hydroxide (Milk Of Mag) 30 ml DAILY  PRN PO CONSTIPATION;  Start 10/24

/17 at 21:00


Famotidine (Pepcid) 20 mg Q12 PO  Last administered on 11/12/17at 09:34; Admin 

Dose 20 MG;  Start 10/24/17 at 21:00


Albuterol (Proventil 0.083% (Neb)) 2.5 mg Q2  PRN HHN SHORTNESS OF BREATH;  

Start 10/24/17 at 21:00


Miscellaneous Information 1 ea NOTE XX ;  Start 10/24/17 at 21:30


Glucose (Glutose) 15 gm Q15M  PRN PO DECREASED GLUCOSE;  Start 10/24/17 at 21:30


Glucose (Glutose) 22.5 gm Q15M  PRN PO DECREASED GLUCOSE;  Start 10/24/17 at 21:

30


Dextrose (D50w Syringe) 25 ml Q15M  PRN IV DECREASED GLUCOSE;  Start 10/24/17 

at 21:30


Dextrose (D50w Syringe) 50 ml Q15M  PRN IV DECREASED GLUCOSE;  Start 10/24/17 

at 21:30


Glucagon (Glucagen) 1 mg Q15M  PRN IM DECREASED GLUCOSE;  Start 10/24/17 at 21:

30


Glucose (Glutose) 15 gm Q15M  PRN BUCCAL DECREASED GLUCOSE;  Start 10/24/17 at 

21:30


Nystatin (Nystatin Powder) 1 applic BID TOP  Last administered on 11/11/17at 21:

00; Admin Dose 1 APPLIC;  Start 10/25/17 at 09:00


Tamoxifen Citrate 20 mg 20 mg DAILY PO  Last administered on 11/12/17at 09:35; 

Admin Dose 20 MG;  Start 10/25/17 at 13:00


Ceftriaxone Sodium (Rocephin) 50 ml @  100 mls/hr Q24H IVPB  Last administered 

on 11/12/17at 14:46; Admin Dose 100 MLS/HR;  Start 10/27/17 at 14:00


Diagnostic Test (Pha) (Accu-Chek) 1 ea 02 XX  Last administered on 11/9/17at 02:

07; Admin Dose 1 EA;  Start 11/4/17 at 02:00











GABRIEL TONG Nov 12, 2017 15:24

## 2017-11-12 NOTE — PN
Date/Time of Note


Date/Time of Note


DATE: 11/12/17 


TIME: 18:38





Assessment/Plan


Lines/Catheters


IV Catheter Type (from Nrsg):  Saline Lock


Guerrero in Place (from Nrsg):  No





Assessment/Plan


Chief Complaint/Hosp Course


 


IMPRESSION:  Right loculated pleural effusion.


 


R status post right VATS pleurodesis decortication


Patient feels much better


CT DCed


NAHUM  150 cc


pt may go home with NAHUM


Check chest x-ray


Problems:  





Subjective


24 Hr Interval Summary


Constitutional:  improved


Pain Control:  mild





Exam/Review of Systems


Vital Signs


Vitals





 Vital Signs








  Date Time  Temp Pulse Resp B/P Pulse Ox O2 Delivery O2 Flow Rate FiO2


 


11/12/17 15:23 98.6 93 20 148/89 93   














 Intake and Output   


 


 11/11/17 11/11/17 11/12/17





 15:00 23:00 07:00


 


Intake Total  1050 ml 1000 ml


 


Output Total  300 ml 1200 ml


 


Balance  750 ml -200 ml











Exam


ENMT:  mucosa pink and moist, nl external ears & nose, nl lips & teeth, nl 

nasal mucosa & septum


Neck:  non-tender, supple


Respiratory:  clear to auscultation, normal air movement, 


   No congested cough, No crackles/rales, No diminished breath sounds, No 

intercostal retraction, No labored breathing, No other, No respirations, No 

tactile fremitus, No wheezing


Cardiovascular:  nl pulses, regular rate and rhythm


Gastrointestinal:  nl liver, spleen, non-tender, soft





Results


Result Diagram:  


11/12/17 0420                                                                  

              11/11/17 0425














HEIDY MAXWELL MD Nov 12, 2017 18:39

## 2017-11-12 NOTE — PN
Date/Time of Note


Date/Time of Note


DATE: 11/12/17 


TIME: 11:10





Assessment/Plan


VTE Prophylaxis


VTE Prophylaxis Intervention:  SCD's





Lines/Catheters


IV Catheter Type (from [facility]):  Saline Lock


Urinary Cath still in place:  No





Assessment/Plan


Assessment/Plan


1.  Empyema 2/2 staph aureus s/p VATS 10/31/17


- Patient doing well and chest tube still in place and draining serous fluid


- Per pulm, spoke with CT surgery about changing patient to [place] drain so 

can be d/c home


- One chest tube has been removed and patient feeling more comfortably. Repeat 

CXR shows stable consolidation and partially loculated effusion in the right 

lower lung.


- On incentive spirometry


- CT surgery on board and appreciate consultation


- ID on board and recommendations appreciated. continue on IV antibiotics for 4 

weeks (on day 18)


- Ultrasound-guided thoracentesis performed, cultures showing staph aureus





2.  Dysfunctional Pleurx catheter


- Pulmonology on board and consultation appreciated


- Respiratory status stable at this time





3.  Cellulitis, area around catheter- improved


- ID on board and recommendations appreciated





4.  Metastatic breast cancer, lung, liver, brain, s/p craniectomy 


- currently receiving radiation therapy and tamoxifen and Decadron


- Heme/onc on board and consultation appreciated


- Radiation as scheduled





5. Diabetes mellitus


- Currently stable





6. Obesity


- Counseled about diet and improving exercise routine when able





7. Disposition


- Continue monitoring on med/surg while chest tube in place


- patient planning to return home and requesting home health services be 

continued


- Will need IV antibiotics (on day 18/28)





Subjective


24 Hr Interval Summary


Free Text/Dictation


Patient doing well and feeling more comfortable after removal of one chest 

tube.  Denies any respiratory issues and saturating 100% on RA.  No acute 

overnight events.





Exam/Review of Systems


Vital Signs


Vitals





 Vital Signs








  Date Time  Temp Pulse Resp B/P Pulse Ox O2 Delivery O2 Flow Rate FiO2


 


11/12/17 08:08 98.6 67 20 123/79 96   














 Intake and Output   


 


 11/11/17 11/11/17 11/12/17





 15:00 23:00 07:00


 


Intake Total  1050 ml 1000 ml


 


Output Total  300 ml 1200 ml


 


Balance  750 ml -200 ml











Exam


General: Patient awake and alert, no acute distress. 


Head: R side craniectomy site healing well. improving fluctuant edema right side


Eyes: EOMI, pupils reactive to light


Neck: Supple, nontender, midline


Respiratory: diminished air entry with 1 chest tube in place


Cardiovascular: regular rate, no obvious murmurs


Gastrointestinal: non-tender to palpation, bowel sounds heard.


Neurological: Moves all extremities spontaneously





Results


Result Diagram:  


11/12/17 0420                                                                  

              11/11/17 0425





Results 24 hrs





Laboratory Tests








Test


  11/11/17


12:44 11/11/17


17:30 11/11/17


22:09 11/12/17


04:20


 


Bedside Glucose 194   290  H 180   


 


White Blood Count    13.1  H


 


Red Blood Count    4.11  L


 


Hemoglobin    10.1  L


 


Hematocrit    32.5  L


 


Mean Corpuscular Volume    79.1  L


 


Mean Corpuscular Hemoglobin    24.6  L


 


Mean Corpuscular Hemoglobin


Concent 


  


  


  31.1  L


 


 


Red Cell Distribution Width    23.1  H


 


Platelet Count    134  L


 


Mean Platelet Volume    9.1  


 


Neutrophils %    77.9  H


 


Lymphocytes %    12.4  L


 


Monocytes %    5.4  


 


Eosinophils %    0.0  


 


Basophils %    0.3  


 


Nucleated Red Blood Cells %    0.0  


 


Neutrophils #    10.2  H


 


Lymphocytes #    1.6  


 


Monocytes #    0.7  


 


Eosinophils #    0.0  


 


Basophils #    0.0  


 


Nucleated Red Blood Cells #    0.0  














Test


  11/12/17


08:35 


  


  


 


 


Bedside Glucose 105     











Medications


Medications





 Current Medications


Dexamethasone (Decadron) 4 mg BID PO  Last administered on 11/12/17at 09:34; 

Admin Dose 4 MG;  Start 10/24/17 at 21:00


Docusate Sodium (Colace) 200 mg QHS PO  Last administered on 11/10/17at 21:41; 

Admin Dose 200 MG;  Start 10/24/17 at 21:00


Ondansetron HCl (Zofran Inj) 4 mg Q6H  PRN IV NAUSEA AND/OR VOMITING;  Start 10/

24/17 at 21:00


Acetaminophen (Tylenol Tab) 650 mg Q6H  PRN PO PAIN LEVEL 1-3 OR FEVER Last 

administered on 11/1/17at 07:13; Admin Dose 650 MG;  Start 10/24/17 at 21:00


Oxycodone/ Acetaminophen (Percocet (5/ 325)) 1 tab Q6H  PRN PO MODERATE PAIN 

LEVEL 4-6;  Start 10/24/17 at 21:00


Morphine Sulfate (morphine) 2 mg Q4H  PRN IV SEVERE PAIN LEVEL 7-10 Last 

administered on 11/1/17at 02:47; Admin Dose 2 MG;  Start 10/24/17 at 21:00


Docusate Sodium (Colace) 100 mg Q12H  PRN PO CONSTIPATION Last administered on 

11/11/17at 22:10; Admin Dose 100 MG;  Start 10/24/17 at 21:00


Magnesium Hydroxide (Milk Of Mag) 30 ml DAILY  PRN PO CONSTIPATION;  Start 10/24

/17 at 21:00


Famotidine (Pepcid) 20 mg Q12 PO  Last administered on 11/12/17at 09:34; Admin 

Dose 20 MG;  Start 10/24/17 at 21:00


Albuterol (Proventil 0.083% (Neb)) 2.5 mg Q2  PRN HHN SHORTNESS OF BREATH;  

Start 10/24/17 at 21:00


Miscellaneous Information 1 ea NOTE XX ;  Start 10/24/17 at 21:30


Glucose (Glutose) 15 gm Q15M  PRN PO DECREASED GLUCOSE;  Start 10/24/17 at 21:30


Glucose (Glutose) 22.5 gm Q15M  PRN PO DECREASED GLUCOSE;  Start 10/24/17 at 21:

30


Dextrose (D50w Syringe) 25 ml Q15M  PRN IV DECREASED GLUCOSE;  Start 10/24/17 

at 21:30


Dextrose (D50w Syringe) 50 ml Q15M  PRN IV DECREASED GLUCOSE;  Start 10/24/17 

at 21:30


Glucagon (Glucagen) 1 mg Q15M  PRN IM DECREASED GLUCOSE;  Start 10/24/17 at 21:

30


Glucose (Glutose) 15 gm Q15M  PRN BUCCAL DECREASED GLUCOSE;  Start 10/24/17 at 

21:30


Nystatin (Nystatin Powder) 1 applic BID TOP  Last administered on 11/11/17at 21:

00; Admin Dose 1 APPLIC;  Start 10/25/17 at 09:00


Tamoxifen Citrate 20 mg 20 mg DAILY PO  Last administered on 11/12/17at 09:35; 

Admin Dose 20 MG;  Start 10/25/17 at 13:00


Ceftriaxone Sodium (Rocephin) 50 ml @  100 mls/hr Q24H IVPB  Last administered 

on 11/11/17at 14:47; Admin Dose 100 MLS/HR;  Start 10/27/17 at 14:00


Diagnostic Test (Pha) (Accu-Chek) 1 ea 02 XX  Last administered on 11/9/17at 02:

07; Admin Dose 1 EA;  Start 11/4/17 at 02:00











RIVERA RAWLS MD Nov 12, 2017 11:10

## 2017-11-13 VITALS — DIASTOLIC BLOOD PRESSURE: 78 MMHG | SYSTOLIC BLOOD PRESSURE: 116 MMHG | RESPIRATION RATE: 18 BRPM

## 2017-11-13 VITALS — DIASTOLIC BLOOD PRESSURE: 78 MMHG | SYSTOLIC BLOOD PRESSURE: 116 MMHG | RESPIRATION RATE: 22 BRPM | HEART RATE: 75 BPM

## 2017-11-13 VITALS — DIASTOLIC BLOOD PRESSURE: 61 MMHG | SYSTOLIC BLOOD PRESSURE: 132 MMHG | RESPIRATION RATE: 19 BRPM

## 2017-11-13 VITALS — RESPIRATION RATE: 18 BRPM | SYSTOLIC BLOOD PRESSURE: 144 MMHG | DIASTOLIC BLOOD PRESSURE: 74 MMHG

## 2017-11-13 LAB
ABNORMAL IP MESSAGE: 1
ALBUMIN SERPL-MCNC: 2.6 G/DL (ref 3.3–4.9)
ANION GAP SERPL CALC-SCNC: 11 MMOL/L (ref 8–16)
BASOPHILS # BLD AUTO: 0 10^3/UL (ref 0–0.1)
BASOPHILS NFR BLD: 0.2 % (ref 0–2)
BUN SERPL-MCNC: 20 MG/DL (ref 7–20)
CALCIUM SERPL-MCNC: 8.4 MG/DL (ref 8.4–10.2)
CHLORIDE SERPL-SCNC: 107 MMOL/L (ref 97–110)
CO2 SERPL-SCNC: 23 MMOL/L (ref 21–31)
CREAT SERPL-MCNC: 0.41 MG/DL (ref 0.44–1)
EOSINOPHIL # BLD: 0 10^3/UL (ref 0–0.5)
EOSINOPHIL NFR BLD: 0 % (ref 0–7)
ERYTHROCYTE [DISTWIDTH] IN BLOOD BY AUTOMATED COUNT: 23.2 % (ref 11.5–14.5)
GLUCOSE SERPL-MCNC: 155 MG/DL (ref 70–220)
HCT VFR BLD CALC: 32.6 % (ref 37–47)
HGB BLD-MCNC: 10.1 G/DL (ref 12–16)
LYMPHOCYTES # BLD AUTO: 1.5 10^3/UL (ref 0.8–2.9)
LYMPHOCYTES NFR BLD AUTO: 12.3 % (ref 15–51)
MAGNESIUM SERPL-MCNC: 1.8 MG/DL (ref 1.7–2.5)
MCH RBC QN AUTO: 24.8 PG (ref 29–33)
MCHC RBC AUTO-ENTMCNC: 31 G/DL (ref 32–37)
MCV RBC AUTO: 80.1 FL (ref 82–101)
MONOCYTES # BLD: 0.7 10^3/UL (ref 0.3–0.9)
MONOCYTES NFR BLD: 5.7 % (ref 0–11)
NEUTROPHILS # BLD: 9.5 10^3/UL (ref 1.6–7.5)
NEUTROPHILS NFR BLD AUTO: 76.9 % (ref 39–77)
NRBC # BLD MANUAL: 0 10^3/UL (ref 0–0)
NRBC BLD AUTO-RTO: 0 /100WBC (ref 0–0)
PHOSPHATE SERPL-MCNC: 3.7 MG/DL (ref 2.5–4.9)
PLATELET # BLD: 137 10^3/UL (ref 140–415)
PMV BLD AUTO: 8.8 FL (ref 7.4–10.4)
POSITIVE DIFF: (no result)
POTASSIUM SERPL-SCNC: 4.3 MMOL/L (ref 3.5–5.1)
RBC # BLD AUTO: 4.07 10^6/UL (ref 4.2–5.4)
SODIUM SERPL-SCNC: 137 MMOL/L (ref 135–144)
WBC # BLD AUTO: 12.4 10^3/UL (ref 4.8–10.8)

## 2017-11-13 RX ADMIN — DOCUSATE SODIUM SCH MG: 100 CAPSULE, LIQUID FILLED ORAL at 21:45

## 2017-11-13 RX ADMIN — NYSTATIN SCH APPLIC: 100000 POWDER TOPICAL at 10:22

## 2017-11-13 RX ADMIN — NYSTATIN SCH APPLIC: 100000 POWDER TOPICAL at 21:45

## 2017-11-13 RX ADMIN — TAMOXIFEN CITRATE SCH MG: 10 TABLET, FILM COATED ORAL at 10:22

## 2017-11-13 RX ADMIN — CEFTRIAXONE SCH MLS/HR: 1 INJECTION, SOLUTION INTRAVENOUS at 14:50

## 2017-11-13 RX ADMIN — FAMOTIDINE SCH MG: 20 TABLET ORAL at 21:45

## 2017-11-13 RX ADMIN — FAMOTIDINE SCH MG: 20 TABLET ORAL at 10:21

## 2017-11-13 NOTE — CONS
Date/Time of Note


Date/Time of Note


DATE: 11/13/17 


TIME: 15:11





Consult Date/Type/Reason


Admit Date/Time


Oct 24, 2017 at 18:29


Initial Consult Date


10/25/17


Type of Consultation:  Pulm


Ordering Provider:  CLAU AGUILAR





Subjective


Patient comfortable this morning.  Status post radiation therapy.  Chest tube 

to be removed.





Objective





 Vital Signs








  Date Time  Temp Pulse Resp B/P Pulse Ox O2 Delivery O2 Flow Rate FiO2


 


11/13/17 08:31 98.6 75 22 116/78 98 Room Air  














 Intake and Output   


 


 11/12/17 11/12/17 11/13/17





 15:00 23:00 07:00


 


Intake Total  750 ml 500 ml


 


Output Total  70 ml 70 ml


 


Balance  680 ml 430 ml








Exam


GENERAL: Well-nourished well-developed lady comfortable at rest no acute 

distress


VITAL SIGNS:  per chart


NECK:  Supple.  No JVD or lymphadenopathy.


CARDIAC EXAM:  S1, S2. No added sounds or murmurs.


CHEST: Diminished air entry right lung chest tube in place.


ABDOMEN:  Soft, nontender.  No guarding or rebound.


EXTREMITIES:  No cyanosis, clubbing or edema.


NEUROLOGIC:  Generalized weakness.  No focal deficits.





Results/Medications


Result Diagram:  


11/13/17 0446                                                                  

              11/13/17 0447





Results 24 hrs





Laboratory Tests








Test


  11/12/17


17:44 11/12/17


22:07 11/13/17


04:46 11/13/17


04:47


 


Bedside Glucose 232  H 162    


 


White Blood Count   12.4  H 


 


Red Blood Count   4.07  L 


 


Hemoglobin   10.1  L 


 


Hematocrit   32.6  L 


 


Mean Corpuscular Volume   80.1  L 


 


Mean Corpuscular Hemoglobin   24.8  L 


 


Mean Corpuscular Hemoglobin


Concent 


  


  31.0  L


  


 


 


Red Cell Distribution Width   23.2  H 


 


Platelet Count   137  L 


 


Mean Platelet Volume   8.8   


 


Neutrophils %   76.9   


 


Lymphocytes %   12.3  L 


 


Monocytes %   5.7   


 


Eosinophils %   0.0   


 


Basophils %   0.2   


 


Nucleated Red Blood Cells %   0.0   


 


Neutrophils #   9.5  H 


 


Lymphocytes #   1.5   


 


Monocytes #   0.7   


 


Eosinophils #   0.0   


 


Basophils #   0.0   


 


Nucleated Red Blood Cells #   0.0   


 


Sodium Level    137  


 


Potassium Level    4.3  


 


Chloride Level    107  


 


Carbon Dioxide Level    23  


 


Anion Gap    11  


 


Blood Urea Nitrogen    20  


 


Creatinine    0.41  L


 


Glucose Level    155  


 


Calcium Level    8.4  


 


Phosphorus Level    3.7  


 


Magnesium Level    1.8  


 


Albumin    2.6  L














Test


  11/13/17


08:44 11/13/17


13:52 


  


 


 


Bedside Glucose 118   146    








Medications





 Current Medications


Dexamethasone (Decadron) 4 mg BID PO  Last administered on 11/13/17at 10:21; 

Admin Dose 4 MG;  Start 10/24/17 at 21:00


Docusate Sodium (Colace) 200 mg QHS PO  Last administered on 11/12/17at 22:04; 

Admin Dose 200 MG;  Start 10/24/17 at 21:00


Ondansetron HCl (Zofran Inj) 4 mg Q6H  PRN IV NAUSEA AND/OR VOMITING;  Start 10/

24/17 at 21:00


Acetaminophen (Tylenol Tab) 650 mg Q6H  PRN PO PAIN LEVEL 1-3 OR FEVER Last 

administered on 11/1/17at 07:13; Admin Dose 650 MG;  Start 10/24/17 at 21:00


Oxycodone/ Acetaminophen (Percocet (5/ 325)) 1 tab Q6H  PRN PO MODERATE PAIN 

LEVEL 4-6;  Start 10/24/17 at 21:00


Morphine Sulfate (morphine) 2 mg Q4H  PRN IV SEVERE PAIN LEVEL 7-10 Last 

administered on 11/1/17at 02:47; Admin Dose 2 MG;  Start 10/24/17 at 21:00


Docusate Sodium (Colace) 100 mg Q12H  PRN PO CONSTIPATION Last administered on 

11/11/17at 22:10; Admin Dose 100 MG;  Start 10/24/17 at 21:00


Magnesium Hydroxide (Milk Of Mag) 30 ml DAILY  PRN PO CONSTIPATION;  Start 10/24

/17 at 21:00


Famotidine (Pepcid) 20 mg Q12 PO  Last administered on 11/13/17at 10:21; Admin 

Dose 20 MG;  Start 10/24/17 at 21:00


Albuterol (Proventil 0.083% (Neb)) 2.5 mg Q2  PRN HHN SHORTNESS OF BREATH;  

Start 10/24/17 at 21:00


Miscellaneous Information 1 ea NOTE XX ;  Start 10/24/17 at 21:30


Glucose (Glutose) 15 gm Q15M  PRN PO DECREASED GLUCOSE;  Start 10/24/17 at 21:30


Glucose (Glutose) 22.5 gm Q15M  PRN PO DECREASED GLUCOSE;  Start 10/24/17 at 21:

30


Dextrose (D50w Syringe) 25 ml Q15M  PRN IV DECREASED GLUCOSE;  Start 10/24/17 

at 21:30


Dextrose (D50w Syringe) 50 ml Q15M  PRN IV DECREASED GLUCOSE;  Start 10/24/17 

at 21:30


Glucagon (Glucagen) 1 mg Q15M  PRN IM DECREASED GLUCOSE;  Start 10/24/17 at 21:

30


Glucose (Glutose) 15 gm Q15M  PRN BUCCAL DECREASED GLUCOSE;  Start 10/24/17 at 

21:30


Nystatin (Nystatin Powder) 1 applic BID TOP  Last administered on 11/13/17at 10:

22; Admin Dose 1 APPLIC;  Start 10/25/17 at 09:00


Tamoxifen Citrate 20 mg 20 mg DAILY PO  Last administered on 11/13/17at 10:22; 

Admin Dose 20 MG;  Start 10/25/17 at 13:00


Ceftriaxone Sodium (Rocephin) 50 ml @  100 mls/hr Q24H IVPB  Last administered 

on 11/13/17at 14:50; Admin Dose 100 MLS/HR;  Start 10/27/17 at 14:00


Diagnostic Test (Pha) (Accu-Chek) 1 ea 02 XX  Last administered on 11/9/17at 02:

07; Admin Dose 1 EA;  Start 11/4/17 at 02:00





Assessment/Plan


Chief Complaint/Hosp Course


Assessment





1.  Metastatic breast cancer with recurrent right pleural effusion.  Cultures 

growing gram-positive cocci consistent with staph aureus.


2.  Loculated pleural effusion possible empyema status post decortication and 

pleurodesis.  Still with moderate chest tube output.


3.  CNS metastasis currently receiving steroids and radiation therapy








Plan





1.  Continue chest tube drainage.  Discussed with thoracic surgery will change 

to [place] tube so that patient can be discharged home.


2.  Continue oncology recommendations continues radiation therapy for CNS 

metastasis


3.  Leukocytosis likely secondary to dexamethasone.





Repeat chest x-ray in a.m. if still has right pleural effusion despite chest 

tube removal will order thoracentesis prior to discharge.





Continue radiation therapy


Discharge planning.


Problems:  











DELFIN LAKHANI [facility] Nov 13, 2017 15:12

## 2017-11-13 NOTE — PN
Date/Time of Note


Date/Time of Note


DATE: 11/13/17 


TIME: 09:51





Assessment/Plan


VTE Prophylaxis


VTE Prophylaxis Intervention:  contraindicated





Lines/Catheters


IV Catheter Type (from [facility]):  Peripheral IV


Urinary Cath still in place:  No





Assessment/Plan


Assessment/Plan


1.  Empyema 2/2 staph aureus s/p VATS 10/31/17


- Patient doing well and chest tube still in place and draining serous fluid


- Pulmonology on board and recommendations appreciated. 


- One chest tube has been removed and patient feeling more comfortably. Repeat 

CXR shows no change to the consolidation, atelectasis and partially loculated 

right pleural fluid accumulation.


- On incentive spirometry


- CT surgery on board and appreciate consultation


- ID on board and recommendations appreciated. continue on IV antibiotics until 

11/25


- Ultrasound-guided thoracentesis performed, cultures showing staph aureus





2.  Dysfunctional Pleurx catheter


- Pulmonology on board and consultation appreciated


- Respiratory status stable at this time





3.  Cellulitis, area around catheter- improved


- ID on board and recommendations appreciated





4.  Metastatic breast cancer, lung, liver, brain, s/p craniectomy 


- currently receiving radiation therapy and tamoxifen and Decadron


- Heme/onc on board and consultation appreciated


- Radiation as scheduled





5. Diabetes mellitus


- Currently stable





6. Obesity


- Counseled about diet and improving exercise routine when able





7. Disposition


- Awaiting CT surgery for clearance for discharge. per note, plan for home with 

NAHUM drain


- Continue antibiotics until 11/25


- patient planning to return home and requesting home health services be 

continued





Subjective


24 Hr Interval Summary


Free Text/Dictation


Patient doing well and eager to go home.  No acute overnight events and no new 

complaints.





Exam/Review of Systems


Vital Signs


Vitals





 Vital Signs








  Date Time  Temp Pulse Resp B/P Pulse Ox O2 Delivery O2 Flow Rate FiO2


 


11/13/17 08:31 98.6 75 22 116/78 98 Room Air  














 Intake and Output   


 


 11/12/17 11/12/17 11/13/17





 15:00 23:00 07:00


 


Intake Total  750 ml 500 ml


 


Output Total  70 ml 70 ml


 


Balance  680 ml 430 ml











Exam


General: Patient awake and alert, no acute distress. 


Head: R side craniectomy site healing well.


Eyes: EOMI, pupils reactive to light


Neck: Supple, nontender, midline


Respiratory: diminished air entry with chest tube in place. previous CT 

incision site healing well 


Cardiovascular: regular rate, no obvious murmurs


Gastrointestinal: non-tender to palpation, bowel sounds heard.


Neurological: Moves all extremities spontaneously





Results


Result Diagram:  


11/13/17 0446                                                                  

              11/13/17 0447





Results 24 hrs





Laboratory Tests








Test


  11/12/17


12:33 11/12/17


17:44 11/12/17


22:07 11/13/17


04:46


 


Bedside Glucose 141   232  H 162   


 


White Blood Count    12.4  H


 


Red Blood Count    4.07  L


 


Hemoglobin    10.1  L


 


Hematocrit    32.6  L


 


Mean Corpuscular Volume    80.1  L


 


Mean Corpuscular Hemoglobin    24.8  L


 


Mean Corpuscular Hemoglobin


Concent 


  


  


  31.0  L


 


 


Red Cell Distribution Width    23.2  H


 


Platelet Count    137  L


 


Mean Platelet Volume    8.8  


 


Neutrophils %    76.9  


 


Lymphocytes %    12.3  L


 


Monocytes %    5.7  


 


Eosinophils %    0.0  


 


Basophils %    0.2  


 


Nucleated Red Blood Cells %    0.0  


 


Neutrophils #    9.5  H


 


Lymphocytes #    1.5  


 


Monocytes #    0.7  


 


Eosinophils #    0.0  


 


Basophils #    0.0  


 


Nucleated Red Blood Cells #    0.0  














Test


  11/13/17


04:47 11/13/17


08:44 


  


 


 


Sodium Level 137     


 


Potassium Level 4.3     


 


Chloride Level 107     


 


Carbon Dioxide Level 23     


 


Anion Gap 11     


 


Blood Urea Nitrogen 20     


 


Creatinine 0.41  L   


 


Glucose Level 155     


 


Calcium Level 8.4     


 


Phosphorus Level 3.7     


 


Magnesium Level 1.8     


 


Albumin 2.6  L   


 


Bedside Glucose  118    











Medications


Medications





 Current Medications


Dexamethasone (Decadron) 4 mg BID PO  Last administered on 11/12/17at 22:05; 

Admin Dose 4 MG;  Start 10/24/17 at 21:00


Docusate Sodium (Colace) 200 mg QHS PO  Last administered on 11/12/17at 22:04; 

Admin Dose 200 MG;  Start 10/24/17 at 21:00


Ondansetron HCl (Zofran Inj) 4 mg Q6H  PRN IV NAUSEA AND/OR VOMITING;  Start 10/

24/17 at 21:00


Acetaminophen (Tylenol Tab) 650 mg Q6H  PRN PO PAIN LEVEL 1-3 OR FEVER Last 

administered on 11/1/17at 07:13; Admin Dose 650 MG;  Start 10/24/17 at 21:00


Oxycodone/ Acetaminophen (Percocet (5/ 325)) 1 tab Q6H  PRN PO MODERATE PAIN 

LEVEL 4-6;  Start 10/24/17 at 21:00


Morphine Sulfate (morphine) 2 mg Q4H  PRN IV SEVERE PAIN LEVEL 7-10 Last 

administered on 11/1/17at 02:47; Admin Dose 2 MG;  Start 10/24/17 at 21:00


Docusate Sodium (Colace) 100 mg Q12H  PRN PO CONSTIPATION Last administered on 

11/11/17at 22:10; Admin Dose 100 MG;  Start 10/24/17 at 21:00


Magnesium Hydroxide (Milk Of Mag) 30 ml DAILY  PRN PO CONSTIPATION;  Start 10/24

/17 at 21:00


Famotidine (Pepcid) 20 mg Q12 PO  Last administered on 11/12/17at 22:05; Admin 

Dose 20 MG;  Start 10/24/17 at 21:00


Albuterol (Proventil 0.083% (Neb)) 2.5 mg Q2  PRN HHN SHORTNESS OF BREATH;  

Start 10/24/17 at 21:00


Miscellaneous Information 1 ea NOTE XX ;  Start 10/24/17 at 21:30


Glucose (Glutose) 15 gm Q15M  PRN PO DECREASED GLUCOSE;  Start 10/24/17 at 21:30


Glucose (Glutose) 22.5 gm Q15M  PRN PO DECREASED GLUCOSE;  Start 10/24/17 at 21:

30


Dextrose (D50w Syringe) 25 ml Q15M  PRN IV DECREASED GLUCOSE;  Start 10/24/17 

at 21:30


Dextrose (D50w Syringe) 50 ml Q15M  PRN IV DECREASED GLUCOSE;  Start 10/24/17 

at 21:30


Glucagon (Glucagen) 1 mg Q15M  PRN IM DECREASED GLUCOSE;  Start 10/24/17 at 21:

30


Glucose (Glutose) 15 gm Q15M  PRN BUCCAL DECREASED GLUCOSE;  Start 10/24/17 at 

21:30


Nystatin (Nystatin Powder) 1 applic BID TOP  Last administered on 11/12/17at 22:

13; Admin Dose 1 APPLIC;  Start 10/25/17 at 09:00


Tamoxifen Citrate 20 mg 20 mg DAILY PO  Last administered on 11/12/17at 09:35; 

Admin Dose 20 MG;  Start 10/25/17 at 13:00


Ceftriaxone Sodium (Rocephin) 50 ml @  100 mls/hr Q24H IVPB  Last administered 

on 11/12/17at 14:46; Admin Dose 100 MLS/HR;  Start 10/27/17 at 14:00


Diagnostic Test (Pha) (Accu-Chek) 1 ea 02 XX  Last administered on 11/9/17at 02:

07; Admin Dose 1 EA;  Start 11/4/17 at 02:00











RIVERA RAWLS MD Nov 13, 2017 09:51

## 2017-11-13 NOTE — CONS
Date/Time of Note


Date/Time of Note


DATE: 11/13/17 


TIME: 09:11





Assessment/Plan


Assessment/Plan


Chief Complaint/Hosp Course


#Her2+/ER+/MD+ metastatic breast ca


-pt is non compliant and when she receives therapy, she is quite responsive to 

therapy


-therefore at this time, although she has terminal disease, there are still 

many more treatment options which she could benefit from


-given her Brain mets, I would consider a combination of Xeloda and Lapatinib 

which can penetrate the blood brain barrier. tentative plan to start next 

monday upon completion of radiation


-continue Tamoxifen as well





#Pleural Effusion with superimposed infection


-pt is s/p decortication and pleurodesis


-chest tube with considerable serosanguineous drainage


-continue antibiotics given + Staph superinfection infection 


-appreciate pulmonary recs





#Brain Mets


-pt is s/p resection 


-pt needs to continue with WBRT with Dr. Sprague. to proceed with radiation today





Problems:  





Consultation Date/Type/Reason


Admit Date/Time


Oct 24, 2017 at 18:29


Initial Consult Date


10/26/17


Type of Consultation:  Oncology


Reason for Consultation


metastatic breast cancer


Referring Provider:  CLAU AGUILAR





24 HR Interval Summary


Free Text/Dictation


Chest tube is draining less. SOB has improved. continues on radiation





Exam/Review of Systems


Vital Signs


Vitals





 Vital Signs








  Date Time  Temp Pulse Resp B/P Pulse Ox O2 Delivery O2 Flow Rate FiO2


 


11/13/17 08:31 98.6 75 22 116/78 98 Room Air  














 Intake and Output   


 


 11/12/17 11/12/17 11/13/17





 15:00 23:00 07:00


 


Intake Total  750 ml 500 ml


 


Output Total  70 ml 70 ml


 


Balance  680 ml 430 ml











Exam


Constitutional:  alert, oriented


Psych:  no complaints


Head:  atraumatic, normocephalic


Eyes:  nl conjunctiva


ENMT:  nl external ears & nose


Neck:  non-tender, supple


Respiratory:  diminished breath sounds, other (chest tube in place, draining 

less)


Cardiovascular:  regular rate and rhythm


Gastrointestinal:  soft





Results


Result Diagram:  


11/13/17 0446                                                                  

              11/13/17 0447





Results 24 hrs





Laboratory Tests








Test


  11/12/17


12:33 11/12/17


17:44 11/12/17


22:07 11/13/17


04:46


 


Bedside Glucose 141   232  H 162   


 


White Blood Count    12.4  H


 


Red Blood Count    4.07  L


 


Hemoglobin    10.1  L


 


Hematocrit    32.6  L


 


Mean Corpuscular Volume    80.1  L


 


Mean Corpuscular Hemoglobin    24.8  L


 


Mean Corpuscular Hemoglobin


Concent 


  


  


  31.0  L


 


 


Red Cell Distribution Width    23.2  H


 


Platelet Count    137  L


 


Mean Platelet Volume    8.8  


 


Neutrophils %    76.9  


 


Lymphocytes %    12.3  L


 


Monocytes %    5.7  


 


Eosinophils %    0.0  


 


Basophils %    0.2  


 


Nucleated Red Blood Cells %    0.0  


 


Neutrophils #    9.5  H


 


Lymphocytes #    1.5  


 


Monocytes #    0.7  


 


Eosinophils #    0.0  


 


Basophils #    0.0  


 


Nucleated Red Blood Cells #    0.0  














Test


  11/13/17


04:47 11/13/17


08:44 


  


 


 


Sodium Level 137     


 


Potassium Level 4.3     


 


Chloride Level 107     


 


Carbon Dioxide Level 23     


 


Anion Gap 11     


 


Blood Urea Nitrogen 20     


 


Creatinine 0.41  L   


 


Glucose Level 155     


 


Calcium Level 8.4     


 


Phosphorus Level 3.7     


 


Magnesium Level 1.8     


 


Albumin 2.6  L   


 


Bedside Glucose  118    











Medications


Medications





 Current Medications


Dexamethasone (Decadron) 4 mg BID PO  Last administered on 11/12/17at 22:05; 

Admin Dose 4 MG;  Start 10/24/17 at 21:00


Docusate Sodium (Colace) 200 mg QHS PO  Last administered on 11/12/17at 22:04; 

Admin Dose 200 MG;  Start 10/24/17 at 21:00


Ondansetron HCl (Zofran Inj) 4 mg Q6H  PRN IV NAUSEA AND/OR VOMITING;  Start 10/

24/17 at 21:00


Acetaminophen (Tylenol Tab) 650 mg Q6H  PRN PO PAIN LEVEL 1-3 OR FEVER Last 

administered on 11/1/17at 07:13; Admin Dose 650 MG;  Start 10/24/17 at 21:00


Oxycodone/ Acetaminophen (Percocet (5/ 325)) 1 tab Q6H  PRN PO MODERATE PAIN 

LEVEL 4-6;  Start 10/24/17 at 21:00


Morphine Sulfate (morphine) 2 mg Q4H  PRN IV SEVERE PAIN LEVEL 7-10 Last 

administered on 11/1/17at 02:47; Admin Dose 2 MG;  Start 10/24/17 at 21:00


Docusate Sodium (Colace) 100 mg Q12H  PRN PO CONSTIPATION Last administered on 

11/11/17at 22:10; Admin Dose 100 MG;  Start 10/24/17 at 21:00


Magnesium Hydroxide (Milk Of Mag) 30 ml DAILY  PRN PO CONSTIPATION;  Start 10/24

/17 at 21:00


Famotidine (Pepcid) 20 mg Q12 PO  Last administered on 11/12/17at 22:05; Admin 

Dose 20 MG;  Start 10/24/17 at 21:00


Albuterol (Proventil 0.083% (Neb)) 2.5 mg Q2  PRN HHN SHORTNESS OF BREATH;  

Start 10/24/17 at 21:00


Miscellaneous Information 1 ea NOTE XX ;  Start 10/24/17 at 21:30


Glucose (Glutose) 15 gm Q15M  PRN PO DECREASED GLUCOSE;  Start 10/24/17 at 21:30


Glucose (Glutose) 22.5 gm Q15M  PRN PO DECREASED GLUCOSE;  Start 10/24/17 at 21:

30


Dextrose (D50w Syringe) 25 ml Q15M  PRN IV DECREASED GLUCOSE;  Start 10/24/17 

at 21:30


Dextrose (D50w Syringe) 50 ml Q15M  PRN IV DECREASED GLUCOSE;  Start 10/24/17 

at 21:30


Glucagon (Glucagen) 1 mg Q15M  PRN IM DECREASED GLUCOSE;  Start 10/24/17 at 21:

30


Glucose (Glutose) 15 gm Q15M  PRN BUCCAL DECREASED GLUCOSE;  Start 10/24/17 at 

21:30


Nystatin (Nystatin Powder) 1 applic BID TOP  Last administered on 11/12/17at 22:

13; Admin Dose 1 APPLIC;  Start 10/25/17 at 09:00


Tamoxifen Citrate 20 mg 20 mg DAILY PO  Last administered on 11/12/17at 09:35; 

Admin Dose 20 MG;  Start 10/25/17 at 13:00


Ceftriaxone Sodium (Rocephin) 50 ml @  100 mls/hr Q24H IVPB  Last administered 

on 11/12/17at 14:46; Admin Dose 100 MLS/HR;  Start 10/27/17 at 14:00


Diagnostic Test (Pha) (Accu-Chek) 1 ea 02 XX  Last administered on 11/9/17at 02:

07; Admin Dose 1 EA;  Start 11/4/17 at 02:00











KRISTEL ORANTES M.D. Nov 13, 2017 09:16

## 2017-11-13 NOTE — CONS
Date/Time of Note


Date/Time of Note


DATE: 11/13/17 


TIME: 12:12





Consult Date/Type/Reason


Admit Date/Time


Oct 24, 2017 at 18:29


Initial Consult Date


10/26/17


Type of Consultation:  ID


Ordering Provider:  CLAU AGUILAR





Objective





 Vital Signs








  Date Time  Temp Pulse Resp B/P Pulse Ox O2 Delivery O2 Flow Rate FiO2


 


11/13/17 08:31 98.6 75 22 116/78 98 Room Air  














 Intake and Output   


 


 11/12/17 11/12/17 11/13/17





 15:00 23:00 07:00


 


Intake Total  750 ml 500 ml


 


Output Total  70 ml 70 ml


 


Balance  680 ml 430 ml











Results/Medications


Result Diagram:  


11/13/17 0446                                                                  

              11/13/17 0447





Results 24 hrs





Laboratory Tests








Test


  11/12/17


12:33 11/12/17


17:44 11/12/17


22:07 11/13/17


04:46


 


Bedside Glucose 141   232  H 162   


 


White Blood Count    12.4  H


 


Red Blood Count    4.07  L


 


Hemoglobin    10.1  L


 


Hematocrit    32.6  L


 


Mean Corpuscular Volume    80.1  L


 


Mean Corpuscular Hemoglobin    24.8  L


 


Mean Corpuscular Hemoglobin


Concent 


  


  


  31.0  L


 


 


Red Cell Distribution Width    23.2  H


 


Platelet Count    137  L


 


Mean Platelet Volume    8.8  


 


Neutrophils %    76.9  


 


Lymphocytes %    12.3  L


 


Monocytes %    5.7  


 


Eosinophils %    0.0  


 


Basophils %    0.2  


 


Nucleated Red Blood Cells %    0.0  


 


Neutrophils #    9.5  H


 


Lymphocytes #    1.5  


 


Monocytes #    0.7  


 


Eosinophils #    0.0  


 


Basophils #    0.0  


 


Nucleated Red Blood Cells #    0.0  














Test


  11/13/17


04:47 11/13/17


08:44 


  


 


 


Sodium Level 137     


 


Potassium Level 4.3     


 


Chloride Level 107     


 


Carbon Dioxide Level 23     


 


Anion Gap 11     


 


Blood Urea Nitrogen 20     


 


Creatinine 0.41  L   


 


Glucose Level 155     


 


Calcium Level 8.4     


 


Phosphorus Level 3.7     


 


Magnesium Level 1.8     


 


Albumin 2.6  L   


 


Bedside Glucose  118    








Medications





 Current Medications


Dexamethasone (Decadron) 4 mg BID PO  Last administered on 11/13/17at 10:21; 

Admin Dose 4 MG;  Start 10/24/17 at 21:00


Docusate Sodium (Colace) 200 mg QHS PO  Last administered on 11/12/17at 22:04; 

Admin Dose 200 MG;  Start 10/24/17 at 21:00


Ondansetron HCl (Zofran Inj) 4 mg Q6H  PRN IV NAUSEA AND/OR VOMITING;  Start 10/

24/17 at 21:00


Acetaminophen (Tylenol Tab) 650 mg Q6H  PRN PO PAIN LEVEL 1-3 OR FEVER Last 

administered on 11/1/17at 07:13; Admin Dose 650 MG;  Start 10/24/17 at 21:00


Oxycodone/ Acetaminophen (Percocet (5/ 325)) 1 tab Q6H  PRN PO MODERATE PAIN 

LEVEL 4-6;  Start 10/24/17 at 21:00


Morphine Sulfate (morphine) 2 mg Q4H  PRN IV SEVERE PAIN LEVEL 7-10 Last 

administered on 11/1/17at 02:47; Admin Dose 2 MG;  Start 10/24/17 at 21:00


Docusate Sodium (Colace) 100 mg Q12H  PRN PO CONSTIPATION Last administered on 

11/11/17at 22:10; Admin Dose 100 MG;  Start 10/24/17 at 21:00


Magnesium Hydroxide (Milk Of Mag) 30 ml DAILY  PRN PO CONSTIPATION;  Start 10/24

/17 at 21:00


Famotidine (Pepcid) 20 mg Q12 PO  Last administered on 11/13/17at 10:21; Admin 

Dose 20 MG;  Start 10/24/17 at 21:00


Albuterol (Proventil 0.083% (Neb)) 2.5 mg Q2  PRN HHN SHORTNESS OF BREATH;  

Start 10/24/17 at 21:00


Miscellaneous Information 1 ea NOTE XX ;  Start 10/24/17 at 21:30


Glucose (Glutose) 15 gm Q15M  PRN PO DECREASED GLUCOSE;  Start 10/24/17 at 21:30


Glucose (Glutose) 22.5 gm Q15M  PRN PO DECREASED GLUCOSE;  Start 10/24/17 at 21:

30


Dextrose (D50w Syringe) 25 ml Q15M  PRN IV DECREASED GLUCOSE;  Start 10/24/17 

at 21:30


Dextrose (D50w Syringe) 50 ml Q15M  PRN IV DECREASED GLUCOSE;  Start 10/24/17 

at 21:30


Glucagon (Glucagen) 1 mg Q15M  PRN IM DECREASED GLUCOSE;  Start 10/24/17 at 21:

30


Glucose (Glutose) 15 gm Q15M  PRN BUCCAL DECREASED GLUCOSE;  Start 10/24/17 at 

21:30


Nystatin (Nystatin Powder) 1 applic BID TOP  Last administered on 11/13/17at 10:

22; Admin Dose 1 APPLIC;  Start 10/25/17 at 09:00


Tamoxifen Citrate 20 mg 20 mg DAILY PO  Last administered on 11/13/17at 10:22; 

Admin Dose 20 MG;  Start 10/25/17 at 13:00


Ceftriaxone Sodium (Rocephin) 50 ml @  100 mls/hr Q24H IVPB  Last administered 

on 11/12/17at 14:46; Admin Dose 100 MLS/HR;  Start 10/27/17 at 14:00


Diagnostic Test (Pha) (Accu-Chek) 1 ea 02 XX  Last administered on 11/9/17at 02:

07; Admin Dose 1 EA;  Start 11/4/17 at 02:00





Assessment/Plan


Chief Complaint/Hosp Course


SUBJECTIVE:  No events overnight. Alert, feels good, no fevers.


 


INDWELLINGS: Right chest tube.


 


ANTIMICROBIALS:  Rocephin.


 


PHYSICAL EXAMINATION:


GENERAL:  This is an obese, well-developed, middle-aged woman who is awake, in 

no distress.


HEENT:  Head atraumatic, normocephalic.  Sclerae anicteric.  Buccal mucosa dry.


NECK:  Supple.


CHEST:  Rise symmetrical.  Breath sounds diminished to bases.


HEART:  S1, S2.


ABDOMEN:  Soft, bowel tones present.


EXTREMITIES:  Without cyanosis.


 


ASSESSMENT:


1.  Right-sided loculated pleural effusion consistent with empyema with fluid 

culture growing oxacillin-sensitive Staphylococcus aureus.  The patient is 

status post decortication and pleurodesis on 10/31/2017.


2.  Metastatic breast cancer.


3.  Diabetes.


4.  History of craniectomy for brain metastasis.


 


PLAN:  The patient remains stable.  Continue abx till Nov 25th for empyema. 

Pulmonary/CTS/oncology rec-s





DW staff


Problems:  











DIANA RANDALL NP Nov 13, 2017 12:13

## 2017-11-13 NOTE — PN
Date/Time of Note


Date/Time of Note


DATE: 11/13/17 


TIME: 15:02





Assessment/Plan


Lines/Catheters


IV Catheter Type (from Nrs):  Saline Lock


Guerrero in Place (from Nrs):  No





Assessment/Plan


Chief Complaint/Hosp Course


 


IMPRESSION:  Right loculated pleural effusion.


 


R status post right VATS pleurodesis decortication


Patient feels much better


2nd  CT DCed





Check chest x-ray


Problems:  





Subjective


24 Hr Interval Summary


Constitutional:  improved


Pain Control:  mild





Exam/Review of Systems


Vital Signs


Vitals





 Vital Signs








  Date Time  Temp Pulse Resp B/P Pulse Ox O2 Delivery O2 Flow Rate FiO2


 


11/13/17 08:31 98.6 75 22 116/78 98 Room Air  














 Intake and Output   


 


 11/12/17 11/12/17 11/13/17





 14:59 22:59 06:59


 


Intake Total  750 ml 500 ml


 


Output Total  70 ml 70 ml


 


Balance  680 ml 430 ml











Exam


ENMT:  mucosa pink and moist, nl external ears & nose, nl lips & teeth, nl 

nasal mucosa & septum


Neck:  non-tender, supple


Respiratory:  clear to auscultation, normal air movement


Cardiovascular:  nl pulses, regular rate and rhythm





Results


Result Diagram:  


11/13/17 0446                                                                  

              11/13/17 0447














HEIDY MAXWELL MD Nov 13, 2017 15:03

## 2017-11-13 NOTE — RADRPT
PROCEDURE:   XR Chest AP portable 

 

CLINICAL INDICATION:   Pneumonia, CHF 

 

TECHNIQUE:   An AP portable radiograph of the chest was submitted. 

 

COMPARISON:   11/12/2017 

 

FINDINGS:

 

Support Hardware: The right inferior pleural drainage catheter remains in place.

 

Cardiovascular: The heart remains mildly enlarged all the peripheral pulmonary vasculature is upper 
normal.

 

Lung Fields: Air space opacification is again seen within the right mid lung zone with atelectatic c
hange seen in the right lung base.

 

Pleural Spaces: A moderate right pleural fluid accumulation is again noted which appears at least pa
rtially loculated laterally. No pneumothorax is evident.

 

Osseous Structures: The osseous structures appear intact.

 

Soft Tissues: The soft tissues appear generous.

 

IMPRESSION: 

1.  The right-sided inferior pleural drainage catheter is stable in positioning.

2.  The heart remains mildly enlarged with the pulmonary vasculature upper normal.

3.  No change to the consolidation, atelectasis and partially loculated right pleural fluid accumula
tion.

_____________________________________________ 

Physician Darlin           Date    Time 

Electronically viewed and signed by Physician Darlin on 11/13/2017 09:19 

 

D:  11/13/2017 09:19  T:  11/13/2017 09:19

/

## 2017-11-14 VITALS — SYSTOLIC BLOOD PRESSURE: 122 MMHG | DIASTOLIC BLOOD PRESSURE: 67 MMHG | RESPIRATION RATE: 18 BRPM | HEART RATE: 74 BPM

## 2017-11-14 VITALS — RESPIRATION RATE: 20 BRPM | SYSTOLIC BLOOD PRESSURE: 133 MMHG | DIASTOLIC BLOOD PRESSURE: 75 MMHG

## 2017-11-14 VITALS — RESPIRATION RATE: 20 BRPM | DIASTOLIC BLOOD PRESSURE: 80 MMHG | SYSTOLIC BLOOD PRESSURE: 142 MMHG

## 2017-11-14 LAB
ABNORMAL IP MESSAGE: 1
BASOPHILS # BLD AUTO: 0 10^3/UL (ref 0–0.1)
BASOPHILS NFR BLD: 0.1 % (ref 0–2)
EOSINOPHIL # BLD: 0 10^3/UL (ref 0–0.5)
EOSINOPHIL NFR BLD: 0 % (ref 0–7)
ERYTHROCYTE [DISTWIDTH] IN BLOOD BY AUTOMATED COUNT: 23.4 % (ref 11.5–14.5)
HCT VFR BLD CALC: 33 % (ref 37–47)
HGB BLD-MCNC: 10.3 G/DL (ref 12–16)
LYMPHOCYTES # BLD AUTO: 1.4 10^3/UL (ref 0.8–2.9)
LYMPHOCYTES NFR BLD AUTO: 9.2 % (ref 15–51)
MCH RBC QN AUTO: 24.6 PG (ref 29–33)
MCHC RBC AUTO-ENTMCNC: 31.2 G/DL (ref 32–37)
MCV RBC AUTO: 78.8 FL (ref 82–101)
MONOCYTES # BLD: 1.3 10^3/UL (ref 0.3–0.9)
MONOCYTES NFR BLD: 8.3 % (ref 0–11)
NEUTROPHILS # BLD: 11.9 10^3/UL (ref 1.6–7.5)
NEUTROPHILS NFR BLD AUTO: 78.2 % (ref 39–77)
NRBC # BLD MANUAL: 0 10^3/UL (ref 0–0)
NRBC BLD AUTO-RTO: 0 /100WBC (ref 0–0)
PLATELET # BLD: 155 10^3/UL (ref 140–415)
PMV BLD AUTO: 9.2 FL (ref 7.4–10.4)
POSITIVE DIFF: (no result)
RBC # BLD AUTO: 4.19 10^6/UL (ref 4.2–5.4)
WBC # BLD AUTO: 15.2 10^3/UL (ref 4.8–10.8)

## 2017-11-14 PROCEDURE — 02H633Z INSERTION OF INFUSION DEVICE INTO RIGHT ATRIUM, PERCUTANEOUS APPROACH: ICD-10-PCS | Performed by: INTERNAL MEDICINE

## 2017-11-14 PROCEDURE — B244YZZ ULTRASONOGRAPHY OF RIGHT HEART USING OTHER CONTRAST: ICD-10-PCS | Performed by: INTERNAL MEDICINE

## 2017-11-14 RX ADMIN — CEFTRIAXONE SCH MLS/HR: 1 INJECTION, SOLUTION INTRAVENOUS at 13:59

## 2017-11-14 RX ADMIN — TAMOXIFEN CITRATE SCH MG: 10 TABLET, FILM COATED ORAL at 09:09

## 2017-11-14 RX ADMIN — NYSTATIN SCH APPLIC: 100000 POWDER TOPICAL at 08:40

## 2017-11-14 RX ADMIN — NYSTATIN SCH APPLIC: 100000 POWDER TOPICAL at 21:11

## 2017-11-14 RX ADMIN — DOCUSATE SODIUM SCH MG: 100 CAPSULE, LIQUID FILLED ORAL at 21:10

## 2017-11-14 RX ADMIN — FAMOTIDINE SCH MG: 20 TABLET ORAL at 21:10

## 2017-11-14 RX ADMIN — FAMOTIDINE SCH MG: 20 TABLET ORAL at 08:40

## 2017-11-14 NOTE — CONS
Date/Time of Note


Date/Time of Note


DATE: 11/14/17 


TIME: 12:53





Consult Date/Type/Reason


Admit Date/Time


Oct 24, 2017 at 18:29


Initial Consult Date


10/26/17


Type of Consultation:  ID


Ordering Provider:  CLAU AGUILAR





Objective





 Vital Signs








  Date Time  Temp Pulse Resp B/P Pulse Ox O2 Delivery O2 Flow Rate FiO2


 


11/14/17 08:30 97.7 63 20 142/80 98   


 


11/13/17 08:31      Room Air  














 Intake and Output   


 


 11/13/17 11/13/17 11/14/17





 14:59 22:59 06:59


 


Intake Total  750 ml 600 ml


 


Balance  750 ml 600 ml











Results/Medications


Result Diagram:  


11/14/17 0435                                                                  

              11/13/17 0447





Results 24 hrs





Laboratory Tests








Test


  11/13/17


13:52 11/13/17


17:43 11/13/17


21:43 11/14/17


02:06


 


Bedside Glucose 146   153   188   171  














Test


  11/14/17


04:35 11/14/17


09:20 11/14/17


12:46 


 


 


White Blood Count 15.2  #H   


 


Red Blood Count 4.19  L   


 


Hemoglobin 10.3  L   


 


Hematocrit 33.0  L   


 


Mean Corpuscular Volume 78.8  L   


 


Mean Corpuscular Hemoglobin 24.6  L   


 


Mean Corpuscular Hemoglobin


Concent 31.2  L


  


  


  


 


 


Red Cell Distribution Width 23.4  H   


 


Platelet Count 155     


 


Mean Platelet Volume 9.2     


 


Neutrophils % 78.2  H   


 


Lymphocytes % 9.2  L   


 


Monocytes % 8.3     


 


Eosinophils % 0.0     


 


Basophils % 0.1     


 


Nucleated Red Blood Cells % 0.0     


 


Neutrophils # 11.9  H   


 


Lymphocytes # 1.4     


 


Monocytes # 1.3  H   


 


Eosinophils # 0.0     


 


Basophils # 0.0     


 


Nucleated Red Blood Cells # 0.0     


 


Bedside Glucose  110   180   








Medications





 Current Medications


Dexamethasone (Decadron) 4 mg BID PO  Last administered on 11/14/17at 08:40; 

Admin Dose 4 MG;  Start 10/24/17 at 21:00


Docusate Sodium (Colace) 200 mg QHS PO  Last administered on 11/13/17at 21:45; 

Admin Dose 200 MG;  Start 10/24/17 at 21:00


Ondansetron HCl (Zofran Inj) 4 mg Q6H  PRN IV NAUSEA AND/OR VOMITING;  Start 10/

24/17 at 21:00


Acetaminophen (Tylenol Tab) 650 mg Q6H  PRN PO PAIN LEVEL 1-3 OR FEVER Last 

administered on 11/1/17at 07:13; Admin Dose 650 MG;  Start 10/24/17 at 21:00


Oxycodone/ Acetaminophen (Percocet (5/ 325)) 1 tab Q6H  PRN PO MODERATE PAIN 

LEVEL 4-6;  Start 10/24/17 at 21:00


Morphine Sulfate (morphine) 2 mg Q4H  PRN IV SEVERE PAIN LEVEL 7-10 Last 

administered on 11/1/17at 02:47; Admin Dose 2 MG;  Start 10/24/17 at 21:00


Docusate Sodium (Colace) 100 mg Q12H  PRN PO CONSTIPATION Last administered on 

11/11/17at 22:10; Admin Dose 100 MG;  Start 10/24/17 at 21:00


Magnesium Hydroxide (Milk Of Mag) 30 ml DAILY  PRN PO CONSTIPATION;  Start 10/24

/17 at 21:00


Famotidine (Pepcid) 20 mg Q12 PO  Last administered on 11/14/17at 08:40; Admin 

Dose 20 MG;  Start 10/24/17 at 21:00


Albuterol (Proventil 0.083% (Neb)) 2.5 mg Q2  PRN HHN SHORTNESS OF BREATH;  

Start 10/24/17 at 21:00


Miscellaneous Information 1 ea NOTE XX ;  Start 10/24/17 at 21:30


Glucose (Glutose) 15 gm Q15M  PRN PO DECREASED GLUCOSE;  Start 10/24/17 at 21:30


Glucose (Glutose) 22.5 gm Q15M  PRN PO DECREASED GLUCOSE;  Start 10/24/17 at 21:

30


Dextrose (D50w Syringe) 25 ml Q15M  PRN IV DECREASED GLUCOSE;  Start 10/24/17 

at 21:30


Dextrose (D50w Syringe) 50 ml Q15M  PRN IV DECREASED GLUCOSE;  Start 10/24/17 

at 21:30


Glucagon (Glucagen) 1 mg Q15M  PRN IM DECREASED GLUCOSE;  Start 10/24/17 at 21:

30


Glucose (Glutose) 15 gm Q15M  PRN BUCCAL DECREASED GLUCOSE;  Start 10/24/17 at 

21:30


Nystatin (Nystatin Powder) 1 applic BID TOP  Last administered on 11/14/17at 08:

40; Admin Dose 1 APPLIC;  Start 10/25/17 at 09:00


Tamoxifen Citrate 20 mg 20 mg DAILY PO  Last administered on 11/14/17at 09:09; 

Admin Dose 20 MG;  Start 10/25/17 at 13:00


Ceftriaxone Sodium (Rocephin) 50 ml @  100 mls/hr Q24H IVPB  Last administered 

on 11/13/17at 14:50; Admin Dose 100 MLS/HR;  Start 10/27/17 at 14:00


Diagnostic Test (Pha) (Accu-Chek) 1 ea 02 XX  Last administered on 11/14/17at 02

:22; Admin Dose 1 EA;  Start 11/4/17 at 02:00


IV Flush (NS 10 ml) 10 ml PRN  PRN IV IV PROTOCOL;  Start 11/14/17 at 13:00





Assessment/Plan


Chief Complaint/Hosp Course


SUBJECTIVE:  No events overnight. Looks comfortable, no fevers.


 


INDWELLINGS: Right chest tube.


 


ANTIMICROBIALS:  Rocephin.


 


PHYSICAL EXAMINATION:


GENERAL:  This is an obese, well-developed, middle-aged woman who is awake, in 

no distress.


HEENT:  Head atraumatic, normocephalic.  Sclerae anicteric.  Buccal mucosa dry.


NECK:  Supple.


CHEST:  Rise symmetrical.  Breath sounds diminished to bases.


HEART:  S1, S2.


ABDOMEN:  Soft, bowel tones present.


EXTREMITIES:  Without cyanosis.


 


ASSESSMENT:


1.  Right-sided loculated pleural effusion consistent with empyema with fluid 

culture growing oxacillin-sensitive Staphylococcus aureus.  The patient is 

status post decortication and pleurodesis on 10/31/2017.


2.  Metastatic breast cancer.


3.  Diabetes.


4.  History of craniectomy for brain metastasis.


 


PLAN:  The patient remains stable. F/u cxr. Continue abx till Nov 25th for 

empyema. Pulmonary/CTS/oncology rec-s





DW staff


Problems:  











DIANA RANDALL NP Nov 14, 2017 12:53

## 2017-11-14 NOTE — RADRPT
PROCEDURE:   Ultrasound guidance for placement of needle in left upper extremity vein.    

 

CLINICAL INDICATION:   Venous access. 

 

TECHNIQUE:   

Limited sonography of the left upper extremity was performed.  Ultrasound images were recorded and s
tored in the patient's medical record.

 

COMPARISON:   None. 

 

FINDINGS:

The ultrasound images demonstrate a patent left upper extremity vein.  The PICC line was inserted by
 the PICC line nurse.

 

IMPRESSION:

1.  Ultrasound guidance for a needle placement in a left upper extremity vein.

2.  The left upper extremity vein is patent.

 

RPTAT: QQ

_____________________________________________ 

.Charly Espinal MD, MD           Date    Time 

Electronically viewed and signed by .Charly Espinal MD, MD on 11/14/2017 14:53 

 

D:  11/14/2017 14:53  T:  11/14/2017 14:53

.R/

## 2017-11-14 NOTE — PN
Date/Time of Note


Date/Time of Note


DATE: 11/14/17 


TIME: 15:45





Assessment/Plan


VTE Prophylaxis


VTE Prophylaxis Intervention:  contraindicated





Lines/Catheters


IV Catheter Type (from [facility]):  PICC Line


Central line still needed:  Yes


Urinary Cath still in place:  No





Assessment/Plan


Assessment/Plan


1.  Empyema 2/2 staph aureus s/p VATS 10/31/17


- Patient doing well and chest tube removed


- Pulmonology on board and recommendations appreciated. 


- CXR shows moderate pleural effusion and will need to follow up with Pulm as 

outpatient for thoracentesis if needed


- On incentive spirometry


- CT surgery on board and appreciate consultation


- ID on board and recommendations appreciated. continue on IV antibiotics until 

11/25. PICC line placed


- Ultrasound-guided thoracentesis performed, cultures showing staph aureus





2.  Dysfunctional Pleurx catheter


- Pulmonology on board and consultation appreciated


- Respiratory status stable at this time





3.  Cellulitis, area around catheter- improved


- ID on board and recommendations appreciated





4.  Metastatic breast cancer, lung, liver, brain, s/p craniectomy 


- currently receiving radiation therapy and tamoxifen and Decadron


- Heme/onc on board and consultation appreciated


- Radiation as scheduled. Next session is tonight 





5. Diabetes mellitus


- Currently stable





6. Obesity


- Counseled about diet and improving exercise routine when able





7. Disposition


- Continue antibiotics until 11/25


- patient planning to return home and requesting home health services be 

continued





Subjective


24 Hr Interval Summary


Free Text/Dictation


Patient states feeling better but concerned about difficulty ambulating.  No 

acute overnight events. PICC line placed and Radiation scheduled for this 

evening





Exam/Review of Systems


Vital Signs


Vitals





 Vital Signs








  Date Time  Temp Pulse Resp B/P Pulse Ox O2 Delivery O2 Flow Rate FiO2


 


11/14/17 08:30 97.7 63 20 142/80 98   


 


11/13/17 08:31      Room Air  














 Intake and Output   


 


 11/13/17 11/13/17 11/14/17





 14:59 22:59 06:59


 


Intake Total  750 ml 600 ml


 


Balance  750 ml 600 ml











Exam


General: Patient awake and alert, no acute distress. 


Head: R side craniectomy site healing well.


Eyes: EOMI, pupils reactive to light


Neck: Supple, nontender, midline


Respiratory: Diminished breath sounds on right, no wheezing or crackles. 

previous CT incision site healing well 


Cardiovascular: regular rate, no obvious murmurs


Gastrointestinal: non-tender to palpation, bowel sounds heard.


Neurological: Moves all extremities spontaneously





Results


Result Diagram:  


11/14/17 0435                                                                  

              11/13/17 0447





Results 24 hrs





Laboratory Tests








Test


  11/13/17


17:43 11/13/17


21:43 11/14/17


02:06 11/14/17


04:35


 


Bedside Glucose 153   188   171   


 


White Blood Count    15.2  #H


 


Red Blood Count    4.19  L


 


Hemoglobin    10.3  L


 


Hematocrit    33.0  L


 


Mean Corpuscular Volume    78.8  L


 


Mean Corpuscular Hemoglobin    24.6  L


 


Mean Corpuscular Hemoglobin


Concent 


  


  


  31.2  L


 


 


Red Cell Distribution Width    23.4  H


 


Platelet Count    155  


 


Mean Platelet Volume    9.2  


 


Neutrophils %    78.2  H


 


Lymphocytes %    9.2  L


 


Monocytes %    8.3  


 


Eosinophils %    0.0  


 


Basophils %    0.1  


 


Nucleated Red Blood Cells %    0.0  


 


Neutrophils #    11.9  H


 


Lymphocytes #    1.4  


 


Monocytes #    1.3  H


 


Eosinophils #    0.0  


 


Basophils #    0.0  


 


Nucleated Red Blood Cells #    0.0  














Test


  11/14/17


09:20 11/14/17


12:46 


  


 


 


Bedside Glucose 110   180    











Medications


Medications





 Current Medications


Dexamethasone (Decadron) 4 mg BID PO  Last administered on 11/14/17at 08:40; 

Admin Dose 4 MG;  Start 10/24/17 at 21:00


Docusate Sodium (Colace) 200 mg QHS PO  Last administered on 11/13/17at 21:45; 

Admin Dose 200 MG;  Start 10/24/17 at 21:00


Ondansetron HCl (Zofran Inj) 4 mg Q6H  PRN IV NAUSEA AND/OR VOMITING;  Start 10/

24/17 at 21:00


Acetaminophen (Tylenol Tab) 650 mg Q6H  PRN PO PAIN LEVEL 1-3 OR FEVER Last 

administered on 11/1/17at 07:13; Admin Dose 650 MG;  Start 10/24/17 at 21:00


Oxycodone/ Acetaminophen (Percocet (5/ 325)) 1 tab Q6H  PRN PO MODERATE PAIN 

LEVEL 4-6;  Start 10/24/17 at 21:00


Morphine Sulfate (morphine) 2 mg Q4H  PRN IV SEVERE PAIN LEVEL 7-10 Last 

administered on 11/1/17at 02:47; Admin Dose 2 MG;  Start 10/24/17 at 21:00


Docusate Sodium (Colace) 100 mg Q12H  PRN PO CONSTIPATION Last administered on 

11/11/17at 22:10; Admin Dose 100 MG;  Start 10/24/17 at 21:00


Magnesium Hydroxide (Milk Of Mag) 30 ml DAILY  PRN PO CONSTIPATION;  Start 10/24

/17 at 21:00


Famotidine (Pepcid) 20 mg Q12 PO  Last administered on 11/14/17at 08:40; Admin 

Dose 20 MG;  Start 10/24/17 at 21:00


Albuterol (Proventil 0.083% (Neb)) 2.5 mg Q2  PRN HHN SHORTNESS OF BREATH;  

Start 10/24/17 at 21:00


Miscellaneous Information 1 ea NOTE XX ;  Start 10/24/17 at 21:30


Glucose (Glutose) 15 gm Q15M  PRN PO DECREASED GLUCOSE;  Start 10/24/17 at 21:30


Glucose (Glutose) 22.5 gm Q15M  PRN PO DECREASED GLUCOSE;  Start 10/24/17 at 21:

30


Dextrose (D50w Syringe) 25 ml Q15M  PRN IV DECREASED GLUCOSE;  Start 10/24/17 

at 21:30


Dextrose (D50w Syringe) 50 ml Q15M  PRN IV DECREASED GLUCOSE;  Start 10/24/17 

at 21:30


Glucagon (Glucagen) 1 mg Q15M  PRN IM DECREASED GLUCOSE;  Start 10/24/17 at 21:

30


Glucose (Glutose) 15 gm Q15M  PRN BUCCAL DECREASED GLUCOSE;  Start 10/24/17 at 

21:30


Nystatin (Nystatin Powder) 1 applic BID TOP  Last administered on 11/14/17at 08:

40; Admin Dose 1 APPLIC;  Start 10/25/17 at 09:00


Tamoxifen Citrate 20 mg 20 mg DAILY PO  Last administered on 11/14/17at 09:09; 

Admin Dose 20 MG;  Start 10/25/17 at 13:00


Ceftriaxone Sodium (Rocephin) 50 ml @  100 mls/hr Q24H IVPB  Last administered 

on 11/14/17at 13:59; Admin Dose 100 MLS/HR;  Start 10/27/17 at 14:00


Diagnostic Test (Pha) (Accu-Chek) 1 ea 02 XX  Last administered on 11/14/17at 02

:22; Admin Dose 1 EA;  Start 11/4/17 at 02:00


IV Flush (NS 10 ml) 10 ml PRN  PRN IV IV PROTOCOL;  Start 11/14/17 at 13:00











RIVERA RAWLS MD Nov 14, 2017 15:48

## 2017-11-14 NOTE — RADRPT
PROCEDURE:   XR Chest. 

 

CLINICAL INDICATION:    Check Line Placement

 

TECHNIQUE:   Single frontal view  of the chest was obtained. 

 

COMPARISON:   Chest x-ray from 05/11/2016

 

FINDINGS:

 

A new left-sided PICC line is noted with its tip in the right brachial cephalic vein.

There is moderate cardiomegaly.  

A mild to moderate right pleural effusion is again noted with subsegmental right basilar atelectasis
.

 

 

IMPRESSION:

Interval placement of a left-sided PICC line with its tip in the right brachial cephalic vein. Repos
itioning is recommended.

 

Moderate cardiomegaly with a mild to moderate right pleural effusion and subsegmental right basilar 
atelectasis.

 

 

RPTAT: EE

_____________________________________________ 

Physician Chelsi           Date    Time 

Electronically viewed and signed by Marcio Robledo Physician on 11/14/2017 13:19 

 

D:  11/14/2017 13:19  T:  11/14/2017 13:19

YANELI/

## 2017-11-14 NOTE — CONS
Date/Time of Note


Date/Time of Note


DATE: 11/14/17 


TIME: 15:23





Consult Date/Type/Reason


Admit Date/Time


Oct 24, 2017 at 18:29


Initial Consult Date


10/25/17


Type of Consultation:  Pulmonary


Ordering Provider:  CLAU AGUILAR


Chest tube removed.  Patient denies shortness of breath.





Objective





 Vital Signs








  Date Time  Temp Pulse Resp B/P Pulse Ox O2 Delivery O2 Flow Rate FiO2


 


11/14/17 08:30 97.7 63 20 142/80 98   


 


11/13/17 08:31      Room Air  














 Intake and Output   


 


 11/13/17 11/13/17 11/14/17





 15:00 23:00 07:00


 


Intake Total  750 ml 600 ml


 


Balance  750 ml 600 ml








Exam


GENERAL: Well-nourished well-developed lady comfortable at rest no acute 

distress


VITAL SIGNS:  per chart


NECK:  Supple.  No JVD or lymphadenopathy.


CARDIAC EXAM:  S1, S2. No added sounds or murmurs.


CHEST: Diminished air entry


ABDOMEN:  Soft, nontender.  No guarding or rebound.


EXTREMITIES:  No cyanosis, clubbing or edema.


NEUROLOGIC:  Generalized weakness.  No focal deficits.





Results/Medications


Result Diagram:  


11/14/17 0435                                                                  

              11/13/17 0447





Results 24 hrs





Laboratory Tests








Test


  11/13/17


17:43 11/13/17


21:43 11/14/17


02:06 11/14/17


04:35


 


Bedside Glucose 153   188   171   


 


White Blood Count    15.2  #H


 


Red Blood Count    4.19  L


 


Hemoglobin    10.3  L


 


Hematocrit    33.0  L


 


Mean Corpuscular Volume    78.8  L


 


Mean Corpuscular Hemoglobin    24.6  L


 


Mean Corpuscular Hemoglobin


Concent 


  


  


  31.2  L


 


 


Red Cell Distribution Width    23.4  H


 


Platelet Count    155  


 


Mean Platelet Volume    9.2  


 


Neutrophils %    78.2  H


 


Lymphocytes %    9.2  L


 


Monocytes %    8.3  


 


Eosinophils %    0.0  


 


Basophils %    0.1  


 


Nucleated Red Blood Cells %    0.0  


 


Neutrophils #    11.9  H


 


Lymphocytes #    1.4  


 


Monocytes #    1.3  H


 


Eosinophils #    0.0  


 


Basophils #    0.0  


 


Nucleated Red Blood Cells #    0.0  














Test


  11/14/17


09:20 11/14/17


12:46 


  


 


 


Bedside Glucose 110   180    








Medications





 Current Medications


Dexamethasone (Decadron) 4 mg BID PO  Last administered on 11/14/17at 08:40; 

Admin Dose 4 MG;  Start 10/24/17 at 21:00


Docusate Sodium (Colace) 200 mg QHS PO  Last administered on 11/13/17at 21:45; 

Admin Dose 200 MG;  Start 10/24/17 at 21:00


Ondansetron HCl (Zofran Inj) 4 mg Q6H  PRN IV NAUSEA AND/OR VOMITING;  Start 10/

24/17 at 21:00


Acetaminophen (Tylenol Tab) 650 mg Q6H  PRN PO PAIN LEVEL 1-3 OR FEVER Last 

administered on 11/1/17at 07:13; Admin Dose 650 MG;  Start 10/24/17 at 21:00


Oxycodone/ Acetaminophen (Percocet (5/ 325)) 1 tab Q6H  PRN PO MODERATE PAIN 

LEVEL 4-6;  Start 10/24/17 at 21:00


Morphine Sulfate (morphine) 2 mg Q4H  PRN IV SEVERE PAIN LEVEL 7-10 Last 

administered on 11/1/17at 02:47; Admin Dose 2 MG;  Start 10/24/17 at 21:00


Docusate Sodium (Colace) 100 mg Q12H  PRN PO CONSTIPATION Last administered on 

11/11/17at 22:10; Admin Dose 100 MG;  Start 10/24/17 at 21:00


Magnesium Hydroxide (Milk Of Mag) 30 ml DAILY  PRN PO CONSTIPATION;  Start 10/24

/17 at 21:00


Famotidine (Pepcid) 20 mg Q12 PO  Last administered on 11/14/17at 08:40; Admin 

Dose 20 MG;  Start 10/24/17 at 21:00


Albuterol (Proventil 0.083% (Neb)) 2.5 mg Q2  PRN HHN SHORTNESS OF BREATH;  

Start 10/24/17 at 21:00


Miscellaneous Information 1 ea NOTE XX ;  Start 10/24/17 at 21:30


Glucose (Glutose) 15 gm Q15M  PRN PO DECREASED GLUCOSE;  Start 10/24/17 at 21:30


Glucose (Glutose) 22.5 gm Q15M  PRN PO DECREASED GLUCOSE;  Start 10/24/17 at 21:

30


Dextrose (D50w Syringe) 25 ml Q15M  PRN IV DECREASED GLUCOSE;  Start 10/24/17 

at 21:30


Dextrose (D50w Syringe) 50 ml Q15M  PRN IV DECREASED GLUCOSE;  Start 10/24/17 

at 21:30


Glucagon (Glucagen) 1 mg Q15M  PRN IM DECREASED GLUCOSE;  Start 10/24/17 at 21:

30


Glucose (Glutose) 15 gm Q15M  PRN BUCCAL DECREASED GLUCOSE;  Start 10/24/17 at 

21:30


Nystatin (Nystatin Powder) 1 applic BID TOP  Last administered on 11/14/17at 08:

40; Admin Dose 1 APPLIC;  Start 10/25/17 at 09:00


Tamoxifen Citrate 20 mg 20 mg DAILY PO  Last administered on 11/14/17at 09:09; 

Admin Dose 20 MG;  Start 10/25/17 at 13:00


Ceftriaxone Sodium (Rocephin) 50 ml @  100 mls/hr Q24H IVPB  Last administered 

on 11/14/17at 13:59; Admin Dose 100 MLS/HR;  Start 10/27/17 at 14:00


Diagnostic Test (Pha) (Accu-Chek) 1 ea 02 XX  Last administered on 11/14/17at 02

:22; Admin Dose 1 EA;  Start 11/4/17 at 02:00


IV Flush (NS 10 ml) 10 ml PRN  PRN IV IV PROTOCOL;  Start 11/14/17 at 13:00





Assessment/Plan


Chief Complaint/Hosp Course


Assessment





1.  Metastatic breast cancer with recurrent right pleural effusion.  Cultures 

growing gram-positive cocci consistent with staph aureus.


2.  Loculated pleural effusion possible empyema status post decortication and 

pleurodesis.  Chest tube now removed.


3.  CNS metastasis currently receiving steroids and radiation therapy








Plan





1.  Status post chest tube placement.  Moderate pleural effusion.


2.  Continue oncology recommendations continues radiation therapy for CNS 

metastasis


3.  Leukocytosis likely secondary to dexamethasone.





DC home.  Follow-up with me in the office may require repeat thoracentesis.





Continue radiation therapy


Discharge planning.


Problems:  











DELFIN LAKHANI MD, [facility] Nov 14, 2017 15:25

## 2017-11-14 NOTE — RADRPT
PROCEDURE:   XR Chest. 

 

CLINICAL INDICATION:   Check Line Placement 

 

TECHNIQUE:   Single frontal view of the chest.

 

COMPARISON:   Chest radiograph dated November 14, 2017 at 12:08 pm.

 

FINDINGS:

 

Left-sided PICC with its tip overlying the upper right atrium.

 

There is stable mild cardiomegaly. Aortic calcifications are present.

 

Again seen is a mild to moderate right pleural effusion with adjacent subsegmental atelectasis, not 
significantly changed in appearance.

 

Degenerative changes of the shoulder joints are noted.

 

IMPRESSION:

 

1.  Left-sided PICC with its tip overlying the upper right atrium. No evidence of pneumothorax.

 

2.  Unchanged small to moderate right pleural effusion with adjacent subsegmental atelectasis.

 

RPTAT:AAJJ

_____________________________________________ 

Physician Renata           Date    Time 

Electronically viewed and signed by Steve Escobar Physician on 11/14/2017 13:27 

 

D:  11/14/2017 13:27  T:  11/14/2017 13:27

QL/

## 2017-11-14 NOTE — RADRPT
PROCEDURE:   XR Chest. 

 

CLINICAL INDICATION:  Shortness of breath

 

TECHNIQUE:  Single portable view of the chest was obtained. 

 

COMPARISON:   11/13/2017 additional priors 

 

FINDINGS:

 

Cardiac/vascular structures:  Stable partially obscured cardiomediastinal silhouette. 

 

Pulmonary:  Similar right perihilar and basilar airspace opacities. Left lung is clear.. Similar mod
erate size right pleural effusion. No evidence of pneumothorax.

 

Osseous structures:  Normal

 

Soft tissues: Normal

 

 

 

IMPRESSION:

Similar right perihilar and basilar airspace opacities. 

Similar moderate size right pleural effusion

 

 

RPTAT:AAJJ

_____________________________________________ 

Physician Fina           Date    Time 

Electronically viewed and signed by Physician Fina on 11/14/2017 09:04 

 

D:  11/14/2017 09:04  T:  11/14/2017 09:04

/

## 2017-11-15 VITALS — SYSTOLIC BLOOD PRESSURE: 120 MMHG | DIASTOLIC BLOOD PRESSURE: 67 MMHG | RESPIRATION RATE: 18 BRPM

## 2017-11-15 RX ADMIN — NYSTATIN SCH APPLIC: 100000 POWDER TOPICAL at 10:49

## 2017-11-15 RX ADMIN — TAMOXIFEN CITRATE SCH MG: 10 TABLET, FILM COATED ORAL at 10:51

## 2017-11-15 RX ADMIN — FAMOTIDINE SCH MG: 20 TABLET ORAL at 10:49

## 2017-11-15 RX ADMIN — CEFTRIAXONE SCH MLS/HR: 1 INJECTION, SOLUTION INTRAVENOUS at 13:36

## 2017-11-15 NOTE — PDOCDIS
Discharge Instructions


DIAGNOSIS


Discharge Diagnosis


1.  Empyema 2/2 staph aureus s/p VATS 10/31/17


2.  Dysfunctional Pleurx catheter


3.  Cellulitis, area around catheter- improved


4.  Metastatic breast cancer, lung, liver, brain, s/p craniectomy 


5. Diabetes mellitus


6. Obesity





CONDITION


Patient Condition:  Good





HOME CARE INSTRUCTIONS:


Special Diet:  carb controlled diet





ACTIVITY:








Activity Restrictions:   Slowly Increase Activity





Bathing Restrictions:  Shower





FOLLOW UP/APPOINTMENTS


Follow-up Plan


1. Follow up with Dr. Loco on Monday as scheduled


2. Follow up with Dr. Bazan in 1 week


3. Follow up with Dr. Saba in 1 week


4. Continue antibiotics until 11/25


5. If experiencing any worsening of shortness of breath, return to the ED





REFERRALS


Other Referrals


Serafin Saba MD 


Specialty: Cardiothoracic Surgery 


Office Address 


[address] [place] [address] 


Office Telephone 


[phone]





Davion Bazan MD 


Specialty: Pulmonology/Critical Care Medicine 


Office Address 


[address] [place] [address] 


Office Telephone 


[phone]





Ani Loco MD 


Specialty: Oncology, Hematology 


Comments 


Office Address 


[address] [place] [address] 


Office Telephone 


[phone]











RIVERA RAWLS MD Nov 15, 2017 11:40

## 2017-11-15 NOTE — CONS
Date/Time of Note


Date/Time of Note


DATE: 11/15/17 


TIME: 11:56





Consult Date/Type/Reason


Admit Date/Time


Oct 24, 2017 at 18:29


Initial Consult Date


10/26/17


Type of Consultation:  ID


Ordering Provider:  CLAU AGUILAR





Objective





 Vital Signs








  Date Time  Temp Pulse Resp B/P Pulse Ox O2 Delivery O2 Flow Rate FiO2


 


11/15/17 08:01 98.0 91 18 120/67 98   


 


11/14/17 20:02      Room Air  














 Intake and Output   


 


 11/14/17 11/14/17 11/15/17





 15:00 23:00 07:00


 


Intake Total 50 ml 960 ml 1400 ml


 


Output Total   1000 ml


 


Balance 50 ml 960 ml 400 ml











Results/Medications


Result Diagram:  


11/14/17 0435                                                                  

              11/13/17 0447





Results 24 hrs





Laboratory Tests








Test


  11/14/17


12:46 11/14/17


17:30 11/14/17


21:06 11/15/17


02:20


 


Bedside Glucose 180   138   232  H 173  








Medications





 Current Medications


Dexamethasone (Decadron) 4 mg BID PO  Last administered on 11/15/17at 10:49; 

Admin Dose 4 MG;  Start 10/24/17 at 21:00


Docusate Sodium (Colace) 200 mg QHS PO  Last administered on 11/14/17at 21:10; 

Admin Dose 200 MG;  Start 10/24/17 at 21:00


Ondansetron HCl (Zofran Inj) 4 mg Q6H  PRN IV NAUSEA AND/OR VOMITING;  Start 10/

24/17 at 21:00


Acetaminophen (Tylenol Tab) 650 mg Q6H  PRN PO PAIN LEVEL 1-3 OR FEVER Last 

administered on 11/1/17at 07:13; Admin Dose 650 MG;  Start 10/24/17 at 21:00


Oxycodone/ Acetaminophen (Percocet (5/ 325)) 1 tab Q6H  PRN PO MODERATE PAIN 

LEVEL 4-6;  Start 10/24/17 at 21:00


Morphine Sulfate (morphine) 2 mg Q4H  PRN IV SEVERE PAIN LEVEL 7-10 Last 

administered on 11/1/17at 02:47; Admin Dose 2 MG;  Start 10/24/17 at 21:00


Docusate Sodium (Colace) 100 mg Q12H  PRN PO CONSTIPATION Last administered on 

11/11/17at 22:10; Admin Dose 100 MG;  Start 10/24/17 at 21:00


Magnesium Hydroxide (Milk Of Mag) 30 ml DAILY  PRN PO CONSTIPATION;  Start 10/24

/17 at 21:00


Famotidine (Pepcid) 20 mg Q12 PO  Last administered on 11/15/17at 10:49; Admin 

Dose 20 MG;  Start 10/24/17 at 21:00


Albuterol (Proventil 0.083% (Neb)) 2.5 mg Q2  PRN HHN SHORTNESS OF BREATH;  

Start 10/24/17 at 21:00


Miscellaneous Information 1 ea NOTE XX ;  Start 10/24/17 at 21:30


Glucose (Glutose) 15 gm Q15M  PRN PO DECREASED GLUCOSE;  Start 10/24/17 at 21:30


Glucose (Glutose) 22.5 gm Q15M  PRN PO DECREASED GLUCOSE;  Start 10/24/17 at 21:

30


Dextrose (D50w Syringe) 25 ml Q15M  PRN IV DECREASED GLUCOSE;  Start 10/24/17 

at 21:30


Dextrose (D50w Syringe) 50 ml Q15M  PRN IV DECREASED GLUCOSE;  Start 10/24/17 

at 21:30


Glucagon (Glucagen) 1 mg Q15M  PRN IM DECREASED GLUCOSE;  Start 10/24/17 at 21:

30


Glucose (Glutose) 15 gm Q15M  PRN BUCCAL DECREASED GLUCOSE;  Start 10/24/17 at 

21:30


Nystatin (Nystatin Powder) 1 applic BID TOP  Last administered on 11/15/17at 10:

49; Admin Dose 1 APPLIC;  Start 10/25/17 at 09:00


Tamoxifen Citrate 20 mg 20 mg DAILY PO  Last administered on 11/15/17at 10:51; 

Admin Dose 20 MG;  Start 10/25/17 at 13:00


Ceftriaxone Sodium (Rocephin) 50 ml @  100 mls/hr Q24H IVPB  Last administered 

on 11/14/17at 13:59; Admin Dose 100 MLS/HR;  Start 10/27/17 at 14:00


Diagnostic Test (Pha) (Accu-Chek) 1 ea 02 XX  Last administered on 11/15/17at 02

:21; Admin Dose 1 EA;  Start 11/4/17 at 02:00


IV Flush (NS 10 ml) 10 ml PRN  PRN IV IV PROTOCOL;  Start 11/14/17 at 13:00





Assessment/Plan


Chief Complaint/Hosp Course


SUBJECTIVE: Alert, feels good, CT removed, no fevers.


 


INDWELLINGS: Right chest tube.


 


ANTIMICROBIALS:  Rocephin.


 


PHYSICAL EXAMINATION:


GENERAL:  This is an obese, well-developed, middle-aged woman who is awake, in 

no distress.


HEENT:  Head atraumatic, normocephalic.  Sclerae anicteric.  Buccal mucosa dry.


NECK:  Supple.


CHEST:  Rise symmetrical.  Breath sounds diminished to bases.


HEART:  S1, S2.


ABDOMEN:  Soft, bowel tones present.


EXTREMITIES:  Without cyanosis.


 


ASSESSMENT:


1.  Right-sided loculated pleural effusion consistent with empyema with fluid 

culture growing oxacillin-sensitive Staphylococcus aureus.  The patient is 

status post decortication and pleurodesis on 10/31/2017.


2.  Metastatic breast cancer.


3.  Diabetes.


4.  History of craniectomy for brain metastasis.


 


PLAN:  The patient remains stable. Continue abx till Nov 25th for empyema. 

Pulmonary/CTS/oncology rec-s





DW staff


Problems:  











DIANA RANDALL NP Nov 15, 2017 11:56

## 2017-11-15 NOTE — CONS
Date/Time of Note


Date/Time of Note


DATE: 11/15/17 


TIME: 11:36





Consult Date/Type/Reason


Admit Date/Time


Oct 24, 2017 at 18:29


Initial Consult Date


10/25/17


Type of Consultation:  Pulmonary


Ordering Provider:  CLAU AGUILAR





Subjective


Patient remains comfortable this morning.  No shortness of breath no 

desaturation on ambulation.





Objective





 Vital Signs








  Date Time  Temp Pulse Resp B/P Pulse Ox O2 Delivery O2 Flow Rate FiO2


 


11/15/17 08:01 98.0 91 18 120/67 98   


 


11/14/17 20:02      Room Air  














 Intake and Output   


 


 11/14/17 11/14/17 11/15/17





 14:59 22:59 06:59


 


Intake Total 50 ml 960 ml 1400 ml


 


Output Total   1000 ml


 


Balance 50 ml 960 ml 400 ml








Exam


GENERAL: Well-nourished well-developed lady comfortable at rest no acute 

distress


VITAL SIGNS:  per chart


NECK:  Supple.  No JVD or lymphadenopathy.


CARDIAC EXAM:  S1, S2. No added sounds or murmurs.


CHEST: Diminished air entry


ABDOMEN:  Soft, nontender.  No guarding or rebound.


EXTREMITIES:  No cyanosis, clubbing or edema.


NEUROLOGIC:  Generalized weakness.  No focal deficits.





Results/Medications


Result Diagram:  


11/14/17 0435                                                                  

              11/13/17 0447





Results 24 hrs





Laboratory Tests








Test


  11/14/17


12:46 11/14/17


17:30 11/14/17


21:06 11/15/17


02:20


 


Bedside Glucose 180   138   232  H 173  








Medications





 Current Medications


Dexamethasone (Decadron) 4 mg BID PO  Last administered on 11/15/17at 10:49; 

Admin Dose 4 MG;  Start 10/24/17 at 21:00


Docusate Sodium (Colace) 200 mg QHS PO  Last administered on 11/14/17at 21:10; 

Admin Dose 200 MG;  Start 10/24/17 at 21:00


Ondansetron HCl (Zofran Inj) 4 mg Q6H  PRN IV NAUSEA AND/OR VOMITING;  Start 10/

24/17 at 21:00


Acetaminophen (Tylenol Tab) 650 mg Q6H  PRN PO PAIN LEVEL 1-3 OR FEVER Last 

administered on 11/1/17at 07:13; Admin Dose 650 MG;  Start 10/24/17 at 21:00


Oxycodone/ Acetaminophen (Percocet (5/ 325)) 1 tab Q6H  PRN PO MODERATE PAIN 

LEVEL 4-6;  Start 10/24/17 at 21:00


Morphine Sulfate (morphine) 2 mg Q4H  PRN IV SEVERE PAIN LEVEL 7-10 Last 

administered on 11/1/17at 02:47; Admin Dose 2 MG;  Start 10/24/17 at 21:00


Docusate Sodium (Colace) 100 mg Q12H  PRN PO CONSTIPATION Last administered on 

11/11/17at 22:10; Admin Dose 100 MG;  Start 10/24/17 at 21:00


Magnesium Hydroxide (Milk Of Mag) 30 ml DAILY  PRN PO CONSTIPATION;  Start 10/24

/17 at 21:00


Famotidine (Pepcid) 20 mg Q12 PO  Last administered on 11/15/17at 10:49; Admin 

Dose 20 MG;  Start 10/24/17 at 21:00


Albuterol (Proventil 0.083% (Neb)) 2.5 mg Q2  PRN HHN SHORTNESS OF BREATH;  

Start 10/24/17 at 21:00


Miscellaneous Information 1 ea NOTE XX ;  Start 10/24/17 at 21:30


Glucose (Glutose) 15 gm Q15M  PRN PO DECREASED GLUCOSE;  Start 10/24/17 at 21:30


Glucose (Glutose) 22.5 gm Q15M  PRN PO DECREASED GLUCOSE;  Start 10/24/17 at 21:

30


Dextrose (D50w Syringe) 25 ml Q15M  PRN IV DECREASED GLUCOSE;  Start 10/24/17 

at 21:30


Dextrose (D50w Syringe) 50 ml Q15M  PRN IV DECREASED GLUCOSE;  Start 10/24/17 

at 21:30


Glucagon (Glucagen) 1 mg Q15M  PRN IM DECREASED GLUCOSE;  Start 10/24/17 at 21:

30


Glucose (Glutose) 15 gm Q15M  PRN BUCCAL DECREASED GLUCOSE;  Start 10/24/17 at 

21:30


Nystatin (Nystatin Powder) 1 applic BID TOP  Last administered on 11/15/17at 10:

49; Admin Dose 1 APPLIC;  Start 10/25/17 at 09:00


Tamoxifen Citrate 20 mg 20 mg DAILY PO  Last administered on 11/15/17at 10:51; 

Admin Dose 20 MG;  Start 10/25/17 at 13:00


Ceftriaxone Sodium (Rocephin) 50 ml @  100 mls/hr Q24H IVPB  Last administered 

on 11/14/17at 13:59; Admin Dose 100 MLS/HR;  Start 10/27/17 at 14:00


Diagnostic Test (Pha) (Accu-Chek) 1 ea 02 XX  Last administered on 11/15/17at 02

:21; Admin Dose 1 EA;  Start 11/4/17 at 02:00


IV Flush (NS 10 ml) 10 ml PRN  PRN IV IV PROTOCOL;  Start 11/14/17 at 13:00





Assessment/Plan


Chief Complaint/Hosp Course


Assessment





1.  Metastatic breast cancer with recurrent right pleural effusion.  Cultures 

growing gram-positive cocci consistent with staph aureus.


2.  Loculated pleural effusion possible empyema status post decortication and 

pleurodesis.  Chest tube now removed.


3.  CNS metastasis currently receiving steroids and radiation therapy








Plan





1.  Status post chest tube placement.  Moderate pleural effusion.


2.  Continue oncology recommendations continues radiation therapy for CNS 

metastasis


3.  Leukocytosis likely secondary to dexamethasone.





DC home.  Follow-up with me in the office may require repeat thoracentesis.





Continue radiation therapy


Discharge planning.


Problems:  











DELFIN LAKHANI MD, [facility] Nov 15, 2017 11:36

## 2017-11-15 NOTE — PN
Date/Time of Note


Date/Time of Note


DATE: 11/15/17 


TIME: 11:21





Assessment/Plan


VTE Prophylaxis


VTE Prophylaxis Intervention:  contraindicated





Lines/Catheters


IV Catheter Type (from [facility]):  PICC Line


Central line still needed:  Yes


Urinary Cath still in place:  No





Assessment/Plan


Assessment/Plan


1.  Empyema 2/2 staph aureus s/p VATS 10/31/17


- Patient doing well and no acute respiratory issues


- Pulmonology on board and recommendations appreciated.


- CXR yesterday shows moderate pleural effusion and will need to follow up with 

Pulmonology as outpatient for thoracentesis if needed


- On incentive spirometry 


- CT surgery on board and appreciate consultation. Will need to follow up as 

outpatient 


- ID on board and recommendations appreciated. continue on IV antibiotics until 

11/25. PICC line placed and home health for PICC care 


- Ultrasound-guided thoracentesis performed, cultures showing staph aureus





2.  Dysfunctional Pleurx catheter


- Pulmonology on board and consultation appreciated


- Respiratory status stable at this time





3.  Cellulitis, area around catheter- improved


- ID on board and recommendations appreciated





4.  Metastatic breast cancer, lung, liver, brain, s/p craniectomy 


- currently receiving radiation therapy and tamoxifen and Decadron


- Heme/onc on board and consultation appreciated. Plans to start Chemo on 

Monday with Dr. Loco


- Radiation as scheduled. Next session is tonight 





5. Diabetes mellitus


- Currently stable





6. Obesity


- Counseled about diet and improving exercise routine when able





7. Disposition


- Continue antibiotics until 11/25


- Patient medically stable for discharge home. Home health arranged for PICC 

care and IV antibiotics 


- Ordered walker and toilet lift





Subjective


24 Hr Interval Summary


Free Text/Dictation


Patient doing well after returning from radiation.  No new complaints and no 

acute overnight events.  Ready for discharge home.





Exam/Review of Systems


Vital Signs


Vitals





 Vital Signs








  Date Time  Temp Pulse Resp B/P Pulse Ox O2 Delivery O2 Flow Rate FiO2


 


11/15/17 08:01 98.0 91 18 120/67 98   


 


11/14/17 20:02      Room Air  














 Intake and Output   


 


 11/14/17 11/14/17 11/15/17





 14:59 22:59 06:59


 


Intake Total 50 ml 960 ml 1400 ml


 


Output Total   1000 ml


 


Balance 50 ml 960 ml 400 ml











Exam


General: Patient awake and alert, no acute distress. 


Head: R side craniectomy site healing well. 


Eyes: EOMI, pupils reactive to light


Neck: Supple, nontender, midline


Respiratory: Diminished breath sounds on right, no wheezing or crackles. 

previous CT incision site healing well 


Cardiovascular: regular rate, no obvious murmurs


Gastrointestinal: non-tender to palpation, bowel sounds heard.


Neurological: Moves all extremities spontaneously





Results


Result Diagram:  


11/14/17 0435                                                                  

              11/13/17 0447





Results 24 hrs





Laboratory Tests








Test


  11/14/17


12:46 11/14/17


17:30 11/14/17


21:06 11/15/17


02:20


 


Bedside Glucose 180   138   232  H 173  











Medications


Medications





 Current Medications


Dexamethasone (Decadron) 4 mg BID PO  Last administered on 11/15/17at 10:49; 

Admin Dose 4 MG;  Start 10/24/17 at 21:00


Docusate Sodium (Colace) 200 mg QHS PO  Last administered on 11/14/17at 21:10; 

Admin Dose 200 MG;  Start 10/24/17 at 21:00


Ondansetron HCl (Zofran Inj) 4 mg Q6H  PRN IV NAUSEA AND/OR VOMITING;  Start 10/

24/17 at 21:00


Acetaminophen (Tylenol Tab) 650 mg Q6H  PRN PO PAIN LEVEL 1-3 OR FEVER Last 

administered on 11/1/17at 07:13; Admin Dose 650 MG;  Start 10/24/17 at 21:00


Oxycodone/ Acetaminophen (Percocet (5/ 325)) 1 tab Q6H  PRN PO MODERATE PAIN 

LEVEL 4-6;  Start 10/24/17 at 21:00


Morphine Sulfate (morphine) 2 mg Q4H  PRN IV SEVERE PAIN LEVEL 7-10 Last 

administered on 11/1/17at 02:47; Admin Dose 2 MG;  Start 10/24/17 at 21:00


Docusate Sodium (Colace) 100 mg Q12H  PRN PO CONSTIPATION Last administered on 

11/11/17at 22:10; Admin Dose 100 MG;  Start 10/24/17 at 21:00


Magnesium Hydroxide (Milk Of Mag) 30 ml DAILY  PRN PO CONSTIPATION;  Start 10/24

/17 at 21:00


Famotidine (Pepcid) 20 mg Q12 PO  Last administered on 11/15/17at 10:49; Admin 

Dose 20 MG;  Start 10/24/17 at 21:00


Albuterol (Proventil 0.083% (Neb)) 2.5 mg Q2  PRN HHN SHORTNESS OF BREATH;  

Start 10/24/17 at 21:00


Miscellaneous Information 1 ea NOTE XX ;  Start 10/24/17 at 21:30


Glucose (Glutose) 15 gm Q15M  PRN PO DECREASED GLUCOSE;  Start 10/24/17 at 21:30


Glucose (Glutose) 22.5 gm Q15M  PRN PO DECREASED GLUCOSE;  Start 10/24/17 at 21:

30


Dextrose (D50w Syringe) 25 ml Q15M  PRN IV DECREASED GLUCOSE;  Start 10/24/17 

at 21:30


Dextrose (D50w Syringe) 50 ml Q15M  PRN IV DECREASED GLUCOSE;  Start 10/24/17 

at 21:30


Glucagon (Glucagen) 1 mg Q15M  PRN IM DECREASED GLUCOSE;  Start 10/24/17 at 21:

30


Glucose (Glutose) 15 gm Q15M  PRN BUCCAL DECREASED GLUCOSE;  Start 10/24/17 at 

21:30


Nystatin (Nystatin Powder) 1 applic BID TOP  Last administered on 11/15/17at 10:

49; Admin Dose 1 APPLIC;  Start 10/25/17 at 09:00


Tamoxifen Citrate 20 mg 20 mg DAILY PO  Last administered on 11/15/17at 10:51; 

Admin Dose 20 MG;  Start 10/25/17 at 13:00


Ceftriaxone Sodium (Rocephin) 50 ml @  100 mls/hr Q24H IVPB  Last administered 

on 11/14/17at 13:59; Admin Dose 100 MLS/HR;  Start 10/27/17 at 14:00


Diagnostic Test (Pha) (Accu-Chek) 1 ea 02 XX  Last administered on 11/15/17at 02

:21; Admin Dose 1 EA;  Start 11/4/17 at 02:00


IV Flush (NS 10 ml) 10 ml PRN  PRN IV IV PROTOCOL;  Start 11/14/17 at 13:00











RIVERA RAWLS MD Nov 15, 2017 11:21

## 2017-11-15 NOTE — DS
Date/Time of Note


Date/Time of Note


DATE: 11/15/17 


TIME: 11:42





Discharge Summary


Admission/Discharge Info


Admit Date/Time


Oct 24, 2017 at 18:29


Discharge Date/Time


11/15/17


Discharge Diagnosis


1.  Empyema 2/2 staph aureus s/p VATS 10/31/17


2.  Dysfunctional Pleurx catheter


3.  Cellulitis, area around catheter- improved


4.  Metastatic breast cancer, lung, liver, brain, s/p craniectomy 


5. Diabetes mellitus


6. Obesity


Patient Condition:  Good


Consults


Pulmonology


CT Surgery


Oncology


Infectious Disease


Procedures


1.  Video-assisted thoracic surgery, pulmonary decortication.


2.  Right pulmonary pleurodesis.


3.  Bronchoscopy.


Hx of Present Illness


39-year-old female with metastatic breast cancer who was residing at 

[facility] rehab for pleural effusion Pleurx catheter therapy.  

There may have been some issues lately and were unable to drain her fluid.  She 

is noticed that the fluid has been leaking and was sent to the ER for eval.  

Denies any fever chills loss of appetite.  She does notice some drainage around 

the chest tube site.





ER: Stable vital signs





Exam


No pallor adenopathy icterus.  Postop craniectomy site C/D/I


Regular no murmur rub gallop


Dimin bs; on right no tachypnea. Rt chest tube in place probably; dressing 

soaked


Bs + nt nd; no r/r/g


Mild edema negative Homans


Hospital Course


Patient was admitted and Pulmonology was consulted for dysfunctional pleurx 

catheter.  ID was consulted for antibiotic recommendations for cellulitis 

surrounding pleurx catheter site.  Decadron and Tamoxifen were continued and 

patient set up to continue receiving scheduled Radiation treatments.  She also 

underwent a right thoracentesis of pleural effusion on 10/25.  There was 

concern about loculated pleural effusion and CT chest was performed which 

confirmed a right loculated pleural effusion with ?empyema.  Pleural fluid from 

thoracentesis was growing staph aureus and patient continued on appropriate 

antibiotics.  CT surgery was consulted for VATS and pleurodesis which was 

performed on 10/31.  Chest tube was placed and patient transferred to ICU 

following procedure which she tolerated well.  Patient was doing well and 

downgraded to Telemetry then Med/Surg to continue chest tube care, IV 

antibiotics, and monitoring.  Patient was maintained on chest tube to wall 

suction and output was monitored.  Output decreased daily and chest tube was 

discontinued on 11/14.  Repeat CXR showed moderate right sided pleural effusion 

but patient was saturating well on room air.  Patient did not want to return to 

SNF and home health services including PICC line care, IV antibiotics, and 

physical therapy were arranged.  Patient was informed she will need IV 

antibiotics until 11/25 and continue being monitored by CT surgery and 

pulmonology as outpatient.  Patient plans to follow up with Dr. Loco on Monday 

to being chemotherapy.  Patient was discharged home in good condition.


Home Meds


Active Scripts


Blood-Glucose Meter (BLOOD GLUCOSE MONITORING) 1 Each Kit, 1 EACH MC for 

ELEVATED GLUCOSE, #1


   Prov:RIVERA RAWLS MD         11/15/17


Front Wheel Walker* (Front Wheel Walker*) 1 Each Dme, 1 EACH MC AS DIRECTED, #1 

DME 0 Refills


   Prov:RIVERA RAWLS MD         11/15/17


Reported Medications


Tamoxifen Citrate* (Nolvadex*) 20 Mg Tablet, 20 MG PO DAILY, TAB


   10/24/17


Famotidine* (Famotidine*) 20 Mg Tablet, 20 MG PO DAILY, #30 TAB


   10/24/17


Dexamethasone* (Dexamethasone*) 4 Mg Tablet, 4 MG PO BID, TAB


   10/24/17


Cranberry Extract (Cranberry) 425 Mg Capsule, 425 MG PO DAILY, CAP


   10/24/17


Acetaminophen* (Acetaminophen*) 500 MG Extra Strength Tablet, 1000 MG PO Q6H Y 

for PAIN 4-6/10, TAB


   10/24/17


Sod Phosphate/Sod Biphosphate* (Fleet* Enema Pediatric) 66.6 Ml Soln, 66.6 ML 

WI Q2D Y for CONSTIPATION, ENEMA


   10/24/17


Bisacodyl* (Bisacodyl*) 10 Mg Supp, 10 MG WI Q24H Y for PRN, SUPP


   10/24/17


Magnesium Hydroxide* (Milk Of Magnesia*) 400 Mg/5 Ml Oral.susp, 30 ML PO QHS, ML


   10/24/17


Docusate Sodium* (Colace*) 100 Mg Capsule, 200 MG PO QHS, #30 CAP


   10/24/17


Insulin Aspart* (Novolog Insulin Pen*) 100 Unit/Ml Soln, 0 SC .SLIDING SCALE AC

, EA


   0-150=0, 151-200=2 UNITS, 201-250=4 UNITS, 251-300=6 UNITS, 301-350=8


   UNITS, 351-400=10 UNITS,>400=12 UNITS; DEAJN BRYANT IF BS BELOW 70


   10/24/17


Discontinued Reported Medications


Acetaminophen* (Tylenol*) 325 Mg Tablet, 650 MG PO Q6H Y for MILD PAIN LEVEL 1-3

, TAB


   AND FOR FEVER 100 AND ABOVE


   10/24/17


Follow-up Plan


1. Follow up with Dr. Loco on Monday as scheduled


2. Follow up with Dr. Bazan in 1 week


3. Follow up with Dr. Saba in 1 week


4. Continue antibiotics until 11/25


5. If experiencing any worsening of shortness of breath, return to the ED


Primary Care Provider


Ani Loco M.D.


Time spent on discharge:   > 30 minutes


Pending Labs





Laboratory Tests








Test


  11/14/17


12:46 11/14/17


17:30 11/14/17


21:06 11/15/17


02:20


 


Bedside Glucose


  180mg/dL


() 138mg/dL


() 232mg/dL


() 173mg/dL


()

















RIVERA RAWLS MD Nov 15, 2017 11:42

## 2017-11-21 ENCOUNTER — HOSPITAL ENCOUNTER (INPATIENT)
Dept: HOSPITAL 10 - E/R | Age: 47
LOS: 2 days | Discharge: HOME | DRG: 193 | End: 2017-11-23
Attending: INTERNAL MEDICINE | Admitting: INTERNAL MEDICINE
Payer: MEDICARE

## 2017-11-21 VITALS — SYSTOLIC BLOOD PRESSURE: 116 MMHG | RESPIRATION RATE: 20 BRPM | DIASTOLIC BLOOD PRESSURE: 66 MMHG

## 2017-11-21 VITALS
WEIGHT: 242.51 LBS | WEIGHT: 242.51 LBS | BODY MASS INDEX: 41.4 KG/M2 | BODY MASS INDEX: 41.4 KG/M2 | HEIGHT: 64 IN | HEIGHT: 64 IN

## 2017-11-21 VITALS — DIASTOLIC BLOOD PRESSURE: 74 MMHG | SYSTOLIC BLOOD PRESSURE: 122 MMHG | HEART RATE: 88 BPM | RESPIRATION RATE: 20 BRPM

## 2017-11-21 VITALS — TEMPERATURE: 98.1 F

## 2017-11-21 DIAGNOSIS — B95.4: ICD-10-CM

## 2017-11-21 DIAGNOSIS — Z91.19: ICD-10-CM

## 2017-11-21 DIAGNOSIS — R73.03: ICD-10-CM

## 2017-11-21 DIAGNOSIS — J91.0: ICD-10-CM

## 2017-11-21 DIAGNOSIS — E66.9: ICD-10-CM

## 2017-11-21 DIAGNOSIS — Z17.0: ICD-10-CM

## 2017-11-21 DIAGNOSIS — C79.31: ICD-10-CM

## 2017-11-21 DIAGNOSIS — C78.7: ICD-10-CM

## 2017-11-21 DIAGNOSIS — N39.0: ICD-10-CM

## 2017-11-21 DIAGNOSIS — D50.9: ICD-10-CM

## 2017-11-21 DIAGNOSIS — C50.919: ICD-10-CM

## 2017-11-21 DIAGNOSIS — J18.9: Primary | ICD-10-CM

## 2017-11-21 DIAGNOSIS — J86.9: ICD-10-CM

## 2017-11-21 DIAGNOSIS — B37.49: ICD-10-CM

## 2017-11-21 DIAGNOSIS — C78.00: ICD-10-CM

## 2017-11-21 LAB
ABNORMAL IP MESSAGE: 1
ADD UMIC: YES
ALBUMIN SERPL-MCNC: 2.7 G/DL (ref 3.3–4.9)
ALBUMIN/GLOB SERPL: 0.87 {RATIO}
ALP SERPL-CCNC: 89 IU/L (ref 42–121)
ALT SERPL-CCNC: 27 IU/L (ref 13–69)
ANION GAP SERPL CALC-SCNC: 14 MMOL/L (ref 8–16)
APTT BLD: 29.6 SEC (ref 25–35)
AST SERPL-CCNC: 56 IU/L (ref 15–46)
BASOPHILS # BLD AUTO: 0 10^3/UL (ref 0–0.1)
BASOPHILS NFR BLD: 0.2 % (ref 0–2)
BILIRUB DIRECT SERPL-MCNC: 0 MG/DL (ref 0–0.2)
BILIRUB SERPL-MCNC: 0.3 MG/DL (ref 0.2–1.3)
BUN SERPL-MCNC: 13 MG/DL (ref 7–20)
CALCIUM SERPL-MCNC: 8.3 MG/DL (ref 8.4–10.2)
CHLORIDE SERPL-SCNC: 106 MMOL/L (ref 97–110)
CO2 SERPL-SCNC: 24 MMOL/L (ref 21–31)
COLOR UR: YELLOW
CREAT SERPL-MCNC: 0.38 MG/DL (ref 0.44–1)
EOSINOPHIL # BLD: 0 10^3/UL (ref 0–0.5)
EOSINOPHIL NFR BLD: 0.2 % (ref 0–7)
ERYTHROCYTE [DISTWIDTH] IN BLOOD BY AUTOMATED COUNT: 23.1 % (ref 11.5–14.5)
GLOBULIN SER-MCNC: 3.1 G/DL (ref 1.3–3.2)
GLUCOSE SERPL-MCNC: 174 MG/DL (ref 70–220)
GLUCOSE UR STRIP-MCNC: (no result) MG/DL
HCT VFR BLD CALC: 30.7 % (ref 37–47)
HGB BLD-MCNC: 9.3 G/DL (ref 12–16)
INR PPP: 1.14
KETONES UR STRIP.AUTO-MCNC: NEGATIVE MG/DL
LYMPHOCYTES # BLD AUTO: 1.3 10^3/UL (ref 0.8–2.9)
LYMPHOCYTES NFR BLD AUTO: 13.3 % (ref 15–51)
MCH RBC QN AUTO: 24.6 PG (ref 29–33)
MCHC RBC AUTO-ENTMCNC: 30.3 G/DL (ref 32–37)
MCV RBC AUTO: 81.2 FL (ref 82–101)
MONOCYTES # BLD: 0.8 10^3/UL (ref 0.3–0.9)
MONOCYTES NFR BLD: 7.9 % (ref 0–11)
MUCOUS THREADS #/AREA URNS HPF: (no result) /HPF
NEUTROPHILS # BLD: 7.4 10^3/UL (ref 1.6–7.5)
NEUTROPHILS NFR BLD AUTO: 75.4 % (ref 39–77)
NITRITE UR QL STRIP.AUTO: NEGATIVE MG/DL
NRBC # BLD MANUAL: 0 10^3/UL (ref 0–0)
NRBC BLD AUTO-RTO: 0 /100WBC (ref 0–0)
PLATELET # BLD: 110 10^3/UL (ref 140–415)
PMV BLD AUTO: 8.8 FL (ref 7.4–10.4)
POSITIVE DIFF: (no result)
POTASSIUM SERPL-SCNC: 3.7 MMOL/L (ref 3.5–5.1)
PROT SERPL-MCNC: 5.8 G/DL (ref 6.1–8.1)
PROTHROMBIN TIME: 14.6 SEC (ref 12.2–14.2)
PT RATIO: 1.1
RBC # BLD AUTO: 3.78 10^6/UL (ref 4.2–5.4)
RBC # UR AUTO: NEGATIVE MG/DL
SODIUM SERPL-SCNC: 140 MMOL/L (ref 135–144)
SQUAMOUS #/AREA URNS HPF: (no result) /HPF
TROPONIN I SERPL-MCNC: < 0.012 NG/ML (ref 0–0.12)
UR ASCORBIC ACID: NEGATIVE MG/DL
UR BILIRUBIN (DIP): NEGATIVE MG/DL
UR CLARITY: (no result)
UR PH (DIP): 5 (ref 5–9)
UR RBC: 5 /HPF (ref 0–5)
UR SPECIFIC GRAVITY (DIP): 1.02 (ref 1–1.03)
UR TOTAL PROTEIN (DIP): NEGATIVE MG/DL
UROBILINOGEN UR STRIP-ACNC: NEGATIVE MG/DL
WBC # BLD AUTO: 9.9 10^3/UL (ref 4.8–10.8)
WBC # UR STRIP: (no result) LEU/UL

## 2017-11-21 PROCEDURE — 87400 INFLUENZA A/B EACH AG IA: CPT

## 2017-11-21 PROCEDURE — 84484 ASSAY OF TROPONIN QUANT: CPT

## 2017-11-21 PROCEDURE — 94664 DEMO&/EVAL PT USE INHALER: CPT

## 2017-11-21 PROCEDURE — 84439 ASSAY OF FREE THYROXINE: CPT

## 2017-11-21 PROCEDURE — 85730 THROMBOPLASTIN TIME PARTIAL: CPT

## 2017-11-21 PROCEDURE — 80048 BASIC METABOLIC PNL TOTAL CA: CPT

## 2017-11-21 PROCEDURE — 70450 CT HEAD/BRAIN W/O DYE: CPT

## 2017-11-21 PROCEDURE — 87081 CULTURE SCREEN ONLY: CPT

## 2017-11-21 PROCEDURE — 96375 TX/PRO/DX INJ NEW DRUG ADDON: CPT

## 2017-11-21 PROCEDURE — 36415 COLL VENOUS BLD VENIPUNCTURE: CPT

## 2017-11-21 PROCEDURE — 87086 URINE CULTURE/COLONY COUNT: CPT

## 2017-11-21 PROCEDURE — 87040 BLOOD CULTURE FOR BACTERIA: CPT

## 2017-11-21 PROCEDURE — 80053 COMPREHEN METABOLIC PANEL: CPT

## 2017-11-21 PROCEDURE — 93005 ELECTROCARDIOGRAM TRACING: CPT

## 2017-11-21 PROCEDURE — 85610 PROTHROMBIN TIME: CPT

## 2017-11-21 PROCEDURE — 81001 URINALYSIS AUTO W/SCOPE: CPT

## 2017-11-21 PROCEDURE — 85025 COMPLETE CBC W/AUTO DIFF WBC: CPT

## 2017-11-21 PROCEDURE — 83735 ASSAY OF MAGNESIUM: CPT

## 2017-11-21 PROCEDURE — 94640 AIRWAY INHALATION TREATMENT: CPT

## 2017-11-21 PROCEDURE — 84100 ASSAY OF PHOSPHORUS: CPT

## 2017-11-21 PROCEDURE — 84443 ASSAY THYROID STIM HORMONE: CPT

## 2017-11-21 PROCEDURE — 71250 CT THORAX DX C-: CPT

## 2017-11-21 PROCEDURE — 71010: CPT

## 2017-11-21 PROCEDURE — 83605 ASSAY OF LACTIC ACID: CPT

## 2017-11-21 PROCEDURE — 82962 GLUCOSE BLOOD TEST: CPT

## 2017-11-21 PROCEDURE — 96365 THER/PROPH/DIAG IV INF INIT: CPT

## 2017-11-21 RX ADMIN — IPRATROPIUM BROMIDE AND ALBUTEROL SULFATE SCH ML: .5; 3 SOLUTION RESPIRATORY (INHALATION) at 17:08

## 2017-11-21 RX ADMIN — IPRATROPIUM BROMIDE AND ALBUTEROL SULFATE SCH ML: .5; 3 SOLUTION RESPIRATORY (INHALATION) at 21:06

## 2017-11-21 RX ADMIN — TAMOXIFEN CITRATE SCH MG: 10 TABLET, FILM COATED ORAL at 20:46

## 2017-11-21 RX ADMIN — CLINDAMYCIN PHOSPHATE SCH MLS/HR: 18 INJECTION, SOLUTION INTRAVENOUS at 22:46

## 2017-11-21 RX ADMIN — LEVOFLOXACIN SCH MLS/HR: 500 INJECTION, SOLUTION INTRAVENOUS at 14:04

## 2017-11-21 RX ADMIN — CLINDAMYCIN PHOSPHATE SCH MLS/HR: 18 INJECTION, SOLUTION INTRAVENOUS at 14:22

## 2017-11-21 RX ADMIN — DOCUSATE SODIUM SCH MG: 100 CAPSULE, LIQUID FILLED ORAL at 21:00

## 2017-11-21 RX ADMIN — FOLIC ACID SCH MLS/HR: 5 INJECTION, SOLUTION INTRAMUSCULAR; INTRAVENOUS; SUBCUTANEOUS at 13:02

## 2017-11-21 NOTE — HP
Date/Time of Note


Date/Time of Note


DATE: 11/21/17 


TIME: 13:34





Assessment/Plan


VTE Prophylaxis


VTE Prophylaxis Intervention:  ambulation





Lines/Catheters


IV Catheter Type (from Nrs):  PICC Line


Central line still needed:  Yes





Assessment/Plan


Chief Complaint/Hosp Course


 This is a 47-year-old female with metastatic breast cancer who was recently 

diagnosed with empyema and receiving IV antibiotics, presented to the emergency 

room for worsening shortness of breath and subjective fevers.





1.  Pneumonia in the setting of recent parapneumonic effusion with staph aureus 

empyema.


-In the setting of recent empyema, we are going to start patient on clindamycin 

that would cover anaerobes along with Levaquin.


-Will consider ID consult.


-Bronchodilators, oxygen and follow-up chest x-ray


-CT Chest in AM to f/u pleural effusion and determine need for drainage.





2.  Sepsis, likely secondary to #1.


-Continue antibiotics, IV fluids.  Follow-up pancultures.





3.  Anemia with iron deficiency and metastatic cancer history.  H&H stable.


-Monitor.





4. Metastatic breast cancer with metastases to lung, liver, brain, craniectomy





5. Recurrent pleural effusion with   and Staph aureus empyema 2/2 malignancy.  

Patient also had history of Pleurx catheter placed in the past.


-Status post recent VATS.  





6.  Prediabetes, previous A1c 6.2.


-Carbohydrate controlled diet.  Patient will be placed on insulin in-house with 

steroids she is taking.








Prophylaxis: SCDs/Pepcid.





Rest of the management depend on hospital course.





I have also spoke with the patient and she wanted to be full code at this time.





Approximately 60 minutes was spent on this history and physical.





Patient is seen in collaboration with Dr. Kent.


Problems:  





HPI/ROS


Admit Date/Time


Admit Date/Time


Nov 21, 2017 at 08:20





Hx of Present Illness


This is a very unfortunate obese 47-year-old female with a past medical history 

of metastatic breast cancer with metastases to lung, liver, brain, craniectomy,

Right mastectomy, pleural effusion with dysfunctional Pleurx catheter which was 

removed, recent empyema with staph aureus and status post VATS, Prediabetes,who 

presented to the emergency room with complaints of subjective fevers, 

generalized weakness and shortness of breath that started 2 days ago.  

Apparently, she has been on IV antibiotics for empyema.  Patient also on oral 

chemo and radiation.  However, she was too weak to get her last radiation dose.

  Patient also follow-up with her oncologist  and has had next chemo 

scheduled for next week.





At my encounter with the patient, she denied chest pain, palpitation, nausea, 

vomiting, abdominal pain, dizziness, hematuria, hematemesis, hematochezia, 

melena or other constitutional symptoms.  Patient was noted with hemoglobin 9.3

, hematocrit 30.7, platelet 110, and a lactic acid level 3.6 upon presentation.

  Urinalysis with 1+ leukocyte esterase, positive with leukocyte esterase, WBC 

and WBC.  Patient was given vancomycin, normal saline 3 L and aztreonam in the 

emergency room and was admitted.





ROS


A 12 point review of system was assessed and is negative other than what is 

mentioned in the HPI.





PMH/Family/Social


Past Medical History


See HPI





Past Surgical History


See HPI





Social History


Patient denied history of alcohol, smoking or illicit drug use.


Smoking Status:  Never smoker





Exam/Review of Systems


Vital Signs


Vitals





 Vital Signs








  Date Time  Temp Pulse Resp B/P Pulse Ox O2 Delivery O2 Flow Rate FiO2


 


11/21/17 13:22      Nasal Cannula 2.0 


 


11/21/17 13:20 98.2 88 20 122/74 99   











Exam


Exam


General: Chronically ill looking female, not in any acute distress .


HEENT: Normocephalic, Atraumatic, No laceration or hematoma; Eyes: PEERL, 

Conjunctiva clear, Anicteric sclera Neck: Supple without any lymphadenopathy, 

nontender, no JVD, no carotid bruits, trachea midline, no thyromegaly


Cardiac: S1, S2 auscultated, regular rhythm and rate, no mumurs or gallop


Pulmonary:Rhonchi bilaterally. Normal respiratory effort. No wheezing


GI: Abdomen Obese to inspection. Soft, non tender, non- distended, no masses, 

no rebound tenderness or guarding. Bowel sounds active on all four quadrants


Genitourinary: Deferred


Extremities: Grossly edematous with 4+ pedal edema.No cyanosis, clubbing, or 

edema. Pulses [2+] bilaterally. Full ROM on all four extremities. No focal 

weakness appreciated.


Neurologic: Alert to person, place, time, and situation. Affect appropriate, 

intact sensation.


Skin: Right chest wall with previous chest tube insertion site with open wound 

with slough.Clean,dry, and intact. No ecchymosis, no rashes, or lesions





Labs


Result Diagram:  


11/21/17 0652                                                                  

              11/21/17 0652








Medications


Medications





 Current Medications


Sodium Chloride (NS) 1,000 ml @  75 mls/hr [id number] IV  Last administered on 11/ 21/17at 13:02; Admin Dose 75 MLS/HR;  Start 11/21/17 at 11:13


Ondansetron HCl (Zofran Inj) 4 mg Q6H  PRN IV NAUSEA AND/OR VOMITING;  Start 11/ 21/17 at 11:30


Acetaminophen (Tylenol Tab) 650 mg Q6H  PRN PO PAIN LEVEL 1-3 OR FEVER;  Start 

11/21/17 at 11:30


Acetaminophen (Tylenol Supp) 650 mg Q6H  PRN UT PAIN LEVEL 1-3 OR FEVER;  Start 

11/21/17 at 11:30


Morphine Sulfate (morphine) 2 mg Q4H  PRN IV SEVERE PAIN LEVEL 7-10;  Start 11/ 21/17 at 11:30


Bisacodyl (Dulcolax Supp) 10 mg Q24H  PRN UT PRN;  Start 11/21/17 at 11:30


Dexamethasone (Decadron) 4 mg BID PO ;  Start 11/21/17 at 21:00


Docusate Sodium (Colace) 200 mg QHS PO ;  Start 11/21/17 at 21:00


Famotidine (Pepcid) 20 mg DAILY PO ;  Start 11/22/17 at 09:00


Magnesium Hydroxide (Milk Of Mag) 30 ml QHS PO ;  Start 11/21/17 at 21:00


Sodium Biphosphate/ Sodium Phosphate (Fleet Enema Pediatric) 66.6 ml Q2D  PRN 

UT CONSTIPATION;  Start 11/21/17 at 11:30


Miscellaneous Information 20 mg 20 mg DAILY PO ;  Start 11/22/17 at 09:00;  

Status UNV


Levofloxacin/ Dextrose (Levaquin 500mg/ D5W 100 ml (Pmx)) 100 ml @  100 mls/hr 

Q24H IVPB ;  Start 11/21/17 at 11:30











SAMEER ROE NP Nov 21, 2017 13:49

## 2017-11-21 NOTE — CONS
DATE OF ADMISSION: 11/21/2017

DATE OF CONSULTATION:  11/21/2017

 

 

 

TYPE OF CONSULTATION:  Infectious consultation.

 

REASON FOR CONSULTATION:  Antibiotic management.

 

HISTORY OF PRESENT ILLNESS:  Brionna Reddy is a very unfortunate 47-year-old female with past medi
parviz history of breast cancer, comes in now with pneumonia and is being seen for antibiotic managemen
t.  Her past problems include:

1.  Obesity.

2.  Metastatic breast carcinoma with metastasis to lungs, liver, brain.

3.  Status post craniectomy

4.  Status post right mastectomy.

5.  Pleural effusion with dysfunctional PleurX catheter which was removed.

6.  Recent empyema with Staphylococcus aureus, status post VATS.

7.  Prediabetes.

8.  Obesity.  

The patient presents to the emergency room with fever, generalized weakness, shortness of breath whi
ch began 2 days prior to admission.  She has been on IV antibiotics for empyema also on oral chemoth
erapy and radiation.  She is too weak; however, for her last radiation dose.  The patient had a whit
e count of 9.9, H and H 9.3, 30.7, platelet count 110,000.  BUN and creatinine 13/0.38.  The patient
 was started on vancomycin and aztreonam in the emergency room.

 

PAST MEDICAL HISTORY:  Operations as outlined.

 

FAMILY HISTORY:  Noncontributory.

 

SOCIAL HISTORY:  She does not smoke, drink or abuse drugs.

 

ALLERGIES:  NONE TO PENICILLIN, SULFA OR FOODS.

 

MEDICATIONS:  Per chart.

 

REVIEW OF SYSTEMS:  As per HPI.

 

PHYSICAL EXAMINATION:

GENERAL:  The patient is a chronically ill-appearing female who is awake, responsive, in no acute di
stress.

VITAL SIGNS:  Stable.  She is afebrile.

SKIN:  Right chest wall with previous chest tube insertion site with open wound, clean, dry and inta
ct.  She has no rash or icterus or ecchymoses.

HEENT:  Within normal limits.

NECK:  Supple.

LYMPH NODES:  None palpable.

CHEST:  Decreased breath sounds at the bases with scattered rhonchi bilaterally.

HEART:  Without murmur or gallop.

ABDOMEN:  Soft, obese, nontender, without organosplenomegaly or masses.

EXTREMITIES:  Without cyanosis, clubbing. She is grossly edematous, 4+ edema with anasarca.

RECTAL AND GENITAL:  Deferred.

NEUROLOGIC:  No focal neurological abnormality.

 

IMPRESSION AND PLAN:  The patient comes in now with pneumonia in the setting of a parapneumonic effu
hari with history of Staph aureus empyema.  She was started on clindamycin and Levaquin.  She was gi
dayday bronchodilator.  She was pancultured.  She had VATS 2 weeks ago.

 

IMAGING STUDIES:  Chest x-ray shows minimal increased right-sided pleural effusion with adjacent con
solidation or atelectasis, increased bilateral right greater than left perihilar airspace disease.  
CT scan of the brain, postsurgical changes from prior right frontal lobe metastasis resection with f
ocal areas of encephalomalacia seen.  The other metastatic lesions seen on prior MRI not well demons
trated.

Urine is 1+ leukocyte esterase, 14 white cells per high-power field.  Cultures are pending.  I concu
r with the current treatment with clindamycin and Levaquin.  We have blood cultures, sputum cultures
, urine cultures, and stool cultures pending.  I will dictate my findings to the hospitalist.

 

 

Dictated By: JERROLD DREYER MD JD/LAKISHA

DD:    11/21/2017 15:32:21

DT:    11/21/2017 16:16:59

Conf#: [id number]

DID#:  [id number]

## 2017-11-21 NOTE — ERD
ER Documentation


Chief Complaint


Chief Complaint


SOB w/a little walk, dizzy, weaknesss, takes  home abx IV via L  port cath





HPI


Patient is a 47-year-old female with breast cancer and recent pneumonia who 

presents with shortness of breath.  She says that she has shortness of breath 

and weakness which started yesterday.  2 nights ago she had a subjective fever.

  She denies cough.  She has been receiving antibiotics through a PICC line for 

"a lung infection".  She was supposed to have radiation last dose done 

yesterday but she was too weak to go.  She was so weak today that she came to 

the emergency department.  She denies bleeding.  Her oncologist is Dr. Loco.





ROS


All systems reviewed and are negative except as per history of present illness.





Medications


Home Meds


Active Scripts


Blood-Glucose Meter (BLOOD GLUCOSE MONITORING) 1 Each Kit, 1 EACH MC for 

ELEVATED GLUCOSE, #1


   Prov:RIVERA RAWLS MD         11/15/17


Front Wheel Walker* (Front Wheel Walker*) 1 Each Dme, 1 EACH MC AS DIRECTED, #1 

DME 0 Refills


   Prov:RIVERA RAWLS MD         11/15/17


Reported Medications


Tamoxifen Citrate* (Nolvadex*) 20 Mg Tablet, 20 MG PO DAILY, TAB


   10/24/17


Famotidine* (Famotidine*) 20 Mg Tablet, 20 MG PO DAILY, #30 TAB


   10/24/17


Dexamethasone* (Dexamethasone*) 4 Mg Tablet, 4 MG PO BID, TAB


   10/24/17


Cranberry Extract (Cranberry) 425 Mg Capsule, 425 MG PO DAILY, CAP


   10/24/17


Acetaminophen* (Acetaminophen*) 500 MG Extra Strength Tablet, 1000 MG PO Q6H Y 

for PAIN 4-6/10, TAB


   10/24/17


Sod Phosphate/Sod Biphosphate* (Fleet* Enema Pediatric) 66.6 Ml Soln, 66.6 ML 

KY Q2D Y for CONSTIPATION, ENEMA


   10/24/17


Bisacodyl* (Bisacodyl*) 10 Mg Supp, 10 MG KY Q24H Y for PRN, SUPP


   10/24/17


Magnesium Hydroxide* (Milk Of Magnesia*) 400 Mg/5 Ml Oral.susp, 30 ML PO QHS, ML


   10/24/17


Docusate Sodium* (Colace*) 100 Mg Capsule, 200 MG PO QHS, #30 CAP


   10/24/17


Insulin Aspart* (Novolog Insulin Pen*) 100 Unit/Ml Soln, 0 SC .SLIDING SCALE AC

, EA


   0-150=0, 151-200=2 UNITS, 201-250=4 UNITS, 251-300=6 UNITS, 301-350=8


   UNITS, 351-400=10 UNITS,>400=12 UNITS; DEJAN BRYANT IF BS BELOW 70


   10/24/17


Discontinued Reported Medications


Acetaminophen* (Tylenol*) 325 Mg Tablet, 650 MG PO Q6H Y for MILD PAIN LEVEL 1-3

, TAB


   AND FOR FEVER 100 AND ABOVE


   10/24/17





Allergies


Allergies:  


Coded Allergies:  


     Penicillins (Verified  Allergy, Mild, 10/24/17)


     hydrocodone bit (Verified  Adverse Reaction, Mild, HEADACHE, NAUSEA AND 

VOMITING, ANAPHYLACTIC SYMPTOMS, 10/24/17)





PMhx/Soc


History of Surgery:  Yes (BRQAIN SURGERY, RIGHT MASECTOMY, )


Anesthesia Reaction:  No


Hx Neurological Disorder:  No


Hx Respiratory Disorders:  Yes (PLEURAL EFFUSION)


Hx Cardiac Disorders:  No


Hx Psychiatric Problems:  No


Hx Miscellaneous Medical Probl:  Yes (See EMR for detail. )


Hx Alcohol Use:  No


Hx Substance Use:  No


Hx Tobacco Use:  No





FmHx


Family History:  No diabetes





Physical Exam


Vitals





Vital Signs








  Date Time  Temp Pulse Resp B/P Pulse Ox O2 Delivery O2 Flow Rate FiO2


 


11/21/17 05:31 98.4 103 20 136/78 98   








Physical Exam


Const: Moderate distress


Head:   Atraumatic 


Eyes:    Normal Conjunctiva


ENT:    Normal External Ears, Nose and Mouth.


Neck:               Full range of motion..~ No meningismus.


Resp:   Decreased breath sounds bilaterally


Cardio:    Regular rate and rhythm, no murmurs


Abd:    Soft, non tender, non distended. Normal bowel sounds


Skin:   Pale skin


Back:    No midline or flank tenderness


Ext:    No cyanosis, or edema


Neur:    Awake and alert


Psych:    Normal Mood and Affect


Result Diagram:  


11/21/17 0652                                                                  

              11/21/17 0652





Results 24 hrs





 Laboratory Tests








Test


  11/21/17


06:52


 


White Blood Count 9.910^3/ul 


 


Red Blood Count 3.7810^6/ul 


 


Hemoglobin 9.3g/dl 


 


Hematocrit 30.7% 


 


Mean Corpuscular Volume 81.2fl 


 


Mean Corpuscular Hemoglobin 24.6pg 


 


Mean Corpuscular Hemoglobin


Concent 30.3g/dl 


 


 


Red Cell Distribution Width 23.1% 


 


Platelet Count 44602^3/UL 


 


Mean Platelet Volume 8.8fl 


 


Neutrophils % 75.4% 


 


Lymphocytes % 13.3% 


 


Monocytes % 7.9% 


 


Eosinophils % 0.2% 


 


Basophils % 0.2% 


 


Nucleated Red Blood Cells % 0.0/100WBC 


 


Neutrophils # 7.410^3/ul 


 


Lymphocytes # 1.310^3/ul 


 


Monocytes # 0.810^3/ul 


 


Eosinophils # 0.010^3/ul 


 


Basophils # 0.010^3/ul 


 


Nucleated Red Blood Cells # 0.010^3/ul 


 


Prothrombin Time 14.6Sec 


 


Prothrombin Time Ratio 1.1 


 


INR International Normalized


Ratio 1.14 


 


 


Activated Partial


Thromboplast Time 29.6Sec 


 


 


Sodium Level 140mmol/L 


 


Potassium Level 3.7mmol/L 


 


Chloride Level 106mmol/L 


 


Carbon Dioxide Level 24mmol/L 


 


Anion Gap 14 


 


Blood Urea Nitrogen 13mg/dl 


 


Creatinine 0.38mg/dl 


 


Glucose Level 174mg/dl 


 


Lactic Acid Level 3.6mmol/L 


 


Calcium Level 8.3mg/dl 


 


Total Bilirubin 0.3mg/dl 


 


Direct Bilirubin 0.00mg/dl 


 


Indirect Bilirubin 0.3mg/dl 


 


Aspartate Amino Transf


(AST/SGOT) 56IU/L 


 


 


Alanine Aminotransferase


(ALT/SGPT) 27IU/L 


 


 


Alkaline Phosphatase 89IU/L 


 


Troponin I < 0.012ng/ml 


 


Total Protein 5.8g/dl 


 


Albumin 2.7g/dl 


 


Globulin 3.10g/dl 


 


Albumin/Globulin Ratio 0.87 








 Current Medications








 Medications


  (Trade)  Dose


 Ordered  Sig/Aiden


 Route


 PRN Reason  Start Time


 Stop Time Status Last Admin


Dose Admin


 


 Vancomycin HCl  250 ml @ 


 125 mls/hr  ONCE  ONCE


 IVPB


   11/21/17 06:30


 11/21/17 08:29   


 


 


 Aztreonam


  (Azactam 1gm/NS


  (Pmx))  50 ml @ 


 100 mls/hr  ONCE  STAT


 IVPB


   11/21/17 06:28


 11/21/17 06:57 DC  


 


 


 Sodium Chloride


  (NS)  3,440 ml  BOLUS OVER 2 HOURS STAT


 IV*


   11/21/17 08:09


 11/21/17 08:10 DC  


 


 


 Ondansetron HCl


  (Zofran Inj)  4 mg  ER BRIDGE PRN


 IV


 NAUSEA AND/OR VOMITING  11/21/17 08:30


 11/22/17 08:29   


 


 


 Acetaminophen


  (Tylenol Tab)  650 mg  ER BRIDGE PRN


 PO


 MILD PAIN/FEVER  11/21/17 08:30


 11/22/17 08:29   


 











Procedures/MDM


Chest x-ray shows infiltrates per radiology.





EKG read by me: 


Rate/Rhythm: Regular rate and rhythm at a rate of 92


Intervals:    Normal


Impression:     No evidence of ischemia or arrhythmia





Admit MDM:


Patient's infectious symptoms have not stabilized and the patient is at risk of 

rapid decompensation.  The patient will be admitted for careful hydration, 

antibiotic therapy, and infectious source control.





Severe Sepsis criteria: 


Infectious source:   Pneumonia


End organ damage indicated by: 


Lactate greater than 2





Sepsis Management:


Time of recognition of sepsis: 6:52 AM





Within 3 hours of recognition:


Blood cultures x 2 before broad-spectrum antibiotics:   Yes


30 ml/kg NS bolus    Completed


Initial lactate      3.6


Repeat lactate     pending





Time of recognition of septic shock: No septic shock





Septic Shock Assessment:


Any lactic acid > 4.0   No


Persistent hypotension (SBP < 90 or 40 mmHg drop, MAP < 65) despite 30 mL/kg IV 

fluid bolus No





Volume Re-assessment for Septic Shock (post 30 ml/kg bolus):


No septic shock at this time





Persistent Hypotension Treatment:


Comfort care   No


Central line   Not Required


Vasopressor started   Not required


I considered further perfusion assessment with CVP measurement, SCVO2, bedside 

ultrasound volume assessment, passive leg raise, trial of further fluid bolus.


And proceeded with 30 ml/kg fluid bolus of NSS, broad spectrum antibiotics, and 

admission.





Accepting Care Team


Current data and ongoing care discussed.


Admitting Physician:   Dr. Truong from the panel team for admission


Consultant(s):   None


Outstanding Data:   Culture results and repeat lactic acid





Critical Care:


Critical care time   35 minutes excluding all billable procedures


Emergent fluid management while maintaining close respiratory support.  

Provision of immediate and broad-spectrum antibiotic therapy.  Simultaneous 

assessment for possible sources in order to direct targeted therapy.  

Consideration for invasive and chemical support to prevent cardiopulmonary 

collapse.





Departure


Diagnosis:  


 Primary Impression:  


 Severe sepsis


 Additional Impressions:  


 Shortness of breath


 Pneumonia


 Pneumonia type:  due to unspecified organism  Laterality:  unspecified 

laterality  Lung location:  unspecified part of lung  Qualified Code:  J18.9 - 

Pneumonia due to infectious organism, unspecified laterality, unspecified part 

of lung


 Anemia


 Anemia type:  unspecified type  Qualified Code:  D64.9 - Anemia, unspecified 

type


Condition:  Serious











DELFINO SUGGS MD Nov 21, 2017 08:24

## 2017-11-21 NOTE — RADRPT
PROCEDURE:   CT Brain without contrast. 

 

CLINICAL INDICATION: Sepsis

 

TECHNIQUE:   Axial images from the skull base through the vertex without IV contrast.  Multiplanar r
eformatted images were made.  Images were reviewed on a PACS workstation.  The CTDIvol is 45.01 mGy 
and the DLP is 810.25 mGycm.  One or more of the following dose reduction techniques were used: auto
mated exposure control, adjustment of the mA and/or kV according to patient size, or use of iterativ
e reconstruction technique.  DICOM images are available.

 

COMPARISON:  MRI from 09/30/2017

 

FINDINGS:

Again seen are changes from prior right frontoparietal craniectomy with resection of large metastati
c lesion. The metastatic lesions in the left cerebellum and occipital lobe seen by MRI are not as we
ll appreciated by CT. The study is slightly limited by motion artifact. There is a small area of enc
ephalomalacia at the right frontal lobe resection site. No definite acute territorial infarction or 
acute intracranial hemorrhage. No midline shift is seen.  The visualized paranasal sinuses and masto
ids are clear..  

 

IMPRESSION:

Postsurgical changes from prior right frontal lobe metastasis resection with focal area of encephalo
malacia seen. The other metastatic lesions seen on prior MRI are not well demonstrated. If symptoms 
persist, follow-up MRI may be helpful.

 

 

 

RPTAT: HLBE

_____________________________________________ 

Physician Krystyna           Date    Time 

Electronically viewed and signed by Physician Krystyna on 11/21/2017 07:30 

 

D:  11/21/2017 07:30  T:  11/21/2017 07:30

LE/

## 2017-11-21 NOTE — CONS
Date/Time of Note


Date/Time of Note


DATE: 11/21/17 


TIME: 23:40





Assessment/Plan


Assessment/Plan


Chief Complaint/Hosp Course


#Her2+/ER+/TN+ metastatic breast ca


-pt is non compliant and when she receives therapy, she is quite responsive to 

therapy


-therefore at this time, although she has terminal disease, there are still 

many more treatment options which she could benefit from


-given her Brain mets, I would consider a combination of Xeloda and Lapatinib 

which can penetrate the blood brain barrier. once patient is stable from an 

infection standpoint, we will start her therapy


-may continue Tamoxifen for now





#Pleural Effusion - 2/2 malignancy as well as infection


-pt is s/p VATS


-continue antibiotics


-ID recs appreciated





#Brain Mets


-pt is s/p resection 


-pt likely will not complete the last dose of WBXRT 


--continue Decadron 4mg po q day for now


Problems:  





Consultation Date/Type/Reason


Admit Date/Time


Nov 21, 2017 at 08:20


Date of Consultation:  Nov 21, 2017


Type of Consultation:  Oncology


Reason for Consultation


metastatic breast cancer


Referring Provider:  ANG LEUNG





Hx of Present Illness


46 yo female with Er+/TN+/Her2 + metastatic breast cancer with recent diagnosis 

of brain mets and malignant pleural effusions. Pt had craniotomy followed by 

whole brain radiation. Pt had 1 more session left but presents to the ER with 

fevers, weakness and shortness of breath. During the last admission pt had a 

VATS with chest tube placement given the recurring pleural effusions. In the 

past patient has been very responsive to therapy but had been non compliant 

with therapy for many months before she was found with the pleural effusion and 

brain mets.


Constitutional:  requiring O2


Respiratory:  shortness of breath


Gastrointestinal:  no complaints


Genitourinary:  no complaints


Musculoskeletal:  back pain, bone/joint pain





Past Medical History


metastatic breast cancer


malignant pleural effusions





Family History


Significant Family History:  no pertinent family hx





Social History


Alcohol Use:  none


Smoking Status:  Never smoker


Drug Use:  none





Exam/Review of Systems


Vital Signs


Vitals





 Vital Signs








  Date Time  Temp Pulse Resp B/P Pulse Ox O2 Delivery O2 Flow Rate FiO2


 


11/21/17 21:07       2.0 


 


11/21/17 21:07  105 20  96 Nasal Cannula  


 


11/21/17 20:10 98.3   116/66    











Exam


Constitutional:  alert, oriented


Head:  normocephalic


Eyes:  nl conjunctiva


ENMT:  nl external ears & nose


Neck:  supple


Respiratory:  diminished breath sounds


Cardiovascular:  regular rate and rhythm


Gastrointestinal:  soft


Musculoskeletal:  nl extremities to inspection





Results


Result Diagram:  


11/21/17 0652                                                                  

              11/21/17 0652





Results 24 hrs





Laboratory Tests








Test


  11/21/17


06:52 11/21/17


07:41 11/21/17


08:40 11/21/17


12:00


 


White Blood Count 9.9  #   


 


Red Blood Count 3.78  L   


 


Hemoglobin 9.3  L   


 


Hematocrit 30.7  L   


 


Mean Corpuscular Volume 81.2  L   


 


Mean Corpuscular Hemoglobin 24.6  L   


 


Mean Corpuscular Hemoglobin


Concent 30.3  L


  


  


  


 


 


Red Cell Distribution Width 23.1  H   


 


Platelet Count 110  #L   


 


Mean Platelet Volume 8.8     


 


Neutrophils % 75.4     


 


Lymphocytes % 13.3  L   


 


Monocytes % 7.9     


 


Eosinophils % 0.2     


 


Basophils % 0.2     


 


Nucleated Red Blood Cells % 0.0     


 


Neutrophils # 7.4     


 


Lymphocytes # 1.3     


 


Monocytes # 0.8     


 


Eosinophils # 0.0     


 


Basophils # 0.0     


 


Nucleated Red Blood Cells # 0.0     


 


Prothrombin Time 14.6  H   


 


Prothrombin Time Ratio 1.1     


 


INR International Normalized


Ratio 1.14  


  


  


  


 


 


Activated Partial


Thromboplast Time 29.6  


  


  


  


 


 


Sodium Level 140     


 


Potassium Level 3.7     


 


Chloride Level 106     


 


Carbon Dioxide Level 24     


 


Anion Gap 14     


 


Blood Urea Nitrogen 13     


 


Creatinine 0.38  L   


 


Glucose Level 174     


 


Lactic Acid Level 3.6  *H  2.0   1.3  


 


Calcium Level 8.3  L   


 


Total Bilirubin 0.3     


 


Direct Bilirubin 0.00     


 


Indirect Bilirubin 0.3     


 


Aspartate Amino Transf


(AST/SGOT) 56  H


  


  


  


 


 


Alanine Aminotransferase


(ALT/SGPT) 27  


  


  


  


 


 


Alkaline Phosphatase 89     


 


Troponin I < 0.012     


 


Total Protein 5.8  L   


 


Albumin 2.7  L   


 


Globulin 3.10     


 


Albumin/Globulin Ratio 0.87     


 


Urine Color  YELLOW    


 


Urine Clarity  CLOUDY  A  


 


Urine pH  5.0    


 


Urine Specific Gravity  1.021    


 


Urine Ketones  NEGATIVE    


 


Urine Nitrite  NEGATIVE    


 


Urine Bilirubin  NEGATIVE    


 


Urine Urobilinogen  NEGATIVE    


 


Urine Leukocyte Esterase  1+  H  


 


Urine Microscopic RBC  5    


 


Urine Microscopic WBC  14  H  


 


Urine Squamous Epithelial


Cells 


  MODERATE  


  


  


 


 


Urine Mucus  MODERATE    


 


Urine Hemoglobin  NEGATIVE    


 


Urine Glucose  2+  H  


 


Urine Total Protein  NEGATIVE    


 


Thyroid Stimulating Hormone


(TSH) 


  


  1.390  


  


 


 


Free Thyroxine   1.62   














Test


  11/21/17


18:06 11/21/17


20:51 


  


 


 


Bedside Glucose 123   113    











Medications


Medications





 Current Medications


Sodium Chloride (NS) 1,000 ml @  75 mls/hr [id number] IV  Last administered on 11/ 21/17at 13:02; Admin Dose 75 MLS/HR;  Start 11/21/17 at 11:13


Ondansetron HCl (Zofran Inj) 4 mg Q6H  PRN IV NAUSEA AND/OR VOMITING;  Start 11/ 21/17 at 11:30


Acetaminophen (Tylenol Tab) 650 mg Q6H  PRN PO PAIN LEVEL 1-3 OR FEVER;  Start 

11/21/17 at 11:30


Acetaminophen (Tylenol Supp) 650 mg Q6H  PRN TN PAIN LEVEL 1-3 OR FEVER;  Start 

11/21/17 at 11:30


Morphine Sulfate (morphine) 2 mg Q4H  PRN IV SEVERE PAIN LEVEL 7-10;  Start 11/ 21/17 at 11:30


Bisacodyl (Dulcolax Supp) 10 mg Q24H  PRN TN PRN;  Start 11/21/17 at 11:30


Docusate Sodium (Colace) 200 mg QHS PO ;  Start 11/21/17 at 21:00


Famotidine (Pepcid) 20 mg DAILY PO ;  Start 11/22/17 at 09:00


Magnesium Hydroxide (Milk Of Mag) 30 ml QHS PO ;  Start 11/21/17 at 21:00


Sodium Biphosphate/ Sodium Phosphate 66.6 ml 66.6 ml Q2D  PRN TN CONSTIPATION;  

Start 11/21/17 at 11:30


Levofloxacin/ Dextrose (Levaquin 500mg/ D5W 100 ml (Pmx)) 100 ml @  100 mls/hr 

Q24H IVPB  Last administered on 11/21/17at 14:04; Admin Dose 100 MLS/HR;  Start 

11/21/17 at 11:30


Diagnostic Test (Pha) 1 ea 1 ea 02 XX ;  Start 11/22/17 at 02:00


Clindamycin HCl/ Dextrose (Cleocin 900 Mg/ D5W (Pmx)) 50 ml @ 50 mls/hr Q8 IVPB

  Last administered on 11/21/17at 22:46; Admin Dose 50 MLS/HR;  Start 11/21/17 

at 14:00


Miscellaneous Information 1 ea NOTE XX ;  Start 11/21/17 at 14:30


Glucose (Glutose) 15 gm Q15M  PRN PO DECREASED GLUCOSE;  Start 11/21/17 at 14:30


Glucose (Glutose) 22.5 gm Q15M  PRN PO DECREASED GLUCOSE;  Start 11/21/17 at 14:

30


Dextrose (D50w Syringe) 25 ml Q15M  PRN IV DECREASED GLUCOSE;  Start 11/21/17 

at 14:30


Dextrose (D50w Syringe) 50 ml Q15M  PRN IV DECREASED GLUCOSE;  Start 11/21/17 

at 14:30


Glucagon (Glucagen) 1 mg Q15M  PRN IM DECREASED GLUCOSE;  Start 11/21/17 at 14:

30


Glucose (Glutose) 15 gm Q15M  PRN BUCCAL DECREASED GLUCOSE;  Start 11/21/17 at 

14:30


Dexamethasone (Decadron) 4 mg DAILY PO  Last administered on 11/21/17at 20:50; 

Admin Dose 4 MG;  Start 11/21/17 at 20:30


Tamoxifen Citrate (Nolvadex) 20 mg DAILY@19 PO  Last administered on 11/21/17at 

20:46; Admin Dose 20 MG;  Start 11/21/17 at 20:30











KRISTEL ORANTES M.D. Nov 21, 2017 23:40

## 2017-11-21 NOTE — RADRPT
PROCEDURE:   XR Chest. 

 

CLINICAL INDICATION:    Possible Sepsis

 

TECHNIQUE:   Single frontal view  of the chest was obtained 

 

COMPARISON:   CR CHEST 5/11/2016; CR CHEST 5/10/2016; CR CHEST 5/5/2016 

 

FINDINGS:

A left upper extremity PICC line tip overlies the mid superior vena cava. There is persistent right-
sided volume loss. Slight increased right pleural effusion with consolidation or atelectasis. There 
an interval increased bilateral, right greater than left perihilar air space disease.  No evidence o
f a pneumothorax.

 

IMPRESSION:

 

1.  Minimal increased right-sided pleural effusion with adjacent consolidation or atelectasis.

2.  Increased bilateral, right greater than left perihilar air space disease.

 

 

RPTAT: HRSR

_____________________________________________ 

Physician Jose D           Date    Time 

Electronically viewed and signed by Physician Jose D on 11/21/2017 07:09 

 

D:  11/21/2017 07:09  T:  11/21/2017 07:09

RR/

## 2017-11-22 VITALS — DIASTOLIC BLOOD PRESSURE: 79 MMHG | SYSTOLIC BLOOD PRESSURE: 135 MMHG | HEART RATE: 100 BPM | RESPIRATION RATE: 20 BRPM

## 2017-11-22 VITALS — DIASTOLIC BLOOD PRESSURE: 71 MMHG | RESPIRATION RATE: 20 BRPM | SYSTOLIC BLOOD PRESSURE: 122 MMHG

## 2017-11-22 VITALS — RESPIRATION RATE: 18 BRPM | DIASTOLIC BLOOD PRESSURE: 62 MMHG | SYSTOLIC BLOOD PRESSURE: 124 MMHG

## 2017-11-22 VITALS — SYSTOLIC BLOOD PRESSURE: 134 MMHG | DIASTOLIC BLOOD PRESSURE: 73 MMHG | RESPIRATION RATE: 19 BRPM

## 2017-11-22 VITALS — SYSTOLIC BLOOD PRESSURE: 125 MMHG | RESPIRATION RATE: 18 BRPM | DIASTOLIC BLOOD PRESSURE: 67 MMHG

## 2017-11-22 LAB
ABNORMAL IP MESSAGE: 1
ALBUMIN SERPL-MCNC: 2.7 G/DL (ref 3.3–4.9)
ALBUMIN/GLOB SERPL: 0.79 {RATIO}
ALP SERPL-CCNC: 99 IU/L (ref 42–121)
ALT SERPL-CCNC: 42 IU/L (ref 13–69)
ANION GAP SERPL CALC-SCNC: 12 MMOL/L (ref 8–16)
AST SERPL-CCNC: 61 IU/L (ref 15–46)
BASOPHILS # BLD AUTO: 0 10^3/UL (ref 0–0.1)
BASOPHILS NFR BLD: 0.2 % (ref 0–2)
BILIRUB DIRECT SERPL-MCNC: 0 MG/DL (ref 0–0.2)
BILIRUB SERPL-MCNC: 0.5 MG/DL (ref 0.2–1.3)
BUN SERPL-MCNC: 6 MG/DL (ref 7–20)
CALCIUM SERPL-MCNC: 8.6 MG/DL (ref 8.4–10.2)
CHLORIDE SERPL-SCNC: 106 MMOL/L (ref 97–110)
CO2 SERPL-SCNC: 26 MMOL/L (ref 21–31)
CREAT SERPL-MCNC: 0.4 MG/DL (ref 0.44–1)
EOSINOPHIL # BLD: 0 10^3/UL (ref 0–0.5)
EOSINOPHIL NFR BLD: 0 % (ref 0–7)
ERYTHROCYTE [DISTWIDTH] IN BLOOD BY AUTOMATED COUNT: 22.7 % (ref 11.5–14.5)
GLOBULIN SER-MCNC: 3.4 G/DL (ref 1.3–3.2)
GLUCOSE SERPL-MCNC: 159 MG/DL (ref 70–220)
HCT VFR BLD CALC: 30.3 % (ref 37–47)
HGB BLD-MCNC: 9.4 G/DL (ref 12–16)
LYMPHOCYTES # BLD AUTO: 1 10^3/UL (ref 0.8–2.9)
LYMPHOCYTES NFR BLD AUTO: 9 % (ref 15–51)
MCH RBC QN AUTO: 24.9 PG (ref 29–33)
MCHC RBC AUTO-ENTMCNC: 31 G/DL (ref 32–37)
MCV RBC AUTO: 80.2 FL (ref 82–101)
MONOCYTES # BLD: 0.3 10^3/UL (ref 0.3–0.9)
MONOCYTES NFR BLD: 3.1 % (ref 0–11)
NEUTROPHILS # BLD: 8.9 10^3/UL (ref 1.6–7.5)
NEUTROPHILS NFR BLD AUTO: 84.8 % (ref 39–77)
NRBC # BLD MANUAL: 0 10^3/UL (ref 0–0)
NRBC BLD AUTO-RTO: 0 /100WBC (ref 0–0)
PHOSPHATE SERPL-MCNC: 4.8 MG/DL (ref 2.5–4.9)
PLATELET # BLD: 119 10^3/UL (ref 140–415)
PMV BLD AUTO: 9.1 FL (ref 7.4–10.4)
POSITIVE DIFF: (no result)
POTASSIUM SERPL-SCNC: 4.2 MMOL/L (ref 3.5–5.1)
PROT SERPL-MCNC: 6.1 G/DL (ref 6.1–8.1)
RBC # BLD AUTO: 3.78 10^6/UL (ref 4.2–5.4)
SODIUM SERPL-SCNC: 140 MMOL/L (ref 135–144)
WBC # BLD AUTO: 10.5 10^3/UL (ref 4.8–10.8)

## 2017-11-22 RX ADMIN — CLINDAMYCIN PHOSPHATE SCH MLS/HR: 18 INJECTION, SOLUTION INTRAVENOUS at 05:03

## 2017-11-22 RX ADMIN — IPRATROPIUM BROMIDE AND ALBUTEROL SULFATE SCH ML: .5; 3 SOLUTION RESPIRATORY (INHALATION) at 13:54

## 2017-11-22 RX ADMIN — DOCUSATE SODIUM SCH MG: 100 CAPSULE, LIQUID FILLED ORAL at 20:49

## 2017-11-22 RX ADMIN — TAMOXIFEN CITRATE SCH MG: 10 TABLET, FILM COATED ORAL at 20:48

## 2017-11-22 RX ADMIN — FOLIC ACID SCH MLS/HR: 5 INJECTION, SOLUTION INTRAMUSCULAR; INTRAVENOUS; SUBCUTANEOUS at 13:53

## 2017-11-22 RX ADMIN — IPRATROPIUM BROMIDE AND ALBUTEROL SULFATE SCH ML: .5; 3 SOLUTION RESPIRATORY (INHALATION) at 08:55

## 2017-11-22 RX ADMIN — FOLIC ACID SCH MLS/HR: 5 INJECTION, SOLUTION INTRAMUSCULAR; INTRAVENOUS; SUBCUTANEOUS at 05:04

## 2017-11-22 RX ADMIN — LEVOFLOXACIN SCH MLS/HR: 500 INJECTION, SOLUTION INTRAVENOUS at 12:00

## 2017-11-22 RX ADMIN — FAMOTIDINE SCH MG: 20 TABLET ORAL at 08:14

## 2017-11-22 RX ADMIN — FOLIC ACID SCH MLS/HR: 5 INJECTION, SOLUTION INTRAMUSCULAR; INTRAVENOUS; SUBCUTANEOUS at 00:33

## 2017-11-22 RX ADMIN — IPRATROPIUM BROMIDE AND ALBUTEROL SULFATE SCH ML: .5; 3 SOLUTION RESPIRATORY (INHALATION) at 21:07

## 2017-11-22 RX ADMIN — IPRATROPIUM BROMIDE AND ALBUTEROL SULFATE SCH ML: .5; 3 SOLUTION RESPIRATORY (INHALATION) at 16:41

## 2017-11-22 NOTE — CONS
Date/Time of Note


Date/Time of Note


DATE: 11/22/17 


TIME: 15:06





Assessment/Plan


Assessment/Plan


Chief Complaint/Hosp Course


#Her2+/ER+/OH+ metastatic breast ca


-pt is non compliant and when she receives therapy, she is quite responsive to 

therapy


-therefore at this time, although she has terminal disease, there are still 

many more treatment options which she could benefit from


-given her Brain mets, I would consider a combination of Xeloda and Lapatinib 

which can penetrate the blood brain barrier. once patient is stable from an 

infection standpoint, we will start her therapy


-may continue Tamoxifen for now





#Pleural Effusion - 2/2 malignancy as well as infection


-pt is s/p VATS


-continue antibiotics


-ID recs appreciated





#Brain Mets


-pt is s/p resection 


-pt likely will not complete the last dose of WBXRT 


--continue Decadron 4mg po q day for now


Problems:  





Consultation Date/Type/Reason


Admit Date/Time


Nov 21, 2017 at 08:20


Initial Consult Date


11/21/17


Type of Consultation:  Hematology


Reason for Consultation


metastatic Her2 + breast cancer


Referring Provider:  ANG LEUNG





24 HR Interval Summary


Free Text/Dictation


pt feels better since antibiotics were started





Exam/Review of Systems


Vital Signs


Vitals





 Vital Signs








  Date Time  Temp Pulse Resp B/P Pulse Ox O2 Delivery O2 Flow Rate FiO2


 


11/22/17 13:54  81 20  98 Nasal Cannula 2.0 


 


11/22/17 12:45 98.0   135/79    














 Intake and Output   


 


 11/21/17 11/21/17 11/22/17





 15:00 23:00 07:00


 


Intake Total 250 ml 760 ml 1340 ml


 


Balance 250 ml 760 ml 1340 ml











Exam


Constitutional:  alert, oriented


Psych:  no complaints


Head:  normocephalic


Eyes:  nl conjunctiva


ENMT:  nl external ears & nose


Neck:  non-tender, supple


Respiratory:  clear to auscultation, normal air movement


Cardiovascular:  regular rate and rhythm


Gastrointestinal:  soft


Musculoskeletal:  nl extremities to inspection





Results


Result Diagram:  


11/22/17 0513                                                                  

              11/22/17 0513





Results 24 hrs





Laboratory Tests








Test


  11/21/17


18:06 11/21/17


20:51 11/22/17


05:13 11/22/17


07:56


 


Bedside Glucose 123   113    158  


 


White Blood Count   10.5   


 


Red Blood Count   3.78  L 


 


Hemoglobin   9.4  L 


 


Hematocrit   30.3  L 


 


Mean Corpuscular Volume   80.2  L 


 


Mean Corpuscular Hemoglobin   24.9  L 


 


Mean Corpuscular Hemoglobin


Concent 


  


  31.0  L


  


 


 


Red Cell Distribution Width   22.7  H 


 


Platelet Count   119  L 


 


Mean Platelet Volume   9.1   


 


Neutrophils %   84.8  H 


 


Lymphocytes %   9.0  L 


 


Monocytes %   3.1   


 


Eosinophils %   0.0   


 


Basophils %   0.2   


 


Nucleated Red Blood Cells %   0.0   


 


Neutrophils #   8.9  H 


 


Lymphocytes #   1.0   


 


Monocytes #   0.3   


 


Eosinophils #   0.0   


 


Basophils #   0.0   


 


Nucleated Red Blood Cells #   0.0   


 


Sodium Level   140   


 


Potassium Level   4.2   


 


Chloride Level   106   


 


Carbon Dioxide Level   26   


 


Anion Gap   12   


 


Blood Urea Nitrogen   6  L 


 


Creatinine   0.40  L 


 


Glucose Level   159   


 


Calcium Level   8.6   


 


Phosphorus Level   4.8   


 


Total Bilirubin   0.5   


 


Direct Bilirubin   0.00   


 


Indirect Bilirubin   0.5   


 


Aspartate Amino Transf


(AST/SGOT) 


  


  61  H


  


 


 


Alanine Aminotransferase


(ALT/SGPT) 


  


  42  


  


 


 


Alkaline Phosphatase   99   


 


Total Protein   6.1   


 


Albumin   2.7  L 


 


Globulin   3.40  H 


 


Albumin/Globulin Ratio   0.79   














Test


  11/22/17


11:54 


  


  


 


 


Bedside Glucose 175     











Medications


Medications





 Current Medications


Sodium Chloride (NS) 1,000 ml @  75 mls/hr [id number] IV  Last administered on 11/ 22/17at 05:04; Admin Dose 75 MLS/HR;  Start 11/21/17 at 11:13


Ondansetron HCl (Zofran Inj) 4 mg Q6H  PRN IV NAUSEA AND/OR VOMITING;  Start 11/ 21/17 at 11:30


Acetaminophen (Tylenol Tab) 650 mg Q6H  PRN PO PAIN LEVEL 1-3 OR FEVER;  Start 

11/21/17 at 11:30


Acetaminophen (Tylenol Supp) 650 mg Q6H  PRN [place] PAIN LEVEL 1-3 OR FEVER;  Start 

11/21/17 at 11:30


Morphine Sulfate (morphine) 2 mg Q4H  PRN IV SEVERE PAIN LEVEL 7-10;  Start 11/ 21/17 at 11:30


Bisacodyl (Dulcolax Supp) 10 mg Q24H  PRN [place] PRN;  Start 11/21/17 at 11:30


Docusate Sodium (Colace) 200 mg QHS PO ;  Start 11/21/17 at 21:00


Famotidine (Pepcid) 20 mg DAILY PO  Last administered on 11/22/17at 08:14; 

Admin Dose 20 MG;  Start 11/22/17 at 09:00


Magnesium Hydroxide (Milk Of Mag) 30 ml QHS PO ;  Start 11/21/17 at 21:00


Sodium Biphosphate/ Sodium Phosphate 66.6 ml 66.6 ml Q2D  PRN [place] CONSTIPATION;  

Start 11/21/17 at 11:30


Levofloxacin/ Dextrose (Levaquin 500mg/ D5W 100 ml (Pmx)) 100 ml @  100 mls/hr 

Q24H IVPB  Last administered on 11/22/17at 12:00; Admin Dose 100 MLS/HR;  Start 

11/21/17 at 11:30


Diagnostic Test (Pha) (Accu-Chek) 1 ea 02 XX ;  Start 11/22/17 at 02:00


Miscellaneous Information 1 ea NOTE XX ;  Start 11/21/17 at 14:30


Glucose (Glutose) 15 gm Q15M  PRN PO DECREASED GLUCOSE;  Start 11/21/17 at 14:30


Glucose (Glutose) 22.5 gm Q15M  PRN PO DECREASED GLUCOSE;  Start 11/21/17 at 14:

30


Dextrose (D50w Syringe) 25 ml Q15M  PRN IV DECREASED GLUCOSE;  Start 11/21/17 

at 14:30


Dextrose (D50w Syringe) 50 ml Q15M  PRN IV DECREASED GLUCOSE;  Start 11/21/17 

at 14:30


Glucagon (Glucagen) 1 mg Q15M  PRN IM DECREASED GLUCOSE;  Start 11/21/17 at 14:

30


Glucose (Glutose) 15 gm Q15M  PRN BUCCAL DECREASED GLUCOSE;  Start 11/21/17 at 

14:30


Dexamethasone (Decadron) 4 mg DAILY PO  Last administered on 11/22/17at 08:11; 

Admin Dose 4 MG;  Start 11/21/17 at 20:30


Tamoxifen Citrate 20 mg 20 mg DAILY@19 PO  Last administered on 11/21/17at 20:46

; Admin Dose 20 MG;  Start 11/21/17 at 20:30


Ceftriaxone Sodium (Rocephin) 50 ml @  100 mls/hr Q24H IVPB ;  Start 11/22/17 

at 14:30;  Status UNV


Fluconazole (Diflucan) 100 mg DAILY PO ;  Start 11/23/17 at 09:00;  Status UNV


Fluconazole (Diflucan) 100 mg ONCE  ONCE PO ;  Start 11/22/17 at 14:30;  Stop 11 /22/17 at 14:31;  Status KRISTEL COOPER M.D. Nov 22, 2017 15:07

## 2017-11-22 NOTE — CONS
Date/Time of Note


Date/Time of Note


DATE: 11/22/17 


TIME: 14:28





Assessment/Plan


Assessment/Plan


Chief Complaint/Hosp Course


Patient is awake looks comfortable.  No fevers





WBC 10.5 H&H 9.4 and 30.3 platelets 119 neutrophils 84.8 BUN 6 creatinine 0.40





microbiology: Urine culture growing Candida albicans and gamma hemolytic strep 

species, blood cultures negative





Antimicrobials: Clindamycin Levaquin





Physical examination obese, well-developed middle-aged  woman who is 

awake in no distress head atraumatic normocephalic sclerae nonicteric vehicle 

mucosa pink neck is supple chest rise symmetrical breath sounds diminished 

bases heart S1-S2 abdomen soft bowel sounds present extremities without cyanosis





Assessment:


1. SOB on admission


2. UTI


3. PNA


4.  Hx LORELEI empyema,  s/p decortication with pleurodesis 10/31/17


5.  Metastatic breast cancer.


6.  Diabetes.


7.  History of craniectomy for brain metastasis.





Plan: Discontinue clindamycin, start Rocephin and fluconazole, continue 

levofloxacin, follow oncology recommendations


Problems:  





Consultation Date/Type/Reason


Admit Date/Time


Nov 21, 2017 at 08:20


Initial Consult Date


11/21/17


Type of Consultation:  ID


Referring Provider:  ANG LEUNG





Exam/Review of Systems


Vital Signs


Vitals





 Vital Signs








  Date Time  Temp Pulse Resp B/P Pulse Ox O2 Delivery O2 Flow Rate FiO2


 


11/22/17 13:54  81 20  98 Nasal Cannula 2.0 


 


11/22/17 12:45 98.0   135/79    














 Intake and Output   


 


 11/21/17 11/21/17 11/22/17





 15:00 23:00 07:00


 


Intake Total 250 ml 760 ml 1340 ml


 


Balance 250 ml 760 ml 1340 ml











Results


Result Diagram:  


11/22/17 0513                                                                  

              11/22/17 0513





Results 24 hrs





Laboratory Tests








Test


  11/21/17


18:06 11/21/17


20:51 11/22/17


05:13 11/22/17


07:56


 


Bedside Glucose 123   113    158  


 


White Blood Count   10.5   


 


Red Blood Count   3.78  L 


 


Hemoglobin   9.4  L 


 


Hematocrit   30.3  L 


 


Mean Corpuscular Volume   80.2  L 


 


Mean Corpuscular Hemoglobin   24.9  L 


 


Mean Corpuscular Hemoglobin


Concent 


  


  31.0  L


  


 


 


Red Cell Distribution Width   22.7  H 


 


Platelet Count   119  L 


 


Mean Platelet Volume   9.1   


 


Neutrophils %   84.8  H 


 


Lymphocytes %   9.0  L 


 


Monocytes %   3.1   


 


Eosinophils %   0.0   


 


Basophils %   0.2   


 


Nucleated Red Blood Cells %   0.0   


 


Neutrophils #   8.9  H 


 


Lymphocytes #   1.0   


 


Monocytes #   0.3   


 


Eosinophils #   0.0   


 


Basophils #   0.0   


 


Nucleated Red Blood Cells #   0.0   


 


Sodium Level   140   


 


Potassium Level   4.2   


 


Chloride Level   106   


 


Carbon Dioxide Level   26   


 


Anion Gap   12   


 


Blood Urea Nitrogen   6  L 


 


Creatinine   0.40  L 


 


Glucose Level   159   


 


Calcium Level   8.6   


 


Phosphorus Level   4.8   


 


Total Bilirubin   0.5   


 


Direct Bilirubin   0.00   


 


Indirect Bilirubin   0.5   


 


Aspartate Amino Transf


(AST/SGOT) 


  


  61  H


  


 


 


Alanine Aminotransferase


(ALT/SGPT) 


  


  42  


  


 


 


Alkaline Phosphatase   99   


 


Total Protein   6.1   


 


Albumin   2.7  L 


 


Globulin   3.40  H 


 


Albumin/Globulin Ratio   0.79   














Test


  11/22/17


11:54 


  


  


 


 


Bedside Glucose 175     











Medications


Medications





 Current Medications


Sodium Chloride (NS) 1,000 ml @  75 mls/hr [id number] IV  Last administered on 11/ 22/17at 05:04; Admin Dose 75 MLS/HR;  Start 11/21/17 at 11:13


Ondansetron HCl (Zofran Inj) 4 mg Q6H  PRN IV NAUSEA AND/OR VOMITING;  Start 11/ 21/17 at 11:30


Acetaminophen (Tylenol Tab) 650 mg Q6H  PRN PO PAIN LEVEL 1-3 OR FEVER;  Start 

11/21/17 at 11:30


Acetaminophen (Tylenol Supp) 650 mg Q6H  PRN [place] PAIN LEVEL 1-3 OR FEVER;  Start 

11/21/17 at 11:30


Morphine Sulfate (morphine) 2 mg Q4H  PRN IV SEVERE PAIN LEVEL 7-10;  Start 11/ 21/17 at 11:30


Bisacodyl (Dulcolax Supp) 10 mg Q24H  PRN [place] PRN;  Start 11/21/17 at 11:30


Docusate Sodium (Colace) 200 mg QHS PO ;  Start 11/21/17 at 21:00


Famotidine (Pepcid) 20 mg DAILY PO  Last administered on 11/22/17at 08:14; 

Admin Dose 20 MG;  Start 11/22/17 at 09:00


Magnesium Hydroxide (Milk Of Mag) 30 ml QHS PO ;  Start 11/21/17 at 21:00


Sodium Biphosphate/ Sodium Phosphate 66.6 ml 66.6 ml Q2D  PRN [place] CONSTIPATION;  

Start 11/21/17 at 11:30


Levofloxacin/ Dextrose (Levaquin 500mg/ D5W 100 ml (Pmx)) 100 ml @  100 mls/hr 

Q24H IVPB  Last administered on 11/22/17at 12:00; Admin Dose 100 MLS/HR;  Start 

11/21/17 at 11:30


Diagnostic Test (Pha) 1 ea 1 ea 02 XX ;  Start 11/22/17 at 02:00


Clindamycin HCl/ Dextrose (Cleocin 900 Mg/ D5W (Pmx)) 50 ml @ 50 mls/hr Q8 IVPB

  Last administered on 11/22/17at 05:03; Admin Dose 50 MLS/HR;  Start 11/21/17 

at 14:00


Miscellaneous Information 1 ea NOTE XX ;  Start 11/21/17 at 14:30


Glucose (Glutose) 15 gm Q15M  PRN PO DECREASED GLUCOSE;  Start 11/21/17 at 14:30


Glucose (Glutose) 22.5 gm Q15M  PRN PO DECREASED GLUCOSE;  Start 11/21/17 at 14:

30


Dextrose (D50w Syringe) 25 ml Q15M  PRN IV DECREASED GLUCOSE;  Start 11/21/17 

at 14:30


Dextrose (D50w Syringe) 50 ml Q15M  PRN IV DECREASED GLUCOSE;  Start 11/21/17 

at 14:30


Glucagon (Glucagen) 1 mg Q15M  PRN IM DECREASED GLUCOSE;  Start 11/21/17 at 14:

30


Glucose (Glutose) 15 gm Q15M  PRN BUCCAL DECREASED GLUCOSE;  Start 11/21/17 at 

14:30


Dexamethasone (Decadron) 4 mg DAILY PO  Last administered on 11/22/17at 08:11; 

Admin Dose 4 MG;  Start 11/21/17 at 20:30


Tamoxifen Citrate (Nolvadex) 20 mg DAILY@19 PO  Last administered on 11/21/17at 

20:46; Admin Dose 20 MG;  Start 11/21/17 at 20:30











DIANA RANDALL NP Nov 22, 2017 14:28

## 2017-11-22 NOTE — RADRPT
PROCEDURE:   CT Brain without contrast. 

 

CLINICAL INDICATION:  Fall. History of right frontal lobe metastasis resection.

 

TECHNIQUE:   A multiplanar CT of the brain was performed on a CT scanner utilizing axial imaging fro
m the skull base through the vertex without IV contrast.  The CTDIvol is 43.95 mGy and the DLP is 72
0.23 mGycm.  One or more of the following dose reduction techniques were utilized:  Automated exposu
re control, adjustment of the mA and/or kV according to patient size, use of iterative reconstructio
n technique. DICOM images are available.

 

COMPARISON:   CT brain 01/21/2017 

 

FINDINGS:

 

No evidence of intracranial hemorrhage or abnormal extra-axial fluid collection. Right frontotempora
l craniectomy and cranioplasty. Underlying right frontal lobe encephalomalacia compatible with resec
tion of intracranial metastasis.

 

The remaining brain parenchyma demonstrates no focal attenuation abnormality, edema, mass effect or 
shift. Contrast-enhanced MRI would be more sensitive for subtle underlying metastasis. No edema, mas
s effect, or shift. Mild prominence of the ventricles compatible with mild generalized volume loss.

 

The basal cisterns, posterior fossa contents, brainstem, craniocervical junction, orbits, pituitary 
axis, paranasal sinuses, mastoid air cells, and calvarium are unremarkable.

 

 

 

IMPRESSION:

 

1.  A right frontotemporal craniectomy and cranioplasty with underlying frontal lobe encephalomalaci
a compatible with prior intracranial metastasis resection.

2.  No other gross parenchymal lesion, edema, mass effect or shift. MRI with contrast is recommended
 to exclude underlying subtle metastasis.

3.  No hydrocephalus or intracranial hemorrhage.

 

 

 

 

RPTAT:AAJJ

_____________________________________________ 

Physician Simeon           Date    Time 

Electronically viewed and signed by Physician Simeon on 11/22/2017 18:27 

 

D:  11/22/2017 18:27  T:  11/22/2017 18:27

NAHUM/

## 2017-11-22 NOTE — PN
Date/Time of Note


Date/Time of Note


DATE: 11/22/17 


TIME: 13:05





Assessment/Plan


VTE Prophylaxis


VTE Prophylaxis Intervention:  ambulation, SCD's





Lines/Catheters


IV Catheter Type (from Nrs):  PICC Line


Central line still needed:  Yes





Assessment/Plan


Chief Complaint/Hosp Course


 This is a 47-year-old female with metastatic breast cancer who was recently 

diagnosed with empyema and receiving IV antibiotics, presented to the emergency 

room for worsening shortness of breath and subjective fevers.





1.  Pneumonia in the setting of recent parapneumonic effusion with staph aureus 

empyema.  Clinically improving.


-Continue clindamycin and Levaquin.  Follow-up with ID recommendations.


-Bronchodilators, oxygen 


-CT Chest for follow-up on pleural effusion.





2.  Sepsis, likely secondary to #1.  Improving.


-Continue antibiotics, IV fluids.  Follow-up pancultures.





3.  Anemia with iron deficiency and metastatic cancer history.  H&H stable.


-Monitor.





4. Metastatic breast cancer with metastases to lung, liver, brain, craniectomy





5. Recurrent pleural effusion with  and Staph aureus empyema 2/2 malignancy.  

Patient also had history of Pleurx catheter placed in the past.


-Status post recent VATS.  





6.  Prediabetes, previous A1c 6.2.


-Carbohydrate controlled diet.  Continue insulin in-house with steroids she is 

taking.








Prophylaxis: SCDs/Pepcid.





Follow-up with CT chest and determine whether patient needs another 

thoracentesis with this admission.  Otherwise, patient is clinically feeling 

improvement in her symptoms.  Follow-up on final cultures.





Today I have also ordered a CT brain as patient fall this morning with hitting 

her head on the cupboard.  Will follow up on the CT.





Patient is seen in collaboration with Dr. Kent.


Problems:  





Subjective


24 Hr Interval Summary


Free Text/Dictation


As per nursing staff, patient had episode of fall and she reported hitting her 

head on a cupboard.  Patient currently denied any headache, loss of 

consciousness, dizziness or other constitutional symptoms.  She is feeling much 

improved in regards to her shortness of breath.





Exam/Review of Systems


Vital Signs


Vitals





 Vital Signs








  Date Time  Temp Pulse Resp B/P Pulse Ox O2 Delivery O2 Flow Rate FiO2


 


11/22/17 08:25 97.8 84 19 134/73 98   


 


11/22/17 08:15      Nasal Cannula 2.0 














 Intake and Output   


 


 11/21/17 11/21/17 11/22/17





 15:00 23:00 07:00


 


Intake Total 250 ml 760 ml 1340 ml


 


Balance 250 ml 760 ml 1340 ml











Results


Result Diagram:  


11/22/17 0513                                                                  

              11/22/17 0513





Results 24 hrs





Laboratory Tests








Test


  11/21/17


18:06 11/21/17


20:51 11/22/17


05:13 11/22/17


07:56


 


Bedside Glucose 123   113    158  


 


White Blood Count   10.5   


 


Red Blood Count   3.78  L 


 


Hemoglobin   9.4  L 


 


Hematocrit   30.3  L 


 


Mean Corpuscular Volume   80.2  L 


 


Mean Corpuscular Hemoglobin   24.9  L 


 


Mean Corpuscular Hemoglobin


Concent 


  


  31.0  L


  


 


 


Red Cell Distribution Width   22.7  H 


 


Platelet Count   119  L 


 


Mean Platelet Volume   9.1   


 


Neutrophils %   84.8  H 


 


Lymphocytes %   9.0  L 


 


Monocytes %   3.1   


 


Eosinophils %   0.0   


 


Basophils %   0.2   


 


Nucleated Red Blood Cells %   0.0   


 


Neutrophils #   8.9  H 


 


Lymphocytes #   1.0   


 


Monocytes #   0.3   


 


Eosinophils #   0.0   


 


Basophils #   0.0   


 


Nucleated Red Blood Cells #   0.0   


 


Sodium Level   140   


 


Potassium Level   4.2   


 


Chloride Level   106   


 


Carbon Dioxide Level   26   


 


Anion Gap   12   


 


Blood Urea Nitrogen   6  L 


 


Creatinine   0.40  L 


 


Glucose Level   159   


 


Calcium Level   8.6   


 


Phosphorus Level   4.8   


 


Total Bilirubin   0.5   


 


Direct Bilirubin   0.00   


 


Indirect Bilirubin   0.5   


 


Aspartate Amino Transf


(AST/SGOT) 


  


  61  H


  


 


 


Alanine Aminotransferase


(ALT/SGPT) 


  


  42  


  


 


 


Alkaline Phosphatase   99   


 


Total Protein   6.1   


 


Albumin   2.7  L 


 


Globulin   3.40  H 


 


Albumin/Globulin Ratio   0.79   














Test


  11/22/17


11:54 


  


  


 


 


Bedside Glucose 175     











Medications


Medications





 Current Medications


Sodium Chloride (NS) 1,000 ml @  75 mls/hr [id number] IV  Last administered on 11/ 22/17at 05:04; Admin Dose 75 MLS/HR;  Start 11/21/17 at 11:13


Ondansetron HCl (Zofran Inj) 4 mg Q6H  PRN IV NAUSEA AND/OR VOMITING;  Start 11/ 21/17 at 11:30


Acetaminophen (Tylenol Tab) 650 mg Q6H  PRN PO PAIN LEVEL 1-3 OR FEVER;  Start 

11/21/17 at 11:30


Acetaminophen (Tylenol Supp) 650 mg Q6H  PRN [place] PAIN LEVEL 1-3 OR FEVER;  Start 

11/21/17 at 11:30


Morphine Sulfate (morphine) 2 mg Q4H  PRN IV SEVERE PAIN LEVEL 7-10;  Start 11/ 21/17 at 11:30


Bisacodyl (Dulcolax Supp) 10 mg Q24H  PRN [place] PRN;  Start 11/21/17 at 11:30


Docusate Sodium (Colace) 200 mg QHS PO ;  Start 11/21/17 at 21:00


Famotidine (Pepcid) 20 mg DAILY PO  Last administered on 11/22/17at 08:14; 

Admin Dose 20 MG;  Start 11/22/17 at 09:00


Magnesium Hydroxide (Milk Of Mag) 30 ml QHS PO ;  Start 11/21/17 at 21:00


Sodium Biphosphate/ Sodium Phosphate 66.6 ml 66.6 ml Q2D  PRN [place] CONSTIPATION;  

Start 11/21/17 at 11:30


Levofloxacin/ Dextrose (Levaquin 500mg/ D5W 100 ml (Pmx)) 100 ml @  100 mls/hr 

Q24H IVPB  Last administered on 11/22/17at 12:00; Admin Dose 100 MLS/HR;  Start 

11/21/17 at 11:30


Diagnostic Test (Pha) 1 ea 1 ea 02 XX ;  Start 11/22/17 at 02:00


Clindamycin HCl/ Dextrose (Cleocin 900 Mg/ D5W (Pmx)) 50 ml @ 50 mls/hr Q8 IVPB

  Last administered on 11/22/17at 05:03; Admin Dose 50 MLS/HR;  Start 11/21/17 

at 14:00


Miscellaneous Information 1 ea NOTE XX ;  Start 11/21/17 at 14:30


Glucose (Glutose) 15 gm Q15M  PRN PO DECREASED GLUCOSE;  Start 11/21/17 at 14:30


Glucose (Glutose) 22.5 gm Q15M  PRN PO DECREASED GLUCOSE;  Start 11/21/17 at 14:

30


Dextrose (D50w Syringe) 25 ml Q15M  PRN IV DECREASED GLUCOSE;  Start 11/21/17 

at 14:30


Dextrose (D50w Syringe) 50 ml Q15M  PRN IV DECREASED GLUCOSE;  Start 11/21/17 

at 14:30


Glucagon (Glucagen) 1 mg Q15M  PRN IM DECREASED GLUCOSE;  Start 11/21/17 at 14:

30


Glucose (Glutose) 15 gm Q15M  PRN BUCCAL DECREASED GLUCOSE;  Start 11/21/17 at 

14:30


Dexamethasone (Decadron) 4 mg DAILY PO  Last administered on 11/22/17at 08:11; 

Admin Dose 4 MG;  Start 11/21/17 at 20:30


Tamoxifen Citrate (Nolvadex) 20 mg DAILY@19 PO  Last administered on 11/21/17at 

20:46; Admin Dose 20 MG;  Start 11/21/17 at 20:30











SAMEER ROE NP Nov 22, 2017 13:05

## 2017-11-23 VITALS — DIASTOLIC BLOOD PRESSURE: 73 MMHG | SYSTOLIC BLOOD PRESSURE: 124 MMHG | RESPIRATION RATE: 18 BRPM

## 2017-11-23 VITALS — RESPIRATION RATE: 16 BRPM | SYSTOLIC BLOOD PRESSURE: 131 MMHG | DIASTOLIC BLOOD PRESSURE: 76 MMHG

## 2017-11-23 LAB
ABNORMAL IP MESSAGE: 1
ANION GAP SERPL CALC-SCNC: 12 MMOL/L (ref 8–16)
BASOPHILS # BLD AUTO: 0 10^3/UL (ref 0–0.1)
BASOPHILS NFR BLD: 0.1 % (ref 0–2)
BUN SERPL-MCNC: 12 MG/DL (ref 7–20)
CALCIUM SERPL-MCNC: 7.9 MG/DL (ref 8.4–10.2)
CHLORIDE SERPL-SCNC: 104 MMOL/L (ref 97–110)
CO2 SERPL-SCNC: 27 MMOL/L (ref 21–31)
CREAT SERPL-MCNC: 0.39 MG/DL (ref 0.44–1)
EOSINOPHIL # BLD: 0 10^3/UL (ref 0–0.5)
EOSINOPHIL NFR BLD: 0 % (ref 0–7)
ERYTHROCYTE [DISTWIDTH] IN BLOOD BY AUTOMATED COUNT: 23.1 % (ref 11.5–14.5)
GLUCOSE SERPL-MCNC: 135 MG/DL (ref 70–220)
HCT VFR BLD CALC: 26.2 % (ref 37–47)
HGB BLD-MCNC: 8.2 G/DL (ref 12–16)
LYMPHOCYTES # BLD AUTO: 1.1 10^3/UL (ref 0.8–2.9)
LYMPHOCYTES NFR BLD AUTO: 11.6 % (ref 15–51)
MAGNESIUM SERPL-MCNC: 1.8 MG/DL (ref 1.7–2.5)
MCH RBC QN AUTO: 25.1 PG (ref 29–33)
MCHC RBC AUTO-ENTMCNC: 31.3 G/DL (ref 32–37)
MCV RBC AUTO: 80.1 FL (ref 82–101)
MONOCYTES # BLD: 0.7 10^3/UL (ref 0.3–0.9)
MONOCYTES NFR BLD: 7.1 % (ref 0–11)
NEUTROPHILS # BLD: 7.7 10^3/UL (ref 1.6–7.5)
NEUTROPHILS NFR BLD AUTO: 78.8 % (ref 39–77)
NRBC # BLD MANUAL: 0 10^3/UL (ref 0–0)
NRBC BLD AUTO-RTO: 0 /100WBC (ref 0–0)
PLATELET # BLD: 117 10^3/UL (ref 140–415)
PMV BLD AUTO: 9.6 FL (ref 7.4–10.4)
POSITIVE DIFF: (no result)
POTASSIUM SERPL-SCNC: 3.5 MMOL/L (ref 3.5–5.1)
RBC # BLD AUTO: 3.27 10^6/UL (ref 4.2–5.4)
SODIUM SERPL-SCNC: 139 MMOL/L (ref 135–144)
WBC # BLD AUTO: 9.8 10^3/UL (ref 4.8–10.8)

## 2017-11-23 RX ADMIN — FAMOTIDINE SCH MG: 20 TABLET ORAL at 08:23

## 2017-11-23 RX ADMIN — LEVOFLOXACIN SCH MLS/HR: 500 INJECTION, SOLUTION INTRAVENOUS at 12:03

## 2017-11-23 RX ADMIN — FOLIC ACID SCH MLS/HR: 5 INJECTION, SOLUTION INTRAMUSCULAR; INTRAVENOUS; SUBCUTANEOUS at 01:21

## 2017-11-23 RX ADMIN — IPRATROPIUM BROMIDE AND ALBUTEROL SULFATE SCH ML: .5; 3 SOLUTION RESPIRATORY (INHALATION) at 09:04

## 2017-11-23 NOTE — RADRPT
PROCEDURE:   CT Chest without contrast. 

 

CLINICAL INDICATION: Pleural effusion. Right mastectomy.

 

TECHNIQUE:   CT scan of the chest without contrast was performed on a multidetector high-resolution 
CT scanner.  Coronal and sagittal reformatted images were obtained from the axial source images. The
 total exam CTDI equals 16.52  mGy and the total exam DLP equals 727.49 mGy-cm.

 

One or more of the following dose reduction techniques were utilized:

1.) Automated exposure control

2.) Adjustment of the mA +/- kV according to patient's size

3.) Use of iterative reconstruction technique.

 

COMPARISON:  Plain film chest dated 11/21/2017. CT examination of the chest dated 10/26/2017.

 

FINDINGS:

Loculated pleural effusions and right lung with pleural thickening suggesting metastatic neoplasm. S
urgical changes of right mastectomy is suggest prior breast cancer. IV contrast enhanced CT examinat
ion of the chest would more sensitive and specific for distinguishing loculated pleural effusion fro
m pleural thickening and pleural metastatic neoplasm masses. Likely solid pleural-based mass at the 
right heart border measuring 43 x 32 x 30 mm. 

 

Loculated pleural effusions and metastatic deposits to the right thorax forearm appear increased ove
r interval since 10/26/2017.

 

Right axillary adenopathy measures up to 23 x 16 x 12 mm, and likely represents metastatic right santos
ast cancer. This is similar in appearance to the prior examination.

 

[id number] x 7 x 5 cm empyema at the right lung base, with partially calcified margins and scattered a
ir. This is new over interval since 10/26/2017.

 

Right supraclavicular lymph node measures 25 x 16 x 13 mm. This is increased in size over interval. 
Extensive mediastinal adenopathy seen with larger lymph nodes measuring up to about 23 x 19 x 13 mm.
 The largest lymph node is increased in size over interval.

 

The vascular structures of the mediastinum are normal in course and caliber.  Aortic vascular calcif
ications are mild in the thoracic aorta; and no atherosclerotic calcifications are identified in the
 coronary arteries.  The heart size is normal without evidence for pericardial thickening or effusio
n.

 

The surrounding chest wall is unremarkable.  Extensive faintly visualized metastatic deposits throug
hout the liver.

 

The surrounding osseous structures are remarkable for sclerotic changes in the sternum compatible wi
th osseous metastatic breast cancer. Osseous metastatic deposits are also likely present C7, T3 and 
T9 humeral bodies, as well as at the right humeral head and the acromion of the left scapula. These 
findings are mildly increased over the interval. A nuclear medicine bone scan may be of further use.

 

IMPRESSION:

1. Right mastectomy and right axillary adenopathy compatible with metastatic breast cancer.

2. New right lung base empyema, with partially calcified margins and air.

3. Increased loculated pleural effusion throughout the right lung.

4. Increased scattered nodular and mass-like pleural changes in the right hemithorax compatible with
 metastatic neoplasm.

5. A contrast enhanced CT examination would be more sensitive and specific for evaluating the right 
hemithorax.

6. Extensive mediastinal and right supraclavicular adenopathy, increased over interval.

7. Mildly increased extensive osseous metastatic neoplasm deposits, and nuclear medicine bone scan m
ay be of further use.

8. Extensive metastatic process throughout the liver, without significant change.

 

RPTAT: UU

_____________________________________________ 

Physician Santi           Date    Time 

Electronically viewed and signed by Physician Santi on 11/23/2017 01:58 

 

D:  11/23/2017 01:58  T:  11/23/2017 01:58

RS/

## 2017-11-23 NOTE — PN
Date/Time of Note


Date/Time of Note


DATE: 11/23/17 


TIME: 11:58





Assessment/Plan


VTE Prophylaxis


VTE Prophylaxis Intervention:  ambulation, SCD's





Lines/Catheters


IV Catheter Type (from Nrsg):  PICC Line


Central line still needed:  Yes





Assessment/Plan


Chief Complaint/Hosp Course


s:


11.23: fell yesterday, CT head WNL, got caught up with her IV line cord





O:


Physical exam





General: Patient is laying in bed and answers questions appropriately


Mentation: Patient is alert and oriented 4,


Head: R side s/p craniectomy, well healed


Eyes: EOMI, pupils reactive to light


Neck: Supple, nontender, midline


Respiratory: coarse to auscultation bilaterally


Cardiovascular: regular rate, no obvious murmurs


Gastrointestinal: non-tender to palpation, bowel sounds heard.


Neurological: Moves all extremities spontaneously


Skin: No new skin lesions





This is a 47-year-old female with metastatic breast cancer who was recently 

diagnosed with empyema and receiving IV antibiotics, presented to the emergency 

room for worsening shortness of breath and subjective fevers.





1.  Pneumonia in the setting of recent parapneumonic effusion with staph aureus 

empyema.  Clinically improving.


-Continue abx.  Follow-up with ID recommendations.


-Bronchodilators, oxygen 


-CT Chest showing empyema/loculated effusion


-pulm and CT surgery consulted





2.  Sepsis, likely secondary to #1.  Improving.


-Continue antibiotics, IV fluids.  Follow-up pancultures.





3.  Anemia with iron deficiency and metastatic cancer history.  H&H stable.


-Monitor.





4. Metastatic breast cancer with metastases to lung, liver, brain, craniectomy





5. Recurrent pleural effusion with  and Staph aureus empyema 2/2 malignancy.  

Patient also had history of Pleurx catheter placed in the past.


-Status post recent VATS.  





6.  Prediabetes, previous A1c 6.2.


-Carbohydrate controlled diet.  Continue insulin in-house with steroids she is 

taking.





DISPO


-pulm/CT consulted


-ID recs appreciated


Problems:  





Exam/Review of Systems


Vital Signs


Vitals





 Vital Signs








  Date Time  Temp Pulse Resp B/P Pulse Ox O2 Delivery O2 Flow Rate FiO2


 


11/23/17 10:37      Nasal Cannula 2.0 


 


11/23/17 09:04  89 20  95   21


 


11/23/17 08:05 98.3   124/73    














 Intake and Output   


 


 11/22/17 11/22/17 11/23/17





 14:59 22:59 06:59


 


Intake Total 100 ml 1305 ml 590 ml


 


Balance 100 ml 1305 ml 590 ml











Results


Result Diagram:  


11/23/17 0454                                                                  

              11/23/17 0452





Results 24 hrs





Laboratory Tests








Test


  11/22/17


18:20 11/22/17


20:45 11/23/17


02:12 11/23/17


04:52


 


Bedside Glucose 143   206   170   


 


Sodium Level    139  


 


Potassium Level    3.5  


 


Chloride Level    104  


 


Carbon Dioxide Level    27  


 


Anion Gap    12  


 


Blood Urea Nitrogen    12  


 


Creatinine    0.39  L


 


Glucose Level    135  


 


Calcium Level    7.9  L


 


Magnesium Level    1.8  














Test


  11/23/17


04:54 11/23/17


08:17 


  


 


 


White Blood Count 9.8     


 


Red Blood Count 3.27  L   


 


Hemoglobin 8.2  L   


 


Hematocrit 26.2  L   


 


Mean Corpuscular Volume 80.1  L   


 


Mean Corpuscular Hemoglobin 25.1  L   


 


Mean Corpuscular Hemoglobin


Concent 31.3  L


  


  


  


 


 


Red Cell Distribution Width 23.1  H   


 


Platelet Count 117  L   


 


Mean Platelet Volume 9.6     


 


Neutrophils % 78.8  H   


 


Lymphocytes % 11.6  L   


 


Monocytes % 7.1     


 


Eosinophils % 0.0     


 


Basophils % 0.1     


 


Nucleated Red Blood Cells % 0.0     


 


Neutrophils # 7.7  H   


 


Lymphocytes # 1.1     


 


Monocytes # 0.7     


 


Eosinophils # 0.0     


 


Basophils # 0.0     


 


Nucleated Red Blood Cells # 0.0     


 


Bedside Glucose  106    











Medications


Medications





 Current Medications


Sodium Chloride (NS) 1,000 ml @  75 mls/hr [id number] IV  Last administered on 11/ 23/17at 01:21; Admin Dose 75 MLS/HR;  Start 11/21/17 at 11:13


Ondansetron HCl (Zofran Inj) 4 mg Q6H  PRN IV NAUSEA AND/OR VOMITING;  Start 11/ 21/17 at 11:30


Acetaminophen (Tylenol Tab) 650 mg Q6H  PRN PO PAIN LEVEL 1-3 OR FEVER;  Start 

11/21/17 at 11:30


Acetaminophen (Tylenol Supp) 650 mg Q6H  PRN AR PAIN LEVEL 1-3 OR FEVER;  Start 

11/21/17 at 11:30


Morphine Sulfate (morphine) 2 mg Q4H  PRN IV SEVERE PAIN LEVEL 7-10;  Start 11/ 21/17 at 11:30


Bisacodyl (Dulcolax Supp) 10 mg Q24H  PRN AR PRN;  Start 11/21/17 at 11:30


Docusate Sodium (Colace) 200 mg QHS PO  Last administered on 11/22/17at 20:49; 

Admin Dose 200 MG;  Start 11/21/17 at 21:00


Famotidine (Pepcid) 20 mg DAILY PO  Last administered on 11/23/17at 08:23; 

Admin Dose 20 MG;  Start 11/22/17 at 09:00


Magnesium Hydroxide (Milk Of Mag) 30 ml QHS PO  Last administered on 11/22/17at 

20:49; Admin Dose 30 ML;  Start 11/21/17 at 21:00


Sodium Biphosphate/ Sodium Phosphate 66.6 ml 66.6 ml Q2D  PRN AR CONSTIPATION;  

Start 11/21/17 at 11:30


Levofloxacin/ Dextrose (Levaquin 500mg/ D5W 100 ml (Pmx)) 100 ml @  100 mls/hr 

Q24H IVPB  Last administered on 11/22/17at 12:00; Admin Dose 100 MLS/HR;  Start 

11/21/17 at 11:30


Diagnostic Test (Pha) (Accu-Chek) 1 ea 02 XX  Last administered on 11/23/17at 02

:15; Admin Dose 1 EA;  Start 11/22/17 at 02:00


Miscellaneous Information 1 ea NOTE XX ;  Start 11/21/17 at 14:30


Glucose (Glutose) 15 gm Q15M  PRN PO DECREASED GLUCOSE;  Start 11/21/17 at 14:30


Glucose (Glutose) 22.5 gm Q15M  PRN PO DECREASED GLUCOSE;  Start 11/21/17 at 14:

30


Dextrose (D50w Syringe) 25 ml Q15M  PRN IV DECREASED GLUCOSE;  Start 11/21/17 

at 14:30


Dextrose (D50w Syringe) 50 ml Q15M  PRN IV DECREASED GLUCOSE;  Start 11/21/17 

at 14:30


Glucagon (Glucagen) 1 mg Q15M  PRN IM DECREASED GLUCOSE;  Start 11/21/17 at 14:

30


Glucose (Glutose) 15 gm Q15M  PRN BUCCAL DECREASED GLUCOSE;  Start 11/21/17 at 

14:30


Dexamethasone (Decadron) 4 mg DAILY PO  Last administered on 11/23/17at 08:23; 

Admin Dose 4 MG;  Start 11/21/17 at 20:30


Tamoxifen Citrate 20 mg 20 mg DAILY@19 PO  Last administered on 11/22/17at 20:48

; Admin Dose 20 MG;  Start 11/21/17 at 20:30


Ceftriaxone Sodium (Rocephin) 50 ml @  100 mls/hr Q24H IVPB  Last administered 

on 11/22/17at 15:51; Admin Dose 100 MLS/HR;  Start 11/22/17 at 14:30


Fluconazole (Diflucan) 100 mg DAILY PO  Last administered on 11/23/17at 08:22; 

Admin Dose 100 MG;  Start 11/23/17 at 09:00











SHIREEN ASHBY Nov 23, 2017 12:00

## 2017-11-23 NOTE — DS
Date/Time of Note


Date/Time of Note


DATE: 11/23/17 


TIME: 13:58





Discharge Summary


Admission/Discharge Info


Admit Date/Time


Nov 21, 2017 at 08:20


Discharge Date/Time





Patient Condition:  Stable


Hospital Course


Patient is a 47-year-old female with metastatic breast cancer recently 

diagnosed with empyema status post pleurodesis on previous admission who 

presented for worsening shortness of breath and subjective fevers.  Patient was 

originally started on IV antibiotics and CT of the chest was taken.  However 

upon evaluation of the CT chest by pulmonology, there is no discrete pocket 

where thoracentesis may be of benefit and the fluid will be persistent in this 

patient with metastatic malignant pleural effusions.  The chart was reviewed 

and since there was no elevation in white count or other signs of true infection

, patient will be discharged to follow-up with her outpatient hematologist 

within 4 days.  Clear instructions were given to the patient to resume home 

medications with a repeat urinalysis in the outpatient setting.  Patient states 

that she wanted to go home and will follow up with her outpatient oncologist, 

and will return to the ED if symptoms persist.  Her original symptoms of 

shortness of breath and subjective fevers were likely multifactorial including 

volume depletion, sequelae of radiation therapy and the overall effects of 

malignant carcinoma.





Discharge diagnosis


Shortness of breath, likely secondary to malignant chronic pleural effusion and 

metastatic cancer


Metastatic breast cancer with metastases to bone, liver, brain


Sepsis, ruled out


Anemia, chronic


Home Meds


Active Scripts


Blood-Glucose Meter (BLOOD GLUCOSE MONITORING) 1 Each Kit, 1 EACH MC for 

ELEVATED GLUCOSE, #1


   Prov:RIVERA RAWLS MD         11/15/17


Front Wheel Walker* (Front Wheel Walker*) 1 Each Dme, 1 EACH MC AS DIRECTED, #1 

DME 0 Refills


   Prov:RIVERA RAWLS MD         11/15/17


Reported Medications


Tamoxifen Citrate* (Nolvadex*) 20 Mg Tablet, 20 MG PO DAILY, TAB


   10/24/17


Famotidine* (Famotidine*) 20 Mg Tablet, 20 MG PO DAILY, #30 TAB


   10/24/17


Dexamethasone* (Dexamethasone*) 4 Mg Tablet, 4 MG PO BID, TAB


   10/24/17


Cranberry Extract (Cranberry) 425 Mg Capsule, 425 MG PO DAILY, CAP


   10/24/17


Acetaminophen* (Acetaminophen*) 500 MG Extra Strength Tablet, 1000 MG PO Q6H Y 

for PAIN 4-6/10, TAB


   10/24/17


Sod Phosphate/Sod Biphosphate* (Fleet* Enema Pediatric) 66.6 Ml Soln, 66.6 ML 

WA Q2D Y for CONSTIPATION, ENEMA


   10/24/17


Bisacodyl* (Bisacodyl*) 10 Mg Supp, 10 MG WA Q24H Y for PRN, SUPP


   10/24/17


Magnesium Hydroxide* (Milk Of Magnesia*) 400 Mg/5 Ml Oral.susp, 30 ML PO QHS, ML


   10/24/17


Docusate Sodium* (Colace*) 100 Mg Capsule, 200 MG PO QHS, #30 CAP


   10/24/17


Insulin Aspart* (Novolog Insulin Pen*) 100 Unit/Ml Soln, 0 SC .SLIDING SCALE AC

, EA


   0-150=0, 151-200=2 UNITS, 201-250=4 UNITS, 251-300=6 UNITS, 301-350=8


   UNITS, 351-400=10 UNITS,>400=12 UNITS; DEJAN BRYANT IF BS BELOW 70


   10/24/17


Follow-up Plan


1. Continue to take all home medications including decadron and tamoxifen


2. Follow up with Dr. Claudy GALLEGO for chemotherapy


3. return to ED if symptoms worsen


Primary Care Provider


Ani Loco M.D.


Time spent on discharge:   > 30 minutes


Pending Labs





Laboratory Tests








Test


  11/22/17


18:20 11/22/17


20:45 11/23/17


02:12 11/23/17


04:52


 


Bedside Glucose


  143mg/dL


() 206mg/dL


() 170mg/dL


() 


 


 


Sodium Level


  


  


  


  139mmol/L


(135-144)


 


Potassium Level


  


  


  


  3.5mmol/L


(3.5-5.1)


 


Chloride Level


  


  


  


  104mmol/L


()


 


Carbon Dioxide Level


  


  


  


  27mmol/L


(21-31)


 


Anion Gap    12 (8-16) 


 


Blood Urea Nitrogen    12mg/dl (7-20) 


 


Creatinine


  


  


  


  0.39mg/dl


(0.44-1.00)


 


Glucose Level


  


  


  


  135mg/dl


()


 


Calcium Level


  


  


  


  7.9mg/dl


(8.4-10.2)


 


Magnesium Level


  


  


  


  1.8mg/dl


(1.7-2.5)














Test


  11/23/17


04:54 11/23/17


08:17 11/23/17


12:01 


 


 


White Blood Count


  9.810^3/ul


(4.8-10.8) 


  


  


 


 


Red Blood Count


  3.2710^6/ul


(4.20-5.40) 


  


  


 


 


Hemoglobin


  8.2g/dl


(12.0-16.0) 


  


  


 


 


Hematocrit


  26.2%


(37.0-47.0) 


  


  


 


 


Mean Corpuscular Volume


  80.1fl


(82.0-101.0) 


  


  


 


 


Mean Corpuscular Hemoglobin


  25.1pg


(29.0-33.0) 


  


  


 


 


Mean Corpuscular Hemoglobin


Concent 31.3g/dl


(32.0-37.0) 


  


  


 


 


Red Cell Distribution Width


  23.1%


(11.5-14.5) 


  


  


 


 


Platelet Count


  35794^3/UL


(140-415) 


  


  


 


 


Mean Platelet Volume


  9.6fl


(7.4-10.4) 


  


  


 


 


Neutrophils %


  78.8%


(39.0-77.0) 


  


  


 


 


Lymphocytes %


  11.6%


(15.0-51.0) 


  


  


 


 


Monocytes %


  7.1%


(0.0-11.0) 


  


  


 


 


Eosinophils % 0.0% (0.0-7.0)    


 


Basophils % 0.1% (0.0-2.0)    


 


Nucleated Red Blood Cells %


  0.0/100WBC


(0.0-0.0) 


  


  


 


 


Neutrophils #


  7.710^3/ul


(1.6-7.5) 


  


  


 


 


Lymphocytes #


  1.110^3/ul


(0.8-2.9) 


  


  


 


 


Monocytes #


  0.710^3/ul


(0.3-0.9) 


  


  


 


 


Eosinophils #


  0.010^3/ul


(0.0-0.5) 


  


  


 


 


Basophils #


  0.010^3/ul


(0.0-0.1) 


  


  


 


 


Nucleated Red Blood Cells #


  0.010^3/ul


(0.0-0.0) 


  


  


 


 


Bedside Glucose


  


  106mg/dL


() 138mg/dL


() 


 

















SHIREEN ASHBY Nov 23, 2017 13:58

## 2017-11-23 NOTE — CONS
Date/Time of Note


Date/Time of Note


DATE: 11/23/17 


TIME: 12:43





Assessment/Plan


Assessment/Plan


Chief Complaint/Hosp Course


Patient is alert, looks comfortable, wants to go home.  No fevers





Microbiology: Urine culture growing Candida albicans and gamma hemolytic strep 

species, blood cultures negative





Antimicrobials: Rocephin, fluconazole, Levaquin





Physical examination obese, well-developed middle-aged  woman who is 

awake in no distress head atraumatic normocephalic sclerae nonicteric vehicle 

mucosa pink neck is supple chest rise symmetrical breath sounds diminished 

bases heart S1-S2 abdomen soft bowel sounds present extremities without cyanosis





Assessment:


1. S/p SOB on admission


2. UTI


3. ? PNA


4.  Hx LORELEI empyema,  s/p decortication with pleurodesis 10/31/17


5.  Metastatic breast cancer.


6.  Diabetes.


7.  History of craniectomy for brain metastasis.





Plan: Stable, no dysuria, continue antibiotics,  follow oncology recommendations


DW pt


Problems:  





Consultation Date/Type/Reason


Admit Date/Time


Nov 21, 2017 at 08:20


Initial Consult Date


11/21/17


Type of Consultation:  ID


Referring Provider:  ANG LEUNG





Exam/Review of Systems


Vital Signs


Vitals





 Vital Signs








  Date Time  Temp Pulse Resp B/P Pulse Ox O2 Delivery O2 Flow Rate FiO2


 


11/23/17 10:37      Nasal Cannula 2.0 


 


11/23/17 09:04  89 20  95   21


 


11/23/17 08:05 98.3   124/73    














 Intake and Output   


 


 11/22/17 11/22/17 11/23/17





 14:59 22:59 06:59


 


Intake Total 100 ml 1305 ml 590 ml


 


Balance 100 ml 1305 ml 590 ml











Results


Result Diagram:  


11/23/17 0454                                                                  

              11/23/17 0452





Results 24 hrs





Laboratory Tests








Test


  11/22/17


18:20 11/22/17


20:45 11/23/17


02:12 11/23/17


04:52


 


Bedside Glucose 143   206   170   


 


Sodium Level    139  


 


Potassium Level    3.5  


 


Chloride Level    104  


 


Carbon Dioxide Level    27  


 


Anion Gap    12  


 


Blood Urea Nitrogen    12  


 


Creatinine    0.39  L


 


Glucose Level    135  


 


Calcium Level    7.9  L


 


Magnesium Level    1.8  














Test


  11/23/17


04:54 11/23/17


08:17 11/23/17


12:01 


 


 


White Blood Count 9.8     


 


Red Blood Count 3.27  L   


 


Hemoglobin 8.2  L   


 


Hematocrit 26.2  L   


 


Mean Corpuscular Volume 80.1  L   


 


Mean Corpuscular Hemoglobin 25.1  L   


 


Mean Corpuscular Hemoglobin


Concent 31.3  L


  


  


  


 


 


Red Cell Distribution Width 23.1  H   


 


Platelet Count 117  L   


 


Mean Platelet Volume 9.6     


 


Neutrophils % 78.8  H   


 


Lymphocytes % 11.6  L   


 


Monocytes % 7.1     


 


Eosinophils % 0.0     


 


Basophils % 0.1     


 


Nucleated Red Blood Cells % 0.0     


 


Neutrophils # 7.7  H   


 


Lymphocytes # 1.1     


 


Monocytes # 0.7     


 


Eosinophils # 0.0     


 


Basophils # 0.0     


 


Nucleated Red Blood Cells # 0.0     


 


Bedside Glucose  106   138   











Medications


Medications





 Current Medications


Sodium Chloride (NS) 1,000 ml @  75 mls/hr [id number] IV  Last administered on 11/ 23/17at 01:21; Admin Dose 75 MLS/HR;  Start 11/21/17 at 11:13


Ondansetron HCl (Zofran Inj) 4 mg Q6H  PRN IV NAUSEA AND/OR VOMITING;  Start 11/ 21/17 at 11:30


Acetaminophen (Tylenol Tab) 650 mg Q6H  PRN PO PAIN LEVEL 1-3 OR FEVER;  Start 

11/21/17 at 11:30


Acetaminophen (Tylenol Supp) 650 mg Q6H  PRN OK PAIN LEVEL 1-3 OR FEVER;  Start 

11/21/17 at 11:30


Morphine Sulfate (morphine) 2 mg Q4H  PRN IV SEVERE PAIN LEVEL 7-10;  Start 11/ 21/17 at 11:30


Bisacodyl (Dulcolax Supp) 10 mg Q24H  PRN OK PRN;  Start 11/21/17 at 11:30


Docusate Sodium (Colace) 200 mg QHS PO  Last administered on 11/22/17at 20:49; 

Admin Dose 200 MG;  Start 11/21/17 at 21:00


Famotidine (Pepcid) 20 mg DAILY PO  Last administered on 11/23/17at 08:23; 

Admin Dose 20 MG;  Start 11/22/17 at 09:00


Magnesium Hydroxide (Milk Of Mag) 30 ml QHS PO  Last administered on 11/22/17at 

20:49; Admin Dose 30 ML;  Start 11/21/17 at 21:00


Sodium Biphosphate/ Sodium Phosphate 66.6 ml 66.6 ml Q2D  PRN OK CONSTIPATION;  

Start 11/21/17 at 11:30


Levofloxacin/ Dextrose (Levaquin 500mg/ D5W 100 ml (Pmx)) 100 ml @  100 mls/hr 

Q24H IVPB  Last administered on 11/23/17at 12:03; Admin Dose 100 MLS/HR;  Start 

11/21/17 at 11:30


Diagnostic Test (Pha) (Accu-Chek) 1 ea 02 XX  Last administered on 11/23/17at 02

:15; Admin Dose 1 EA;  Start 11/22/17 at 02:00


Miscellaneous Information 1 ea NOTE XX ;  Start 11/21/17 at 14:30


Glucose (Glutose) 15 gm Q15M  PRN PO DECREASED GLUCOSE;  Start 11/21/17 at 14:30


Glucose (Glutose) 22.5 gm Q15M  PRN PO DECREASED GLUCOSE;  Start 11/21/17 at 14:

30


Dextrose (D50w Syringe) 25 ml Q15M  PRN IV DECREASED GLUCOSE;  Start 11/21/17 

at 14:30


Dextrose (D50w Syringe) 50 ml Q15M  PRN IV DECREASED GLUCOSE;  Start 11/21/17 

at 14:30


Glucagon (Glucagen) 1 mg Q15M  PRN IM DECREASED GLUCOSE;  Start 11/21/17 at 14:

30


Glucose (Glutose) 15 gm Q15M  PRN BUCCAL DECREASED GLUCOSE;  Start 11/21/17 at 

14:30


Dexamethasone (Decadron) 4 mg DAILY PO  Last administered on 11/23/17at 08:23; 

Admin Dose 4 MG;  Start 11/21/17 at 20:30


Tamoxifen Citrate 20 mg 20 mg DAILY@19 PO  Last administered on 11/22/17at 20:48

; Admin Dose 20 MG;  Start 11/21/17 at 20:30


Ceftriaxone Sodium (Rocephin) 50 ml @  100 mls/hr Q24H IVPB  Last administered 

on 11/22/17at 15:51; Admin Dose 100 MLS/HR;  Start 11/22/17 at 14:30


Fluconazole (Diflucan) 100 mg DAILY PO  Last administered on 11/23/17at 08:22; 

Admin Dose 100 MG;  Start 11/23/17 at 09:00











DIANA RANDALL NP Nov 23, 2017 12:45

## 2017-11-23 NOTE — CONS
DATE OF ADMISSION: 11/21/2017

DATE OF CONSULTATION:  11/23/2017

 

 

 

TYPE OF CONSULTATION:  Pulmonary.

 

REASON FOR CONSULTATION:  Shortness of breath.

 

Thank you,  ____, for this consultation.

 

HISTORY OF PRESENT ILLNESS:  This is a 47-year-old lady with history of metastatic breast cancer wit
h recurrent right pleural effusions, underwent video-assisted thorascopic pleurodesis recently, now 
presents with increasing shortness of breath, orthopnea, PND and a CT of the chest demonstrating rig
ht lung base empyema, loculated pleural effusion.  She describes no fever or chills.  No chest pain 
or palpitations.  No orthopnea or PND.  She has recently completed radiation treatment for brain met
astasis.

 

PAST MEDICAL HISTORY:  As above.

 

MEDICATIONS:  Per chart.

 

ALLERGIES:  NONE.

 

SOCIAL HISTORY:  Nonsmoker, no alcohol, no history of drug use.

 

FAMILY HISTORY:  Noncontributory.

 

SYSTEMS REVIEW:  A 12-point review of systems are negative other than that mentioned.

 

PHYSICAL EXAMINATION:

GENERAL:  Well-nourished, well-developed lady, comfortable at rest, no acute distress.

VITAL SIGNS:  Currently afebrile, pulse is 89, blood pressure 124/73, O2 saturation 95% on 2 liters.

NECK:  Supple.  No JVD or lymphadenopathy.

CARDIAC:  S1, S2, no added sounds or murmurs.

CHEST:  Diminished air entry bilaterally.

ABDOMEN:  Soft, nontender.  No guarding or rebound.

EXTREMITIES:  No cyanosis, clubbing or edema.

NEUROLOGIC:  Grossly intact.

 

IMAGING:  CT shows right frontotemporal craniotomy and cranioplasty.  Chest CT shows right mastectom
y with axillary lymphadenopathy, lung base empyema, loculated pleural effusion on the right, nodular
 masses throughout the lung.

 

IMPRESSION AND PLAN:

1.  Recurrent metastatic pleural effusion.

2.  Progressive metastatic breast cancer.

3.  Status post CNS radiation.  

 

Upon reviewing the CT scan, effusion seems multiloculated, does not appear to be a clear pocket for 
thoracentesis; would therefore defer thoracentesis for now.  Continue with current care and consider
 discharge and outpatient oncology evaluation.

 

 

Dictated By: DELFIN LAKHANI MD

 

SV/NTS

DD:    11/23/2017 13:35:01

DT:    11/23/2017 16:24:47

Conf#: [id number]

DID#:  [id number]

CC: ANG LEUNG MD;*EndCC*

## 2017-11-23 NOTE — PDOCDIS
Discharge Instructions


CONDITION


Patient Condition:  Stable





HOME CARE INSTRUCTIONS:


Special Diet:  carb controlled





FOLLOW UP/APPOINTMENTS


Follow-up Plan


1. Continue to take all home medications including decadron and tamoxifen


2. Follow up with Dr. Claudy GALLEGO for chemotherapy


3. return to ED if symptoms worsen











SHIREEN ASHBY Nov 23, 2017 13:51

## 2018-03-31 ENCOUNTER — HOSPITAL ENCOUNTER (INPATIENT)
Dept: HOSPITAL 91 - E/R | Age: 48
LOS: 4 days | Discharge: HOME | DRG: 180 | End: 2018-04-04
Payer: MEDICARE

## 2018-03-31 ENCOUNTER — HOSPITAL ENCOUNTER (INPATIENT)
Age: 48
LOS: 4 days | Discharge: HOME | DRG: 180 | End: 2018-04-04

## 2018-03-31 DIAGNOSIS — C79.51: ICD-10-CM

## 2018-03-31 DIAGNOSIS — C78.01: Primary | ICD-10-CM

## 2018-03-31 DIAGNOSIS — C79.31: ICD-10-CM

## 2018-03-31 DIAGNOSIS — Z80.3: ICD-10-CM

## 2018-03-31 DIAGNOSIS — J96.90: ICD-10-CM

## 2018-03-31 DIAGNOSIS — J98.11: ICD-10-CM

## 2018-03-31 DIAGNOSIS — J91.0: ICD-10-CM

## 2018-03-31 DIAGNOSIS — D64.9: ICD-10-CM

## 2018-03-31 DIAGNOSIS — C50.919: ICD-10-CM

## 2018-03-31 DIAGNOSIS — C78.7: ICD-10-CM

## 2018-03-31 LAB
ADD MAN DIFF?: NO
ALANINE AMINOTRANSFERASE: 34 IU/L (ref 13–69)
ALBUMIN/GLOBULIN RATIO: 1.18
ALBUMIN: 3.9 G/DL (ref 3.3–4.9)
ALKALINE PHOSPHATASE: 70 IU/L (ref 42–121)
ANION GAP: 20 (ref 8–16)
ASPARTATE AMINO TRANSFERASE: 258 IU/L (ref 15–46)
BASOPHIL #: 0 10^3/UL (ref 0–0.1)
BASOPHILS %: 0.5 % (ref 0–2)
BILIRUBIN,DIRECT: 0 MG/DL (ref 0–0.2)
BILIRUBIN,TOTAL: 0.4 MG/DL (ref 0.2–1.3)
BLOOD UREA NITROGEN: 12 MG/DL (ref 7–20)
CALCIUM: 9.2 MG/DL (ref 8.4–10.2)
CARBON DIOXIDE: 22 MMOL/L (ref 21–31)
CHLORIDE: 104 MMOL/L (ref 97–110)
CREATININE: 0.6 MG/DL (ref 0.44–1)
EOSINOPHILS #: 0.1 10^3/UL (ref 0–0.5)
EOSINOPHILS %: 0.7 % (ref 0–7)
GLOBULIN: 3.3 G/DL (ref 1.3–3.2)
GLUCOSE: 76 MG/DL (ref 70–220)
HEMATOCRIT: 35.4 % (ref 37–47)
HEMOGLOBIN: 11.6 G/DL (ref 12–16)
LACTIC ACID: 1.8 MMOL/L (ref 0.5–2)
LIPASE: 52 U/L (ref 23–300)
LYMPHOCYTES #: 1.4 10^3/UL (ref 0.8–2.9)
LYMPHOCYTES %: 16.7 % (ref 15–51)
MEAN CORPUSCULAR HEMOGLOBIN: 27 PG (ref 29–33)
MEAN CORPUSCULAR HGB CONC: 32.8 G/DL (ref 32–37)
MEAN CORPUSCULAR VOLUME: 82.3 FL (ref 82–101)
MEAN PLATELET VOLUME: 9.6 FL (ref 7.4–10.4)
MONOCYTE #: 0.8 10^3/UL (ref 0.3–0.9)
MONOCYTES %: 9.8 % (ref 0–11)
NEUTROPHIL #: 5.8 10^3/UL (ref 1.6–7.5)
NEUTROPHILS %: 71.6 % (ref 39–77)
NUCLEATED RED BLOOD CELLS #: 0 10^3/UL (ref 0–0)
NUCLEATED RED BLOOD CELLS%: 0 /100WBC (ref 0–0)
PLATELET COUNT: 204 10^3/UL (ref 140–415)
POTASSIUM: 3.9 MMOL/L (ref 3.5–5.1)
RED BLOOD COUNT: 4.3 10^6/UL (ref 4.2–5.4)
RED CELL DISTRIBUTION WIDTH: 16.9 % (ref 11.5–14.5)
SODIUM: 142 MMOL/L (ref 135–144)
TOTAL PROTEIN: 7.2 G/DL (ref 6.1–8.1)
TROPONIN-I: < 0.012 NG/ML (ref 0–0.12)
WHITE BLOOD COUNT: 8.1 10^3/UL (ref 4.8–10.8)

## 2018-03-31 PROCEDURE — 84100 ASSAY OF PHOSPHORUS: CPT

## 2018-03-31 PROCEDURE — 96374 THER/PROPH/DIAG INJ IV PUSH: CPT

## 2018-03-31 PROCEDURE — 94640 AIRWAY INHALATION TREATMENT: CPT

## 2018-03-31 PROCEDURE — 83540 ASSAY OF IRON: CPT

## 2018-03-31 PROCEDURE — 80053 COMPREHEN METABOLIC PANEL: CPT

## 2018-03-31 PROCEDURE — 71045 X-RAY EXAM CHEST 1 VIEW: CPT

## 2018-03-31 PROCEDURE — 80061 LIPID PANEL: CPT

## 2018-03-31 PROCEDURE — 96375 TX/PRO/DX INJ NEW DRUG ADDON: CPT

## 2018-03-31 PROCEDURE — 84443 ASSAY THYROID STIM HORMONE: CPT

## 2018-03-31 PROCEDURE — 36415 COLL VENOUS BLD VENIPUNCTURE: CPT

## 2018-03-31 PROCEDURE — 85610 PROTHROMBIN TIME: CPT

## 2018-03-31 PROCEDURE — 84703 CHORIONIC GONADOTROPIN ASSAY: CPT

## 2018-03-31 PROCEDURE — 85025 COMPLETE CBC W/AUTO DIFF WBC: CPT

## 2018-03-31 PROCEDURE — 83605 ASSAY OF LACTIC ACID: CPT

## 2018-03-31 PROCEDURE — 99285 EMERGENCY DEPT VISIT HI MDM: CPT

## 2018-03-31 PROCEDURE — 87040 BLOOD CULTURE FOR BACTERIA: CPT

## 2018-03-31 PROCEDURE — 70552 MRI BRAIN STEM W/DYE: CPT

## 2018-03-31 PROCEDURE — 74177 CT ABD & PELVIS W/CONTRAST: CPT

## 2018-03-31 PROCEDURE — 71260 CT THORAX DX C+: CPT

## 2018-03-31 PROCEDURE — 83036 HEMOGLOBIN GLYCOSYLATED A1C: CPT

## 2018-03-31 PROCEDURE — 83735 ASSAY OF MAGNESIUM: CPT

## 2018-03-31 PROCEDURE — 84479 ASSAY OF THYROID (T3 OR T4): CPT

## 2018-03-31 PROCEDURE — 76604 US EXAM CHEST: CPT

## 2018-03-31 PROCEDURE — 83690 ASSAY OF LIPASE: CPT

## 2018-03-31 PROCEDURE — 84436 ASSAY OF TOTAL THYROXINE: CPT

## 2018-03-31 PROCEDURE — 80048 BASIC METABOLIC PNL TOTAL CA: CPT

## 2018-03-31 PROCEDURE — 84484 ASSAY OF TROPONIN QUANT: CPT

## 2018-03-31 PROCEDURE — 94664 DEMO&/EVAL PT USE INHALER: CPT

## 2018-03-31 RX ADMIN — ONDANSETRON HYDROCHLORIDE 1 MG: 2 INJECTION, SOLUTION INTRAMUSCULAR; INTRAVENOUS at 11:14

## 2018-03-31 RX ADMIN — ZOLPIDEM TARTRATE 1 MG: 5 TABLET, FILM COATED ORAL at 22:50

## 2018-03-31 RX ADMIN — THIAMINE HYDROCHLORIDE 1 MLS/HR: 100 INJECTION, SOLUTION INTRAMUSCULAR; INTRAVENOUS at 11:14

## 2018-04-01 LAB
INR: 1.36
PROTIME: 17 SEC (ref 11.9–14.9)
PT RATIO: 1.3

## 2018-04-01 RX ADMIN — ONDANSETRON HYDROCHLORIDE 1 MG: 2 INJECTION, SOLUTION INTRAMUSCULAR; INTRAVENOUS at 20:59

## 2018-04-01 RX ADMIN — IPRATROPIUM BROMIDE AND ALBUTEROL SULFATE 1 ML: .5; 3 SOLUTION RESPIRATORY (INHALATION) at 21:06

## 2018-04-01 RX ADMIN — MORPHINE SULFATE 1 MG: 2 INJECTION, SOLUTION INTRAMUSCULAR; INTRAVENOUS at 20:49

## 2018-04-01 RX ADMIN — MORPHINE SULFATE 1 MG: 2 INJECTION, SOLUTION INTRAMUSCULAR; INTRAVENOUS at 01:14

## 2018-04-01 RX ADMIN — CAPECITABINE 1 MG: 500 TABLET, FILM COATED ORAL at 20:57

## 2018-04-01 RX ADMIN — IPRATROPIUM BROMIDE AND ALBUTEROL SULFATE 1 ML: .5; 3 SOLUTION RESPIRATORY (INHALATION) at 14:15

## 2018-04-01 RX ADMIN — IPRATROPIUM BROMIDE AND ALBUTEROL SULFATE 1 ML: .5; 3 SOLUTION RESPIRATORY (INHALATION) at 17:20

## 2018-04-01 RX ADMIN — CAPECITABINE 1 MG: 500 TABLET, FILM COATED ORAL at 15:29

## 2018-04-02 LAB
% IRON SATURATION: 17 % SAT (ref 22–52)
ADD MAN DIFF?: NO
ALANINE AMINOTRANSFERASE: 32 IU/L (ref 13–69)
ALBUMIN/GLOBULIN RATIO: 1.02
ALBUMIN: 3.5 G/DL (ref 3.3–4.9)
ALKALINE PHOSPHATASE: 76 IU/L (ref 42–121)
ANION GAP: 21 (ref 8–16)
ASPARTATE AMINO TRANSFERASE: 307 IU/L (ref 15–46)
BASOPHIL #: 0 10^3/UL (ref 0–0.1)
BASOPHILS %: 0.4 % (ref 0–2)
BILIRUBIN,DIRECT: 0 MG/DL (ref 0–0.2)
BILIRUBIN,TOTAL: 0.7 MG/DL (ref 0.2–1.3)
BLOOD UREA NITROGEN: 11 MG/DL (ref 7–20)
CALCIUM: 8.9 MG/DL (ref 8.4–10.2)
CARBON DIOXIDE: 22 MMOL/L (ref 21–31)
CHLORIDE: 103 MMOL/L (ref 97–110)
CHOL/HDL RATIO: 5.2 RATIO
CHOLESTEROL: 94 MG/DL (ref 100–200)
CREATININE: 0.7 MG/DL (ref 0.44–1)
EOSINOPHILS #: 0.1 10^3/UL (ref 0–0.5)
EOSINOPHILS %: 0.7 % (ref 0–7)
FREE THYROXINE INDEX (CALC): 3.96 UG/ML (ref 0.65–3.89)
GLOBULIN: 3.4 G/DL (ref 1.3–3.2)
GLUCOSE: 82 MG/DL (ref 70–220)
HDL CHOLESTEROL: 18 MG/DL (ref 34–88)
HEMATOCRIT: 34.5 % (ref 37–47)
HEMOGLOBIN A1C: 5.1 % (ref 0–5.9)
HEMOGLOBIN: 11.4 G/DL (ref 12–16)
IRON: 50 UG/DL (ref 35–150)
LDL CHOLESTEROL,CALCULATED: 48 MG/DL
LYMPHOCYTES #: 1.5 10^3/UL (ref 0.8–2.9)
LYMPHOCYTES %: 19.6 % (ref 15–51)
MAGNESIUM: 1.4 MG/DL (ref 1.7–2.5)
MEAN CORPUSCULAR HEMOGLOBIN: 27.7 PG (ref 29–33)
MEAN CORPUSCULAR HGB CONC: 33 G/DL (ref 32–37)
MEAN CORPUSCULAR VOLUME: 83.7 FL (ref 82–101)
MEAN PLATELET VOLUME: 9.4 FL (ref 7.4–10.4)
MONOCYTE #: 0.9 10^3/UL (ref 0.3–0.9)
MONOCYTES %: 11.5 % (ref 0–11)
NEUTROPHIL #: 5 10^3/UL (ref 1.6–7.5)
NEUTROPHILS %: 67.1 % (ref 39–77)
NUCLEATED RED BLOOD CELLS #: 0 10^3/UL (ref 0–0)
NUCLEATED RED BLOOD CELLS%: 0 /100WBC (ref 0–0)
PHOSPHORUS: 4.5 MG/DL (ref 2.5–4.9)
PLATELET COUNT: 201 10^3/UL (ref 140–415)
POTASSIUM: 3.9 MMOL/L (ref 3.5–5.1)
RED BLOOD COUNT: 4.12 10^6/UL (ref 4.2–5.4)
RED CELL DISTRIBUTION WIDTH: 17 % (ref 11.5–14.5)
SODIUM: 142 MMOL/L (ref 135–144)
T3 UPTAKE: 36.3 % (ref 23.5–40.5)
T4 (THYROXINE): 10.9 UG/DL (ref 5.5–11)
THYROID STIMULATING HORMONE: 2.1 MIU/L (ref 0.47–4.68)
TOTAL IRON BINDING CAPACITY: 303 UG/DL (ref 241–421)
TOTAL PROTEIN: 6.9 G/DL (ref 6.1–8.1)
TRIGLYCERIDES: 140 MG/DL (ref 0–149)
WHITE BLOOD COUNT: 7.4 10^3/UL (ref 4.8–10.8)

## 2018-04-02 RX ADMIN — MORPHINE SULFATE 1 MG: 2 INJECTION, SOLUTION INTRAMUSCULAR; INTRAVENOUS at 05:07

## 2018-04-02 RX ADMIN — MAGNESIUM SULFATE HEPTAHYDRATE 1 MLS/HR: 500 INJECTION, SOLUTION INTRAMUSCULAR; INTRAVENOUS at 13:15

## 2018-04-02 RX ADMIN — IPRATROPIUM BROMIDE AND ALBUTEROL SULFATE 1 ML: .5; 3 SOLUTION RESPIRATORY (INHALATION) at 17:00

## 2018-04-02 RX ADMIN — IPRATROPIUM BROMIDE AND ALBUTEROL SULFATE 1 ML: .5; 3 SOLUTION RESPIRATORY (INHALATION) at 10:20

## 2018-04-02 RX ADMIN — CAPECITABINE 1 MG: 500 TABLET, FILM COATED ORAL at 09:00

## 2018-04-02 RX ADMIN — PANTOPRAZOLE SODIUM 1 MG: 40 INJECTION, POWDER, FOR SOLUTION INTRAVENOUS at 05:07

## 2018-04-02 RX ADMIN — IPRATROPIUM BROMIDE AND ALBUTEROL SULFATE 1 ML: .5; 3 SOLUTION RESPIRATORY (INHALATION) at 22:07

## 2018-04-02 RX ADMIN — MORPHINE SULFATE 1 MG: 2 INJECTION, SOLUTION INTRAMUSCULAR; INTRAVENOUS at 13:26

## 2018-04-02 RX ADMIN — IPRATROPIUM BROMIDE AND ALBUTEROL SULFATE 1 ML: .5; 3 SOLUTION RESPIRATORY (INHALATION) at 13:08

## 2018-04-02 RX ADMIN — MORPHINE SULFATE 1 MG: 10 SOLUTION ORAL at 18:40

## 2018-04-02 RX ADMIN — CAPECITABINE 1 MG: 500 TABLET, FILM COATED ORAL at 20:35

## 2018-04-03 LAB
ANION GAP: 18 (ref 8–16)
BLOOD UREA NITROGEN: 9 MG/DL (ref 7–20)
CALCIUM: 8.6 MG/DL (ref 8.4–10.2)
CARBON DIOXIDE: 24 MMOL/L (ref 21–31)
CHLORIDE: 102 MMOL/L (ref 97–110)
CREATININE: 0.65 MG/DL (ref 0.44–1)
GLUCOSE: 78 MG/DL (ref 70–220)
POTASSIUM: 4.3 MMOL/L (ref 3.5–5.1)
SODIUM: 140 MMOL/L (ref 135–144)

## 2018-04-03 RX ADMIN — VASOPRESSIN 1 ML/SEC: 20 INJECTION, SOLUTION INTRAMUSCULAR; SUBCUTANEOUS at 14:12

## 2018-04-03 RX ADMIN — PANTOPRAZOLE SODIUM 1 MG: 40 INJECTION, POWDER, FOR SOLUTION INTRAVENOUS at 06:08

## 2018-04-03 RX ADMIN — FERROUS SULFATE TAB 325 MG (65 MG ELEMENTAL FE) 1 MG: 325 (65 FE) TAB at 11:00

## 2018-04-03 RX ADMIN — IOHEXOL 1 ML: 300 INJECTION, SOLUTION INTRAVENOUS at 14:13

## 2018-04-03 RX ADMIN — BARIUM SULFATE 1 ML: 21 SUSPENSION ORAL at 15:00

## 2018-04-03 RX ADMIN — IPRATROPIUM BROMIDE AND ALBUTEROL SULFATE 1 ML: .5; 3 SOLUTION RESPIRATORY (INHALATION) at 13:00

## 2018-04-03 RX ADMIN — MORPHINE SULFATE 1 MG: 10 SOLUTION ORAL at 00:27

## 2018-04-03 RX ADMIN — IPRATROPIUM BROMIDE AND ALBUTEROL SULFATE 1 ML: .5; 3 SOLUTION RESPIRATORY (INHALATION) at 16:24

## 2018-04-03 RX ADMIN — MAGNESIUM SULFATE HEPTAHYDRATE 1 MLS/HR: 40 INJECTION, SOLUTION INTRAVENOUS at 10:00

## 2018-04-03 RX ADMIN — CAPECITABINE 1 MG: 500 TABLET, FILM COATED ORAL at 09:00

## 2018-04-03 RX ADMIN — IPRATROPIUM BROMIDE AND ALBUTEROL SULFATE 1 ML: .5; 3 SOLUTION RESPIRATORY (INHALATION) at 09:29

## 2018-04-03 RX ADMIN — CAPECITABINE 1 MG: 500 TABLET, FILM COATED ORAL at 21:42

## 2018-04-03 RX ADMIN — SODIUM CHLORIDE 1 ML: 9 INJECTION, SOLUTION INTRAMUSCULAR; INTRAVENOUS; SUBCUTANEOUS at 14:12

## 2018-04-03 RX ADMIN — IPRATROPIUM BROMIDE AND ALBUTEROL SULFATE 1 ML: .5; 3 SOLUTION RESPIRATORY (INHALATION) at 21:53

## 2018-04-03 RX ADMIN — MORPHINE SULFATE 1 MG: 10 SOLUTION ORAL at 14:50

## 2018-04-04 LAB
ANION GAP: 17 (ref 8–16)
BLOOD UREA NITROGEN: 8 MG/DL (ref 7–20)
CALCIUM: 8.9 MG/DL (ref 8.4–10.2)
CARBON DIOXIDE: 23 MMOL/L (ref 21–31)
CHLORIDE: 103 MMOL/L (ref 97–110)
CREATININE: 0.6 MG/DL (ref 0.44–1)
GLUCOSE: 76 MG/DL (ref 70–220)
POTASSIUM: 4.2 MMOL/L (ref 3.5–5.1)
SODIUM: 139 MMOL/L (ref 135–144)

## 2018-04-04 RX ADMIN — HYDROCODONE BITARTRATE AND ACETAMINOPHEN 1 TAB: 5; 325 TABLET ORAL at 15:53

## 2018-04-04 RX ADMIN — IPRATROPIUM BROMIDE AND ALBUTEROL SULFATE 1 ML: .5; 3 SOLUTION RESPIRATORY (INHALATION) at 09:16

## 2018-04-04 RX ADMIN — ONDANSETRON HYDROCHLORIDE 1 MG: 2 INJECTION, SOLUTION INTRAMUSCULAR; INTRAVENOUS at 14:53

## 2018-04-04 RX ADMIN — ONDANSETRON HYDROCHLORIDE 1 MG: 2 INJECTION, SOLUTION INTRAMUSCULAR; INTRAVENOUS at 05:23

## 2018-04-04 RX ADMIN — HYDROCODONE BITARTRATE AND ACETAMINOPHEN 1 TAB: 5; 325 TABLET ORAL at 05:23

## 2018-04-04 RX ADMIN — PANTOPRAZOLE SODIUM 1 MG: 40 INJECTION, POWDER, FOR SOLUTION INTRAVENOUS at 05:23

## 2018-04-04 RX ADMIN — FERROUS SULFATE TAB 325 MG (65 MG ELEMENTAL FE) 1 MG: 325 (65 FE) TAB at 08:28

## 2018-04-04 RX ADMIN — MORPHINE SULFATE 1 MG: 10 SOLUTION ORAL at 01:01

## 2018-04-04 RX ADMIN — IPRATROPIUM BROMIDE AND ALBUTEROL SULFATE 1 ML: .5; 3 SOLUTION RESPIRATORY (INHALATION) at 12:57

## 2018-04-04 RX ADMIN — HYDROCODONE BITARTRATE AND ACETAMINOPHEN 1 TAB: 5; 325 TABLET ORAL at 14:52

## 2018-04-04 RX ADMIN — IPRATROPIUM BROMIDE AND ALBUTEROL SULFATE 1 ML: .5; 3 SOLUTION RESPIRATORY (INHALATION) at 17:30

## 2018-04-04 RX ADMIN — CAPECITABINE 1 MG: 500 TABLET, FILM COATED ORAL at 08:27

## 2023-06-16 NOTE — PN
----- Message from [facility] sent at 6/16/2023 10:36 AM CDT -----  Regarding: refill  .Type:  RX Refill Request    Who Called: [facility]    Refill or New Rx: busPIRone (BUSPAR) 10 MG tablet      How is the patient currently taking it? (ex. 1XDay): one day    Is this a 30 day or 90 day RX: 90    Preferred Pharmacy with phone number: [phone]    Local or Mail Order: mail    Ordering Provider: Ross Zhao Call Back Number: [phone]    Additional Information:  refill         Date/Time of Note


Date/Time of Note


DATE: 11/1/17 


TIME: 18:39





Assessment/Plan


Lines/Catheters


IV Catheter Type (from Nrs):  A Line


Guerrero in Place (from Nrs):  Yes





Assessment/Plan


Chief Complaint/Hosp Course


 


IMPRESSION:  Right loculated pleural effusion.


 


R status post right VATS pleurodesis decortication


Patient feels much better


Less shortness of breath


Check chest x-ray


Will continue chest tube suction


Problems:  





Subjective


24 Hr Interval Summary


Constitutional:  improved


Pain Control:  mild





Exam/Review of Systems


Vital Signs


Vitals





 Vital Signs








  Date Time  Temp Pulse Resp B/P Pulse Ox O2 Delivery O2 Flow Rate FiO2


 


11/1/17 16:00  84      


 


11/1/17 15:00   19 119/68 96 Nasal Cannula  


 


11/1/17 12:00       2.0 


 


11/1/17 12:00 97.7       














 Intake and Output   


 


 10/31/17 10/31/17 11/1/17





 15:00 23:00 07:00


 


Intake Total 260 ml 1060 ml 590 ml


 


Output Total  1180 ml 995 ml


 


Balance 260 ml -120 ml -405 ml











Exam


Neck:  non-tender, supple


Respiratory:  clear to auscultation, normal air movement


Cardiovascular:  nl pulses, regular rate and rhythm


Gastrointestinal:  nl liver, spleen, non-tender, soft





Results


Result Diagram:  


11/1/17 0415                                                                   

             11/1/17 0415














HEIDY MAXWELL MD Nov 1, 2017 18:39